# Patient Record
Sex: MALE | Race: WHITE | NOT HISPANIC OR LATINO | Employment: OTHER | URBAN - METROPOLITAN AREA
[De-identification: names, ages, dates, MRNs, and addresses within clinical notes are randomized per-mention and may not be internally consistent; named-entity substitution may affect disease eponyms.]

---

## 2017-01-12 ENCOUNTER — ALLSCRIPTS OFFICE VISIT (OUTPATIENT)
Dept: OTHER | Facility: OTHER | Age: 67
End: 2017-01-12

## 2017-02-10 ENCOUNTER — ALLSCRIPTS OFFICE VISIT (OUTPATIENT)
Dept: OTHER | Facility: OTHER | Age: 67
End: 2017-02-10

## 2017-02-10 ENCOUNTER — GENERIC CONVERSION - ENCOUNTER (OUTPATIENT)
Dept: OTHER | Facility: OTHER | Age: 67
End: 2017-02-10

## 2017-02-10 ENCOUNTER — APPOINTMENT (OUTPATIENT)
Dept: LAB | Facility: CLINIC | Age: 67
End: 2017-02-10
Payer: MEDICARE

## 2017-02-10 DIAGNOSIS — R35.1 NOCTURIA: ICD-10-CM

## 2017-02-10 DIAGNOSIS — R53.83 OTHER FATIGUE: ICD-10-CM

## 2017-02-10 LAB
ALBUMIN SERPL BCP-MCNC: 3.5 G/DL (ref 3.5–5)
ALP SERPL-CCNC: 57 U/L (ref 46–116)
ALT SERPL W P-5'-P-CCNC: 28 U/L (ref 12–78)
ANION GAP SERPL CALCULATED.3IONS-SCNC: 6 MMOL/L (ref 4–13)
AST SERPL W P-5'-P-CCNC: 18 U/L (ref 5–45)
BILIRUB SERPL-MCNC: 0.46 MG/DL (ref 0.2–1)
BUN SERPL-MCNC: 12 MG/DL (ref 5–25)
CALCIUM SERPL-MCNC: 9.8 MG/DL (ref 8.3–10.1)
CHLORIDE SERPL-SCNC: 108 MMOL/L (ref 100–108)
CO2 SERPL-SCNC: 28 MMOL/L (ref 21–32)
CREAT SERPL-MCNC: 1.12 MG/DL (ref 0.6–1.3)
GFR SERPL CREATININE-BSD FRML MDRD: >60 ML/MIN/1.73SQ M
GLUCOSE SERPL-MCNC: 92 MG/DL (ref 65–140)
POTASSIUM SERPL-SCNC: 4.4 MMOL/L (ref 3.5–5.3)
PROT SERPL-MCNC: 6.7 G/DL (ref 6.4–8.2)
PSA SERPL-MCNC: 0.7 NG/ML (ref 0–4)
SODIUM SERPL-SCNC: 142 MMOL/L (ref 136–145)
TSH SERPL DL<=0.05 MIU/L-ACNC: 2.57 UIU/ML (ref 0.36–3.74)

## 2017-02-10 PROCEDURE — 80053 COMPREHEN METABOLIC PANEL: CPT

## 2017-02-10 PROCEDURE — 84443 ASSAY THYROID STIM HORMONE: CPT

## 2017-02-10 PROCEDURE — G0103 PSA SCREENING: HCPCS

## 2017-02-10 PROCEDURE — 36415 COLL VENOUS BLD VENIPUNCTURE: CPT

## 2017-02-28 ENCOUNTER — ALLSCRIPTS OFFICE VISIT (OUTPATIENT)
Dept: OTHER | Facility: OTHER | Age: 67
End: 2017-02-28

## 2017-03-23 ENCOUNTER — ALLSCRIPTS OFFICE VISIT (OUTPATIENT)
Dept: OTHER | Facility: OTHER | Age: 67
End: 2017-03-23

## 2017-07-10 ENCOUNTER — ALLSCRIPTS OFFICE VISIT (OUTPATIENT)
Dept: OTHER | Facility: OTHER | Age: 67
End: 2017-07-10

## 2017-08-03 ENCOUNTER — ALLSCRIPTS OFFICE VISIT (OUTPATIENT)
Dept: OTHER | Facility: OTHER | Age: 67
End: 2017-08-03

## 2017-08-15 ENCOUNTER — GENERIC CONVERSION - ENCOUNTER (OUTPATIENT)
Dept: OTHER | Facility: OTHER | Age: 67
End: 2017-08-15

## 2017-08-15 ENCOUNTER — LAB REQUISITION (OUTPATIENT)
Dept: LAB | Facility: HOSPITAL | Age: 67
End: 2017-08-15
Payer: MEDICARE

## 2017-08-15 DIAGNOSIS — K62.1 RECTAL POLYP: ICD-10-CM

## 2017-08-15 DIAGNOSIS — Z86.010 HISTORY OF COLONIC POLYPS: ICD-10-CM

## 2017-08-15 DIAGNOSIS — K57.30 DIVERTICULOSIS OF LARGE INTESTINE WITHOUT PERFORATION OR ABSCESS WITHOUT BLEEDING: ICD-10-CM

## 2017-08-15 PROCEDURE — 88305 TISSUE EXAM BY PATHOLOGIST: CPT | Performed by: INTERNAL MEDICINE

## 2017-10-19 ENCOUNTER — GENERIC CONVERSION - ENCOUNTER (OUTPATIENT)
Dept: OTHER | Facility: OTHER | Age: 67
End: 2017-10-19

## 2017-10-28 NOTE — PROGRESS NOTES
Assessment    1  Polyneuropathy (356 9) (G62 9)   2  Trigeminal neuralgia (350 1) (G50 0)    Plan   Polyneuropathy, Trigeminal neuralgia    · Follow-up visit in 3 months Evaluation and Treatment  Follow-up  Status: Complete Done: [id number]   Ordered; Polyneuropathy, Trigeminal neuralgia; Ordered By: Sukhwinder Coello Performed:  Due: 71TTX4018; Last Updated By: Kyle Kiser; 7/10/2017 10:10:44 AM  OXcarbazepine 150 MG Oral Tablet; 1 tab qHS; Therapy: 68DQP6987 to (Last Rx:09Qxa4665)  Requested for: 05MYS9465; Status: ACTIVE Ordered Rx By: Sukhwinder Coello; Dispense: 0 Days ; #:30 Tablet; Refill: 2;  For: Trigeminal neuralgia; NOELLE = N; Verified Transmission to [address]; Last Updated By: [email or web address]@; 7/19/2017 3:57:27 PM   Discussion/Summary  Discussion Summary:   Overall since starting Lyrica he reports less severe pain so he will continue this dose but will not increase dose at this time  Last KFT in Feb is normal  For facial pain will add a small dose of Trileptal qHS, and consider Verapamil if not helpful  No autonomic features, no migrainous features, but headaches do somewhat mimic exertional or high pressure headaches as the pain in his right eye worsens when he sneezes  polyneuropathypossibly idiopathic at this point, no causes in blood work or specific cause in EMGpossibly low back pain contributing factorcan always repeat EMGwill try supplement(s) (as below) in the meantime and discussed to stay as active as possibleAlpha-lipoic acid 600 mg BID, B-complex, Continue B12  sleep and facial pain:try Trileptal/ oxcarbazepine first, if not helpful can try Trazodone or sleep medicine referralWean from Lamictal- 75 mg x 3 days, 50 mg x 3 days, then 25 mg x 3 days, then stop (ineffective per pt)continue Lyrica  patient will follow up in 3 months or earlier if needed  The patient was encouraged to call in the meantime with any questions or concerns   The patient was told to call 911 or report to the nearest ER with any new or worsening neurological symptoms  He expressed understanding of the plan and was appreciative  Medication SE Review and Pt Understands Tx: Possible side effects of new medications were reviewed with the patient/guardian today  The treatment plan was reviewed with the patient/guardian  The patient/guardian understands and agrees with the treatment plan   Patient Guardian understands agrees: The treatment plan was reviewed with the patient/guardian  The patient/guardian understands and agrees with the treatment plan      Chief Complaint  Chief Complaint Free Text Note Form: Patient present for follow up regarding Trigeminal neuralgia      History of Present Illness  HPI: Judy Trevino is a 79year old male presenting to the office today in neurological follow up of trigeminal neuralgia  is s/p Right V2 Rhizotomy by Dr Johanna Solitario on 8/8/2014  In addition he has a previous history of prior gamma knife radiosurgery in 2007 and 2010 by Dr Nelson Alfonso and Dr Erick Powell for his trigeminal neuralgia  recently seen by neurosurgery s/p trigeminal RF rhizotomy (--2014 and pain went away for about 1 5 years) and 2 gamma knife procedures (--2008 and --2011)  last seen facial pain is worse and constant  Constant ?background pain? and breakthrough or severe episodes are 1-2 times daily  pain level- 4-5/10daily; 1-2 times daily it is severe for several minutesconstant; 15 minutes when a severe episode occursaverage pain level 4-5/10 ? background pain,? up to 9/10 when severeright side V2>V1; just below the right eye; new at upper right forehead (where gamma knife frame was screwed into his head)sharp, dull, nagging, ice pick likeunsure, ?eating/ chewingmakes it worse: sneezing makes it 10/10  medications:  Lyrica, baclofen, aspirin, tramadol, amitriptyline (stopped due to fatigue), carbamazepine, cymbalta- no improvement, lamictal- no improvementtx: As noted above-- Hx of right sided rhizotomy with improvement of pain but wishes to hold on repeat rhizotomy   knee pain R>L:Chronic condition, unchanged since last seen, has caused falls due to pain, pain worsening over time  Follows with orthopedics and noticed injections do help, injections help with balance, did see ortho had inj no improvement  Started approx  September 2015, since last has improved, believes due to knees not brain  Will intermittently become lightheaded/sensation of self spinning occurs les often, occurs intermittently with walking/exertion/change directions but less frequent, will occur with quick movements/standing quickly, will last approx  1-2 seconds often occurs with severe pain  Does have hx of meningioma  brain- stable  changes- Since last seen unchanged  Started approx  February 2016, located in all toes, no radiation, constant, sensation of tingling and numbness  No progression over time  June 2016- subacute to chronic polyneuropathy  6-7 hours every nightSome nights 4 hours but that is unusualDenies clenching, denies snoringRestless leg sxs, tingling in feet as wellNever seen sleep specialist  of systems, Past medical history, Surgical history, Family history, Social history and Medication history were all reviewed today  Review of Systems  Neurological ROS:  Constitutional: no fever, no chills, no recent weight gain, no recent weight loss, no complaints of feeling tired, no changes in appetite  HEENT: hearing loss-- and-- tinnitus  Cardiovascular:  no chest pain or pressure, no palpitations present, the heart rate was not rapid or irregular, no swelling in the arms or legs, no poor circulation  Respiratory:  no unusual or persistant cough, no shortness of breath with or without exertion  Gastrointestinal: loss of bowel control  Genitourinary:  no incontinence, no feelings of urinary urgency, no increase in frequency, no urinary hesitancy, no dysuria, no hematuria    Musculoskeletal: arthralgias,-- immobility or loss of function,-- head/neck/back pain-- and-- pain while walking  Integumentary  no masses, no rash, no skin lesions, no livedo reticularis  Psychiatric:  no anxiety, no depression, no mood swings, no psychiatric hospitalizations, no sleep problems  Endocrine loss of sexual ability or drive   Hematologic/Lymphatic:  no unusual bleeding, no tendency for easy bruising, no clotting skin or lumps  Neurological General:  no headache, no nausea or vomiting, no lightheadedness, no convulsions, no blackouts, no syncope, no trauma, no photopsia, no increased sleepiness, no trouble falling asleep, no snoring, no awakening at night  Neurological Mental Status: memory problems  Neurological Cranial Nerves:  no blurry or double vision, no loss of vision, no face drooping, no facial numbness or weakness, no taste or smell loss/changes, no hearing loss or ringing, no vertigo or dizziness, no dysphagia, no slurred speech  Neurological Motor findings include:  no tremor, no twitching, no cramping(pre/post exercise), no atrophy  Neurological Coordination: balance difficulties-- and-- clumsiness  Neurological Sensory: tingling  Neurological Gait: difficulty walking  ROS Reviewed:   ROS reviewed  Active Problems     1  Abnormal tandem gait test (781 2) (R26 9)   2  Aftercare following surgery (V58 89) (Z48 89)   3  Anemia (285 9) (D64 9)   4  Backache (724 5) (M54 9)   5  Bilateral patellofemoral syndrome (719 46) (M22 2X1,M22 2X2)   6  Esophageal reflux (530 81) (K21 9)   7  Fatigue (780 79) (R53 83)   8  Gastroenteritis (558 9) (K52 9)   9  Intracranial meningioma (225 2) (D32 0)   10  Knee pain (719 46) (M25 569)   11  Need for pneumococcal vaccination (V03 82) (Z23)   12  Nocturia (788 43) (R35 1)   13  Paresthesia (782 0) (R20 2)   14  Past pointing on examination (334 3) (R27 8)   15  Polyneuropathy (356 9) (G62 9)   16  Primary osteoarthritis of knees, bilateral (715 16) (M17 0)   17   Prostate cancer screening (V76 44) (Z12 5)   18  Sensory disturbance (782 0) (R20 9)   19  Trigeminal neuralgia (350 1) (G50 0)   20  Urinary frequency (788 41) (R35 0)   21  Vertigo (780 4) (R42)   22  Vitamin D deficiency (268 9) (E55 9)  Primary osteoarthritis of right knee (715 16) (M17 11)       Surgical History  1  History of Appendectomy   2  History of Arthroscopy Knee Right   3  History of Brain Surgery   4  History of Hernia Repair   5  History of Stereotactic Ablation Of Gasserian Ganglion    Family History  Mother    1  Family history of emphysema (V17 6) (Z82 5)  Father    2  Family history of benign neoplasm of colon (V19 8) (Z84 89)  Maternal Grandmother    3  Family history of    4  Family history of skin cancer (V16 8) (Z80 8)  Paternal Grandmother    11  Family history of   Family History    6  Family history of Family Health Status Of Father -    9  Family history of Family Health Status Of Mother -     Social History     · Being A Social Drinker   · Caffeine use (V49 89) (F15 90)   · Daily caffeinated cola consumption   · Drinks coffee   · Denied: History of Drug use   · Marital History - Currently    · Never a smoker   · Never A Smoker   · Occupation:    Current Meds   1  Aspirin 325 MG Oral Tablet; TAKE 1 TABLET DAILY; Therapy: (Recorded:82Fll1705) to Recorded   2  Baclofen 10 MG Oral Tablet; Take 1 tablet every 8 hours as needed for pain; Therapy: 66QHL4433 to (Rigo Fisher)  Requested for: 59VOZ0510; Last Rx:2016 Ordered   3  Calcium CAPS; TAKE ONE CAPLET BY MOUTH DAILY; Therapy: (Recorded:62Czr5701) to Recorded   4  LamoTRIgine 100 MG Oral Tablet; Take 1 tablet qhs until done titration then 1 tab bid; Therapy: 32ZAC9586 to (Evaluate:90Bzw0767)  Requested for: 16LIB2411; Last Rx:2017 Ordered   5  LamoTRIgine 25 MG Oral Tablet; 1 tab qam x 5 days, then 2 tab qam x 5 days, then 3 tab qam x 5 day, then 100mg bid;  Therapy: 61SWU6336 to (Evaluate:2018)  Requested for: 91RRU8699; Last Rx:23Mar2017 Ordered   6  Lyrica 200 MG Oral Capsule; take 2 caps Q AM, 1 capsule in the afternoon and 1 cap Q PM; Last Rx:11Jzc5103 Ordered   7  PreserVision AREDS TABS; TAKE 1 TABLET DAILY; Therapy: (Recorded:24Jun2015) to Recorded   8  Protonix 40 MG Oral Tablet Delayed Release; take 1 tab daily; Therapy: (Recorded:24Jun2015) to Recorded   9  Tylenol Extra Strength 500 MG Oral Tablet; TAKE 1 TABLET EVERY 4 TO 6 HOURS AS NEEDED; Therapy: (Recorded:24Jun2015) to Recorded   10  Vitamin B-12 TABS; Take 1 tablet by mouth once daily; Therapy: (Recorded:10Feb2017) to Recorded   11  Vitamin B6 TABS; TAKE 1 TABLET DAILY AS DIRECTED; Therapy: (Recorded:10Feb2017) to Recorded   12  Vitamin D3 CAPS; Take 1000 IU by mouth daily; Therapy: (Recorded:10Feb2017) to Recorded    Allergies    1  No Known Drug Allergies    Vitals  Signs   Recorded: 64Aep4572 09:22AM   Heart Rate: 81  Respiration: 16  Systolic: 562  Diastolic: 66  Weight: 375 lb 3 oz  BMI Calculated: 36 9  BSA Calculated: 2 32  O2 Saturation: 97    Physical Exam   Constitutional  General appearance: Abnormal   obese  Musculoskeletal  Gait and station: Abnormal  -- (cannot walk in tandem) Gait evaluation demonstrated a wide-based and ataxic gait  Muscle strength: Normal strength throughout  Muscle tone: No atrophy, abnormal movements, flaccidity, cogwheeling or spasticity  Neurologic  Language: Names objects, able to repeat phrases and speaks spontaneously  Language:  The evaluation was normal   2nd cranial nerve: Normal    3rd, 4th, and 6th cranial nerves: Normal    5th cranial nerve: Normal    7th cranial nerve: Normal    8th cranial nerve: Normal    9th cranial nerve: Normal    10th cranial nerve: Normal    11th cranial nerve: Normal    12th cranial nerve: Normal    Sensation: Normal    Reflexes: Abnormal   Deep tendon reflexes: 1+ right patella,-- 1+ left patella,-- 1+ right ankle jerk-- and-- 1+ left ankle jerk2+ right biceps,-- 2+ left biceps,-- 2+ right triceps,-- 2+ left triceps,-- 2+ right brachioradialis,-- 2+ left brachioradialis,-- no ankle clonus on the right-- and-- no ankle clonus on the left  Coordination: Normal    Mood and affect: Normal  -- very pleasant  Future Appointments    Date/Time Provider Specialty Site   10/12/2017 02:00 PM Ha Sampson, [facility]       Signatures   Electronically signed by : Randi Drake [facility]; Jul 23 2017  8:23PM EST                       (Author)    Electronically signed by :  Kaelyn Talavera DO; Oct  9 2017  4:31PM EST                       (Author)

## 2018-01-13 VITALS
BODY MASS INDEX: 36.51 KG/M2 | HEART RATE: 83 BPM | WEIGHT: 255 LBS | DIASTOLIC BLOOD PRESSURE: 70 MMHG | HEIGHT: 70 IN | SYSTOLIC BLOOD PRESSURE: 112 MMHG

## 2018-01-13 VITALS
SYSTOLIC BLOOD PRESSURE: 124 MMHG | HEART RATE: 60 BPM | OXYGEN SATURATION: 97 % | BODY MASS INDEX: 37.79 KG/M2 | WEIGHT: 263.4 LBS | DIASTOLIC BLOOD PRESSURE: 74 MMHG

## 2018-01-14 VITALS
BODY MASS INDEX: 37.58 KG/M2 | HEART RATE: 72 BPM | RESPIRATION RATE: 18 BRPM | WEIGHT: 262.5 LBS | SYSTOLIC BLOOD PRESSURE: 128 MMHG | HEIGHT: 70 IN | DIASTOLIC BLOOD PRESSURE: 78 MMHG

## 2018-01-15 VITALS
SYSTOLIC BLOOD PRESSURE: 102 MMHG | WEIGHT: 257.19 LBS | DIASTOLIC BLOOD PRESSURE: 66 MMHG | RESPIRATION RATE: 16 BRPM | OXYGEN SATURATION: 97 % | BODY MASS INDEX: 36.9 KG/M2 | HEART RATE: 81 BPM

## 2018-01-15 VITALS
BODY MASS INDEX: 38.38 KG/M2 | RESPIRATION RATE: 18 BRPM | DIASTOLIC BLOOD PRESSURE: 75 MMHG | WEIGHT: 267.5 LBS | HEART RATE: 78 BPM | SYSTOLIC BLOOD PRESSURE: 117 MMHG

## 2018-01-15 VITALS
WEIGHT: 268 LBS | RESPIRATION RATE: 16 BRPM | HEART RATE: 64 BPM | TEMPERATURE: 97.8 F | DIASTOLIC BLOOD PRESSURE: 70 MMHG | HEIGHT: 70 IN | SYSTOLIC BLOOD PRESSURE: 140 MMHG | BODY MASS INDEX: 38.37 KG/M2

## 2018-01-15 NOTE — RESULT NOTES
Message   all bw was ok for pt     Verified Results  (1) COMPREHENSIVE METABOLIC PANEL [id number] 08:13OC Lindsey Quintero Order Number: [id number]     Test Name Result Flag Reference   GLUCOSE,RANDM 92 mg/dL     If the patient is fasting, the ADA then defines impaired fasting glucose as > 100 mg/dL and diabetes as > or equal to 123 mg/dL  SODIUM 142 mmol/L  136-145   POTASSIUM 4 4 mmol/L  3 5-5 3   CHLORIDE 108 mmol/L  100-108   CARBON DIOXIDE 28 mmol/L  21-32   ANION GAP (CALC) 6 mmol/L  4-13   BLOOD UREA NITROGEN 12 mg/dL  5-25   CREATININE 1 12 mg/dL  0 60-1 30   Standardized to IDMS reference method   CALCIUM 9 8 mg/dL  8 3-10 1   BILI, TOTAL 0 46 mg/dL  0 20-1 00   ALK PHOSPHATAS 57 U/L     ALT (SGPT) 28 U/L  12-78   AST(SGOT) 18 U/L  5-45   ALBUMIN 3 5 g/dL  3 5-5 0   TOTAL PROTEIN 6 7 g/dL  6 4-8 2   eGFR Non-African American      >60 0 ml/min/1 73sq m   - Patient Instructions: This bloodwork is non-fasting  Please drink two glasses of water morning of bloodwork  National Kidney Disease Education Program recommendations are as follows:  GFR calculation is accurate only with a steady state creatinine  Chronic Kidney disease less than 60 ml/min/1 73 sq  meters  Kidney failure less than 15 ml/min/1 73 sq  meters  (1) TSH 83OPE2474 50:77BP Lindsey Quintero Order Number: [id number]     Test Name Result Flag Reference   TSH 2 570 uIU/mL  0 358-3 740   - Patient Instructions: This bloodwork is non-fasting  Please drink two glasses of water morning of bloodwork  - Patient Instructions: This bloodwork is non-fasting  Please drink two glasses of water morning of bloodwork  Patients undergoing fluorescein dye angiography may retain small amounts of fluorescein in the body for 48-72 hours post procedure  Samples containing fluorescein can produce falsely depressed TSH values  If the patient had this procedure,a specimen should be resubmitted post fluorescein clearance  (1) PSA (SCREEN) (Dx V76 44 Screen for Prostate Cancer) 12HJG2139 70:38OH Anniece June Order Number: [id number]     Test Name Result Flag Reference   PROSTATE SPECIFIC ANTIGEN 0 7 ng/mL  0 0-4 0   American Urological Association Guidelines define biochemical recurrence of prostate cancer as a detectable or rising PSA value post-radical prostatectomy that is greater than or equal to 0 2 ng/mL with a second confirmatory level of greater than or equal to 0 2 ng/mL  - Patient Instructions: This test is non-fasting  Please drink two glasses of water morning of bloodwork  - Patient Instructions: This test is non-fasting  Please drink two glasses of water morning of bloodwork

## 2018-01-22 VITALS
BODY MASS INDEX: 36.7 KG/M2 | HEART RATE: 67 BPM | WEIGHT: 256.38 LBS | HEIGHT: 70 IN | SYSTOLIC BLOOD PRESSURE: 108 MMHG | DIASTOLIC BLOOD PRESSURE: 60 MMHG

## 2018-01-23 ENCOUNTER — ALLSCRIPTS OFFICE VISIT (OUTPATIENT)
Dept: OTHER | Facility: OTHER | Age: 68
End: 2018-01-23

## 2018-01-24 NOTE — PROGRESS NOTES
Assessment   1  Trigeminal neuralgia (350 1) (G50 0)   2  Intracranial meningioma (225 2) (D32 0)    Plan   Intracranial meningioma    · 1 - Ben Egan MD, Geraldine Troy  (Neurosurgery) Co-Management  *pt previous patient  Wants    to know if any options available surgically  Status: Active  Requested for: [id number]   Ordered; For: Intracranial meningioma; Ordered By: Jean Harmon Performed:  Due: 55DAW4932; Last Updated By: Peyton Newton; 1/23/2018 2:01:18 PM  Care Summary provided  : Yes  Intracranial meningioma, Paresthesia, Trigeminal neuralgia    · Follow-up visit in 2 months Evaluation and Treatment  Follow-up  Status: Complete     Done: [id number]   Ordered; For: Intracranial meningioma, Paresthesia, Trigeminal neuralgia; Ordered By: Jean Harmon Performed:  Due: 37PJA2625; Last Updated By: Peyton Newton; 1/23/2018 2:00:58 PM  Sensory disturbance, Trigeminal neuralgia    · LamoTRIgine 25 MG Oral Tablet   Rx By: Jean Harmon; Dispense: 90 Days ; #:360 Tablet; Refill: 3;For: Sensory disturbance, Trigeminal neuralgia; NOELLE = N; Sent To: [address]  Trigeminal neuralgia    · OXcarbazepine 150 MG Oral Tablet; 1 tablet daily for 5 days then increase to 1    tablet twice  a day   Rx By: Jean Harmon; Dispense: 0 Days ; #:60 Tablet; Refill: 3;For: Trigeminal neuralgia; NOELLE = N; Verified Transmission to [address]; Last Updated By: System, [employer]; 1/23/2018 1:58:45 PM    Discussion/Summary   Discussion Summary:    Preventative: Lamictal 100 mg twice a day Lyrica as previously prescribed oxcarbazepine 150 mg at bedtime for 5 days  Increase to 1 tablet twice a day after that  If any confusion or disorientation, we will discontinue that medication and start Dilantin  Finally, if this fails we may want to try to apply for Botox   follow up with Dr Cristobal Ugalde in 2 months  Patient and wife noted call with any problems or concerns prior to this appointment        Chief Complaint   Chief Complaint Free Text Note Form: Patient present for follow-up appointment regarding trigeminal neuralgia      History of Present Illness   HPI: Sandie Meade is a 79year old male presenting to the office today in neurological follow up of trigeminal neuralgia  is s/p Right V2 Rhizotomy by Dr Lida Mcrae on 8/8/2014  In addition he has a previous history of prior gamma knife radiosurgery in 2007 and 2010 by Dr Fadumo Pelaez and Dr Gonzalez Guerrero for his trigeminal neuralgia  recently seen by neurosurgery s/p trigeminal RF rhizotomy (--2014 and pain went away for about 1 5 years) and 2 gamma knife procedures (--2008 and --2011)  last seen facial pain is worse and constant  Constant âbackground painâ and breakthrough or severe episodes are 1-2 times daily  pain level- 7/10 daily; always presents, lowest 1-2/10, 1-2 times daily it is severe for several minutes 10/10 constant; 15 minutes when a severe episode occurs average pain level 4-5/10 âbackground pain,â up to 10/10 when severe right side V2>V1; just below the right eye; new at upper right forehead (where gamma knife frame was screwed into his head) sharp, dull, nagging, ice pick like unsure, ?eating/ chewing makes it worse: sneezing makes it 10/10   medications:   Lyrica, baclofen, aspirin, tramadol, amitriptyline (stopped due to fatigue), carbamazepine-makes goofy/confusion, cymbalta- no improvement, ,trileptal-dizzy, confusion, lamictal tx: As noted above-- Hx of right sided rhizotomy with improvement of pain but wishes to hold on repeat rhizotomy      knee pain R>L: Chronic condition, unchanged since last seen, has caused falls due to pain, pain worsening over time  Follows with orthopedics and noticed injections do help, injections help with balance, did see ortho had inj no improvement   Started approx  September 2015, since last has improved, believes due to knees not brain   Will intermittently become lightheaded/sensation of self spinning occurs less often, occurs intermittently with walking/exertion/change directions but less frequent, will occur with quick movements/standing quickly, will last approx  1-2 seconds often occurs with severe pain  Does have hx of meningioma  brain- stable   changes- Since last seen unchanged  Started approx  February 2016, located in all toes, no radiation, constant, sensation of tingling and numbness  No progression over time  June 2016- subacute to chronic polyneuropathy   goes to bed between 12-1 am, get up at 8-815am 6-7 hours every night Some nights 4 hours but that is unusual some daytime naps 10-90 minutes Denies clenching, denies snoring Restless leg sxs, tingling in feet as well Never seen sleep specialist   of systems, Past medical history, Surgical history, Family history, Social history and Medication history were all reviewed today  Review of Systems   Neurological ROS:      Constitutional: no fever, no chills, no recent weight gain, no recent weight loss, no complaints of feeling tired, no changes in appetite  HEENT: blurred vision-- and-- hearing loss  Cardiovascular:  no chest pain or pressure, no palpitations present, the heart rate was not rapid or irregular, no swelling in the arms or legs, no poor circulation  Respiratory:  no unusual or persistant cough, no shortness of breath with or without exertion  Gastrointestinal:  no nausea, no vomiting, no diarrhea, no abdominal pain, no changes in bowel habits, no melena, no loss of bowel control  Genitourinary: feelings of urinary urgency  Musculoskeletal: arthralgias-- and-- pain while walking  Integumentary  no masses, no rash, no skin lesions, no livedo reticularis  Psychiatric:  no anxiety, no depression, no mood swings, no psychiatric hospitalizations, no sleep problems  Endocrine   no unusual weight loss or gain, no excessive urination, no excessive thirst, no hair loss or gain, no hot or cold intolerance, no menstrual period change or irregularity, no loss of sexual ability or drive, no erection difficulty, no nipple discharge  Hematologic/Lymphatic:  no unusual bleeding, no tendency for easy bruising, no clotting skin or lumps  Neurological General: headache  Neurological Mental Status: memory problems  Neurological Cranial Nerves: facial numbness or weakness  Neurological Motor findings include:  no tremor, no twitching, no cramping(pre/post exercise), no atrophy  Neurological Coordination: clumsiness  Neurological Sensory:  no numbness, no pain, no tingling, does not fall when eyes closed or taking a shower  Neurological Gait: difficulty walking  ROS Reviewed:    ROS reviewed  Active Problems   1  Abnormal tandem gait test (781 2) (R26 9)   2  Aftercare following surgery (V58 89) (Z48 89)   3  Anemia (285 9) (D64 9)   4  Backache (724 5) (M54 9)   5  Bilateral patellofemoral syndrome (719 46) (M22 2X1,M22 2X2)   6  Diverticulosis of colon (562 10) (K57 30)   7  Esophageal reflux (530 81) (K21 9)   8  Fatigue (780 79) (R53 83)   9  Gastroenteritis (558 9) (K52 9)   10  History of colon polyps (V12 72) (Z86 010)   11  Intracranial meningioma (225 2) (D32 0)   12  Knee pain (719 46) (M25 569)   13  Need for pneumococcal vaccination (V03 82) (Z23)   14  Nocturia (788 43) (R35 1)   15  Paresthesia (782 0) (R20 2)   16  Past pointing on examination (334 3) (R27 8)   17  Polyneuropathy (356 9) (G62 9)   18  Primary osteoarthritis of knees, bilateral (715 16) (M17 0)   19  Primary osteoarthritis of right knee (715 16) (M17 11)   20  Prostate cancer screening (V76 44) (Z12 5)   21  Screening for colon cancer (V76 51) (Z12 11)   22  Sensory disturbance (782 0) (R20 9)   23  Trigeminal neuralgia (350 1) (G50 0)   24  Urinary frequency (788 41) (R35 0)   25  Vertigo (780 4) (R42)   26   Vitamin D deficiency (268 9) (E55 9)    Past Medical History   Active Problems And Past Medical History Reviewed: The active problems and past medical history were reviewed and updated today  Surgical History   1  History of Appendectomy   2  History of Arthroscopy Knee Right   3  History of Brain Surgery   4  History of Hernia Repair   5  History of Stereotactic Ablation Of Gasserian Ganglion  Surgical History Reviewed: The surgical history was reviewed and updated today  Family History   Mother    1  Family history of emphysema (V17 6) (Z82 5)  Father    2  Family history of benign neoplasm of colon (V19 8) (Z84 89)  Maternal Grandmother    3  Family history of    4  Family history of skin cancer (V16 8) (Z80 8)  Paternal Grandmother    11  Family history of   Family History    6  Family history of Family Health Status Of Father -    9  Family history of Family Health Status Of Mother -   Family History Reviewed: The family history was reviewed and updated today  Social History    · Being A Social Drinker   · Caffeine use (V49 89) (F15 90)   · Daily caffeinated cola consumption   · Drinks coffee   · Denied: History of Drug use   · Marital History - Currently    · Never a smoker   · Never A Smoker   · Occupation:  Social History Reviewed: The social history was reviewed and updated today  The social history was reviewed and is unchanged  Current Meds    1  Aspirin 325 MG Oral Tablet; TAKE 1 TABLET DAILY; Therapy: (Recorded:64Gxb8962) to Recorded   2  Baclofen 10 MG Oral Tablet; Take 1 tablet every 8 hours as needed for pain; Therapy: 47JKV2036 to (Evaluate:2018)  Requested for: 11NUI8192; Last     Rx:2017 Ordered   3  Calcium CAPS; TAKE ONE CAPLET BY MOUTH DAILY; Therapy: (Recorded:03Hrk8143) to Recorded   4  LamoTRIgine 100 MG Oral Tablet; Take 1 tablet qhs until done titration then 1 tab bid; Therapy: 79XXO9694 to (Evaluate:2018)  Requested for: 2017; Last     Rx:2017 Ordered   5   LamoTRIgine 25 MG Oral Tablet; 1 tab qam x 5 days, then 2 tab qam x 5 days, then 3     tab qam x 5 day, then 100mg bid; Therapy: 34DPS3574 to (Evaluate:74Kpl4669)  Requested for: 19Oct2017; Last     Rx:19Oct2017 Ordered   6  Lyrica 200 MG Oral Capsule; take 2 caps Q AM, 1 capsule in the afternoon and 1 cap Q     PM; Last Rx:19Oct2017 Ordered   7  PreserVision AREDS TABS; TAKE 1 TABLET DAILY; Therapy: (Recorded:24Jun2015) to Recorded   8  Tylenol Extra Strength 500 MG Oral Tablet; TAKE 1 TABLET EVERY 4 TO 6 HOURS AS     NEEDED; Therapy: (Recorded:24Jun2015) to Recorded   9  Vitamin B-12 TABS; Take 1 tablet by mouth once daily; Therapy: (Recorded:00Tct7463) to Recorded   10  Vitamin B6 TABS; TAKE 1 TABLET DAILY AS DIRECTED; Therapy: (Recorded:77Rqq8445) to Recorded   11  Vitamin D3 CAPS; Take 1000 IU by mouth daily; Therapy: (Recorded:36Wjv8632) to Recorded  Medication List Reviewed: The medication list was reviewed and updated today  Allergies   1  No Known Drug Allergies    Vitals   Signs   Recorded: 29YFC2009 01:27PM   Heart Rate: 68  Respiration: 16  Systolic: 410  Diastolic: 66  Height: 5 ft 10 in  Weight: 258 lb 7 oz  BMI Calculated: 37 08  BSA Calculated: 2 33    Physical Exam        Constitutional      General appearance: No acute distress, well appearing and well nourished  Eyes      Ophthalmoscopic examination: Vision is grossly normal  Gross visual field testing by confrontation shows no abnormalities  EOMI in both eyes  Conjunctivae clear  Eyelids normal palpebral fissures equal  Orbits exhibit normal position  No discharge from the eyes  PERRL  Musculoskeletal      Gait and station: Normal gait, stance and balance  Muscle strength: Normal strength throughout  Neurologic      Orientation to person, place, and time: Normal        Attention span and concentration: Normal thought process and attention span         2nd cranial nerve: Normal        3rd, 4th, and 6th cranial nerves: Normal        5th cranial nerve: Normal        7th cranial nerve: Normal        8th cranial nerve: Normal        9th cranial nerve: Normal        11th cranial nerve: Normal        12th cranial nerve: Normal        Sensation: Normal        Reflexes: Abnormal   Deep tendon reflexes: 1+ right biceps,-- 1+ left biceps,-- 1+ right brachioradialis,-- 1+ left brachioradialis,-- 1+ right patella-- and-- 1+ left patella  Coordination: Normal        Mood and affect: Abnormal  -- flat  Attending Note   Collaborating Physician Note: Collaborating Note: I agree with the Advanced Practitioner note        Signatures    Electronically signed by : Jimena Barton [facility]; Jan 23 2018  2:57PM EST                       (Author)     Electronically signed by : Rigo Giordano MD; Jan 23 2018  3:48PM EST                       (Co-participant)

## 2018-01-25 ENCOUNTER — TELEPHONE (OUTPATIENT)
Dept: NEUROLOGY | Facility: CLINIC | Age: 68
End: 2018-01-25

## 2018-01-25 DIAGNOSIS — G50.0 TRIGEMINAL NEURALGIA: Primary | ICD-10-CM

## 2018-01-25 NOTE — TELEPHONE ENCOUNTER
Pt called to report that he was started on Oxcarbazepine 150 mg 1 tab x 5 days then 1 tab BID on 1/23/18  He reports increased confusion, ringing ears and poor concentration  [facility] advice      [phone]

## 2018-01-26 ENCOUNTER — TELEPHONE (OUTPATIENT)
Dept: NEUROSURGERY | Facility: CLINIC | Age: 68
End: 2018-01-26

## 2018-01-26 RX ORDER — PHENYTOIN 50 MG/1
50 TABLET, CHEWABLE ORAL 3 TIMES DAILY
Qty: 90 TABLET | Refills: 0 | Status: SHIPPED | OUTPATIENT
Start: 2018-01-26 | End: 2018-02-21 | Stop reason: SDUPTHER

## 2018-01-26 RX ORDER — AMITRIPTYLINE HYDROCHLORIDE 10 MG/1
10 TABLET, FILM COATED ORAL
Qty: 60 TABLET | Refills: 0 | Status: SHIPPED | OUTPATIENT
Start: 2018-01-26 | End: 2018-02-21 | Stop reason: SDUPTHER

## 2018-01-26 NOTE — TELEPHONE ENCOUNTER
Called pt back and stating that he cannot tolerate taking Oxcarbazepine 150 mg 1 tab daily  He would like other alternative   Pls advice

## 2018-01-26 NOTE — TELEPHONE ENCOUNTER
Will start him on Dilantin as we did discuss at our last visit    Sent electronically to his pharmacy

## 2018-01-26 NOTE — TELEPHONE ENCOUNTER
Called pt back regarding below  He declined to take Oxcarbazepine 150 mg 1 tab as you suggested  Pt stated, "I took it yesterday and I'm afraid to take it bec it's making me too confused, ringing ears and I cannot function"  He is requesting for you to call him if possible       [phone]

## 2018-01-26 NOTE — TELEPHONE ENCOUNTER
We discussed at his visit that there aren't many options available for him but he increased to twice a day 3 days ago    Please have him try once a day until Monday and then call back if still having issues

## 2018-01-26 NOTE — TELEPHONE ENCOUNTER
Received call from patient reports is  s/p Rhizotomy approximately 3 years ago  Has efficacy with intermittent mild pain  now with exacerbation of trigeminal neuralgia symptoms  Starting approximately 2 months ago   He describes his symptoms as  Sharp stabbing right sided facial  Pain  Started just under the eye, has progressed  to include above right eye brow  He reports the pain is debilitating him, cannot wear dentures causes pain to worsen  Cannot wear dentures , diet changed to soft  , drink fluids without intolerance  He is followed by neurologist DR Danay Cox , prescribes Lyrica and Lamotrigine is requesting appoint with DR Abdi Jean to discuss treatment options  Plan; discuss with Dr Abdi Jean- for appointment    Patient in agreement with plan and that I will return call by Tuesday

## 2018-02-12 ENCOUNTER — OFFICE VISIT (OUTPATIENT)
Dept: NEUROSURGERY | Facility: CLINIC | Age: 68
End: 2018-02-12
Payer: MEDICARE

## 2018-02-12 VITALS
WEIGHT: 258 LBS | HEART RATE: 72 BPM | BODY MASS INDEX: 38.21 KG/M2 | RESPIRATION RATE: 16 BRPM | SYSTOLIC BLOOD PRESSURE: 130 MMHG | DIASTOLIC BLOOD PRESSURE: 70 MMHG | HEIGHT: 69 IN | TEMPERATURE: 97.5 F

## 2018-02-12 DIAGNOSIS — G50.0 TRIGEMINAL NEURALGIA OF RIGHT SIDE OF FACE: Primary | ICD-10-CM

## 2018-02-12 PROCEDURE — 99214 OFFICE O/P EST MOD 30 MIN: CPT | Performed by: NEUROLOGICAL SURGERY

## 2018-02-12 RX ORDER — LANOLIN ALCOHOL/MO/W.PET/CERES
1 CREAM (GRAM) TOPICAL DAILY
COMMUNITY

## 2018-02-12 RX ORDER — BACLOFEN 10 MG/1
TABLET ORAL
COMMUNITY
Start: 2018-01-03 | End: 2018-05-01

## 2018-02-12 RX ORDER — VIT A/VIT C/VIT E/ZINC/COPPER 2148-113
1 TABLET ORAL DAILY
COMMUNITY
End: 2021-04-06

## 2018-02-12 RX ORDER — BIOTIN 1 MG
1 TABLET ORAL DAILY
COMMUNITY
End: 2020-05-22

## 2018-02-12 RX ORDER — ASPIRIN 325 MG
1 TABLET ORAL DAILY
Status: ON HOLD | COMMUNITY
End: 2018-03-06 | Stop reason: ALTCHOICE

## 2018-02-12 RX ORDER — LAMOTRIGINE 100 MG/1
TABLET ORAL
Status: ON HOLD | COMMUNITY
Start: 2017-03-23 | End: 2018-03-06 | Stop reason: ALTCHOICE

## 2018-02-12 RX ORDER — MULTIVITAMIN WITH IRON
1 TABLET ORAL DAILY
COMMUNITY
End: 2021-08-30

## 2018-02-12 RX ORDER — PREGABALIN 200 MG/1
CAPSULE ORAL
COMMUNITY
End: 2018-02-21 | Stop reason: SDUPTHER

## 2018-02-12 NOTE — PROGRESS NOTES
Assessment/Plan:    No problem-specific Assessment & Plan notes found for this encounter  Diagnoses and all orders for this visit:    Trigeminal neuralgia of right side of face    Summary: This is a 27-year-old male with a history of right-sided facial pain  He has a known  meningioma in [facility]  Previous gamma knife radiation x2  Underwent right-sided V2 and V3 trigeminal rhizotomy 3 5 years ago with significant relief of his pain  He has had progressive return of his pain in the V2 distribution, but has also now developed pain in the V1 distribution  His pain is present despite multiple adjustments to his medications  The option of a repeat procedure was reviewed with the patient and his wife  They wish to proceed with this option  They understand the variable results with a repeat of the procedure  Additional discussion was also held in reference to performing the procedure in the V1 distribution with a loss of corneal sensation and response  He does wear glasses  They understand this risk involved and wished to proceed  We will therefore proceed with a right-sided trigeminal radiofrequency rhizotomy to the V1 and V2 distribution  The risks and benefits of the surgery were reviewed with the patient and family and they wish to proceed  These risks include, but are not limited to bleeding, infection, stroke, loss of corneal response and treatment failure  All questions were answered and appropriate contact information was given in case questions arise in the future  In the past the wife had difficulty coping with the chronic pain of her   They have instituted lifestyle adjustments and have been well in this regard  Subjective:      Patient ID: Luis Cole is a 79 y o  male  HPI   This is a very pleasant 27-year-old male with a history of right-sided facial pain since 2007  Upon workup he was found to have a meningioma of 4 mm in [facility]    He underwent gamma knife radiation in 2007 and 2010 at [facility]   Follow-up MRI imaging demonstrated stability with no change in size  He did not obtain pain relief from the gamma knife radiation  He underwent a right-sided trigeminal rhizotomy 3 5 years ago with me  He had resolution of his severe pain with the expected numbness  He always remained with mild residual pain  This mild residual pain was adequately treated with medication  He has had slow return and progression of his pain pattern  He is now describing the sharp and shooting pain in his right cheek which has become severe for the last 2 months  He also reports that the pain is now extended around his right eye and especially near his right eyebrow  He has undergone medication adjustments including the addition of Lyrica, amitriptyline, and Dilantin  He presents to discuss repeat procedure  The following portions of the patient's history were reviewed and updated as appropriate: allergies, current medications, past family history, past medical history, past social history and past surgical history  Review of Systems   Constitutional: Positive for fatigue  HENT: Negative  Eyes: Positive for pain  Right eye pain around eye socket  Respiratory: Negative  Cardiovascular: Negative  Gastrointestinal: Positive for constipation  Endocrine: Negative  Genitourinary: Negative  Musculoskeletal: Negative  Skin: Negative  Allergic/Immunologic: Negative  Neurological: Positive for facial asymmetry  Hematological: Negative     Psychiatric/Behavioral: Negative  Objective:    Vitals:    02/12/18 1141   BP: 130/70   Pulse: 72   Resp: 16   Temp: 97 5 °F (36 4 °C)        Physical Exam   Constitutional: He is oriented to person, place, and time  He appears well-developed  HENT:   Head: Normocephalic  Eyes: EOM are normal  Pupils are equal, round, and reactive to light     Neck: Normal range of motion  Pulmonary/Chest: Effort normal    Musculoskeletal: Normal range of motion  Neurological: He is alert and oriented to person, place, and time  He has normal strength  No cranial nerve deficit or sensory deficit  Psychiatric: He has a normal mood and affect

## 2018-02-15 ENCOUNTER — TRANSCRIBE ORDERS (OUTPATIENT)
Dept: LAB | Facility: CLINIC | Age: 68
End: 2018-02-15

## 2018-02-15 ENCOUNTER — APPOINTMENT (OUTPATIENT)
Dept: LAB | Facility: CLINIC | Age: 68
End: 2018-02-15
Payer: MEDICARE

## 2018-02-15 DIAGNOSIS — G50.0 TRIGEMINAL NEURALGIA OF RIGHT SIDE OF FACE: ICD-10-CM

## 2018-02-15 LAB
ALBUMIN SERPL BCP-MCNC: 3.6 G/DL (ref 3.5–5)
ALP SERPL-CCNC: 93 U/L (ref 46–116)
ALT SERPL W P-5'-P-CCNC: 23 U/L (ref 12–78)
ANION GAP SERPL CALCULATED.3IONS-SCNC: 4 MMOL/L (ref 4–13)
APTT PPP: 30 SECONDS (ref 23–35)
AST SERPL W P-5'-P-CCNC: 13 U/L (ref 5–45)
BASOPHILS # BLD AUTO: 0.04 THOUSANDS/ΜL (ref 0–0.1)
BASOPHILS NFR BLD AUTO: 1 % (ref 0–1)
BILIRUB SERPL-MCNC: 0.37 MG/DL (ref 0.2–1)
BILIRUB UR QL STRIP: NEGATIVE
BUN SERPL-MCNC: 16 MG/DL (ref 5–25)
CALCIUM SERPL-MCNC: 9.9 MG/DL (ref 8.3–10.1)
CHLORIDE SERPL-SCNC: 107 MMOL/L (ref 100–108)
CLARITY UR: CLEAR
CO2 SERPL-SCNC: 30 MMOL/L (ref 21–32)
COLOR UR: YELLOW
CREAT SERPL-MCNC: 1.19 MG/DL (ref 0.6–1.3)
EOSINOPHIL # BLD AUTO: 0.6 THOUSAND/ΜL (ref 0–0.61)
EOSINOPHIL NFR BLD AUTO: 8 % (ref 0–6)
ERYTHROCYTE [DISTWIDTH] IN BLOOD BY AUTOMATED COUNT: 15 % (ref 11.6–15.1)
EST. AVERAGE GLUCOSE BLD GHB EST-MCNC: 94 MG/DL
GFR SERPL CREATININE-BSD FRML MDRD: 63 ML/MIN/1.73SQ M
GLUCOSE P FAST SERPL-MCNC: 98 MG/DL (ref 65–99)
GLUCOSE UR STRIP-MCNC: NEGATIVE MG/DL
HBA1C MFR BLD: 4.9 % (ref 4.2–6.3)
HCT VFR BLD AUTO: 41.6 % (ref 36.5–49.3)
HGB BLD-MCNC: 13.3 G/DL (ref 12–17)
HGB UR QL STRIP.AUTO: NEGATIVE
INR PPP: 0.93 (ref 0.86–1.16)
KETONES UR STRIP-MCNC: NEGATIVE MG/DL
LEUKOCYTE ESTERASE UR QL STRIP: NEGATIVE
LYMPHOCYTES # BLD AUTO: 2.24 THOUSANDS/ΜL (ref 0.6–4.47)
LYMPHOCYTES NFR BLD AUTO: 31 % (ref 14–44)
MCH RBC QN AUTO: 27.1 PG (ref 26.8–34.3)
MCHC RBC AUTO-ENTMCNC: 32 G/DL (ref 31.4–37.4)
MCV RBC AUTO: 85 FL (ref 82–98)
MONOCYTES # BLD AUTO: 0.62 THOUSAND/ΜL (ref 0.17–1.22)
MONOCYTES NFR BLD AUTO: 9 % (ref 4–12)
NEUTROPHILS # BLD AUTO: 3.8 THOUSANDS/ΜL (ref 1.85–7.62)
NEUTS SEG NFR BLD AUTO: 51 % (ref 43–75)
NITRITE UR QL STRIP: NEGATIVE
NRBC BLD AUTO-RTO: 0 /100 WBCS
PH UR STRIP.AUTO: 6.5 [PH] (ref 4.5–8)
PLATELET # BLD AUTO: 290 THOUSANDS/UL (ref 149–390)
PMV BLD AUTO: 9.6 FL (ref 8.9–12.7)
POTASSIUM SERPL-SCNC: 4.2 MMOL/L (ref 3.5–5.3)
PROT SERPL-MCNC: 7 G/DL (ref 6.4–8.2)
PROT UR STRIP-MCNC: NEGATIVE MG/DL
PROTHROMBIN TIME: 12.5 SECONDS (ref 12.1–14.4)
RBC # BLD AUTO: 4.91 MILLION/UL (ref 3.88–5.62)
SODIUM SERPL-SCNC: 141 MMOL/L (ref 136–145)
SP GR UR STRIP.AUTO: 1.01 (ref 1–1.03)
UROBILINOGEN UR QL STRIP.AUTO: 0.2 E.U./DL
WBC # BLD AUTO: 7.32 THOUSAND/UL (ref 4.31–10.16)

## 2018-02-15 PROCEDURE — 85025 COMPLETE CBC W/AUTO DIFF WBC: CPT

## 2018-02-15 PROCEDURE — 83036 HEMOGLOBIN GLYCOSYLATED A1C: CPT

## 2018-02-15 PROCEDURE — 36415 COLL VENOUS BLD VENIPUNCTURE: CPT

## 2018-02-15 PROCEDURE — 81003 URINALYSIS AUTO W/O SCOPE: CPT | Performed by: NEUROLOGICAL SURGERY

## 2018-02-15 PROCEDURE — 85730 THROMBOPLASTIN TIME PARTIAL: CPT

## 2018-02-15 PROCEDURE — 85610 PROTHROMBIN TIME: CPT

## 2018-02-15 PROCEDURE — 80053 COMPREHEN METABOLIC PANEL: CPT

## 2018-02-21 ENCOUNTER — PROCEDURE VISIT (OUTPATIENT)
Dept: NEUROLOGY | Facility: CLINIC | Age: 68
End: 2018-02-21
Payer: MEDICARE

## 2018-02-21 VITALS — HEART RATE: 76 BPM | DIASTOLIC BLOOD PRESSURE: 72 MMHG | SYSTOLIC BLOOD PRESSURE: 133 MMHG | TEMPERATURE: 98.3 F

## 2018-02-21 DIAGNOSIS — G51.39 FACIAL SPASM: ICD-10-CM

## 2018-02-21 DIAGNOSIS — G50.0 TRIGEMINAL NEURALGIA OF RIGHT SIDE OF FACE: Primary | ICD-10-CM

## 2018-02-21 DIAGNOSIS — G50.0 TRIGEMINAL NEURALGIA: ICD-10-CM

## 2018-02-21 PROCEDURE — 64615 CHEMODENERV MUSC MIGRAINE: CPT | Performed by: PSYCHIATRY & NEUROLOGY

## 2018-02-21 RX ORDER — AMITRIPTYLINE HYDROCHLORIDE 10 MG/1
TABLET, FILM COATED ORAL
Qty: 180 TABLET | Refills: 1 | Status: SHIPPED | OUTPATIENT
Start: 2018-02-21 | End: 2018-07-17 | Stop reason: SDUPTHER

## 2018-02-21 RX ORDER — PREGABALIN 200 MG/1
CAPSULE ORAL
Qty: 90 CAPSULE | Refills: 1 | Status: SHIPPED | OUTPATIENT
Start: 2018-02-21 | End: 2018-05-09 | Stop reason: SDUPTHER

## 2018-02-21 RX ORDER — PHENYTOIN 50 MG/1
TABLET, CHEWABLE ORAL
Qty: 270 TABLET | Refills: 1 | Status: SHIPPED | OUTPATIENT
Start: 2018-02-21 | End: 2018-03-26 | Stop reason: SDUPTHER

## 2018-02-21 NOTE — PROGRESS NOTES
Chemodenervation  Date/Time: 2/21/2018 11:34 AM  Performed by: Cam Vazquez  Authorized by: Ean Rowley details:     Preparation: Patient was prepped and draped in usual sterile fashion    Anesthesia (see MAR for exact dosages):      Anesthesia method:  None  Procedure details:     Position:  Supine  Botox:     Botox Type:  Type A    mL's of Botulinum Toxin:  65    Final Concentration per CC:  100 units  Procedures:     Indications: hemifacial spasm    Total Units:     Total units used:  65    Total units discarded:  35  Post-procedure details:     Chemodenervation:  Head or face  Comments:      25 units in the right temporalis region  10 units right orbicularis oculi   5 units medial nasal region  5 units superior orbital region  5 units right   5 nits procerus  10 units right frontalis

## 2018-02-22 ENCOUNTER — OFFICE VISIT (OUTPATIENT)
Dept: FAMILY MEDICINE CLINIC | Facility: CLINIC | Age: 68
End: 2018-02-22
Payer: MEDICARE

## 2018-02-22 VITALS
DIASTOLIC BLOOD PRESSURE: 78 MMHG | SYSTOLIC BLOOD PRESSURE: 120 MMHG | WEIGHT: 255.2 LBS | BODY MASS INDEX: 37.8 KG/M2 | HEIGHT: 69 IN | HEART RATE: 76 BPM | TEMPERATURE: 97.6 F | RESPIRATION RATE: 14 BRPM

## 2018-02-22 DIAGNOSIS — Z23 NEED FOR PNEUMOCOCCAL VACCINATION: Primary | ICD-10-CM

## 2018-02-22 DIAGNOSIS — Z01.818 PREOPERATIVE EVALUATION OF A MEDICAL CONDITION TO RULE OUT SURGICAL CONTRAINDICATIONS (TAR REQUIRED): ICD-10-CM

## 2018-02-22 PROBLEM — E55.9 VITAMIN D DEFICIENCY: Status: ACTIVE | Noted: 2017-02-10

## 2018-02-22 PROBLEM — K57.30 DIVERTICULOSIS OF COLON: Status: ACTIVE | Noted: 2017-08-15

## 2018-02-22 PROCEDURE — G0009 ADMIN PNEUMOCOCCAL VACCINE: HCPCS | Performed by: FAMILY MEDICINE

## 2018-02-22 PROCEDURE — 90670 PCV13 VACCINE IM: CPT | Performed by: FAMILY MEDICINE

## 2018-02-22 PROCEDURE — G0439 PPPS, SUBSEQ VISIT: HCPCS | Performed by: FAMILY MEDICINE

## 2018-02-22 PROCEDURE — 99213 OFFICE O/P EST LOW 20 MIN: CPT | Performed by: FAMILY MEDICINE

## 2018-02-22 NOTE — PROGRESS NOTES
HPI:  Marcia Bowen is a 79 y o  male here for his Subsequent Wellness Visit  MMSE-30  Patient Active Problem List   Diagnosis    Trigeminal neuralgia    Facial spasm    Anemia    Bilateral patellofemoral syndrome    Diverticulosis of colon    Esophageal reflux    Intracranial meningioma (HCC)    Vertigo    Vitamin D deficiency    Need for pneumococcal vaccination    Preoperative evaluation of a medical condition to rule out surgical contraindications (TAR required)     No past medical history on file    Past Surgical History:   Procedure Laterality Date    APPENDECTOMY  1962    BRAIN SURGERY  2005    Gamma Knife for Trigeminal Neuralgia    HERNIA REPAIR  1952    KNEE ARTHROSCOPY Right     RHIZOTOMY W/ RADIOFREQUENCY ABLATION Right 08/08/2014    Stereotactic Ablation of Gasserian Ganglion Right sided trigeminal V2 radiofrequency rhizotomy     Family History   Problem Relation Age of Onset    Emphysema Mother     Colon cancer Father     Skin cancer Maternal Grandmother      History   Smoking Status    Never Smoker   Smokeless Tobacco    Never Used     History   Alcohol Use    Yes     Comment: Social      History   Drug Use No     /78 (BP Location: Left arm, Patient Position: Sitting, Cuff Size: Large)   Pulse 76   Temp 97 6 °F (36 4 °C) (Tympanic)   Resp 14   Ht 5' 9" (1 753 m)   Wt 116 kg (255 lb 3 2 oz)   BMI 37 69 kg/m²       Current Outpatient Prescriptions   Medication Sig Dispense Refill    amitriptyline (ELAVIL) 10 mg tablet Two tabs at bedtime 180 tablet 1    aspirin 325 mg tablet Take 1 tablet by mouth daily      baclofen 10 mg tablet       Calcium 250 MG CAPS Take 600 mg by mouth daily        Cholecalciferol (VITAMIN D3) 1000 units CAPS Take by mouth      cyanocobalamin (VITAMIN B-12) 100 mcg tablet Take 1 tablet by mouth daily      lamoTRIgine (LaMICtal) 100 mg tablet Take by mouth      Multiple Vitamins-Minerals (PRESERVISION AREDS) TABS Take 1 tablet by mouth daily      phenytoin (DILANTIN) 50 mg tablet 1 tablet  tablet 1    pregabalin (LYRICA) 200 MG capsule Take one tab daily 90 capsule 1    pyridoxine (VITAMIN B6) 100 mg tablet Take 1 tablet by mouth daily       No current facility-administered medications for this visit        Allergies   Allergen Reactions    Gabapentin Delerium    Oxcarbazepine Dizziness     Immunization History   Administered Date(s) Administered    Pneumococcal Conjugate 13-Valent 02/22/2018    Pneumococcal Polysaccharide PPV23 02/10/2017       Patient Care Team:  Madhu Pina MD as PCP - General  MD Svetlana Chowdary MD Mathew Sawyer, MD Doy Mans, MD      Medicare Screening Tests and Risk Assessments:  AWV Clinical     ISAR:   Previous hospitalizations?:  No       Once in a Lifetime Medicare Screening:   EKG performed?:  Yes Results:  pending MD review   AAA screening performed? (if performed, please add date to Health Maintenance):  No       Medicare Screening Tests and Risk Assessment:   AAA Risk Assessment    Age over 72 (males only):  Yes    Osteoporosis Risk Assessment    :  Yes    Age over 48:  Yes    HIV Risk Assessment        Drug and Alcohol Use:   Tobacco use    Cigarettes:  never smoker    Smokeless:  never used smokeless tobacco    Tobacco use duration    Tobacco Cessation Readiness    Alcohol use    Alcohol use:  occasional use    Amount of alcohol consumed:  3 drinks per week   Concern about alcohol use:  No    Alcohol Treatment Readiness   Illicit Drug Use    Drug use:  never        Diet & Exercise:   Diet   What is your diet?:  Regular, Limited junk food   How many servings a day of the following:   Fruits and Vegetables:  3-4 Meat:  1-2   Whole Grains:  1 Simple Carbs:  1   Dairy:  1 Soda:  0   Coffee:  4 Tea:  0   Exercise    Do you currently exercise?:  yes    Frequency:  occasional    Minutes per week:  60    Type of exercise:  walking       Cognitive Impairment Screening: Depression screening preformed:  Yes     PHQ-9 Depression scale score:  15   Depression screening results:  clinically significant symptoms   Cognitive Impairment Screening    Do you have difficulty learning or retaining new information?:  No Do you have difficulty handling new tasks?:  No   Do you have difficulty with reasoning?:  No Do you have difficulty with spatial ability and orientation?:  No   Do you have difficulty with language?:  No Do you have difficulty with behavior?:  No       Functional Ability/Level of Safety:   Hearing     Bilateral:  slightly decreased   Left:  slightly decreased Right:  slightly decreased   Hearing aid:  No    Hearing Impairment Assessment    Current Activities    Status:  limited ADL's, limited social activities, unlimited driving, unlimited IADL's   Help needed with the folllowing:    Using the phone:  No Transportation:  No   Shopping:  No Preparing Meals:  No   Doing Housework:  No Doing Laundry:  No   Managing Medications:  No Managing Money:  Yes   ADL    Fall Risk   Have you fallen in the last 12 months?:  Yes Are you unsteady on your feet?:  Yes   How many times?:  3 Are you taking any medications that may cause fatigue or dizziness?:  Yes    Do you rush to the bathroom potentially risking a fall?:  No   Injury History   Polypharmacy:  Yes    Previous Fall:  Yes     Urinary Incontinence:  No       Home Safety:   Are there hazards in your environment?:  No   If you fell, would you need help to get back up from the ground?:  Yes    Do you feel unsteady when walking?:  Yes    Do you have handrails and grab-bars in the home?:  No    Are you and/or family members aware of the dangers of smoking in bed?:  Yes    Home Safety Risk Factors   Unfamilar with surroundings:  No Uneven floors:  No   Stairs or handrail saftey risk:  No Loose rugs:  No   Household clutter:  No Poor household lighting:  No   No grab bars in bathroom:  No        Advanced Directives:   Advanced Directives Living Will:  No Durable POA for healthcare:  No   Advanced directive:  No    Patient's End of Life Decisions        Urinary Incontinence:   Do you have urinary incontinence?:  No        Glaucoma:            Provider Screening     Preventative Screening/Counseling:   Cardiovascular Screening/Counseling:   (Labs Q5 years, EKG optional one-time)         Diabetes Screening/Counseling:   (2 tests/year if Pre-Diabetes or 1 test/year if no Diabetes)         Colorectal Cancer Screening/Counseling:   (FOBT Q1 yr; Flex Sig Q4 yrs or Q10 yrs after Screening Colonoscopy; Screening Colonoscpy Q2 yrs High Risk or Q10 yrs Low Risk; Barium Enema Q2 yrs High Risk or Q4 yrs Low Risk)         Prostate Cancer Screening/Counseling:   (Annual)          Breast Cancer Screening/Counseling:   (Baseline Age 28 - 43; Annual Age 36+)         Cervical Cancer Screening/Counseling:   (Annual for High Risk or Childbearing Age with Abnormal Pap in Last 3 yrs; Every 2 all others)         Osteoporosis Screening/Counseling:   (Every 2 Yrs if at risk or more if medically necessary)         AAA Screening/Counseling:   (Once per Lifetime with risk factors)     Age over 72 (males only):  Yes          Glaucoma Screening/Counseling:   (Annual)         HIV Screening/Counseling:   (Voluntary; Once annually for high risk OR 3 times for Pregnancy at diagnosis of IUP; 3rd trimester; and at Labor         Hepatitis C Screening:             Immunizations: Other Preventative Couseling (Non-Medicare Wellness Visit Required):       Referrals (Non-Medicare Wellness Visit Required):       Medical Equipment/Suppliers:           No exam data present  Reviewed Updated [facility] Prior Wellness Visits:   Last Medicare wellness visit information was reviewed, patient interviewed , no change since last AWVyes  Last Medicare wellness visit information was reviewed, patient interviewed and updates made to the record today yes    Assessment and Plan:  1   Need for pneumococcal vaccination  PNEUMOCOCCAL CONJUGATE VACCINE 13-VALENT GREATER THAN 6 MONTHS    CANCELED: PNEUMOCOCCAL CONJUGATE VACCINE 13-VALENT GREATER THAN 6 MONTHS   2   Preoperative evaluation of a medical condition to rule out surgical contraindications (TAR required)         Health Maintenance Due   Topic Date Due    Hepatitis C Screening  1950    SLP PLAN OF CARE  1950    DTaP,Tdap,and Td Vaccines (1 - Tdap) 06/18/1957    Depression Screening PHQ-9  06/18/1962    Fall Risk  06/18/2015    GLAUCOMA SCREENING 67+ YR  06/18/2017    INFLUENZA VACCINE  09/01/2017

## 2018-02-22 NOTE — PROGRESS NOTES
FAMILY PRACTICE OFFICE VISIT       NAME: Michelle Prakash  AGE: 79 y o  SEX: male       : 1950        MRN: [id number]    DATE: 2018  TIME: 11:50 AM    Assessment and Plan     Problem List Items Addressed This Visit     Need for pneumococcal vaccination - Primary    Relevant Orders    PNEUMOCOCCAL CONJUGATE VACCINE 13-VALENT GREATER THAN 6 MONTHS (Completed)    Preoperative evaluation of a medical condition to rule out surgical contraindications (TAR required)          Patient appears to be in stable health and will be medically cleared for his rhizotomy procedure  I reviewed his EKG and blood work  There are no Patient Instructions on file for this visit  Chief Complaint     Chief Complaint   Patient presents with   [facility] Wellness Visit     Annual Welleness Exam and Questionnaire       History of Present Illness     Patient denies any recent illness  He is scheduled to have her rhizotomy therapy for his chronic trigeminal neuralgia  He did have a similar procedure approximately 3 years ago that did seem to help with his symptoms  I reviewed his current list of medications  Review of Systems   Review of Systems   Constitutional: Positive for fatigue  Negative for fever  HENT: Negative for congestion, ear pain and sore throat  Patient has chronic intermittent pain along his right facial area due to trigeminal neuralgia   Respiratory: Negative  Cardiovascular: Negative  Gastrointestinal: Negative  Genitourinary: Negative  Musculoskeletal: Negative  Neurological: Negative  Psychiatric/Behavioral: Negative          Active Problem List     Patient Active Problem List   Diagnosis    Trigeminal neuralgia    Facial spasm    Anemia    Bilateral patellofemoral syndrome    Diverticulosis of colon    Esophageal reflux    Intracranial meningioma (HCC)    Vertigo    Vitamin D deficiency    Need for pneumococcal vaccination    Preoperative evaluation of a medical condition to rule out surgical contraindications (TAR required)       Past Medical History:  No past medical history on file  Past Surgical History:  Past Surgical History:   Procedure Laterality Date    APPENDECTOMY  1962    BRAIN SURGERY  2005    Gamma Knife for Trigeminal Neuralgia    HERNIA REPAIR  1952    KNEE ARTHROSCOPY Right     RHIZOTOMY W/ RADIOFREQUENCY ABLATION Right 08/08/2014    Stereotactic Ablation of Gasserian Ganglion Right sided trigeminal V2 radiofrequency rhizotomy       Family History:  Family History   Problem Relation Age of Onset    Emphysema Mother     Colon cancer Father     Skin cancer Maternal Grandmother        Social History:  Social History     Social History    Marital status: /Civil Union     Spouse name: N/A    Number of children: N/A    Years of education: N/A     Occupational History    Semi-Retired      Social History Main Topics    Smoking status: Never Smoker    Smokeless tobacco: Never Used    Alcohol use Yes      Comment: Social    Drug use: No    Sexual activity: Not on file     Other Topics Concern    Not on file     Social History Narrative    Caffeine use: Daily caffeinated cola, drinks coffee       Objective     Vitals:    02/22/18 0926   BP: 120/78   Pulse: 76   Resp: 14   Temp: 97 6 °F (36 4 °C)     Wt Readings from Last 3 Encounters:   02/22/18 116 kg (255 lb 3 2 oz)   02/12/18 117 kg (258 lb)   10/19/17 116 kg (256 lb 6 oz)       Physical Exam   Constitutional: He is oriented to person, place, and time  Patient in no acute distress   HENT:   Mouth/Throat: No oropharyngeal exudate  Tympanic membranes within normal limits bilaterally   Cardiovascular:   Regular rate and rhythm with no murmurs   Pulmonary/Chest:   Lungs are clear to auscultation without wheezes,rales, or rhonchi   Abdominal:   Abdomen is soft, nontender with positive bowel sounds  There is no rebound or guarding   No masses palpated   Musculoskeletal: He exhibits no edema  Lymphadenopathy:     He has no cervical adenopathy  Neurological: He is alert and oriented to person, place, and time  No cranial nerve deficit  Skin: No rash noted       EKG showed normal sinus rhythm at 74 beats per minute, left axis deviation, negative acute ischemic changes  Pertinent Laboratory/Diagnostic Studies:  Lab Results   Component Value Date    GLUCOSE 92 02/10/2017    BUN 16 02/15/2018    CREATININE 1 19 02/15/2018    CALCIUM 9 9 02/15/2018     02/15/2018    K 4 2 02/15/2018    CO2 30 02/15/2018     02/15/2018     Lab Results   Component Value Date    ALT 23 02/15/2018    AST 13 02/15/2018    ALKPHOS 93 02/15/2018    BILITOT 0 37 02/15/2018       Lab Results   Component Value Date    WBC 7 32 02/15/2018    HGB 13 3 02/15/2018    HCT 41 6 02/15/2018    MCV 85 02/15/2018     02/15/2018       No results found for: TSH    Lab Results   Component Value Date    CHOL 185 06/24/2015     Lab Results   Component Value Date    TRIG 67 06/24/2015     Lab Results   Component Value Date    HDL 46 06/24/2015     Lab Results   Component Value Date    LDLCALC 126 (H) 06/24/2015     Lab Results   Component Value Date    HGBA1C 4 9 02/15/2018       Results for orders placed or performed in visit on 02/15/18   Comprehensive metabolic panel   Result Value Ref Range    Sodium 141 136 - 145 mmol/L    Potassium 4 2 3 5 - 5 3 mmol/L    Chloride 107 100 - 108 mmol/L    CO2 30 21 - 32 mmol/L    Anion Gap 4 4 - 13 mmol/L    BUN 16 5 - 25 mg/dL    Creatinine 1 19 0 60 - 1 30 mg/dL    Glucose, Fasting 98 65 - 99 mg/dL    Calcium 9 9 8 3 - 10 1 mg/dL    AST 13 5 - 45 U/L    ALT 23 12 - 78 U/L    Alkaline Phosphatase 93 46 - 116 U/L    Total Protein 7 0 6 4 - 8 2 g/dL    Albumin 3 6 3 5 - 5 0 g/dL    Total Bilirubin 0 37 0 20 - 1 00 mg/dL    eGFR 63 ml/min/1 73sq m   CBC and differential   Result Value Ref Range    WBC 7 32 4 31 - 10 16 Thousand/uL    RBC 4 91 3 88 - 5 62 Million/uL Hemoglobin 13 3 12 0 - 17 0 g/dL    Hematocrit 41 6 36 5 - 49 3 %    MCV 85 82 - 98 fL    MCH 27 1 26 8 - 34 3 pg    MCHC 32 0 31 4 - 37 4 g/dL    RDW 15 0 11 6 - 15 1 %    MPV 9 6 8 9 - 12 7 fL    Platelets 181 831 - 141 Thousands/uL    nRBC 0 /100 WBCs    Neutrophils Relative 51 43 - 75 %    Lymphocytes Relative 31 14 - 44 %    Monocytes Relative 9 4 - 12 %    Eosinophils Relative 8 (H) 0 - 6 %    Basophils Relative 1 0 - 1 %    Neutrophils Absolute 3 80 1 85 - 7 62 Thousands/µL    Lymphocytes Absolute 2 24 0 60 - 4 47 Thousands/µL    Monocytes Absolute 0 62 0 17 - 1 22 Thousand/µL    Eosinophils Absolute 0 60 0 00 - 0 61 Thousand/µL    Basophils Absolute 0 04 0 00 - 0 10 Thousands/µL   APTT   Result Value Ref Range    PTT 30 23 - 35 seconds   Protime-INR   Result Value Ref Range    Protime 12 5 12 1 - 14 4 seconds    INR 0 93 0 86 - 1 16   Hemoglobin A1c   Result Value Ref Range    Hemoglobin A1C 4 9 4 2 - 6 3 %    EAG 94 mg/dl       Orders Placed This Encounter   Procedures    PNEUMOCOCCAL CONJUGATE VACCINE 13-VALENT GREATER THAN 6 MONTHS       ALLERGIES:  Allergies   Allergen Reactions    Gabapentin Delerium    Oxcarbazepine Dizziness       Current Medications     Current Outpatient Prescriptions   Medication Sig Dispense Refill    amitriptyline (ELAVIL) 10 mg tablet Two tabs at bedtime 180 tablet 1    aspirin 325 mg tablet Take 1 tablet by mouth daily      baclofen 10 mg tablet       Calcium 250 MG CAPS Take 600 mg by mouth daily        Cholecalciferol (VITAMIN D3) 1000 units CAPS Take by mouth      cyanocobalamin (VITAMIN B-12) 100 mcg tablet Take 1 tablet by mouth daily      lamoTRIgine (LaMICtal) 100 mg tablet Take by mouth      Multiple Vitamins-Minerals (PRESERVISION AREDS) TABS Take 1 tablet by mouth daily      phenytoin (DILANTIN) 50 mg tablet 1 tablet  tablet 1    pregabalin (LYRICA) 200 MG capsule Take one tab daily 90 capsule 1    pyridoxine (VITAMIN B6) 100 mg tablet Take 1 tablet by mouth daily       No current facility-administered medications for this visit            Health Maintenance     Health Maintenance   Topic Date Due    Hepatitis C Screening  1950    SLP PLAN OF CARE  1950    DTaP,Tdap,and Td Vaccines (1 - Tdap) 06/18/1957    Depression Screening PHQ-9  06/18/1962    Fall Risk  06/18/2015    GLAUCOMA SCREENING 67+ YR  06/18/2017    INFLUENZA VACCINE  09/01/2017    PNEUMOCOCCAL POLYSACCHARIDE VACCINE AGE 72 AND OVER  Completed     Immunization History   Administered Date(s) Administered    Pneumococcal Conjugate 13-Valent 02/22/2018    Pneumococcal Polysaccharide PPV23 51/87/1042       Rufus Diane MD

## 2018-02-27 DIAGNOSIS — G50.0 TRIGEMINAL NEURALGIA OF RIGHT SIDE OF FACE: ICD-10-CM

## 2018-02-27 RX ORDER — PREGABALIN 200 MG/1
CAPSULE ORAL
Qty: 360 CAPSULE | Refills: 3 | OUTPATIENT
Start: 2018-02-27

## 2018-03-01 ENCOUNTER — TELEPHONE (OUTPATIENT)
Dept: NEUROLOGY | Facility: CLINIC | Age: 68
End: 2018-03-01

## 2018-03-01 NOTE — TELEPHONE ENCOUNTER
Pt called re: rx lyrica  Called [facility] and confirmed that it's being processed as of today  Estimated mail date-3/5/18  Pt made aware

## 2018-03-05 ENCOUNTER — TELEPHONE (OUTPATIENT)
Dept: NEUROSURGERY | Facility: CLINIC | Age: 68
End: 2018-03-05

## 2018-03-05 DIAGNOSIS — Z98.890 STATUS POST SURGERY: Primary | ICD-10-CM

## 2018-03-05 DIAGNOSIS — G89.18 ACUTE POST-OPERATIVE PAIN: ICD-10-CM

## 2018-03-05 RX ORDER — OXYCODONE HYDROCHLORIDE AND ACETAMINOPHEN 5; 325 MG/1; MG/1
TABLET ORAL
Qty: 32 TABLET | Refills: 0 | Status: SHIPPED | OUTPATIENT
Start: 2018-03-05 | End: 2018-03-05 | Stop reason: SDUPTHER

## 2018-03-05 RX ORDER — OXYCODONE HYDROCHLORIDE AND ACETAMINOPHEN 5; 325 MG/1; MG/1
TABLET ORAL
Qty: 32 TABLET | Refills: 0 | Status: SHIPPED | OUTPATIENT
Start: 2018-03-05 | End: 2018-03-19

## 2018-03-05 RX ORDER — CEPHALEXIN 500 MG/1
CAPSULE ORAL
Qty: 12 CAPSULE | Refills: 0 | Status: SHIPPED | OUTPATIENT
Start: 2018-03-06 | End: 2018-03-09

## 2018-03-05 RX ORDER — ACETAMINOPHEN 325 MG/1
650 TABLET ORAL EVERY 6 HOURS PRN
COMMUNITY
End: 2018-03-05

## 2018-03-05 NOTE — PRE-PROCEDURE INSTRUCTIONS
Pre-Surgery Instructions:   Medication Instructions    acetaminophen (TYLENOL) 325 mg tablet Instructed patient per Anesthesia Guidelines   amitriptyline (ELAVIL) 10 mg tablet Patient was instructed by Physician and understands   aspirin 325 mg tablet Patient was instructed by Physician and understands   baclofen 10 mg tablet Patient was instructed by Physician and understands   Calcium 250 MG CAPS Instructed patient per Anesthesia Guidelines   Cholecalciferol (VITAMIN D3) 1000 units CAPS Instructed patient per Anesthesia Guidelines   cyanocobalamin (VITAMIN B-12) 100 mcg tablet Instructed patient per Anesthesia Guidelines   lamoTRIgine (LaMICtal) 100 mg tablet Patient was instructed by Physician and understands   Multiple Vitamins-Minerals (PRESERVISION AREDS) TABS Instructed patient per Anesthesia Guidelines   phenytoin (DILANTIN) 50 mg tablet Patient was instructed by Physician and understands   pregabalin (LYRICA) 200 MG capsule Patient was instructed by Physician and understands   pyridoxine (VITAMIN B6) 100 mg tablet Instructed patient per Anesthesia Guidelines  Before your operation, you play an important role in decreasing your risk for infection by washing with special antiseptic soap  This is an effective way to reduce bacteria on the skin which may help to prevent infections at the surgical site  Please read the following directions in advance  1  In the week before your operation purchase a 4 ounce bottle of antiseptic soap containing chlorhexidine gluconate 4%  Some brand names include: Aplicare, Endure, and Hibiclens  The cost is usually less than $5 00  · For your convenience, the [address] [place] [address] carries the soap  · It may also be available at your doctor's office or pre-admission testing center, and at most retail pharmacies    · If you are allergic or sensitive to soaps containing chlorhexidine gluconate (CHG), please let your doctor know so another antiseptic soap can be suggested  · CHG antiseptic soap is for external use only  2      The day before your operation follow these directions carefully to get ready  · Place clean lines (sheets) on your bed; you should sleep on clean sheets after your evening shower  · Get clean towels and washcloths ready - you need enough for 2 showers  · Set aside clean underwear, pajamas, and clothing to wear after the shower  Reminders:  · DO NOT use any other soap or body rinse on your skin during or after the antiseptic showers  · DO NOT use lotion , powder, deodorant, or perfume/aftershave of any kind on your skin after your antiseptic shower  · DO NOT shave any body parts in the 24 hours/the day before your operation  · DO NOT get the antiseptic soap in your eyes, ears, nose, mouth, or vaginal area  3      You will need to shower the night before AND the morning of your Surgery  Shower 1:  · The evening before your operation, take the fist shower  · First, shampoo your hair with regular shampoo and rinse it completely before you use the anitseptic soap  After washing and rinsing your hair, rinse your body  · Next, use a clean wash cloth to apply the antiseptic soap and wash your body from the neck down to your toes using 1/2 bottle of the antiseptic soap  You will use the other 1/2 bottle for the second shower  · Clean the area where your incision will be; later this area well for about 2 minutes  · If you ar having head or neck surgery, wash areas with the antiseptic soap  · Rinse yourself completely with running water  · Use a clean towel to dry off  · Wear clean underwear and clothing/pajamas  Shower 2:  · The Morning of your operation, take the second shower following the same steps as Shower 1 using the second 1/2 of the bottle of antiseptic soap  · Use clean cloths and towels to was and dry yourself off  · Wear clean underwear and clothing

## 2018-03-05 NOTE — TELEPHONE ENCOUNTER
Pre operative call day prior surgery scheduled in the AM w/ DR Kelly Oneill the following information is confirmed / discussed: Allergies Reviewed ---yes   Hold medications reviewed: asa and OTC and supplements 7 days prior and 7 days post  NPO after MN, night prior surgery reviewed:-yes   Medication (s) instructed by healthcare provider to take the morning of surgery w/ sip of water 4 OZ discussed:per ASU   Post operative scripts electronic transmission: cephalexin   PDMP site reviewed accessed and reviewed scheduled drug list printed and scanned into record--reviewed lyrica  Pain management script:percocet   Pre- operative shower protocol reviewed; Clarify instructions as per protocol, third chlorhexidine shower tonight before surgery, then use ADOLPH wipes as per packaging instructions, Use a clean towel and wash cloth starting tonight and continue nightly until seen 2 weeks post operative visit for staple removal  Change bed linens tonight and continue at least 1-2 times weekly  --reinforced     Informed will receive a telephone call tonight from a hospital representative with time to report on surgery day: --reinforced   Informed will receive a f/u call within in 24 -48 hours post-op to assess   Follow-up appointments reviewed  2 week 4 week   Patient verbalized understanding information provided /discussed

## 2018-03-06 ENCOUNTER — ANESTHESIA (OUTPATIENT)
Dept: PERIOP | Facility: HOSPITAL | Age: 68
End: 2018-03-06
Payer: MEDICARE

## 2018-03-06 ENCOUNTER — HOSPITAL ENCOUNTER (OUTPATIENT)
Facility: HOSPITAL | Age: 68
Setting detail: OUTPATIENT SURGERY
Discharge: HOME | End: 2018-03-06
Attending: NEUROLOGICAL SURGERY | Admitting: NEUROLOGICAL SURGERY
Payer: MEDICARE

## 2018-03-06 ENCOUNTER — APPOINTMENT (OUTPATIENT)
Dept: RADIOLOGY | Facility: HOSPITAL | Age: 68
End: 2018-03-06
Payer: MEDICARE

## 2018-03-06 ENCOUNTER — ANESTHESIA EVENT (OUTPATIENT)
Dept: PERIOP | Facility: HOSPITAL | Age: 68
End: 2018-03-06
Payer: MEDICARE

## 2018-03-06 VITALS
OXYGEN SATURATION: 100 % | BODY MASS INDEX: 37.22 KG/M2 | WEIGHT: 260 LBS | SYSTOLIC BLOOD PRESSURE: 162 MMHG | RESPIRATION RATE: 16 BRPM | HEIGHT: 70 IN | DIASTOLIC BLOOD PRESSURE: 93 MMHG | HEART RATE: 54 BPM | TEMPERATURE: 98.4 F

## 2018-03-06 PROCEDURE — 70250 X-RAY EXAM OF SKULL: CPT

## 2018-03-06 PROCEDURE — 61790 TREAT TRIGEMINAL NERVE: CPT | Performed by: NEUROLOGICAL SURGERY

## 2018-03-06 RX ORDER — TRAMADOL HYDROCHLORIDE 50 MG/1
50 TABLET ORAL EVERY 6 HOURS SCHEDULED
Status: DISCONTINUED | OUTPATIENT
Start: 2018-03-06 | End: 2018-03-06 | Stop reason: HOSPADM

## 2018-03-06 RX ORDER — HYDRALAZINE HYDROCHLORIDE 20 MG/ML
5 INJECTION INTRAMUSCULAR; INTRAVENOUS AS NEEDED
Status: DISCONTINUED | OUTPATIENT
Start: 2018-03-06 | End: 2018-03-06 | Stop reason: HOSPADM

## 2018-03-06 RX ORDER — PROPOFOL 10 MG/ML
INJECTION, EMULSION INTRAVENOUS AS NEEDED
Status: DISCONTINUED | OUTPATIENT
Start: 2018-03-06 | End: 2018-03-06 | Stop reason: SURG

## 2018-03-06 RX ORDER — MEPERIDINE HYDROCHLORIDE 25 MG/ML
12.5 INJECTION INTRAMUSCULAR; INTRAVENOUS; SUBCUTANEOUS
Status: DISCONTINUED | OUTPATIENT
Start: 2018-03-06 | End: 2018-03-06 | Stop reason: HOSPADM

## 2018-03-06 RX ORDER — ONDANSETRON 2 MG/ML
4 INJECTION INTRAMUSCULAR; INTRAVENOUS ONCE AS NEEDED
Status: DISCONTINUED | OUTPATIENT
Start: 2018-03-06 | End: 2018-03-06 | Stop reason: HOSPADM

## 2018-03-06 RX ORDER — METOCLOPRAMIDE HYDROCHLORIDE 5 MG/ML
10 INJECTION INTRAMUSCULAR; INTRAVENOUS ONCE AS NEEDED
Status: DISCONTINUED | OUTPATIENT
Start: 2018-03-06 | End: 2018-03-06 | Stop reason: HOSPADM

## 2018-03-06 RX ORDER — ONDANSETRON 2 MG/ML
4 INJECTION INTRAMUSCULAR; INTRAVENOUS EVERY 6 HOURS PRN
Status: DISCONTINUED | OUTPATIENT
Start: 2018-03-06 | End: 2018-03-06 | Stop reason: HOSPADM

## 2018-03-06 RX ORDER — FENTANYL CITRATE 50 UG/ML
INJECTION, SOLUTION INTRAMUSCULAR; INTRAVENOUS AS NEEDED
Status: DISCONTINUED | OUTPATIENT
Start: 2018-03-06 | End: 2018-03-06 | Stop reason: SURG

## 2018-03-06 RX ORDER — CHLORHEXIDINE GLUCONATE 0.12 MG/ML
15 RINSE ORAL EVERY 12 HOURS SCHEDULED
Status: DISCONTINUED | OUTPATIENT
Start: 2018-03-06 | End: 2018-03-06 | Stop reason: HOSPADM

## 2018-03-06 RX ORDER — FENTANYL CITRATE/PF 50 MCG/ML
25 SYRINGE (ML) INJECTION
Status: DISCONTINUED | OUTPATIENT
Start: 2018-03-06 | End: 2018-03-06 | Stop reason: HOSPADM

## 2018-03-06 RX ORDER — SODIUM CHLORIDE, SODIUM LACTATE, POTASSIUM CHLORIDE, CALCIUM CHLORIDE 600; 310; 30; 20 MG/100ML; MG/100ML; MG/100ML; MG/100ML
50 INJECTION, SOLUTION INTRAVENOUS CONTINUOUS
Status: DISCONTINUED | OUTPATIENT
Start: 2018-03-06 | End: 2018-03-06 | Stop reason: HOSPADM

## 2018-03-06 RX ORDER — MIDAZOLAM HYDROCHLORIDE 1 MG/ML
INJECTION INTRAMUSCULAR; INTRAVENOUS AS NEEDED
Status: DISCONTINUED | OUTPATIENT
Start: 2018-03-06 | End: 2018-03-06 | Stop reason: SURG

## 2018-03-06 RX ORDER — PROPOFOL 10 MG/ML
INJECTION, EMULSION INTRAVENOUS CONTINUOUS PRN
Status: DISCONTINUED | OUTPATIENT
Start: 2018-03-06 | End: 2018-03-06 | Stop reason: SURG

## 2018-03-06 RX ORDER — PROMETHAZINE HYDROCHLORIDE 25 MG/ML
6.25 INJECTION, SOLUTION INTRAMUSCULAR; INTRAVENOUS ONCE AS NEEDED
Status: DISCONTINUED | OUTPATIENT
Start: 2018-03-06 | End: 2018-03-06 | Stop reason: HOSPADM

## 2018-03-06 RX ORDER — LABETALOL HYDROCHLORIDE 5 MG/ML
5 INJECTION, SOLUTION INTRAVENOUS AS NEEDED
Status: DISCONTINUED | OUTPATIENT
Start: 2018-03-06 | End: 2018-03-06 | Stop reason: HOSPADM

## 2018-03-06 RX ADMIN — TRAMADOL HYDROCHLORIDE 50 MG: 50 TABLET, FILM COATED ORAL at 15:52

## 2018-03-06 RX ADMIN — PROPOFOL 50 MG: 10 INJECTION, EMULSION INTRAVENOUS at 14:35

## 2018-03-06 RX ADMIN — PROPOFOL 50 MG: 10 INJECTION, EMULSION INTRAVENOUS at 14:41

## 2018-03-06 RX ADMIN — FENTANYL CITRATE 50 MCG: 50 INJECTION, SOLUTION INTRAMUSCULAR; INTRAVENOUS at 14:18

## 2018-03-06 RX ADMIN — CHLORHEXIDINE GLUCONATE 15 ML: 1.2 RINSE ORAL at 11:56

## 2018-03-06 RX ADMIN — SODIUM CHLORIDE, SODIUM LACTATE, POTASSIUM CHLORIDE, AND CALCIUM CHLORIDE: .6; .31; .03; .02 INJECTION, SOLUTION INTRAVENOUS at 14:14

## 2018-03-06 RX ADMIN — MIDAZOLAM HYDROCHLORIDE 2 MG: 1 INJECTION, SOLUTION INTRAMUSCULAR; INTRAVENOUS at 14:18

## 2018-03-06 RX ADMIN — PROPOFOL 50 MCG/KG/MIN: 10 INJECTION, EMULSION INTRAVENOUS at 14:30

## 2018-03-06 RX ADMIN — FENTANYL CITRATE 50 MCG: 50 INJECTION, SOLUTION INTRAMUSCULAR; INTRAVENOUS at 14:30

## 2018-03-06 RX ADMIN — SODIUM CHLORIDE, SODIUM LACTATE, POTASSIUM CHLORIDE, AND CALCIUM CHLORIDE 50 ML/HR: .6; .31; .03; .02 INJECTION, SOLUTION INTRAVENOUS at 11:55

## 2018-03-06 RX ADMIN — CEFAZOLIN SODIUM 2000 MG: 2 SOLUTION INTRAVENOUS at 14:26

## 2018-03-06 NOTE — INTERIM OP NOTE
RHIZOTOMY TRIGEMINAL NERVES (V2 & V3)  Postoperative Note  PATIENT NAME: Jud Dakins  : 1950  MRN: [id number]  [facility] OR ROOM 03    Surgery Date: 3/6/2018    Preop Diagnosis:  Trigeminal neuralgia of right side of face [G50 0]    Post-Op Diagnosis Codes:     * Trigeminal neuralgia of right side of face [G50 0]    Procedure(s) (LRB):  RHIZOTOMY TRIGEMINAL NERVES (V2 & V3) (Right)    Surgeon(s) and Role:     * Quinn Hatchet, MD - Primary    Specimens:  * No specimens in log *    Estimated Blood Loss:   Minimal    Anesthesia Type:   IV Sedation with Anesthesia     Findings:    Jaw Jerk    Complications:   None    SIGNATURE: Quinn Hatchet, MD   DATE: 2018   TIME: 2:56 PM

## 2018-03-06 NOTE — ANESTHESIA PREPROCEDURE EVALUATION
Review of Systems/Medical History  Patient summary reviewed  Chart reviewed      Cardiovascular  EKG reviewed,    Pulmonary       GI/Hepatic    GERD well controlled,             Endo/Other     GYN       Hematology  Anemia ,     Musculoskeletal    Arthritis     Neurology    Neuromuscular disease (trigeminal neuralgia) ,    Psychology           Physical Exam    Airway    Mallampati score: I  TM Distance: >3 FB  Neck ROM: full     Dental   upper dentures and lower dentures,     Cardiovascular  Rhythm: regular, Rate: normal,     Pulmonary  Pulmonary exam normal     Other Findings        Anesthesia Plan  ASA Score- 2     Anesthesia Type- IV sedation with anesthesia and general with ASA Monitors  Additional Monitors:   Airway Plan:         Plan Factors-    Induction- intravenous  Postoperative Plan-     Informed Consent- Anesthetic plan and risks discussed with patient

## 2018-03-06 NOTE — OP NOTE
OPERATIVE REPORT  PATIENT NAME: Gina Dodge    :  1950  MRN: [id number]  Pt Location:  OR ROOM 03    SURGERY DATE: 3/6/2018    Surgeon(s) and Role:     * Ester Hayes MD - Primary    Preop Diagnosis:  Trigeminal neuralgia of right side of face [G50 0]    Post-Op Diagnosis Codes:     * Trigeminal neuralgia of right side of face [G50 0]    Specimen(s):  * No specimens in log *    Estimated Blood Loss:   Minimal    Drains:       Anesthesia Type:   IV Sedation with Anesthesia    Operative Indications:  Trigeminal neuralgia of right side of face [E91 1]      Complications:   None    Procedure and Technique:  Right-sided trigeminal radiofrequency rhizotomy to the V1 and V2    distribution       INDICATION  This is a 59-year-old male with a long-standing history of right-sided    facial pain consistent with trigeminal neuralgia  The risks and benefits of the procedure were reviewed with    the patient and family, and they wished to proceed  Additional discussion held in reference to the V1 distribution and corneal sensation      FINDINGS  At time of surgery, the foramen ovale was visualized on the submental    fluoroscopic imaging  Upon entering the foramen ovale, there was a    visible jaw jerk reflex       OPERATIVE TECHNIQUE  The patient was identified and brought to the operating room  He    underwent the induction of MAC anesthesia and was placed supine on the    operating table with her head placed in slight extension  He was    prepped and draped in the usual sterile fashion  Then, 2 g of Ancef    were administered as antibiotic prophylaxis  Bilateral lower extremity    sequential compression devices were placed for deep vein thrombosis    prophylaxis  A time-out was then performed       Live fluoroscopic imaging was brought in with a submental view in    order to identify the foramen ovale   Once this was identified, an    entry point was chosen just lateral to the oral commissure, and a    Tuohy needle was advanced under fluoroscopic imaging into the foramen    ovale  Upon entering the foramen ovale, there was a visible jaw jerk    reflex       At this point then, the fluoroscopic imaging was changed into a true    lateral projection, and the Tuohy needle was advanced to just posterior to the    petroclival angle  The stylet was then removed, and the radiofrequency    probe was placed  Fluoroscopic imaging confirmed once again that it    was at the posterior portion petroclival angle for the V1 distribution  At this point    then, a rhizotomy was created at 65 degrees for 75 seconds with a    20-second ramp-up time  This was then repeated at 75 degrees for 75    seconds with a 20-second ramp-up time       Under fluoroscopic imaging, the radiofrequency probe and needle was   then slightly retracted to be just at the petroclival angle    for the V2 distribution  A rhizotomy was created at 65 degrees for 75    seconds with a 20-second ramp-up time and then duplicated at 75    degrees for 75 seconds with a 20-second ramp-up time  Under live    fluoroscopic imaging, the radiofrequency probe and Tuohy needle were    then removed, and routine sterile dressings were then applied       At the end of the case, all counts were reported to be correct  There    were no breaks in surgical technique or complications encountered    during the procedure   The patient awoke from anesthesia without    difficulty in stable condition being taken to the recovery room        I was present for the entire procedure    Patient Disposition:  PACU     SIGNATURE: Azeem Bass MD  DATE: March 6, 2018  TIME: 2:57 PM

## 2018-03-06 NOTE — DISCHARGE INSTRUCTIONS
Follow Up Dr Otilia Tomas 2 weeks  Remove Dressing 2 days  Antibiotics prescription at pharmacy  Prescription for pain    RHIZOTOMY DISCHARGE INSTRUCTIONS    What happens when I go home after the Rhizotomy? Surgical site: The bandage may be removed the day after surgery    Activity:   Resume normal activity level    Shower: You may shower the same day    Pain:   Take acetaminophen (i e  Tylenol) for pain according to  instructions    Continue all your previous facial pain medications without change      Please call the office at [phone] if you have any of the followin  Redness, swelling, heat, or unusual drainage (green, yellow, white, smelly)   from the surgical site  2  Heavy bleeding from the surgical site  3  Chills or fever above 101 degrees   4  Pain not relieved by acetaminophen  5  Questions or concerns about your surgery

## 2018-03-08 ENCOUNTER — TELEPHONE (OUTPATIENT)
Dept: NEUROSURGERY | Facility: CLINIC | Age: 68
End: 2018-03-08

## 2018-03-08 NOTE — TELEPHONE ENCOUNTER
Post operative call s/p surgery on   w/ DR Otilia Tomas s/p ;  Post operative pain:reports efficacy has right facial numbness  Up to forehead , right eyebrow, part of the right eye and right ear  No longer has trigeminal pain  Cannot tase food on the right  side , tast intact on left side  Has a constant pulsating sensation that is less intense as days progress  Taking one percocet per day about 1- 2 times   Antibiotic:complete today prescribed x 3 days   Gastrointestinal (BM, NVD,  Appetite /Fluid intake), ---denies problems                    Call if you develop any signs or symptoms of incision (s) redness, drainage, dehiscence, or  fever of 101 or higher , uncontrolled nausea and /or vomiting  ---reinforced   Wound/dressing; as per postoperative discharge instructions remove dressings  ---None   Post operative Hygiene: Gently wash surgical incision(s) with a clean wash cloth and pat dry using a clean wash towel  Reinforced use clean wash cloth and towel daily, use antibacterial soap, change bed linens 1-2 times per week and pajamas several times per week  --reinforced   Post operative Activity:  Ambulate as tolerate, Lift no greater than 10 LBS until 6 weeks, Avoid submersion in water x 3 weeks, and refrain for bending or twisting until about 4 weeks -light duty until then  --reinforced   No NSAID (aspirin, Motrin, ibuprofen, OTC, supplements etc  ) for 2 weeks, may resume after 2 week postoperative visit ---reinforced   Patent verbalized an understanding of information communicated

## 2018-03-19 ENCOUNTER — OFFICE VISIT (OUTPATIENT)
Dept: NEUROSURGERY | Facility: CLINIC | Age: 68
End: 2018-03-19

## 2018-03-19 VITALS
SYSTOLIC BLOOD PRESSURE: 139 MMHG | RESPIRATION RATE: 16 BRPM | WEIGHT: 260 LBS | BODY MASS INDEX: 38.51 KG/M2 | HEART RATE: 80 BPM | TEMPERATURE: 97.4 F | HEIGHT: 69 IN | DIASTOLIC BLOOD PRESSURE: 96 MMHG

## 2018-03-19 DIAGNOSIS — Z09 POSTOPERATIVE EXAMINATION: Primary | ICD-10-CM

## 2018-03-19 DIAGNOSIS — G50.0 TRIGEMINAL NEURALGIA: ICD-10-CM

## 2018-03-19 PROCEDURE — 99024 POSTOP FOLLOW-UP VISIT: CPT | Performed by: NURSE PRACTITIONER

## 2018-03-19 NOTE — PROGRESS NOTES
Assessment/Plan:      Problem List Items Addressed This Visit        Nervous and Auditory    Trigeminal neuralgia - Primary  Encounter for post operative examination            Subjective:      Patient ID: Ramya Eden is a 79 y o  male  presents for 2 week     HPI   Presents for 2 week post operative visit s/p surgery with Dr Dany Waddell on 3/6/2018 RHIZOTOMY TRIGEMINAL NERVES (V2 & V3)  As per operative report Fluoroscopic imaging confirmed once again that it  was at the posterior portion petroclival angle for the V1 distribution  At this point    then, a rhizotomy was created at 65 degrees for 75  Under fluoroscopic imaging, the radiofrequency probe and needle was then slightly retracted to be just at the petroclival angle    for the V2 distribution  A rhizotomy was created   He has a history of right sided facial pain since 2007 consistent with Trigeminal neuralgia  Has history of known meningioma in [address]  Previous gamma knife radiation x 2  He underwent right sided V2 and V3 trigeminal rhizotomy 3 5 years ago  W/ significant relief of his pain  Hes has had progressive return of his pain in the V2 distribution, but also now developed pain in  The V1 distribution  His medications have been adjusted several times but re[ports persistent pain  He reports efficacy and a numb sensation consistent w/ V1 2 Rhizotomy , he describes the corneal sensation right face forehead region, right eye blurry and dry  Recently saw opthalmology diagnosed with cataracts  Reports  a numb like sensation in the areas of V1 and V 2  opthalmic and maxillary distribution  Reports the trigeminal neuralgia pain is no longer present, has 65 % efficacy, slight discomfort under and over right eye, almost no residual neuralgia pain    Reports right ear tinnitus since surgery, has balance issues w/ turning but this was present before surgery       The following portions of the patient's history were reviewed and updated as appropriate:   He  has a past medical history of Arthritis; Chronic pain disorder; and Ulcer of bile duct  He   Patient Active Problem List    Diagnosis Date Noted    Need for pneumococcal vaccination 02/22/2018    Preoperative evaluation of a medical condition to rule out surgical contraindications (TAR required) 02/22/2018    Facial spasm 02/21/2018    Trigeminal neuralgia 02/12/2018    Diverticulosis of colon 08/15/2017    Vitamin D deficiency 02/10/2017    Intracranial meningioma ([facility] [address] ) 12/04/2015    Bilateral patellofemoral syndrome 07/09/2015    Esophageal reflux 06/24/2015    Vertigo 02/22/2013    Anemia 02/04/2013     He  has a past surgical history that includes Appendectomy (1962); Knee arthroscopy (Right); Hernia repair (3266); Rhizotomy w/ radiofrequency ablation (Right, 08/08/2014); Brain surgery (2005); Esophagogastroduodenoscopy; and pr stereo treat trigeminal nerve (Right, 3/6/2018)  His family history includes Colon cancer in his father; Emphysema in his mother; Skin cancer in his maternal grandmother  He  reports that he has never smoked  He has never used smokeless tobacco  He reports that he drinks alcohol  He reports that he does not use drugs  Current Outpatient Prescriptions   Medication Sig Dispense Refill    amitriptyline (ELAVIL) 10 mg tablet Two tabs at bedtime 180 tablet 1    baclofen 10 mg tablet       Calcium 250 MG CAPS Take 600 mg by mouth daily        Cholecalciferol (VITAMIN D3) 1000 units CAPS Take by mouth      cyanocobalamin (VITAMIN B-12) 100 mcg tablet Take 1 tablet by mouth daily      Multiple Vitamins-Minerals (PRESERVISION AREDS) TABS Take 1 tablet by mouth daily      phenytoin (DILANTIN) 50 mg tablet 1 tablet  tablet 1    pregabalin (LYRICA) 200 MG capsule Take one tab daily 90 capsule 1    pyridoxine (VITAMIN B6) 100 mg tablet Take 1 tablet by mouth daily       No current facility-administered medications for this visit        Current Outpatient Prescriptions on File Prior to Visit   Medication Sig    amitriptyline (ELAVIL) 10 mg tablet Two tabs at bedtime    baclofen 10 mg tablet     Calcium 250 MG CAPS Take 600 mg by mouth daily      Cholecalciferol (VITAMIN D3) 1000 units CAPS Take by mouth    cyanocobalamin (VITAMIN B-12) 100 mcg tablet Take 1 tablet by mouth daily    Multiple Vitamins-Minerals (PRESERVISION AREDS) TABS Take 1 tablet by mouth daily    phenytoin (DILANTIN) 50 mg tablet 1 tablet TID    pregabalin (LYRICA) 200 MG capsule Take one tab daily    pyridoxine (VITAMIN B6) 100 mg tablet Take 1 tablet by mouth daily     No current facility-administered medications on file prior to visit  He is allergic to gabapentin and oxcarbazepine       Review of Systems   Constitutional: Negative  HENT: Negative  Eyes:        Right eye dryness and blurry vision   Respiratory: Negative  Negative for apnea  Cardiovascular: Negative  Gastrointestinal: Negative  Endocrine: Negative  Genitourinary: Negative  Musculoskeletal: Negative  Allergic/Immunologic: Negative  Neurological: Positive for numbness (facial numbness, right side)  Hematological: Negative  Psychiatric/Behavioral: Negative  Objective:    /96 (BP Location: Left arm, Patient Position: Sitting, Cuff Size: Standard)   Pulse 80   Temp (!) 97 4 °F (36 3 °C) (Tympanic)   Resp 16   Ht 5' 9" (1 753 m)   Wt 118 kg (260 lb)   BMI 38 40 kg/m²          Physical Exam   Constitutional: He is oriented to person, place, and time  He appears well-nourished  HENT:   Head: Normocephalic and atraumatic  Eyes: Conjunctivae and EOM are normal  Right eye exhibits no discharge  Left eye exhibits no discharge  No scleral icterus  Neck: Neck supple  Cardiovascular: Normal rate, regular rhythm and normal heart sounds  Exam reveals no gallop and no friction rub  No murmur heard    Pulmonary/Chest: Effort normal and breath sounds normal  No respiratory distress  He has no wheezes  He has no rales  He exhibits no tenderness  Abdominal: Soft  Bowel sounds are normal  He exhibits no distension  There is no tenderness  Musculoskeletal: Normal range of motion  He exhibits no edema  Neurological: He is alert and oriented to person, place, and time  Skin: Skin is warm and dry  Psychiatric: He has a normal mood and affect  His behavior is normal    Nursing note and vitals reviewed

## 2018-03-26 ENCOUNTER — OFFICE VISIT (OUTPATIENT)
Dept: NEUROLOGY | Facility: CLINIC | Age: 68
End: 2018-03-26
Payer: MEDICARE

## 2018-03-26 VITALS
BODY MASS INDEX: 38.1 KG/M2 | HEART RATE: 74 BPM | SYSTOLIC BLOOD PRESSURE: 129 MMHG | WEIGHT: 258 LBS | DIASTOLIC BLOOD PRESSURE: 69 MMHG

## 2018-03-26 DIAGNOSIS — G50.0 TRIGEMINAL NEURALGIA: Primary | ICD-10-CM

## 2018-03-26 DIAGNOSIS — D32.0 INTRACRANIAL MENINGIOMA (HCC): ICD-10-CM

## 2018-03-26 PROCEDURE — 99214 OFFICE O/P EST MOD 30 MIN: CPT | Performed by: PSYCHIATRY & NEUROLOGY

## 2018-03-26 RX ORDER — DEXAMETHASONE 2 MG/1
2 TABLET ORAL
Qty: 5 TABLET | Refills: 0 | Status: SHIPPED | OUTPATIENT
Start: 2018-03-26 | End: 2018-04-06 | Stop reason: SINTOL

## 2018-03-26 RX ORDER — PHENYTOIN 50 MG/1
TABLET, CHEWABLE ORAL
Qty: 270 TABLET | Refills: 0 | Status: SHIPPED | OUTPATIENT
Start: 2018-03-26 | End: 2018-07-24 | Stop reason: SDUPTHER

## 2018-03-26 NOTE — PROGRESS NOTES
Patient ID: Alex Bone is a 79 y o  male  Assessment/Plan:   Problem List Items Addressed This Visit        Nervous and Auditory    Trigeminal neuralgia - Primary    Relevant Medications    phenytoin (DILANTIN) 50 mg tablet    dexamethasone (DECADRON) 2 mg tablet    Intracranial meningioma (HCC)          Preventive therapy  -  Continue taking amitriptyline 10 mg  -  May wean off of the Lamictal by taking 50 mg at bedtime for 5 days then 25 mg at bedtime for 5 days and then stop  -   Increase the Dilantin to 100 mg 3 times a day  -  Lyrica 200 mg 2 tabs in the morning 1 in the afternoon and 2 at bedtime-  This has also helped him sleep better at night  -  Baclofen 10 mg 3 times a day as needed for his facial pain  -  Continue Botox    Blurry vision in the right eye-  Told patient that does not improve he needs to go see his ophthalmologist   This did happen to him before in the past when he had his rhizotomy and it did improve on its own    Tinnitus right ear:  Suggest the patient increase his fluid intake to see if that improves his symptoms     will see him back in 3 weeks on April 12th at 12:30 p m  Subjective:  HPI   Nghia Claude Branham is a 79year old male presenting to the office today in neurological follow up of trigeminal neuralgia  He stopped working Jan of 2018  Trigeminal Neuralgia:     Surgery:   -  2 gamma knife procedures (~2008 and ~2011)    -  8/8/2014 - S/p Right V2 Rhizotomy by Dr Cristal Mcdermott  (pain went away for 6 months but the pain did not get worse for 3 years)   - 3/6/2018 S/p Rhizotomy TN V2 & V3     Medication used:  - Prevention: Lyrica (helped the most), amitriptyline (stopped due to fatigue), carbamazepine-makes goofy/confusion, cymbalta- no improvement, trileptal-dizzy, confusion, Neurontin (confusion), Lamictal (confusion)  - Abortive: baclofen, aspirin, tramadol,      Since the surgery has noted numbness on the right side of the face along with change in taste as well which I told the patient is typical following the surgery  Current pian level: 4-5 -   But the pain is on the V1 distribution at this time  Occurs- daily; always presents, lowest 1-2/10, 1-2 times daily it is severe for several minutes 10/10  Last- constant; 15 minutes when a severe episode occurs  Severity- average pain level 4-5/10 on the V1 and can get up to 10/10 few times a day and last for 15 minutes  Located- right side V2>V1; just below the right eye; new at upper right forehead (where gamma knife frame was screwed into his head)  Quality- sharp, dull, nagging, ice pick like  Triggers- unsure, ?eating/ chewing  What makes it worse: sneezing makes it 10/10    Tinnitus: right ear only    Meningoma:   Stable    MRI head:   IMPRESSION-    1  Stable nonenhancing rounded T2 hypointense structure in right Meckel's cave correlating to the previously present calcified structure noted on the prior 2013 head CT, stable from the previous MRI, possibly   a treated or calcified meningioma  Cisternal segments of both 5th nerves are otherwise normal in signal without abnormal enhancement  No brainstem abnormality  No interval change from the previous study  2   No acute infarction, intracranial hemorrhage or mass effect  MRI brain- stable       Objective:  Blood pressure 129/69, pulse 74, weight 117 kg (258 lb)  Physical Exam   Constitutional: He appears well-developed  HENT:   Head: Normocephalic  Eyes: EOM are normal  Pupils are equal, round, and reactive to light  Neck: Normal range of motion  Cardiovascular: Normal rate  Pulmonary/Chest: Effort normal    Musculoskeletal: Normal range of motion  Neurological: He has normal strength and normal reflexes  Gait and coordination normal    Skin: Skin is warm  Psychiatric: He has a normal mood and affect  His speech is normal    Nursing note and vitals reviewed        Neurological Exam    Mental Status  The patient is alert and oriented to person, place and time  His speech is normal  His language is fluent with no aphasia  He has normal attention span and concentration  Cranial Nerves    CN II: The patient's visual acuity and visual fields are normal   CN III, IV, VI: The patient's pupils are equally round and reactive to light and ocular movements are normal   CN VII:  The patient has symmetric facial movement  CN VIII:  The patient's hearing is normal   CN IX, X: The patient has symmetric palate movement and normal gag reflex  CN XI: The patient's shoulder shrug strength is normal   CN XII: The patient's tongue is midline without atrophy or fasciculations  Numbness on the way to V2-3 distribution on the right side     Motor  The patient has normal muscle bulk throughout  His overall muscle tone is normal throughout  His strength is 5/5 throughout all four extremities  Sensory  The patient's sensation is normal in all four extremities  Reflexes  Deep tendon reflexes are 2+ and symmetric in all four extremities with downgoing toes bilaterally  Gait and Coordination  The patient has normal gait and station and normal casual, toe, heel, and tandem gait  He has normal coordination bilaterally  ROS:  Review of Systems   Constitutional: Negative  HENT: Positive for hearing loss and tinnitus  Eyes: Positive for visual disturbance  Respiratory: Negative  Cardiovascular: Negative  Gastrointestinal: Negative  Endocrine: Negative  Genitourinary: Negative  Musculoskeletal: Positive for arthralgias and gait problem  Skin: Negative  Allergic/Immunologic: Negative  Neurological: Positive for numbness  Hematological: Negative  Psychiatric/Behavioral: Positive for confusion and sleep disturbance

## 2018-03-26 NOTE — PATIENT INSTRUCTIONS
preventive therapy  -  Continue taking amitriptyline 10 mg  -  May wean off of the Lamictal by taking 50 mg at bedtime for 5 days then 25 mg at bedtime for 5 days and then stop  -   Increase the Dilantin to 100 mg 3 times a day  -  Lyrica 200 mg 2 tabs in the morning 1 in the afternoon and 2 at bedtime-  This has also helped him sleep better at night  -  Baclofen 10 mg 3 times a day as needed for his facial pain  -  Continue Botox   will see him back in 3 weeks on April 12th at 12:30 p m

## 2018-04-06 ENCOUNTER — TELEPHONE (OUTPATIENT)
Dept: NEUROSURGERY | Facility: CLINIC | Age: 68
End: 2018-04-06

## 2018-04-06 DIAGNOSIS — G43.709 CHRONIC MIGRAINE WITHOUT AURA WITHOUT STATUS MIGRAINOSUS, NOT INTRACTABLE: Primary | ICD-10-CM

## 2018-04-06 RX ORDER — DEXAMETHASONE 2 MG/1
TABLET ORAL
Qty: 5 TABLET | Refills: 0 | Status: SHIPPED | OUTPATIENT
Start: 2018-04-06 | End: 2018-05-01

## 2018-04-06 NOTE — TELEPHONE ENCOUNTER
Decadron 2 mg in a m   For 5 days   increase amitriptyline to 30 mg at bedtime   if he can tolerate baclofen we can add that  As well

## 2018-04-06 NOTE — TELEPHONE ENCOUNTER
Rhizotomy on 3/6/18, pt states that he has not received any pain relief, and thinks that his pain may actually be worse  Pt states that he has an appt next week, Questioning recommendations in the meantime      Current meds:  lyrica 200m in am 1 in afternoon 1 at bedtime  amitriptyline 10m at bedtime  dilantin 50m TID    Pt #[phone]

## 2018-04-06 NOTE — TELEPHONE ENCOUNTER
Returned call to patient he reports continue Trigeminal nerve pain affecting area different form previous locations; He is s/p surgery w/ Dr Rosanne Payne on 3/6 2018 RHIZOTOMY TRIGEMINAL NERVES (V2 & V3)   New complaints appear to affect Trigeminal V1 root, located in the right eye brow and slightly above right eye brow  and underneath right eye  Describes pain is constant dull tooth ache type , when he touches under right eye pain is like a shooting pain up into forehead and under eyebrow  He reports continued numbness in V2-V3 areas, desired effect of recent surgery  Current medicine Treatment; Lyrica, Neurontin, dilantin, and  Elavil  Reports he has appointment with Dr Gil Limon neurologist next Thursday , he tu call me Thursday after appointment , I will review note then discuss w/ Dr Rosanne Payne , determine if need f/u appointment        During medication review patient reports he stopped taking decadron in January secondary adverse effects of dizziness, disorientation and loss of balance; d/c med from list

## 2018-04-06 NOTE — TELEPHONE ENCOUNTER
Advised patient of instructions below  Verbalizes understanding and agreeable to taking the Decadron  Script generated  Requesting script be sent to [facility]  Patient has been taking Baclofen 1 tab twice a day, "couple days a week " States it has been helping

## 2018-04-12 ENCOUNTER — OFFICE VISIT (OUTPATIENT)
Dept: NEUROLOGY | Facility: CLINIC | Age: 68
End: 2018-04-12
Payer: MEDICARE

## 2018-04-12 VITALS
HEART RATE: 72 BPM | WEIGHT: 254 LBS | DIASTOLIC BLOOD PRESSURE: 72 MMHG | BODY MASS INDEX: 37.51 KG/M2 | SYSTOLIC BLOOD PRESSURE: 132 MMHG

## 2018-04-12 DIAGNOSIS — G50.0 TRIGEMINAL NEURALGIA: Primary | ICD-10-CM

## 2018-04-12 PROCEDURE — 99214 OFFICE O/P EST MOD 30 MIN: CPT | Performed by: PSYCHIATRY & NEUROLOGY

## 2018-04-12 RX ORDER — CYPROHEPTADINE HYDROCHLORIDE 4 MG/1
TABLET ORAL
Qty: 60 TABLET | Refills: 0 | Status: SHIPPED | OUTPATIENT
Start: 2018-04-12 | End: 2018-05-17 | Stop reason: ALTCHOICE

## 2018-04-12 NOTE — PROGRESS NOTES
Patient ID: Daniel Lr is a 79 y o  male  Assessment/Plan:     Problem List Items Addressed This Visit        Nervous and Auditory    Trigeminal neuralgia - Primary    Relevant Medications    cyproheptadine (PERIACTIN) 4 mg tablet    Other Relevant Orders    Ambulatory referral to Pain Management          Preventive therapy  -  amitriptyline 10 mg may stop the amitriptyline at this time as well  -  Dilantin to 100 mg 3 times a day  -  Lyrica 200 mg 2 tabs in the morning 1 in the afternoon and 2 at bedtime-    -  Cyproheptadine 4 mg at bedtime to help him sleep  -   Compound gel  Will be sent out to see if that helps with his facial pain  -   Stop the baclofen as pt does not think it is helping  -  Continue Botox for one more time will give him on the tirgger point    -  Will refer him to Pain Management for possible trigger points or opioids  Subjective:  LEYDA Kee Harry Jeff is a 79year old male presenting to the office today in neurological follow up of trigeminal neuralgia  He stopped working Jan of 2018  Trigeminal Neuralgia:   Surgery:   -  2 gamma knife procedures (~2008 and ~2011)  -  8/8/2014 - S/p Right V2 Rhizotomy by Dr [facility]  (pain went away for 6 months but the pain did not get worse for 3 years)   - 3/6/2018 S/p Rhizotomy TN V2 & V3     Medication used:  - Prevention: Lyrica (helped the most), amitriptyline (stopped due to fatigue), carbamazepine-makes goofy/confusion, cymbalta- no improvement, trileptal-dizzy, confusion, Neurontin (confusion), Lamictal (confusion)  - Abortive: baclofen, aspirin, tramadol,      Since the surgery has noted numbness on the right side of the face along with change in taste as well which I told the patient is typical following the surgery  He has not noticed any improvement in his symptoms since last seen  He brought a picture today showing me where his typical trigger points in his face are      Current pian level: 4-5 -   But the pain is on the V1 distribution at this time  Occurs- daily; always presents, lowest 1-2/10, 1-2 times daily it is severe for several minutes 10/10  Last- constant; 15 minutes when a severe episode occurs  Severity- average pain level 4-5/10 on the V1 and can get up to 10/10 few times a day and last for 15 minutes  Located- right side V2>V1; just below the right eye; new at upper right forehead (where gamma knife frame was screwed into his head)  Quality- sharp, dull, nagging, ice pick like  Triggers- unsure, ?eating/ chewing  What makes it worse: sneezing makes it 10/10    Tinnitus: right ear only    Meningoma:   Stable    MRI head:   IMPRESSION-    1  Stable nonenhancing rounded T2 hypointense structure in right Meckel's cave correlating to the previously present calcified structure noted on the prior 2013 head CT, stable from the previous MRI, possibly   a treated or calcified meningioma  Cisternal segments of both 5th nerves are otherwise normal in signal without abnormal enhancement  No brainstem abnormality  No interval change from the previous study  2   No acute infarction, intracranial hemorrhage or mass effect  MRI brain- stable       The following portions of the patient's history were reviewed and updated as appropriate: allergies, current medications, past family history, past medical history, past social history, past surgical history and problem list      Objective:    Blood pressure 132/72, pulse 72, weight 115 kg (254 lb)  Physical Exam   Constitutional: He appears well-developed  HENT:   Head: Normocephalic  Eyes: EOM are normal  Pupils are equal, round, and reactive to light  Neck: Normal range of motion  Cardiovascular: Normal rate  Pulmonary/Chest: Effort normal    Musculoskeletal: Normal range of motion  Neurological: He has normal strength and normal reflexes  Gait and coordination normal    Skin: Skin is warm  Psychiatric: He has a normal mood and affect   His speech is normal    Nursing note and vitals reviewed  Neurological Exam    Mental Status  The patient is alert  His speech is normal  His language is fluent with no aphasia  He has normal attention span and concentration  Cranial Nerves    CN II: The patient's visual acuity and visual fields are normal   CN III, IV, VI: The patient's pupils are equally round and reactive to light and ocular movements are normal   CN VII:  The patient has symmetric facial movement  CN VIII:  The patient's hearing is normal   CN IX, X: The patient has symmetric palate movement and normal gag reflex  CN XI: The patient's shoulder shrug strength is normal   CN XII: The patient's tongue is midline without atrophy or fasciculations  Right V2  And V3 numbness     Motor  The patient has normal muscle bulk throughout  His overall muscle tone is normal throughout  His strength is 5/5 throughout all four extremities  Sensory  The patient's sensation is normal in all four extremities  Reflexes  Deep tendon reflexes are 2+ and symmetric in all four extremities with downgoing toes bilaterally  Gait and Coordination  The patient has normal gait and station and normal casual, toe, heel, and tandem gait  He has normal coordination bilaterally  ROS:    Review of Systems   Constitutional: Negative  HENT: Negative  Eyes: Negative  Respiratory: Negative  Cardiovascular: Negative  Gastrointestinal: Negative  Endocrine: Negative  Genitourinary: Negative  Musculoskeletal: Negative  Skin: Negative  Allergic/Immunologic: Negative  Neurological: Positive for headaches  Face pain   Hematological: Negative  Psychiatric/Behavioral: Negative

## 2018-04-20 ENCOUNTER — TELEPHONE (OUTPATIENT)
Dept: NEUROSURGERY | Facility: CLINIC | Age: 68
End: 2018-04-20

## 2018-04-20 NOTE — TELEPHONE ENCOUNTER
Call to patient as a f/u to note on 4/6 2018 ;  New onset trigeminal neuralgia pian pain is on the V1 distributionlocated in the right eye brow and slightly above right eye brow  and underneath right eye discussed with Dr Shaye Varghese    Informed Dr Shaye Varghese will discuss with patient during 4/23 visit

## 2018-04-23 ENCOUNTER — OFFICE VISIT (OUTPATIENT)
Dept: NEUROSURGERY | Facility: CLINIC | Age: 68
End: 2018-04-23

## 2018-04-23 VITALS
WEIGHT: 254 LBS | RESPIRATION RATE: 16 BRPM | BODY MASS INDEX: 37.62 KG/M2 | SYSTOLIC BLOOD PRESSURE: 136 MMHG | DIASTOLIC BLOOD PRESSURE: 98 MMHG | HEIGHT: 69 IN | HEART RATE: 75 BPM | TEMPERATURE: 96.6 F

## 2018-04-23 DIAGNOSIS — G50.0 TRIGEMINAL NEURALGIA: Primary | ICD-10-CM

## 2018-04-23 PROCEDURE — 99024 POSTOP FOLLOW-UP VISIT: CPT | Performed by: NEUROLOGICAL SURGERY

## 2018-04-23 NOTE — PROGRESS NOTES
Assessment/Plan:    No problem-specific Assessment & Plan notes found for this encounter  Summary: This is a 51-year-old male with a history of right-sided facial pain  He has a known  meningioma in [facility]  Previous gamma knife radiation in 2007 and 2010  Underwent right-sided V2 and V3 trigeminal rhizotomy 3 5 years ago with significant relief of his pain  He has had progressive return of his pain in the V2 distribution, but has also now developed pain in the V1 distribution  His pain is present despite multiple adjustments to his medications  He is now 6 weeks from V1 and V2 radiofrequency trigeminal rhizotomy  He has had a mild a response for pain relief in the V2 distribution, but did not have relief in the V1 distribution despite having the expected numbness in both these areas  Discussion once again held that repeat procedures have had variable responses  Both he is wife expressed understanding  Further options were discussed  At this point, there is the option of a repeat gamma knife radiation procedure  For this, I am referring him to my partner Dr Jl Zheng for evaluation  He is also scheduled to see pain management in 1 month  There is the possibility of an SPG block  Further, I did discuss the options of facial peripheral nerve stimulation, as well as deep brain stimulation  These are considered off label  I will have him follow up in 3 months to  his progress at to have further discussions if needed      In the past the wife had difficulty coping with the chronic pain of her   They have instituted lifestyle adjustments and have been well in this regard             Diagnoses and all orders for this visit:    Trigeminal neuralgia  -     Ambulatory referral to Neurosurgery; Future          Subjective:      Patient ID: Alber Gonzalez is a 79 y o  male  HPI:     He is now 6 weeks from a repeat V1 and V2 radiofrequency trigeminal rhizotomy    He is noting the expected numbness in the V2 distribution, as well as mild numbness in the V1 distribution following the procedure  He does note however that he remains with pain in the V1 distribution  He feels significant relief in the V2 distribution  from previous office note: This is a very pleasant 31-year-old male with a history of right-sided facial pain since 2007  Upon workup he was found to have a meningioma of 4 mm in [facility]  He underwent gamma knife radiation in 2007 and 2010 at [facility]   Follow-up MRI imaging demonstrated stability with no change in size  He did not obtain pain relief from the gamma knife radiation  He underwent a right-sided trigeminal rhizotomy 3 5 years ago with me  He had resolution of his severe pain with the expected numbness  He always remained with mild residual pain  This mild residual pain was adequately treated with medication  He has had slow return and progression of his pain pattern  He is now describing the sharp and shooting pain in his right cheek which has become severe for the last 2 months  He also reports that the pain is now extended around his right eye and especially near his right eyebrow  He has undergone medication adjustments including the addition of Lyrica, amitriptyline, and Dilantin  He presents to discuss repeat procedure        The following portions of the patient's history were reviewed and updated as appropriate: allergies, current medications, past family history, past medical history, past social history and past surgical history  Review of Systems   Constitutional: Negative  HENT: Negative  Eyes: Negative  Respiratory: Negative  Cardiovascular: Negative  Gastrointestinal: Negative  Endocrine: Negative  Genitourinary: Negative  Musculoskeletal: Negative  Allergic/Immunologic: Negative  Neurological: Negative  Hematological: Negative  Psychiatric/Behavioral: Negative  Objective:      /98 (BP Location: Left arm, Patient Position: Sitting, Cuff Size: Standard)   Pulse 75   Temp (!) 96 6 °F (35 9 °C) (Tympanic)   Resp 16   Ht 5' 9" (1 753 m)   Wt 115 kg (254 lb)   BMI 37 51 kg/m²          Physical Exam   Constitutional: He appears well-developed  HENT:   Head: Normocephalic  Eyes: Pupils are equal, round, and reactive to light  Neck: Normal range of motion  Pulmonary/Chest: Effort normal    Musculoskeletal: Normal range of motion  Neurological: He has normal strength  No sensory deficit

## 2018-04-23 NOTE — LETTER
April 23, 4831     Yuridia Mauricio MD  [address] [facility] [address]    Patient: Jimbo Ram   YOB: 1950   Date of Visit: 4/23/2018       Dear Dr Caitlyn Campo: Thank you for referring Cindi Magdaleno to me for evaluation  Below are my notes for this consultation  If you have questions, please do not hesitate to call me  I look forward to following your patient along with you           Sincerely,        Pérez Greco MD        CC: MD Gabriela Nelson MD

## 2018-05-01 ENCOUNTER — OFFICE VISIT (OUTPATIENT)
Dept: NEUROSURGERY | Facility: CLINIC | Age: 68
End: 2018-05-01

## 2018-05-01 VITALS
HEART RATE: 78 BPM | HEIGHT: 69 IN | SYSTOLIC BLOOD PRESSURE: 104 MMHG | WEIGHT: 255 LBS | DIASTOLIC BLOOD PRESSURE: 75 MMHG | RESPIRATION RATE: 14 BRPM | BODY MASS INDEX: 37.77 KG/M2 | TEMPERATURE: 97.1 F

## 2018-05-01 DIAGNOSIS — G50.0 TRIGEMINAL NEURALGIA: Primary | ICD-10-CM

## 2018-05-01 DIAGNOSIS — D32.0 INTRACRANIAL MENINGIOMA (HCC): ICD-10-CM

## 2018-05-01 PROCEDURE — 99024 POSTOP FOLLOW-UP VISIT: CPT | Performed by: NEUROLOGICAL SURGERY

## 2018-05-01 NOTE — PROGRESS NOTES
Neurosurgery Office Note  Quiana Masterson 79 y o  male MRN: [id number]       Assessment/Plan      Diagnoses and all orders for this visit:    Trigeminal neuralgia  -     Ambulatory referral to Neurosurgery    Intracranial meningioma [facility])    Other orders  -     Ketoprofen 10 % CREA; 3 (three) times a day        Discussion:    75-year-old male status post Gamma Knife 2007, 2010 presumably 2 right Meckel's cave mass  More recently had radiofrequency ablation is with Dr Johanna Solitario, most recently 3/6/18  Since that time he feels his face pain is improving  He is presently taking Lyrica and amitriptyline  He also follows with Dr Carlo Spears  At present, he experiences some pain in the V1 distribution, typically mild, sometimes moderate and infrequently severe  Character sharp, occasionally throbbing  The symptom is constant but severity varies  He has numbness and tingling in the V2, V3 distributions at present  I reviewed with him options for management including radiosurgery to the trigeminal nerve itself to treat trigeminal neuralgia  This would be a much higher dose and at the root entry zone  Whether this can be technically accomplished in the history of prior gamma knife radiosurgery is unclear  We will request records from the [facility] i e  specifically the ports of his prior gamma knife surgery  As he is improving, at present we will not plan for radiosurgery  He is also planning to see Dr Amaris Gann next week for medication adjustment management etc     He will contact us should he wish to further discuss radiosurgery, should he relapse or not continue to improve in wish to pursue radiosurgery  That appointment would be with Radiation Oncology at the [facility] as appropriate  Counseling / Coordination of Care  I spent 25 minutes with the patient, more than 50% was spent on counseling and/or coordination of care        CHIEF COMPLAINT    Chief Complaint   Patient presents with    Follow-up Discuss [facility] for Trigeminal Pain       HISTORY    History of Present Illness     79y o  year old male     HPI    See discussion    REVIEW OF SYSTEMS    Review of Systems   Constitutional: Negative  HENT: Positive for tinnitus (right ear, new since March rhizotomy, comes and goes, constant buzz when present)  Eyes: Positive for pain (hurts a lttle to touch and pain around the eye) and visual disturbance (some blurriness in right eye)  Respiratory: Negative  Cardiovascular: Negative  Gastrointestinal: Positive for constipation (occasional, may be from meds)  Endocrine: Positive for polydipsia  Genitourinary: Negative  Musculoskeletal: Positive for arthralgias (right knee)  Right knee some walking difficulty   Skin: Negative  Allergic/Immunologic: Negative  Neurological: Positive for light-headedness (occasional) and numbness (in location of rhizotomy)  Trigeminal neuralgia pain, right sided   Hematological: Negative  Psychiatric/Behavioral: Positive for decreased concentration (when pain is present, some forgetfulness), dysphoric mood (due to pain, discouraged by being in pain so long) and sleep disturbance  Negative for agitation  Meds/Allergies     Current Outpatient Prescriptions   Medication Sig Dispense Refill    amitriptyline (ELAVIL) 10 mg tablet Two tabs at bedtime (Patient taking differently: 30 mg daily at bedtime Two tabs at bedtime ) 180 tablet 1    Calcium 250 MG CAPS Take 600 mg by mouth daily        cyanocobalamin (VITAMIN B-12) 1,000 mcg tablet Take 1 5 tablets by mouth daily        cyproheptadine (PERIACTIN) 4 mg tablet 1 tab HS X 1-2 weeks, if not better you can increase to 2 tabs HS   60 tablet 0    Ketoprofen 10 % CREA 3 (three) times a day  2    Multiple Vitamins-Minerals (PRESERVISION AREDS) TABS Take 1 tablet by mouth daily      phenytoin (DILANTIN) 50 mg tablet 2 tabs  tablet 0    pregabalin (LYRICA) 200 MG capsule Take one tab daily (Patient taking differently: 200 mg 4 (four) times a day Take one tab daily ) 90 capsule 1    pyridoxine (VITAMIN B6) 100 mg tablet Take 2 tablets by mouth daily        Cholecalciferol (VITAMIN D3) 1000 units CAPS Take by mouth       No current facility-administered medications for this visit  Allergies   Allergen Reactions    Gabapentin Delerium    Oxcarbazepine Dizziness       PAST HISTORY    Past Medical History:   Diagnosis Date    Arthritis     Right knee    Chronic pain disorder     Ulcer of bile duct        Past Surgical History:   Procedure Laterality Date    APPENDECTOMY  1962    BRAIN SURGERY  2005    Gamma Knife for Trigeminal Neuralgia    ESOPHAGOGASTRODUODENOSCOPY      HERNIA REPAIR  1952    KNEE ARTHROSCOPY Right     NV STEREO TREAT TRIGEMINAL NERVE Right 3/6/2018    Procedure: RHIZOTOMY TRIGEMINAL NERVES (V2 & V3); Surgeon: Jean-Paul Shukla MD;  Location: [facility];  Service: Neurosurgery    RHIZOTOMY W/ RADIOFREQUENCY ABLATION Right 08/08/2014    Stereotactic Ablation of Gasserian Ganglion Right sided trigeminal V2 radiofrequency rhizotomy       Social History   Substance Use Topics    Smoking status: Never Smoker    Smokeless tobacco: Never Used    Alcohol use Yes      Comment: Social       Family History   Problem Relation Age of Onset    Emphysema Mother     Colon cancer Father     Skin cancer Maternal Grandmother          Above history personally reviewed  EXAM    Vitals:Blood pressure 104/75, pulse 78, temperature (!) 97 1 °F (36 2 °C), temperature source Tympanic, resp  rate 14, height 5' 9" (1 753 m), weight 116 kg (255 lb)  ,Body mass index is 37 66 kg/m²  Physical Exam    Neurologic Exam     Cranial Nerves     CN VII   Facial expression full, symmetric  MEDICAL DECISION MAKING    Imaging Studies:     MRI brain study from 12/26/15 personally reviewed  I have personally reviewed pertinent reports     and I have personally reviewed pertinent films in PACS

## 2018-05-07 ENCOUNTER — CONSULT (OUTPATIENT)
Dept: PAIN MEDICINE | Facility: CLINIC | Age: 68
End: 2018-05-07
Payer: MEDICARE

## 2018-05-07 VITALS
HEART RATE: 82 BPM | DIASTOLIC BLOOD PRESSURE: 60 MMHG | BODY MASS INDEX: 38.06 KG/M2 | HEIGHT: 69 IN | TEMPERATURE: 98.2 F | SYSTOLIC BLOOD PRESSURE: 132 MMHG | WEIGHT: 257 LBS

## 2018-05-07 DIAGNOSIS — G50.0 TRIGEMINAL NEURALGIA: Primary | ICD-10-CM

## 2018-05-07 PROCEDURE — 99204 OFFICE O/P NEW MOD 45 MIN: CPT | Performed by: ANESTHESIOLOGY

## 2018-05-07 NOTE — PROGRESS NOTES
Assessment:  1  Trigeminal neuralgia        Plan:  Idania Victoria is a 79 y o  male who presents for initial consultation for trigeminal neuralgia that has been present for greater than 11 years the as a result of a meningioma in [address]  The patient continues today with ongoing right facial pain  The patient underwent right V1 and V2 radiofrequency trigeminal rhizotomy on 3/6/2018 with Dr Martínez Burch, and reports increased relief of symptoms that were radiating into his right jaw and cheek  However, He continues to have right V1 allodynia on palpation and continued pain above and below his right eye  I discussed with the patient about moving forward with a right sphenopalatine ganglion injection, to decrease his pain and symptoms  Patient was educated on the procedures most common risks  The patient verbalized understanding and would like to proceed with the procedure  Therefore, the patient be scheduled for an upcoming Tuesday or Thursday  The patient will follow up his right sphenopalatine ganglion nerve block  ,or sooner with the worsening of symptoms  Complete risks and benefits including bleeding, infection, tissue reaction, nerve injury and allergic reaction were discussed  The approach was demonstrated using models and literature was provided  Verbal and written consent was obtained  My impressions and treatment recommendations were discussed in detail with the patient who verbalized understanding and had no further questions  Discharge instructions were provided  I personally saw and examined the patient and I agree with the above discussed plan of care  Orders Placed This Encounter   Procedures    [place] spine and pain procedure     Standing Status:   Future     Standing Expiration Date:   5/7/2022     Order Specific Question:   Reason for Exam:     Answer:   Right Sphenopalatine ganglion nerve block     Order Specific Question:   Anticoagulant hold needed?      Answer:   No     No orders of the defined types were placed in this encounter  History of Present Illness:    Beverly Mistry is a 79 y o  male who presents for initial consultation for trigeminal neuralgia that has been present for greater than 11 years the as a result of a meningioma in [address]  Patient was referred to our office by Dr Tere Frankel  Of V1 The patient and V2 radiofrequency trigeminal rhizotomy on 3/6/2018 with Tere Frankel  He also under went gamma knife radiation with the past    Patient reports that his pain is moderate, constant in nature occurring in no typical pattern  Patient currently rates his pain as 3-10 out numeric pain scale  Patient describes his pain as sharp, dull aching, throbbing, drilling pain  Patient reports that he has mild right calf weakness  Patient reports that the pain is present above and below his right eye and also noted in his right forehead  Patient reports that his pain is decreased with walking, exercising  Patient reports no change in pain with prayer, lying down, standing, bending, bowel movements  Patient reports that his pain is increased with sitting, exercising, and sneezing  Patient reports moderate relief of symptoms with the use of surgery, and nerve block/injection  Patient reports that he is currently taking amitriptyline 30 mg at bedtime, Dilantin 100 mg 3 times a day, and Lyrica 200 mg 4 times a day  Patient reports that these medications provide him minimal pain relief  I have personally reviewed and/or updated the patient's past medical history, past surgical history, family history, social history, current medications, allergies, and vital signs today  Review of Systems:    Review of Systems   Constitutional: Negative for fever and unexpected weight change  HENT: Negative for trouble swallowing  Eyes: Positive for visual disturbance  Respiratory: Negative for shortness of breath and wheezing      Cardiovascular: Negative for chest pain and palpitations  Gastrointestinal: Positive for constipation  Negative for diarrhea, nausea and vomiting  Endocrine: Negative for cold intolerance, heat intolerance and polydipsia  Genitourinary: Negative for difficulty urinating and frequency  Musculoskeletal: Positive for gait problem  Negative for arthralgias, joint swelling and myalgias  Skin: Negative for rash  Neurological: Positive for weakness  Negative for dizziness, seizures, syncope and headaches  Hematological: Does not bruise/bleed easily  Psychiatric/Behavioral: Negative for dysphoric mood  All other systems reviewed and are negative  Patient Active Problem List   Diagnosis    Trigeminal neuralgia    Facial spasm    Anemia    Bilateral patellofemoral syndrome    Diverticulosis of colon    Esophageal reflux    [facility]ac[facility] ([facility] [address] [facility]    Vertigo    Vitamin D deficiency    Need for pneumococcal vaccination    Preoperative evaluation of a medical condition to rule out surgical contraindications (TAR required)       Past Medical History:   Diagnosis Date    Arthritis     Right knee    Chronic pain disorder     Trigeminal neuralgia pain     Ulcer of bile duct        Past Surgical History:   Procedure Laterality Date    APPENDECTOMY  1962    BRAIN SURGERY  2005    Gamma Knife for Trigeminal Neuralgia    ESOPHAGOGASTRODUODENOSCOPY      HERNIA REPAIR  1952    KNEE ARTHROSCOPY Right     Patient denies arthroscopy, had steroid injection    OTHER SURGICAL HISTORY      gamma knife RT in 2007 and 2010    TX STEREO TREAT TRIGEMINAL NERVE Right 3/6/2018    Procedure: RHIZOTOMY TRIGEMINAL NERVES (V2 & V3);   Surgeon: Rikki Runner, MD;  Location:  MAIN OR;  Service: Neurosurgery    RHIZOTOMY W/ RADIOFREQUENCY ABLATION Right 08/08/2014    Stereotactic Ablation of Gasserian Ganglion Right sided trigeminal V2 radiofrequency rhizotomy x2       Family History   Problem Relation Age of Onset    Emphysema Mother    Marcia Silence Colon cancer Father     Skin cancer Maternal Grandmother        Social History     Occupational History    Retired      21 years [employer] &      Social History Main Topics    Smoking status: Never Smoker    Smokeless tobacco: Never Used    Alcohol use Yes      Comment: Social    Drug use: No    Sexual activity: Yes       Current Outpatient Prescriptions on File Prior to Visit   Medication Sig    amitriptyline (ELAVIL) 10 mg tablet Two tabs at bedtime (Patient taking differently: 30 mg daily at bedtime Two tabs at bedtime )    Calcium 250 MG CAPS Take 600 mg by mouth daily      Cholecalciferol (VITAMIN D3) 1000 units CAPS Take by mouth    cyanocobalamin (VITAMIN B-12) 1,000 mcg tablet Take 1 5 tablets by mouth daily      cyproheptadine (PERIACTIN) 4 mg tablet 1 tab HS X 1-2 weeks, if not better you can increase to 2 tabs HS   Ketoprofen 10 % CREA 3 (three) times a day    Multiple Vitamins-Minerals (PRESERVISION AREDS) TABS Take 1 tablet by mouth daily    phenytoin (DILANTIN) 50 mg tablet 2 tabs TID    pregabalin (LYRICA) 200 MG capsule Take one tab daily (Patient taking differently: 200 mg 4 (four) times a day Take one tab daily )    pyridoxine (VITAMIN B6) 100 mg tablet Take 2 tablets by mouth daily       No current facility-administered medications on file prior to visit  Allergies   Allergen Reactions    Gabapentin Delerium    Oxcarbazepine Dizziness       Physical Exam:    /60   Pulse 82   Temp 98 2 °F (36 8 °C) (Oral)   Ht 5' 9" (1 753 m)   Wt 117 kg (257 lb)   BMI 37 95 kg/m²     Constitutional: normal, well developed, well nourished, alert, in no distress and non-toxic and no overt pain behavior   and obese  Eyes: anicteric  HEENT: grossly intact  Neck: supple, symmetric, trachea midline and no masses   Pulmonary:even and unlabored  Cardiovascular:No edema or pitting edema present  Skin:Normal without rashes or lesions and well hydrated  Psychiatric:Mood and affect appropriate  Neurologic:Cranial Nerves II-XII grossly intact  Musculoskeletal:normal    The patient was noted to have allodynia over V1 with palpation  Patient also was noted to have numbness and paresthesias of the right cheek with palpation  Imaging    EXAM ROOM = MRI 1   EXAM START = [id number]   EXAM STOP = [id number]   FILMS USED = N/A      MRI BRAIN WITH AND WITHOUT CONTRAST      INDICATION-  Trigeminal neuralgia  781 2 r26 9, 225 2 d32 0 334 3   r27 8, 780 4 r42  History-pt states face pain- trigeminal neuralgia pt   states he had gamma ray 2 times-once in 2008 and 2013      COMPARISON-  6/8/2014^ head CT 2/15/2013      TECHNIQUE-   Brain- Sagittal T1, axial T2, axial gradient, axial T1 postcontrast        Brainstem- Axial Fiesta, coronal T1 precontrast, coronal T1   postcontrast with fat suppression, axial T1 postcontrast        12 mL of Gadavist was injected intravenously without immediate   consequence  IMAGE QUALITY-   Motion artifact limits portions of the study  Some   pulse sequences were repeated  Diagnostic information is obtained  FINDINGS-      BRAIN PARENCHYMA-  There is no discrete mass, mass effect or midline   shift  No abnormal white matter signal identified  Brainstem and   cerebellum demonstrate normal signal  There is no intracranial   hemorrhage  There is no evidence of acute infarction and diffusion   imaging is unremarkable  Postcontrast imaging of the brain demonstrates no abnormal enhancement  BRAINSTEM-  The appearance of the low signal abnormality in the right   Meckel's cave, best seen perhaps on image 9 series 7 is stable,   correlating to the calcified lesion noted on the prior head CT  This   rounded T2 hypointense structure measuring approximately 7 mm in   maximal transverse diameter  The cisternal segments of both 5th nerves   are normal in size and signal characteristics  There is no fatty   atrophy of the muscles of mastication    The brainstem is free of signal   abnormality  No definite evidence of vascular compression on either   5th nerve      VENTRICLES-  Normal       SELLA AND PITUITARY GLAND-  Normal       ORBITS-  Normal       PARANASAL SINUSES- Normal       VASCULATURE-  Evaluation of the major intracranial vasculature   demonstrates appropriate flow voids  CALVARIUM AND SKULL BASE-  Normal       EXTRACRANIAL SOFT TISSUES-  Normal       IMPRESSION-      1  Stable nonenhancing rounded T2 hypointense structure in right   Meckel's cave correlating to the previously present calcified structure   noted on the prior 2013 head CT, stable from the previous MRI, possibly   a treated or calcified meningioma  Cisternal segments of both 5th   nerves are otherwise normal in signal without abnormal enhancement  No   brainstem abnormality  No interval change from the previous study  2   No acute infarction, intracranial hemorrhage or mass effect        C-ARM - SKULL BASE     INDICATION:  Procedure guidance  Trigeminal rhizotomy      COMPARISON:  None     TECHNIQUE:     FLUOROSCOPY TIME:   34 seconds     2 FLUOROSCOPIC IMAGES     FINDINGS:     Image(s) depict fluoroscopic guidance for trigeminal rhizotomy     IMPRESSION:     Image(s) depict fluoroscopic guidance for trigeminal rhizotomy       Please refer to the separate procedure notes for additional details

## 2018-05-09 DIAGNOSIS — G50.0 TRIGEMINAL NEURALGIA OF RIGHT SIDE OF FACE: ICD-10-CM

## 2018-05-09 RX ORDER — PREGABALIN 200 MG/1
CAPSULE ORAL
Qty: 90 CAPSULE | Refills: 1 | Status: SHIPPED | OUTPATIENT
Start: 2018-05-09 | End: 2018-05-16 | Stop reason: SDUPTHER

## 2018-05-15 ENCOUNTER — TELEPHONE (OUTPATIENT)
Dept: NEUROLOGY | Facility: CLINIC | Age: 68
End: 2018-05-15

## 2018-05-15 NOTE — TELEPHONE ENCOUNTER
Patient's wife called in and stated patient has not received mail order of lyrica  Called [facility]-spoke to Beatrice, transferred to [facility], spoke to XIOMARA, she states the med was being held because they needed clarification and multiple attempts were made to contact our office  Transferred to Stefan Dobson (pharmacy) She states the dosage on Rx exceeds max daily dosage  Need a new Rx; the one sent on 5/9 was cancelled  Rx is 1000 mg/day but max recomm is 600/day  They stated that the new Rx should have in the note to the pharm that "DR is aware of max recomm dose is 600/day" or something similar    (So that delay doesn't happen again)    Also, since patient will not receive this med in mail on time, patient's wife is requesting a 30 day supply to local pharmacy on file--[facility]

## 2018-05-16 DIAGNOSIS — G50.0 TRIGEMINAL NEURALGIA OF RIGHT SIDE OF FACE: ICD-10-CM

## 2018-05-16 RX ORDER — PREGABALIN 200 MG/1
CAPSULE ORAL
Qty: 150 CAPSULE | Refills: 2 | Status: SHIPPED | OUTPATIENT
Start: 2018-05-16 | End: 2018-10-03 | Stop reason: SDUPTHER

## 2018-05-16 RX ORDER — PREGABALIN 200 MG/1
CAPSULE ORAL
Qty: 75 CAPSULE | Refills: 0 | Status: SHIPPED | OUTPATIENT
Start: 2018-05-16 | End: 2018-05-17 | Stop reason: SDUPTHER

## 2018-05-17 ENCOUNTER — HOSPITAL ENCOUNTER (OUTPATIENT)
Dept: RADIOLOGY | Facility: CLINIC | Age: 68
Discharge: HOME | End: 2018-05-17
Attending: ANESTHESIOLOGY | Admitting: ANESTHESIOLOGY
Payer: MEDICARE

## 2018-05-17 VITALS
SYSTOLIC BLOOD PRESSURE: 112 MMHG | HEART RATE: 74 BPM | RESPIRATION RATE: 20 BRPM | OXYGEN SATURATION: 98 % | TEMPERATURE: 98.1 F | DIASTOLIC BLOOD PRESSURE: 73 MMHG

## 2018-05-17 DIAGNOSIS — G50.0 TRIGEMINAL NEURALGIA: ICD-10-CM

## 2018-05-17 PROCEDURE — 64505 N BLOCK SPENOPALATINE GANGL: CPT | Performed by: ANESTHESIOLOGY

## 2018-05-17 PROCEDURE — 77003 FLUOROGUIDE FOR SPINE INJECT: CPT | Performed by: ANESTHESIOLOGY

## 2018-05-17 RX ORDER — BUPIVACAINE HCL/PF 2.5 MG/ML
10 VIAL (ML) INJECTION ONCE
Status: COMPLETED | OUTPATIENT
Start: 2018-05-17 | End: 2018-05-17

## 2018-05-17 RX ORDER — METHYLPREDNISOLONE ACETATE 80 MG/ML
80 INJECTION, SUSPENSION INTRA-ARTICULAR; INTRALESIONAL; INTRAMUSCULAR; PARENTERAL; SOFT TISSUE ONCE
Status: COMPLETED | OUTPATIENT
Start: 2018-05-17 | End: 2018-05-17

## 2018-05-17 RX ORDER — LIDOCAINE HYDROCHLORIDE 10 MG/ML
5 INJECTION, SOLUTION EPIDURAL; INFILTRATION; INTRACAUDAL; PERINEURAL ONCE
Status: COMPLETED | OUTPATIENT
Start: 2018-05-17 | End: 2018-05-17

## 2018-05-17 RX ADMIN — IOHEXOL 1 ML: 300 INJECTION, SOLUTION INTRAVENOUS at 15:03

## 2018-05-17 RX ADMIN — Medication 1.5 ML: at 15:03

## 2018-05-17 RX ADMIN — METHYLPREDNISOLONE ACETATE 40 MG: 80 INJECTION, SUSPENSION INTRA-ARTICULAR; INTRALESIONAL; INTRAMUSCULAR; PARENTERAL; SOFT TISSUE at 15:03

## 2018-05-17 RX ADMIN — LIDOCAINE HYDROCHLORIDE 2 ML: 10 INJECTION, SOLUTION EPIDURAL; INFILTRATION; INTRACAUDAL; PERINEURAL at 14:54

## 2018-05-17 NOTE — H&P
History of Present Illness: The patient is a 79 y o  male who presents with complaints of right-sided facial pain and is here today for right-sided sphenopalatine ganglion block  Patient Active Problem List   Diagnosis    Trigeminal neuralgia    Facial spasm    Anemia    Bilateral patellofemoral syndrome    Diverticulosis of colon    Esophageal reflux    Intracranial meningioma ([address] )    Vertigo    Vitamin D deficiency    Need for pneumococcal vaccination    Preoperative evaluation of a medical condition to rule out surgical contraindications (TAR required)       Past Medical History:   Diagnosis Date    Arthritis     Right knee    Chronic pain disorder     Trigeminal neuralgia pain     Ulcer of bile duct        Past Surgical History:   Procedure Laterality Date    APPENDECTOMY  1962    BRAIN SURGERY  2005    Gamma Knife for Trigeminal Neuralgia    ESOPHAGOGASTRODUODENOSCOPY      HERNIA REPAIR  1952    KNEE ARTHROSCOPY Right     Patient denies arthroscopy, had steroid injection    OTHER SURGICAL HISTORY      gamma knife RT in 2007 and 2010    VT STEREO TREAT TRIGEMINAL NERVE Right 3/6/2018    Procedure: RHIZOTOMY TRIGEMINAL NERVES (V2 & V3);   Surgeon: Siri Howard MD;  Location: [facility];  Service: Neurosurgery    RHIZOTOMY W/ RADIOFREQUENCY ABLATION Right 08/08/2014    Stereotactic Ablation of Gasserian Ganglion Right sided trigeminal V2 radiofrequency rhizotomy x2         Current Outpatient Prescriptions:     amitriptyline (ELAVIL) 10 mg tablet, Two tabs at bedtime (Patient taking differently: 30 mg daily at bedtime Two tabs at bedtime ), Disp: 180 tablet, Rfl: 1    Calcium 250 MG CAPS, Take 600 mg by mouth daily  , Disp: , Rfl:     Cholecalciferol (VITAMIN D3) 1000 units CAPS, Take by mouth, Disp: , Rfl:     cyanocobalamin (VITAMIN B-12) 1,000 mcg tablet, Take 1 5 tablets by mouth daily  , Disp: , Rfl:     Ketoprofen 10 % CREA, 3 (three) times a day, Disp: , Rfl: 2    Multiple Vitamins-Minerals (PRESERVISION AREDS) TABS, Take 1 tablet by mouth daily, Disp: , Rfl:     phenytoin (DILANTIN) 50 mg tablet, 2 tabs TID, Disp: 270 tablet, Rfl: 0    pregabalin (LYRICA) 200 MG capsule, Take 2 tabs in the am, 1 tab in the afternoon, 2 tabs at bedtime, Disp: 150 capsule, Rfl: 2    pyridoxine (VITAMIN B6) 100 mg tablet, Take 2 tablets by mouth daily  , Disp: , Rfl:     Current Facility-Administered Medications:     bupivacaine (PF) (MARCAINE) 0 25 % injection 10 mL, 10 mL, Perineural, Once, Rosalina Stanton MD    iohexol (OMNIPAQUE) 300 mg/mL injection 50 mL, 50 mL, Perineural, Once, Rosalina Stanton MD    lidocaine (PF) (XYLOCAINE-MPF) 1 % injection 5 mL, 5 mL, Infiltration, Once, Rosalina Stanton MD    methylPREDNISolone acetate (DEPO-MEDROL) injection 80 mg, 80 mg, Perineural, Once, Rosalina Stanton MD    Allergies   Allergen Reactions    Gabapentin Delerium    Oxcarbazepine Dizziness       Physical Exam:   Vitals:    05/17/18 1438   BP: 122/80   Pulse: 72   Resp: 20   Temp: 98 1 °F (36 7 °C)   SpO2: 96%     General: Awake, Alert, Oriented x 3, Mood and affect appropriate  Respiratory: Respirations even and unlabored  Cardiovascular: Peripheral pulses intact; no edema  Musculoskeletal Exam:  Right-sided facial pain    ASA Score: 3    Assessment:   1   Trigeminal neuralgia        Plan: Right Sphenopalatine ganglion nerve block

## 2018-05-17 NOTE — DISCHARGE INSTRUCTIONS
1  Do not apply heat to any area that is numb  If you have discomfort or soreness at the injection site, you may apply ice today, 20 minutes on and 20 minutes off  Tomorrow you may use ice or warm, moist heat  Do not apply ice or heat directly to the skin  2  If you experience severe shortness of breath, go to the Emergency Room  3  You may have numbness for several hours from the local anesthetic  Please use caution and common sense, especially with weight-bearing activities  4  You may have an increase or change in the discomfort for 36-48 hours after your treatment  Apply ice and continue with any pain medicine you have been prescribed  5  Do not do anything strenuous today  You may shower, but no tub baths or hot tubs today  You may resume your normal activities tomorrow, but do not overdo it  Resume normal activities slowly when you are feeling better  6  If you experience redness, drainage or swelling at the injection site, or if you develop a fever above 100 degrees, please call [facility] at [phone] or go to the Emergency Room  7  Continue to take all routine medicines prescribed by your primary care physician unless otherwise instructed by our staff  Most blood thinners should be started again according to your regularly scheduled dosing  If you have any questions, please give our office a call  If you have a problem specifically related to your procedure, please call our office at [phone]  Problems not related to your procedure should be directed to your primary care physician

## 2018-05-23 ENCOUNTER — TELEPHONE (OUTPATIENT)
Dept: RADIOLOGY | Facility: CLINIC | Age: 68
End: 2018-05-23

## 2018-05-24 NOTE — TELEPHONE ENCOUNTER
Pt left message in voicemail this morning, stating that he was returning a call to our office regarding his recent procedure  Pt left a call back number of [phone] or [phone]

## 2018-05-24 NOTE — TELEPHONE ENCOUNTER
Spoke with pt  75% relief  Pain level -2/10  Pt stated that he still has some discomfort under his right eye

## 2018-07-16 ENCOUNTER — OFFICE VISIT (OUTPATIENT)
Dept: NEUROSURGERY | Facility: CLINIC | Age: 68
End: 2018-07-16
Payer: MEDICARE

## 2018-07-16 VITALS
BODY MASS INDEX: 39.4 KG/M2 | DIASTOLIC BLOOD PRESSURE: 77 MMHG | HEART RATE: 72 BPM | HEIGHT: 69 IN | SYSTOLIC BLOOD PRESSURE: 103 MMHG | WEIGHT: 266 LBS | TEMPERATURE: 96.7 F | RESPIRATION RATE: 16 BRPM

## 2018-07-16 DIAGNOSIS — G50.0 TRIGEMINAL NEURALGIA OF RIGHT SIDE OF FACE: Primary | ICD-10-CM

## 2018-07-16 DIAGNOSIS — D32.0 INTRACRANIAL MENINGIOMA (HCC): ICD-10-CM

## 2018-07-16 PROCEDURE — 99213 OFFICE O/P EST LOW 20 MIN: CPT | Performed by: NEUROLOGICAL SURGERY

## 2018-07-16 NOTE — PROGRESS NOTES
Assessment/Plan:    No problem-specific Assessment & Plan notes found for this encounter  Diagnoses and all orders for this visit:    Trigeminal neuralgia of right side of face    Intracranial meningioma ([address] )      Summary: This is a 60-year-old male with a history of right-sided facial pain  He has a known meningioma and Meckel's cave  Previous gamma knife radiation  He is 4 months from his repeat radiofrequency trigeminal rhizotomy  He is now also 2 months from and SPG block with Dr Liza Laird  He is doing well with control of his pain  He has expected numbness in the V2 and V3 distribution  At this point he will continue his medications and continue his follow-up with Dr Jerald Cooney  If his pain returns, he has the option of a repeat rhizotomy, a repeat SPG block with Dr Liza Laird, or potential gamma knife radiation with Dr Joss Garner  For now he will follow-up as needed  In the past the wife had difficulty coping with the chronic pain of her , but they instituted lifestyle adjustments and have continued to do well in this regard and several subsequent follow-up visits  Subjective:      Patient ID: Krista Munoz is a 76 y o  male  HPI     He is now 4 months from his repeat V1 and V2 radiofrequency trigeminal rhizotomy  He remains with the expected numbness in the V2 and V3 distribution on the right side  Two months ago he underwent an SPG block with Dr Liza Laird which gave him additional relief  He is now noting only mild intermittent pain below his right eye on the medial border  He had also follow-up with Dr Joss Garner to discuss potential gamma knife radiation for which he is not interested in pursuing at this time  Review of his history is that he is a very pleasant 60-year-old male with a right-sided meningioma in [address]  He had undergone subsequent gamma knife radiation multiple times and has right-sided trigeminal neuralgia    He has had 2 radiofrequency rhizotomy is with myself  He is taking medication and is followed by Dr Kat Tirado  The following portions of the patient's history were reviewed and updated as appropriate: allergies, current medications, past family history, past medical history, past social history and past surgical history  Review of Systems   Constitutional: Negative  HENT:        Per patient loss of taste  Pain from TN is now constant by right eye and temple  Eyes: Positive for pain (at times TN pain through right eye )  Respiratory: Negative  Cardiovascular: Negative  Gastrointestinal: Negative  Endocrine: Negative  Genitourinary: Negative  Musculoskeletal: Negative  Skin: Negative  Allergic/Immunologic: Negative  Neurological: Positive for light-headedness  Hematological: Negative  Psychiatric/Behavioral: Negative  Objective:      /77 (BP Location: Left arm, Patient Position: Sitting, Cuff Size: Standard)   Pulse 72   Temp (!) 96 7 °F (35 9 °C) (Tympanic)   Resp 16   Ht 5' 9" (1 753 m)   Wt 121 kg (266 lb)   BMI 39 28 kg/m²          Physical Exam   Constitutional: He is oriented to person, place, and time  He appears well-developed  HENT:   Head: Normocephalic  Eyes: Pupils are equal, round, and reactive to light  Neck: Normal range of motion  Pulmonary/Chest: Effort normal    Musculoskeletal: Normal range of motion  Neurological: He is alert and oriented to person, place, and time  He has normal strength  No sensory deficit     Right V2 and V3 numbness

## 2018-07-17 DIAGNOSIS — G50.0 TRIGEMINAL NEURALGIA: ICD-10-CM

## 2018-07-17 RX ORDER — AMITRIPTYLINE HYDROCHLORIDE 10 MG/1
TABLET, FILM COATED ORAL
Qty: 270 TABLET | Refills: 1 | Status: SHIPPED | OUTPATIENT
Start: 2018-07-17 | End: 2018-07-20 | Stop reason: SDUPTHER

## 2018-07-20 DIAGNOSIS — G50.0 TRIGEMINAL NEURALGIA: ICD-10-CM

## 2018-07-20 RX ORDER — AMITRIPTYLINE HYDROCHLORIDE 10 MG/1
TABLET, FILM COATED ORAL
Qty: 30 TABLET | Refills: 0 | Status: SHIPPED | OUTPATIENT
Start: 2018-07-20 | End: 2018-09-26

## 2018-07-20 NOTE — TELEPHONE ENCOUNTER
Pt has been taking 3tabs Qhs  Pt ran out of meds 4days ago, has taken nothing x3days  Ok to not refill? Pt can just be done? Or did u want him to taper off?

## 2018-07-20 NOTE — TELEPHONE ENCOUNTER
My note states that he has to stop it  thus please have him stop it but if he is taking 2 tabs have go down to 1 tab for 10 days and then stop it   Thank you

## 2018-07-20 NOTE — PROGRESS NOTES
Patient ID: Phani Londono is a 76 y o  male  Assessment/Plan:    Insomnia  Referral to Dr Josy Webber    Trigeminal neuralgia   preventive therapy  -  Wean off Amitriptyline  Take 2 pills at bedtime for 10 days then 1 pill at bedtime for 10 days then stop  -  Continue the Dilantin to 100 mg 3 times a day  -  Lyrica 200 mg 2 tabs in the morning 1 in the afternoon and 2 at bedtime-  This has also helped him sleep better at night  - Start Melatonin 5 mg at 30 min before bedtime  May increase up to 10 mg  We will also put in a referral sleep medicine - Dr Nilton Gonzales         Problem List Items Addressed This Visit        Nervous and Auditory    Trigeminal neuralgia - Primary      preventive therapy  -  Wean off Amitriptyline  Take 2 pills at bedtime for 10 days then 1 pill at bedtime for 10 days then stop  -  Continue the Dilantin to 100 mg 3 times a day  -  Lyrica 200 mg 2 tabs in the morning 1 in the afternoon and 2 at bedtime-  This has also helped him sleep better at night  - Start Melatonin 5 mg at 30 min before bedtime  May increase up to 10 mg  We will also put in a referral sleep medicine - Dr Nilton Gonzales            Other    Vertigo    Insomnia     Referral to Dr Miquel Martínez    Ambulatory referral to Sleep Medicine         Subjective:    HPI  Danita Stevens is a 76year old male presenting to the office today in neurological follow up of trigeminal neuralgia  He stopped working Jan of 2018        Trigeminal Neuralgia:   Surgery:   -  2 gamma knife procedures (~2008 and ~2011)    -  8/8/2014 - S/p Right V2 Rhizotomy by Dr Jed Marti  (pain went away for 6 months but the pain did not get worse for 3 years)   - 3/6/2018 S/p Rhizotomy TN V2 & V3      Medication used:  - Prevention: Lyrica (helped the most), amitriptyline (stopped due to fatigue), carbamazepine-makes goofy/confusion, cymbalta- no improvement, trileptal-dizzy, confusion, Neurontin (confusion), Lamictal (confusion)  - Abortive: baclofen, aspirin, tramadol,      Since the surgery has noted numbness on the right side of the face along with change in taste as well which I told the patient is typical following the surgery  He has not noticed any improvement in his symptoms since last seen  He brought a picture today showing me where his typical trigger points in his face are      Current pian level: 4/10-   But the pain is in corner of right eye and above eye brow  Occurs- daily; always presents, lowest 1-2/10, 1-2 times daily it is severe for several minutes 10/10 when sneezes, occasionally random 8/10 (at least once a day-15-20 minutes)  Last- constant; 15 minutes when a severe episode occurs  Severity- average pain level 4-5/10 on the V1 and can get up to 10/10 few times a day and last for 15 minutes  Located- right side, corner of right eye and right eye brow at upper right forehead (where gamma knife frame was screwed into his head)  Quality- sharp, dull, nagging, ice pick like  Triggers- unsure, ?eating/ chewing  What makes it worse: sneezing makes it 10/10     Tinnitus: right ear only     Meningoma:   Stable     MRI head:   IMPRESSION-    1  Stable nonenhancing rounded T2 hypointense structure in right Meckel's cave correlating to the previously present calcified structure noted on the prior 2013 head CT, stable from the previous MRI, possibly   a treated or calcified meningioma   Cisternal segments of both 5th nerves are otherwise normal in signal without abnormal enhancement   No brainstem abnormality   No interval change from the previous study     2   No acute infarction, intracranial hemorrhage or mass effect       MRI brain- stable         The following portions of the patient's history were reviewed and updated as appropriate: allergies, current medications, past family history, past medical history, past social history, past surgical history and problem list          Objective:    Blood pressure 122/78, pulse 70, height 5' 9" (1 753 m), weight 121 kg (265 lb 12 8 oz)  Physical Exam   Constitutional: He is oriented to person, place, and time  He appears well-developed and well-nourished  HENT:   Head: Normocephalic and atraumatic  Eyes: EOM are normal  Pupils are equal, round, and reactive to light  Neck: Neck supple  Cardiovascular: Normal rate  Pulmonary/Chest: Effort normal    Abdominal: Soft  Musculoskeletal: Normal range of motion  Neurological: He is alert and oriented to person, place, and time  He has normal strength and normal reflexes  Gait and coordination normal    Skin: Skin is warm and dry  Psychiatric: He has a normal mood and affect  His speech is normal    Nursing note and vitals reviewed  Neurological Exam    Mental Status  The patient is alert and oriented to person, place, time, and situation  His recent and remote memory are normal  He has no visuospatial neglect  His speech is normal  His language is fluent with no aphasia  He has normal attention span and concentration  He has a normal fund of knowledge  Cranial Nerves    CN II: The patient's visual acuity and visual fields are normal   CN III, IV, VI: The patient's pupils are equally round and reactive to light and ocular movements are normal   CN V: The patient has normal facial sensation  CN VII:  The patient has symmetric facial movement  CN VIII:  The patient's hearing is normal   CN IX, X: The patient has symmetric palate movement and normal gag reflex  CN XI: The patient's shoulder shrug strength is normal   CN XII: The patient's tongue is midline without atrophy or fasciculations  Motor  The patient has normal muscle bulk throughout  His overall muscle tone is normal throughout  His strength is 5/5 throughout all four extremities  Sensory  The patient's sensation is normal in all four extremities   He has normal cortical sensation    Reflexes  Deep tendon reflexes are 2+ and symmetric in all four extremities with downgoing toes bilaterally  Gait and Coordination  The patient has normal gait and station and normal casual, toe, heel, and tandem gait  He has normal coordination bilaterally  ROS:    Review of Systems   Constitutional: Positive for fatigue  HENT: Positive for facial swelling and tinnitus  Eyes: Negative  Respiratory: Negative  Cardiovascular: Negative  Gastrointestinal: Negative  Endocrine: Negative  Genitourinary: Negative  Musculoskeletal: Positive for arthralgias  Skin: Negative  Allergic/Immunologic: Negative  Neurological: Positive for dizziness, numbness and headaches  Hematological: Negative  Psychiatric/Behavioral: Positive for sleep disturbance

## 2018-07-20 NOTE — TELEPHONE ENCOUNTER
Yeah I will rather he still taper off, as once the serotonin level drops he is not going to feel well    give him total of 30 pills   he will to 2 pills at bedtime for 10 days then 1 pill at bedtime for 10 days then stop    Thank you

## 2018-07-20 NOTE — TELEPHONE ENCOUNTER
received a fax from pharm to danielle instructions for amitriptyline  instructions were two 3 at bedtime  i spoke to pt and he takes 3 tabs hs   pt states that he has not taken this for the last 4 days because he ran out  he states that he does not notice a difference since stopping  he still has pain every day but in varing degrees  per office note in april it states he may stop amtriptyline as well   he states that he has been taking med since last appt     lyrica 200mg 2 tabs in am, 1 tab in afternoon and 2 tabs at hs  dilantin 50mg 2 tabs tid  please advise    if you want him to continue amitriptyline please send new script with updated instructions  [phone]

## 2018-07-23 ENCOUNTER — OFFICE VISIT (OUTPATIENT)
Dept: NEUROLOGY | Facility: CLINIC | Age: 68
End: 2018-07-23
Payer: MEDICARE

## 2018-07-23 VITALS
HEIGHT: 69 IN | DIASTOLIC BLOOD PRESSURE: 78 MMHG | HEART RATE: 70 BPM | WEIGHT: 265.8 LBS | BODY MASS INDEX: 39.37 KG/M2 | SYSTOLIC BLOOD PRESSURE: 122 MMHG

## 2018-07-23 DIAGNOSIS — G50.0 TRIGEMINAL NEURALGIA: Primary | ICD-10-CM

## 2018-07-23 DIAGNOSIS — R42 VERTIGO: ICD-10-CM

## 2018-07-23 DIAGNOSIS — G47.00 INSOMNIA, UNSPECIFIED TYPE: ICD-10-CM

## 2018-07-23 PROCEDURE — 99214 OFFICE O/P EST MOD 30 MIN: CPT | Performed by: PHYSICIAN ASSISTANT

## 2018-07-23 NOTE — PATIENT INSTRUCTIONS
preventive therapy  -  Wean off Amitriptyline  Take 2 pills at bedtime for 10 days then 1 pill at bedtime for 10 days then stop  -  Continue the Dilantin to 100 mg 3 times a day  -  Lyrica 200 mg 2 tabs in the morning 1 in the afternoon and 2 at bedtime-  This has also helped him sleep better at night  - Start Melatonin 5 mg at 30 min before bedtime  May increase up to 10 mg    We will also put in a referral sleep medicine - Dr Stanislav Luna to Follow up Dr Liza Laird and if needed Dr Jason Cardenas

## 2018-07-23 NOTE — PROGRESS NOTES
Patient ID: Eloisa Araujo is a 76 y o  male  Assessment/Plan:    No problem-specific Assessment & Plan notes found for this encounter  {Assess/PlanSmartLinks:73839}       Subjective:    HPI    {[facility] HPI texts:06250}    {Common ambulatory SmartLinks:02635}         Objective:    Blood pressure 122/78, pulse 70, height 5' 9" (1 753 m), weight 121 kg (265 lb 12 8 oz)  Physical Exam    Neurological Exam      ROS:    Review of Systems   Constitutional: Positive for fatigue  Negative for appetite change and fever  HENT: Positive for facial swelling and tinnitus  Negative for hearing loss, trouble swallowing and voice change  Eyes: Negative  Negative for photophobia and pain  Respiratory: Negative  Negative for shortness of breath  Cardiovascular: Negative  Negative for palpitations  Gastrointestinal: Negative  Negative for nausea and vomiting  Endocrine: Negative  Negative for cold intolerance and heat intolerance  Genitourinary: Negative  Negative for dysuria, frequency and urgency  Musculoskeletal: Negative  Negative for myalgias and neck pain  Knee Pain   Skin: Negative  Negative for rash  Neurological: Positive for dizziness, numbness (Face) and headaches  Negative for tremors, seizures, syncope, facial asymmetry, speech difficulty, weakness and light-headedness  Hematological: Negative  Does not bruise/bleed easily  Psychiatric/Behavioral: Positive for sleep disturbance (2/3 hrs a night)  Negative for confusion and hallucinations

## 2018-07-24 DIAGNOSIS — G50.0 TRIGEMINAL NEURALGIA: ICD-10-CM

## 2018-07-24 RX ORDER — PHENYTOIN 50 MG/1
TABLET, CHEWABLE ORAL
Qty: 270 TABLET | Refills: 1 | Status: SHIPPED | OUTPATIENT
Start: 2018-07-24 | End: 2018-07-25 | Stop reason: SDUPTHER

## 2018-07-25 DIAGNOSIS — G50.0 TRIGEMINAL NEURALGIA: ICD-10-CM

## 2018-07-25 RX ORDER — PHENYTOIN 50 MG/1
TABLET, CHEWABLE ORAL
Qty: 540 TABLET | Refills: 0 | Status: SHIPPED | OUTPATIENT
Start: 2018-07-25 | End: 2018-11-05 | Stop reason: SDUPTHER

## 2018-08-28 ENCOUNTER — OFFICE VISIT (OUTPATIENT)
Dept: PAIN MEDICINE | Facility: CLINIC | Age: 68
End: 2018-08-28
Payer: MEDICARE

## 2018-08-28 VITALS
WEIGHT: 272 LBS | RESPIRATION RATE: 16 BRPM | DIASTOLIC BLOOD PRESSURE: 70 MMHG | HEIGHT: 69 IN | HEART RATE: 69 BPM | BODY MASS INDEX: 40.29 KG/M2 | SYSTOLIC BLOOD PRESSURE: 126 MMHG | TEMPERATURE: 97.9 F

## 2018-08-28 DIAGNOSIS — G50.0 TRIGEMINAL NEURALGIA: Primary | ICD-10-CM

## 2018-08-28 PROCEDURE — 99214 OFFICE O/P EST MOD 30 MIN: CPT | Performed by: NURSE PRACTITIONER

## 2018-08-28 NOTE — PROGRESS NOTES
Pt c/o facial pain     Assessment:  1  Trigeminal neuralgia        Plan:  Meg Gonzalez is a 76 y o  male with a history of trigeminal neuralgia  The patient presents today with right forehead pain, which is consistent with trigeminal neuralgia  The patient underwent sphenopalatine ganglion injection on 5/17/2018, where he continues with ongoing relief of symptoms under his eye and into his cheek and jaw  However, he continues with right forehead pain, which has worsened  Therefore at this time I discussed with the patient about moving forward with a V1 super orbital nerve block injection to help with his right forehead pain  The patient was educated on the procedures most common risks  He verbalized understanding, would like to proceed with the procedure  Therefore the patient will be scheduled for upcoming Friday afternoon under ultrasound guidance  Complete risks and benefits including bleeding, infection, tissue reaction, nerve injury and allergic reaction were discussed  The approach was demonstrated using models and literature was provided  The patient will follow up after his V1 supraorbital nerve block, or sooner the with worsening of symptoms  My impressions and treatment recommendations were discussed in detail with the patient who verbalized understanding and had no further questions  Discharge instructions were provided  I personally saw and examined the patient and I agree with the above discussed plan of care  Orders Placed This Encounter   Procedures    US guidance     Standing Status:   Future     Standing Expiration Date:   8/28/2022     Scheduling Instructions:      No prep required  Please bring your insurance cards, a form of photo ID and a list of your medications with you  Arrive 15 minutes prior to your appointment time in order to register  To schedule this appointment, please contact Central Scheduling at [phone]       Order Specific Question: Reason for Exam:     Answer:   right V1 supraorbital nerve block injection (procedure code 56756)     Order Specific Question:   What is the patient's sedation requirement? Answer:   No Sedation     No orders of the defined types were placed in this encounter  History of Present Illness:  Jimbo Ram is a 76 y o  male with a history of trigeminal neuralgia  The patient was last seen office on 5/17/2018 where he underwent a right sphenopalatine ganglion block  The patient reports greater than 50% ongoing relief of symptoms after this injection  He presents for a follow up office visit in regards to right forehead pain  The patients current symptoms include right-sided pain above his eye and in his right forehead  He describes his pain as sharp, pressure-like, pins and needles pain that is constant in nature  He reports that his pain is worse since last visit  He currently rates his pain 8 out 10 numeric pain scale  Current pain medications includes:  Lyrica 200 mg 1 tablet 2 times daily     The patient reports that this regimen is providing minimal to moderate % pain relief  The patient is reporting Confusion and balance issues from this pain medication regimen  I have personally reviewed and/or updated the patient's past medical history, past surgical history, family history, social history, current medications, allergies, and vital signs today  Review of Systems   Respiratory: Negative for shortness of breath  Cardiovascular: Negative for chest pain  Gastrointestinal: Negative for constipation, diarrhea, nausea and vomiting  Musculoskeletal: Positive for gait problem  Negative for arthralgias, joint swelling and myalgias  Joint stiffness   Skin: Negative for rash  Neurological: Positive for dizziness  Negative for seizures and weakness  Memory loss   All other systems reviewed and are negative        Patient Active Problem List   Diagnosis    Trigeminal neuralgia    Facial spasm    Anemia    Bilateral patellofemoral syndrome    Diverticulosis of colon    Esophageal reflux    Intracranial meningioma (HCC)    Vertigo    Vitamin D deficiency    Need for pneumococcal vaccination    Preoperative evaluation of a medical condition to rule out surgical contraindications (TAR required)    Insomnia       Past Medical History:   Diagnosis Date    Arthritis     Right knee    Chronic pain disorder     Trigeminal neuralgia pain     Ulcer of bile duct        Past Surgical History:   Procedure Laterality Date    APPENDECTOMY  1962    BRAIN SURGERY  2005    Gamma Knife for Trigeminal Neuralgia    ESOPHAGOGASTRODUODENOSCOPY      HERNIA REPAIR  1952    KNEE ARTHROSCOPY Right     Patient denies arthroscopy, had steroid injection    OTHER SURGICAL HISTORY      gamma knife RT in 2007 and 2010    IN STEREO TREAT TRIGEMINAL NERVE Right 3/6/2018    Procedure: RHIZOTOMY TRIGEMINAL NERVES (V2 & V3);   Surgeon: Jean-Paul Shukla MD;  Location: [facility];  Service: Neurosurgery    RHIZOTOMY W/ [address] Right 08/08/2014    Stereotactic Ablation of Gasserian Ganglion Right sided trigeminal V2 radiofrequency rhizotomy x2       Family History   Problem Relation Age of Onset    Emphysema Mother     Colon cancer Father     Skin cancer Maternal Grandmother        Social History     Occupational History    Retired      21 years [employer] &      Social History Main Topics    Smoking status: Never Smoker    Smokeless tobacco: Never Used    Alcohol use Yes      Comment: Social    Drug use: No    Sexual activity: Yes       Current Outpatient Prescriptions on File Prior to Visit   Medication Sig    Calcium 250 MG CAPS Take 600 mg by mouth daily      Cholecalciferol (VITAMIN D3) 1000 units CAPS Take by mouth    cyanocobalamin (VITAMIN B-12) 1,000 mcg tablet Take 1 5 tablets by mouth daily      Ketoprofen 10 % CREA 3 (three) times a day    Multiple Vitamins-Minerals (PRESERVISION AREDS) TABS Take 1 tablet by mouth daily    phenytoin (DILANTIN) 50 mg tablet 2 tabs TID    pregabalin (LYRICA) 200 MG capsule Take 2 tabs in the am, 1 tab in the afternoon, 2 tabs at bedtime    pyridoxine (VITAMIN B6) 100 mg tablet Take 2 tablets by mouth daily      amitriptyline (ELAVIL) 10 mg tablet Take 2 pills at bedtime 10 days, then 1 pill at bedtime x 10days, then stop (Patient not taking: Reported on 8/28/2018 )     No current facility-administered medications on file prior to visit  Allergies   Allergen Reactions    Gabapentin Delerium    Oxcarbazepine Dizziness       Physical Exam:    /70   Pulse 69   Temp 97 9 °F (36 6 °C)   Resp 16   Ht 5' 9" (1 753 m)   Wt 123 kg (272 lb)   BMI 40 17 kg/m²     Constitutional:normal, well developed, well nourished, alert, in no distress and non-toxic and no overt pain behavior  and obese  Eyes:anicteric  HEENT:grossly intact  Neck:supple, symmetric, trachea midline and no masses   Pulmonary:even and unlabored  Cardiovascular:No edema or pitting edema present  Skin:Normal without rashes or lesions and well hydrated  Psychiatric:Mood and affect appropriate  Neurologic:Cranial Nerves II-XII grossly intact  Musculoskeletal:normal    Tenderness noted over patient's right forehead with palpation       Imaging

## 2018-09-13 ENCOUNTER — HOSPITAL ENCOUNTER (OUTPATIENT)
Dept: RADIOLOGY | Facility: HOSPITAL | Age: 68
Discharge: HOME | End: 2018-09-13
Attending: ORTHOPAEDIC SURGERY
Payer: MEDICARE

## 2018-09-13 ENCOUNTER — OFFICE VISIT (OUTPATIENT)
Dept: OBGYN CLINIC | Facility: HOSPITAL | Age: 68
End: 2018-09-13
Payer: MEDICARE

## 2018-09-13 VITALS
HEIGHT: 70 IN | HEART RATE: 56 BPM | WEIGHT: 273 LBS | BODY MASS INDEX: 39.08 KG/M2 | DIASTOLIC BLOOD PRESSURE: 75 MMHG | SYSTOLIC BLOOD PRESSURE: 112 MMHG

## 2018-09-13 DIAGNOSIS — M17.11 PRIMARY OSTEOARTHRITIS OF RIGHT KNEE: Primary | ICD-10-CM

## 2018-09-13 DIAGNOSIS — M25.561 RIGHT KNEE PAIN, UNSPECIFIED CHRONICITY: ICD-10-CM

## 2018-09-13 PROCEDURE — 73562 X-RAY EXAM OF KNEE 3: CPT

## 2018-09-13 PROCEDURE — 99214 OFFICE O/P EST MOD 30 MIN: CPT | Performed by: ORTHOPAEDIC SURGERY

## 2018-09-13 RX ORDER — FOLIC ACID 1 MG/1
1 TABLET ORAL DAILY
Qty: 30 TABLET | Refills: 0 | Status: SHIPPED | OUTPATIENT
Start: 2018-09-13 | End: 2019-01-08

## 2018-09-13 RX ORDER — FERROUS SULFATE TAB EC 324 MG (65 MG FE EQUIVALENT) 324 (65 FE) MG
324 TABLET DELAYED RESPONSE ORAL
Qty: 60 TABLET | Refills: 0 | Status: SHIPPED | OUTPATIENT
Start: 2018-09-13 | End: 2019-01-08

## 2018-09-13 RX ORDER — ASCORBIC ACID 500 MG
500 TABLET ORAL DAILY
Qty: 60 TABLET | Refills: 0 | Status: SHIPPED | OUTPATIENT
Start: 2018-09-13

## 2018-09-13 RX ORDER — SODIUM CHLORIDE 9 MG/ML
125 INJECTION, SOLUTION INTRAVENOUS CONTINUOUS
Status: CANCELLED | OUTPATIENT
Start: 2018-09-13

## 2018-09-13 RX ORDER — ACETAMINOPHEN 325 MG/1
975 TABLET ORAL ONCE
Status: CANCELLED | OUTPATIENT
Start: 2018-09-13 | End: 2018-09-13

## 2018-09-14 ENCOUNTER — TELEPHONE (OUTPATIENT)
Dept: PREADMISSION TESTING | Facility: HOSPITAL | Age: 68
End: 2018-09-14

## 2018-09-14 PROBLEM — M17.11 PRIMARY OSTEOARTHRITIS OF RIGHT KNEE: Status: ACTIVE | Noted: 2018-09-14

## 2018-09-14 PROCEDURE — 1124F ACP DISCUSS-NO DSCNMKR DOCD: CPT | Performed by: ORTHOPAEDIC SURGERY

## 2018-09-14 NOTE — PROGRESS NOTES
Orthopedics          Nidia Dc 76 y o  male MRN: [id number]      Chief Complaint:   right knee pain    HPI:   76 y o male complaining of right knee pain  Patient with history of bilateral knee DJD but with right greater than left  Patient with right knee valgus deformity  Patient states that the pain has become unbearable  Most the pain is on the lateral aspect of the knee  Denies any numbness tingling fevers chills  States that conservative treatment has not helped the symptoms as well as he used to  Review Of Systems:   · Skin: Normal  · Neuro: See HPI  · Musculoskeletal: See HPI  · All review of systems were noted to be negative other than those stated in HPI    Past Medical History:   Past Medical History:   Diagnosis Date    Arthritis     Right knee    Chronic pain disorder     Trigeminal neuralgia pain     Ulcer of bile duct        Past Surgical History:   Past Surgical History:   Procedure Laterality Date    APPENDECTOMY  1962    BRAIN SURGERY  2005    Gamma Knife for Trigeminal Neuralgia    ESOPHAGOGASTRODUODENOSCOPY      HERNIA REPAIR  1952    KNEE ARTHROSCOPY Right     Patient denies arthroscopy, had steroid injection    OTHER SURGICAL HISTORY      gamma knife RT in 2007 and 2010    [place] STEREO TREAT TRIGEMINAL NERVE Right 3/6/2018    Procedure: RHIZOTOMY TRIGEMINAL NERVES (V2 & V3); Surgeon: Noel Ruiz MD;  Location: [facility];  Service: Neurosurgery    RHIZOTOMY W/ RADIOFREQUENCY ABLATION Right 08/08/2014    Stereotactic Ablation of Gasserian Ganglion Right sided trigeminal V2 radiofrequency rhizotomy x2       Family History:  Family history reviewed and non-contributory  Family History   Problem Relation Age of Onset    Emphysema Mother     Colon cancer Father     Skin cancer Maternal Grandmother          Social History:  Social History     Social History    Marital status: /Civil Union     Spouse name:  Grady Mauro Number of children: 2    Years of education: BS Degree plus addl classes     Occupational History    Retired      20 years [employer] &      Social History Main Topics    Smoking status: Never Smoker    Smokeless tobacco: Never Used    Alcohol use Yes      Comment: Social    Drug use: No    Sexual activity: Yes     Other Topics Concern    None     Social History Narrative    Caffeine use: Daily caffeinated cola, drinks coffee    Lives at home with wife Erma June       Allergies:    Allergies   Allergen Reactions    Gabapentin Delerium    Oxcarbazepine Dizziness       Labs:    0  Lab Value Date/Time   HCT 41 6 02/15/2018 1029   HCT 43 6 06/24/2015 1107   HCT 44 8 07/29/2014 1048   HGB 13 3 02/15/2018 1029   HGB 14 7 06/24/2015 1107   HGB 15 0 07/29/2014 1048   INR 0 93 02/15/2018 1029   INR 0 94 07/29/2014 1048   WBC 7 32 02/15/2018 1029   WBC 5 76 06/24/2015 1107   WBC 4 81 07/29/2014 1048   ESR 2 06/24/2015 1107       Meds:    Current Outpatient Prescriptions:     Calcium 250 MG CAPS, Take 600 mg by mouth daily  , Disp: , Rfl:     Cholecalciferol (VITAMIN D3) 1000 units CAPS, Take by mouth, Disp: , Rfl:     cyanocobalamin (VITAMIN B-12) 1,000 mcg tablet, Take 1 5 tablets by mouth daily  , Disp: , Rfl:     Ketoprofen 10 % CREA, 3 (three) times a day, Disp: , Rfl: 2    Multiple Vitamins-Minerals (PRESERVISION AREDS) TABS, Take 1 tablet by mouth daily, Disp: , Rfl:     phenytoin (DILANTIN) 50 mg tablet, 2 tabs TID, Disp: 540 tablet, Rfl: 0    pregabalin (LYRICA) 200 MG capsule, Take 2 tabs in the am, 1 tab in the afternoon, 2 tabs at bedtime, Disp: 150 capsule, Rfl: 2    pyridoxine (VITAMIN B6) 100 mg tablet, Take 2 tablets by mouth daily  , Disp: , Rfl:     amitriptyline (ELAVIL) 10 mg tablet, Take 2 pills at bedtime 10 days, then 1 pill at bedtime x 10days, then stop (Patient not taking: Reported on 8/28/2018 ), Disp: 30 tablet, Rfl: 0    ascorbic acid (VITAMIN C) 500 MG tablet, Take 1 tablet (500 mg total) by mouth daily, Disp: 60 tablet, Rfl: 0    enoxaparin (LOVENOX) 40 mg/0 4 mL, 1 subcutaneous injection daily x 28 days AFTER surgery, Disp: 28 Syringe, Rfl: 0    ferrous sulfate 324 (65 Fe) mg, Take 1 tablet (324 mg total) by mouth 2 (two) times a day before meals, Disp: 60 tablet, Rfl: 0    folic acid (FOLVITE) 1 mg tablet, Take 1 tablet (1 mg total) by mouth daily, Disp: 30 tablet, Rfl: 0      Physical Exam:     General Appearance:    Alert, cooperative, no distress, appears stated age   Head:    Normocephalic, without obvious abnormality, atraumatic   Eyes:    conjunctiva/corneas clear, both eyes        Nose:   Nares normal, septum midline, no drainage    Throat:   Lips normal; teeth and gums normal   Neck:    symmetrical, trachea midline, ;     thyroid:  no enlargement/   Back:     Symmetric, no curvature, ROM normal   Lungs:   No audible wheezing or labored breathing   Chest Wall:    No tenderness or deformity    Heart:    Regular rate and rhythm               Pulses:   2+ and symmetric all extremities   Skin:   Skin color, texture, turgor normal, no rashes or lesions   Neurologic:   normal strength, sensation and reflexes     throughout       Musculoskeletal: right lower extremity-knee-  · No abrasions, no open wounds  · Valgus deformity of the knee  · Significant lateral joint line tenderness  · Minimal medial joint line tenderness  · Stable to varus and valgus stress  · Stable in sagittal plane stress is  · Able to obtain full extension  · Neurologically and vascularly intact distally    Radiology:   I personally reviewed the films    X-rays right knee shows worsening valgus deformity of the right knee with bone-on-bone deformity noted over the lateral tibial femoral compartment    _*_*_*_*_*_*_*_*_*_*_*_*_*_*_*_*_*_*_*_*_*_*_*_*_*_*_*_*_*_*_*_*_*_*_*_*_*_*_*_*_*    Assessment:  68 y o male with right knee DJD with valgus deformity    Plan:   · Weight bearing as tolerated  right lower extremity  · Discussed pros and cons of operative and non operative intervention and patient like to elect for right total knee arthroplasty    Patient told of pros and cons of operative and non-operative intervention  Complications include infection, bleeding, scaring, nerve injury, vascular injury, continued pain, decreased range of motion, fracture, need for further surgery, DVT, PE, death, loss of limb, not obtaining results patient wished  he understood the pros and cons and consented for operative intervention  Will plan for primary right total knee arthroplasty and all associated procedures    Medical clearance    Will follow up with patient post-operatively    Discussed plan with patient and he is in agreement with treatment plan      · Will follow up with patient 2 weeks postoperatively    Jimbo Lynn MD

## 2018-09-18 ENCOUNTER — TELEPHONE (OUTPATIENT)
Dept: OBGYN CLINIC | Facility: HOSPITAL | Age: 68
End: 2018-09-18

## 2018-09-18 NOTE — TELEPHONE ENCOUNTER
I was notified by staff that patient had questions he wanted addressed, call placed to address questions and conduct pre-op elective admission assessment  Spoke with pt  Pt reporting that [place] with his wife, Leticia Galvan is planned post-op caregiver and transportation  Pt has 8 steps into the home and 13 steps to the 2nd level  Pt plans to stay on the 1st level for first few days after DC in their guest bedroom  Pt has a step-in tub  Pt is currently ambulating independently of assistive devices and is independent with his ADLs  Pt has a cane but does not have a walker or bedside commode  Pt uses [facility] in [place]  He self manages his meds  Pt reports his pharmacy notified him that there were medications ready to be picked up- pt questioning what medications are there and why  Pt has RXs for blood management vitamins and post-op lovenox at pharmacy- pt was educated that the vitamins are to help build up pt's hgb and lovenox is for after surgery blood thinning  Pt verbalizes understanding and reports he plans to  soon  Pt was instructed Lovenox is for after surgery use only and was instructed vitamins are to be used before surgery, pt again verbalizes understanding  Pt plans to be DCd to home and plans to attend outpt PT at [facility]  Pt enrolled into [facility]  Pt educated on post-op pain, early mobilization (POD0), indication for/use of incentive spirometer (10x/hour while awake) and indication for/use of foot/leg pumps (18 hours/day)  Pt reports all his questions were addressed during the call, he denies having additional questions at this time  Pt encouraged to call me with any questions, concerns or issues

## 2018-09-21 ENCOUNTER — PROCEDURE VISIT (OUTPATIENT)
Dept: PAIN MEDICINE | Facility: CLINIC | Age: 68
End: 2018-09-21
Payer: MEDICARE

## 2018-09-21 VITALS — BODY MASS INDEX: 38.94 KG/M2 | WEIGHT: 272 LBS | HEIGHT: 70 IN

## 2018-09-21 DIAGNOSIS — G50.0 TRIGEMINAL NEURALGIA: Primary | ICD-10-CM

## 2018-09-21 PROCEDURE — 76942 ECHO GUIDE FOR BIOPSY: CPT | Performed by: ANESTHESIOLOGY

## 2018-09-21 PROCEDURE — 64400 NJX AA&/STRD TRIGEMINAL NRV: CPT | Performed by: ANESTHESIOLOGY

## 2018-09-21 RX ORDER — METHYLPREDNISOLONE ACETATE 40 MG/ML
20 INJECTION, SUSPENSION INTRA-ARTICULAR; INTRALESIONAL; INTRAMUSCULAR; SOFT TISSUE ONCE
Status: COMPLETED | OUTPATIENT
Start: 2018-09-21 | End: 2018-09-21

## 2018-09-21 RX ORDER — BUPIVACAINE HYDROCHLORIDE 2.5 MG/ML
0.5 INJECTION, SOLUTION INFILTRATION; PERINEURAL ONCE
Status: COMPLETED | OUTPATIENT
Start: 2018-09-21 | End: 2018-09-21

## 2018-09-21 RX ADMIN — BUPIVACAINE HYDROCHLORIDE 0.5 ML: 2.5 INJECTION, SOLUTION INFILTRATION; PERINEURAL at 15:30

## 2018-09-21 RX ADMIN — METHYLPREDNISOLONE ACETATE 20 MG: 40 INJECTION, SUSPENSION INTRA-ARTICULAR; INTRALESIONAL; INTRAMUSCULAR; SOFT TISSUE at 15:31

## 2018-09-21 RX ADMIN — BUPIVACAINE HYDROCHLORIDE 0.5 ML: 2.5 INJECTION, SOLUTION INFILTRATION; PERINEURAL at 15:32

## 2018-09-21 NOTE — PROGRESS NOTES
Pre-procedure Diagnosis:   1  Trigeminal neuralgia      Post-procedure Diagnosis:   1  Trigeminal neuralgia      Operation Title(s):  Right V1 trigeminal nerve block under ultrasound guidance  Attending Surgeon:   Antonia Marcus MD  Anesthesia:   Local    Indications: The patient is a 76y o  year-old male with a diagnosis of   1  Trigeminal neuralgia      The patient's history and physical exam were reviewed  The risks, benefits and alternatives to the procedure were discussed, and all questions were answered to the patient's satisfaction  The patient agreed to proceed, and written informed consent was obtained  Procedure in Detail: The patient was brought into the exam room and placed in the supine position on the exam room table  The right forehead was prepped with chloraprep and draped in a sterile manner  A sterile ultrasound probe cover and gel was placed over a 3 75 MHz curvilinear transducer probe  The probe was placed over the right forehead and eye to visualize the supraorbital notch  Optimal needle path was determined  Then, under ultrasound guidance, a 2 inch ultrasound needle was advanced until the needle entered the target area  After visualization of the tip in the target area and negative aspiration for blood, a solution consisting of 0 5 mL 0 25% bupivacaine and 0 5 mL Depo-Medrol (40mg/ml) was easily injected  The patient's forehead was cleaned and a bandage was placed over the sites of needle insertion  Disposition: The patient tolerated the procedure well and there were no apparent complications  The patient was given written discharge instructions for the procedure

## 2018-09-26 ENCOUNTER — APPOINTMENT (OUTPATIENT)
Dept: LAB | Facility: HOSPITAL | Age: 68
End: 2018-09-26
Attending: NEUROLOGICAL SURGERY
Payer: MEDICARE

## 2018-09-26 ENCOUNTER — LAB (OUTPATIENT)
Dept: LAB | Facility: HOSPITAL | Age: 68
End: 2018-09-26
Attending: PHYSICIAN ASSISTANT
Payer: MEDICARE

## 2018-09-26 ENCOUNTER — TRANSCRIBE ORDERS (OUTPATIENT)
Dept: LAB | Facility: HOSPITAL | Age: 68
End: 2018-09-26

## 2018-09-26 DIAGNOSIS — M25.561 CHRONIC PAIN OF RIGHT KNEE: ICD-10-CM

## 2018-09-26 DIAGNOSIS — M17.11 PRIMARY OSTEOARTHRITIS OF RIGHT KNEE: ICD-10-CM

## 2018-09-26 DIAGNOSIS — G89.29 CHRONIC PAIN OF RIGHT KNEE: ICD-10-CM

## 2018-09-26 DIAGNOSIS — G50.0 TRIGEMINAL NEURALGIA OF RIGHT SIDE OF FACE: ICD-10-CM

## 2018-09-26 LAB
ABO GROUP BLD: NORMAL
ALBUMIN SERPL BCP-MCNC: 3.4 G/DL (ref 3.5–5)
ALP SERPL-CCNC: 95 U/L (ref 46–116)
ALT SERPL W P-5'-P-CCNC: 26 U/L (ref 12–78)
ANION GAP SERPL CALCULATED.3IONS-SCNC: 5 MMOL/L (ref 4–13)
APTT PPP: 28 SECONDS (ref 24–36)
AST SERPL W P-5'-P-CCNC: 14 U/L (ref 5–45)
BASOPHILS # BLD AUTO: 0.05 THOUSANDS/ΜL (ref 0–0.1)
BASOPHILS NFR BLD AUTO: 1 % (ref 0–1)
BILIRUB SERPL-MCNC: 0.24 MG/DL (ref 0.2–1)
BLD GP AB SCN SERPL QL: NEGATIVE
BUN SERPL-MCNC: 13 MG/DL (ref 5–25)
CALCIUM SERPL-MCNC: 9.4 MG/DL (ref 8.3–10.1)
CHLORIDE SERPL-SCNC: 106 MMOL/L (ref 100–108)
CO2 SERPL-SCNC: 28 MMOL/L (ref 21–32)
CREAT SERPL-MCNC: 0.9 MG/DL (ref 0.6–1.3)
EOSINOPHIL # BLD AUTO: 0 THOUSAND/ΜL (ref 0–0.61)
EOSINOPHIL NFR BLD AUTO: 0 % (ref 0–6)
ERYTHROCYTE [DISTWIDTH] IN BLOOD BY AUTOMATED COUNT: 14.4 % (ref 11.6–15.1)
EST. AVERAGE GLUCOSE BLD GHB EST-MCNC: 100 MG/DL
GFR SERPL CREATININE-BSD FRML MDRD: 87 ML/MIN/1.73SQ M
GLUCOSE SERPL-MCNC: 102 MG/DL (ref 65–140)
HBA1C MFR BLD: 5.1 % (ref 4.2–6.3)
HCT VFR BLD AUTO: 46.3 % (ref 36.5–49.3)
HGB BLD-MCNC: 15.4 G/DL (ref 12–17)
IMM GRANULOCYTES # BLD AUTO: 0.01 THOUSAND/UL (ref 0–0.2)
IMM GRANULOCYTES NFR BLD AUTO: 0 % (ref 0–2)
INR PPP: 0.99 (ref 0.86–1.17)
LYMPHOCYTES # BLD AUTO: 1.99 THOUSANDS/ΜL (ref 0.6–4.47)
LYMPHOCYTES NFR BLD AUTO: 31 % (ref 14–44)
MCH RBC QN AUTO: 30.8 PG (ref 26.8–34.3)
MCHC RBC AUTO-ENTMCNC: 33.3 G/DL (ref 31.4–37.4)
MCV RBC AUTO: 93 FL (ref 82–98)
MONOCYTES # BLD AUTO: 0.61 THOUSAND/ΜL (ref 0.17–1.22)
MONOCYTES NFR BLD AUTO: 10 % (ref 4–12)
NEUTROPHILS # BLD AUTO: 3.67 THOUSANDS/ΜL (ref 1.85–7.62)
NEUTS SEG NFR BLD AUTO: 58 % (ref 43–75)
NRBC BLD AUTO-RTO: 0 /100 WBCS
PLATELET # BLD AUTO: 209 THOUSANDS/UL (ref 149–390)
PMV BLD AUTO: 10 FL (ref 8.9–12.7)
POTASSIUM SERPL-SCNC: 4.4 MMOL/L (ref 3.5–5.3)
PROT SERPL-MCNC: 6.3 G/DL (ref 6.4–8.2)
PROTHROMBIN TIME: 13.2 SECONDS (ref 11.8–14.2)
RBC # BLD AUTO: 5 MILLION/UL (ref 3.88–5.62)
RH BLD: NEGATIVE
SODIUM SERPL-SCNC: 139 MMOL/L (ref 136–145)
SPECIMEN EXPIRATION DATE: NORMAL
WBC # BLD AUTO: 6.33 THOUSAND/UL (ref 4.31–10.16)

## 2018-09-26 PROCEDURE — 85610 PROTHROMBIN TIME: CPT

## 2018-09-26 PROCEDURE — 36415 COLL VENOUS BLD VENIPUNCTURE: CPT

## 2018-09-26 PROCEDURE — 86901 BLOOD TYPING SEROLOGIC RH(D): CPT

## 2018-09-26 PROCEDURE — 86900 BLOOD TYPING SEROLOGIC ABO: CPT

## 2018-09-26 PROCEDURE — 80053 COMPREHEN METABOLIC PANEL: CPT

## 2018-09-26 PROCEDURE — 93005 ELECTROCARDIOGRAM TRACING: CPT

## 2018-09-26 PROCEDURE — 85025 COMPLETE CBC W/AUTO DIFF WBC: CPT

## 2018-09-26 PROCEDURE — 85730 THROMBOPLASTIN TIME PARTIAL: CPT

## 2018-09-26 PROCEDURE — 83036 HEMOGLOBIN GLYCOSYLATED A1C: CPT

## 2018-09-26 PROCEDURE — 86850 RBC ANTIBODY SCREEN: CPT

## 2018-09-26 NOTE — PRE-PROCEDURE INSTRUCTIONS
Pre-Surgery Instructions:   Medication Instructions    Acetaminophen (TYLENOL PO) Instructed patient per Anesthesia Guidelines   ascorbic acid (VITAMIN C) 500 MG tablet Instructed patient per Anesthesia Guidelines   Calcium 250 MG CAPS Instructed patient per Anesthesia Guidelines   Cholecalciferol (VITAMIN D3) 1000 units CAPS Instructed patient per Anesthesia Guidelines   cyanocobalamin (VITAMIN B-12) 1,000 mcg tablet Instructed patient per Anesthesia Guidelines   ferrous sulfate 324 (65 Fe) mg Instructed patient per Anesthesia Guidelines   folic acid (FOLVITE) 1 mg tablet Instructed patient per Anesthesia Guidelines   MELATONIN PO Instructed patient per Anesthesia Guidelines   Multiple Vitamins-Minerals (PRESERVISION AREDS) TABS Instructed patient per Anesthesia Guidelines   phenytoin (DILANTIN) 50 mg tablet Instructed patient per Anesthesia Guidelines   pregabalin (LYRICA) 200 MG capsule Instructed patient per Anesthesia Guidelines   pyridoxine (VITAMIN B6) 100 mg tablet Instructed patient per Anesthesia Guidelines  Patient had PAT appt, wife present  Medication list reviewed & instructed  As of 10 15 18 pt to stop b6, preservision, b12, d3, calcium  Instructed on tylenol only  Pt started taking folic acid, vit C, iron and will continue until 10 21 18  Am DOS pt to take lyrica & dilantin with 1-2 sips of water  Pt has lovenox script filled at home, instructed injection is for post-op use only  Pt aware  Showering instructions & cloths given by office, reviewed at time of appt  Soap and IS given to pt  All instructions verbally understood by patient  All questions answered  Callback number given  Acetaminophen Med Class     Continue to take this medication on your normal schedule  If this is an oral medication and you take it in the morning, then you may take this medicine with a sip of water    Antiepileptic Med Class     Continue to take this medication on your normal schedule  If this is an oral medication and you take it in the morning, then you may take this medicine with a sip of water  Low Molecular Weight Heparin Med Class     Stop taking this medication at least 12-24 hours prior to surgery/procedure with prescribing Physician and Surgeon consultation  Herbal Med Class     Stop taking this herbal medications at least one week prior to surgery/procedure

## 2018-09-27 LAB
ATRIAL RATE: 66 BPM
P AXIS: 37 DEGREES
PR INTERVAL: 216 MS
QRS AXIS: -21 DEGREES
QRSD INTERVAL: 106 MS
QT INTERVAL: 406 MS
QTC INTERVAL: 425 MS
T WAVE AXIS: 21 DEGREES
VENTRICULAR RATE: 66 BPM

## 2018-09-28 ENCOUNTER — TELEPHONE (OUTPATIENT)
Dept: PAIN MEDICINE | Facility: CLINIC | Age: 68
End: 2018-09-28

## 2018-10-02 NOTE — TELEPHONE ENCOUNTER
Patient states that he got some relief  Patient states that he thinks the pain has expanded  Patient states that the pain is under his right eye and expands to his right ear  Patient states that the pain comes and goes  However, he feels the pain is always there  Pain is not as intense as before      Pain scale - 3/10 with 35% relief

## 2018-10-03 ENCOUNTER — OFFICE VISIT (OUTPATIENT)
Dept: FAMILY MEDICINE CLINIC | Facility: CLINIC | Age: 68
End: 2018-10-03
Payer: MEDICARE

## 2018-10-03 VITALS
HEART RATE: 76 BPM | BODY MASS INDEX: 38.77 KG/M2 | RESPIRATION RATE: 20 BRPM | TEMPERATURE: 96.7 F | HEIGHT: 70 IN | SYSTOLIC BLOOD PRESSURE: 110 MMHG | DIASTOLIC BLOOD PRESSURE: 62 MMHG | WEIGHT: 270.8 LBS

## 2018-10-03 DIAGNOSIS — M17.11 PRIMARY OSTEOARTHRITIS OF RIGHT KNEE: ICD-10-CM

## 2018-10-03 DIAGNOSIS — G50.0 TRIGEMINAL NEURALGIA OF RIGHT SIDE OF FACE: ICD-10-CM

## 2018-10-03 DIAGNOSIS — Z01.818 PRE-OP EXAMINATION: Primary | ICD-10-CM

## 2018-10-03 LAB
SL AMB  POCT GLUCOSE, UA: NORMAL
SL AMB LEUKOCYTE ESTERASE,UA: NORMAL
SL AMB POCT BILIRUBIN,UA: NORMAL
SL AMB POCT BLOOD,UA: NORMAL
SL AMB POCT CLARITY,UA: NORMAL
SL AMB POCT COLOR,UA: YELLOW
SL AMB POCT KETONES,UA: NORMAL
SL AMB POCT NITRITE,UA: NORMAL
SL AMB POCT PH,UA: 8
SL AMB POCT SPECIFIC GRAVITY,UA: 1
SL AMB POCT URINE PROTEIN: NORMAL
SL AMB POCT UROBILINOGEN: 0.2

## 2018-10-03 PROCEDURE — 81002 URINALYSIS NONAUTO W/O SCOPE: CPT | Performed by: FAMILY MEDICINE

## 2018-10-03 PROCEDURE — 99214 OFFICE O/P EST MOD 30 MIN: CPT | Performed by: FAMILY MEDICINE

## 2018-10-03 RX ORDER — PREGABALIN 200 MG/1
CAPSULE ORAL
Qty: 150 CAPSULE | Refills: 2 | Status: SHIPPED | OUTPATIENT
Start: 2018-10-03 | End: 2019-01-14 | Stop reason: SDUPTHER

## 2018-10-03 NOTE — TELEPHONE ENCOUNTER
Received fax from [facility] requesting lyrica refill  Refill request entered, pls sign off      Thanks

## 2018-10-03 NOTE — PROGRESS NOTES
FAMILY PRACTICE OFFICE VISIT       NAME: Tni Blanca  AGE: 76 y o  SEX: male       : 1950        MRN: [id number]    DATE: 10/3/2018  TIME: 8:32 AM    Assessment and Plan     Problem List Items Addressed This Visit     Primary osteoarthritis of right knee    Pre-op examination - Primary    Relevant Orders    POCT urine dip auto non-scope (Completed)        Overall the patient appears to be in stable health  I reviewed his pre-admission blood tests, EKG, and urinalysis  All values appears stable and patient is medically cleared to proceed with upcoming surgery for total right knee replacement as described    There are no Patient Instructions on file for this visit  Chief Complaint     Chief Complaint   Patient presents with    Pre-op Exam     Pt is here for preop exam for right knee surgery on 10/22/2018       History of Present Illness     Patient scheduled to have total right knee replacement surgery by Dr Pratt on 2018  He denies any recent illness  He has already obtain his pre-admission blood work as well as EKG  Review of Systems   Review of Systems   Constitutional: Negative  HENT: Negative  Respiratory: Negative  Cardiovascular: Negative  Gastrointestinal: Negative  Genitourinary: Negative  Musculoskeletal:        Chronic right knee pain from degenerative joint disease   Neurological:        Chronic intermittent trigeminal neuralgia of right-sided face  Psychiatric/Behavioral: Negative          Active Problem List     Patient Active Problem List   Diagnosis    Trigeminal neuralgia    Facial spasm    Anemia    Bilateral patellofemoral syndrome    Diverticulosis of colon    Esophageal reflux    Intracranial meningioma (HCC)    Vertigo    Vitamin D deficiency    Need for pneumococcal vaccination    Preoperative evaluation of a medical condition to rule out surgical contraindications (TAR required)    Insomnia    Primary osteoarthritis of right knee    Pre-op examination       Past Medical History:  Past Medical History:   Diagnosis Date    Arthritis     Right knee    Chronic pain disorder     Trigeminal neuralgia pain     Ulcer of bile duct        Past Surgical History:  Past Surgical History:   Procedure Laterality Date    APPENDECTOMY  1962    BRAIN SURGERY  2005    Gamma Knife for Trigeminal Neuralgia    ESOPHAGOGASTRODUODENOSCOPY      HERNIA REPAIR  1952    KNEE ARTHROSCOPY Right     Patient denies arthroscopy, had steroid injection    OTHER SURGICAL HISTORY      gamma knife RT in 2007 and 2010    PA STEREO TREAT TRIGEMINAL NERVE Right 3/6/2018    Procedure: RHIZOTOMY TRIGEMINAL NERVES (V2 & V3); Surgeon: Daisha Briseno MD;  Location: [facility];  Service: Neurosurgery    RHIZOTOMY W/ RADIOFREQUENCY ABLATION Right 08/08/2014    Stereotactic Ablation of Gasserian Ganglion Right sided trigeminal V2 radiofrequency rhizotomy x2       Family History:  Family History   Problem Relation Age of Onset    Emphysema Mother     Colon cancer Father     Skin cancer Maternal Grandmother        Social History:  Social History     Social History    Marital status: /Civil Union     Spouse name:  Dhiraj Lindsay Number of children: 2    Years of education: BS Degree plus addl classes     Occupational History    Retired      21 years [employer] &      Social History Main Topics    Smoking status: Never Smoker    Smokeless tobacco: Never Used    Alcohol use No    Drug use: No    Sexual activity: Yes     Other Topics Concern    Not on file     Social History Narrative    Caffeine use: Daily caffeinated cola, drinks coffee    Lives at home with wife Viki Harrison       Objective     Vitals:    10/03/18 0804   BP: 110/62   Pulse: 76   Resp: 20   Temp: (!) 96 7 °F (35 9 °C)     Wt Readings from Last 3 Encounters:   10/03/18 123 kg (270 lb 12 8 oz)   09/21/18 123 kg (272 lb)   09/13/18 124 kg (273 lb)       Physical Exam Constitutional: He is oriented to person, place, and time  No distress  HENT:   Mouth/Throat: Oropharynx is clear and moist  No oropharyngeal exudate  Tympanic membranes within normal limits bilaterally   Neck:   No carotid bruits   Cardiovascular:   Regular rate and rhythm with no murmurs   Pulmonary/Chest:   Lungs are clear to auscultation without wheezes,rales, or rhonchi   Abdominal:   Abdomen is soft, nontender with positive bowel sounds  There is no rebound or guarding  No masses palpated   Musculoskeletal: He exhibits no edema  Lymphadenopathy:     He has no cervical adenopathy  Neurological: He is alert and oriented to person, place, and time  No cranial nerve deficit  Skin: No rash noted  Psychiatric: He has a normal mood and affect   His behavior is normal  Judgment and thought content normal        Pertinent Laboratory/Diagnostic Studies:  Lab Results   Component Value Date    GLUCOSE 78 12/19/2015    BUN 13 09/26/2018    CREATININE 0 90 09/26/2018    CALCIUM 9 4 09/26/2018     09/26/2018    K 4 4 09/26/2018    CO2 28 09/26/2018     09/26/2018     Lab Results   Component Value Date    ALT 26 09/26/2018    AST 14 09/26/2018    ALKPHOS 95 09/26/2018    BILITOT 0 45 06/24/2015       Lab Results   Component Value Date    WBC 6 33 09/26/2018    HGB 15 4 09/26/2018    HCT 46 3 09/26/2018    MCV 93 09/26/2018     09/26/2018       No results found for: TSH    Lab Results   Component Value Date    CHOL 185 06/24/2015     Lab Results   Component Value Date    TRIG 67 06/24/2015     Lab Results   Component Value Date    HDL 46 06/24/2015     Lab Results   Component Value Date    LDLCALC 126 (H) 06/24/2015     Lab Results   Component Value Date    HGBA1C 5 1 09/26/2018       Results for orders placed or performed in visit on 10/03/18   POCT urine dip auto non-scope   Result Value Ref Range     COLOR,UA yellow      CLARITY,UA transparent     SPECIFIC GRAVITY,UA 1 005      PH,UA 8 0 LEUKOCYTE ESTERASE,UA -     NITRITE,UA -     GLUCOSE, UA -     KETONES,UA -      BILIRUBIN,UA -      BLOOD,UA -     SL AMB POCT URINE PROTEIN -     SL AMB POCT UROBILINOGEN 0 2        Orders Placed This Encounter   Procedures    POCT urine dip auto non-scope       ALLERGIES:  Allergies   Allergen Reactions    Gabapentin Delerium    Oxcarbazepine Dizziness       Current Medications     Current Outpatient Prescriptions   Medication Sig Dispense Refill    Acetaminophen (TYLENOL PO) Take by mouth as needed      ascorbic acid (VITAMIN C) 500 MG tablet Take 1 tablet (500 mg total) by mouth daily 60 tablet 0    Calcium 250 MG CAPS Take 600 mg by mouth daily        Cholecalciferol (VITAMIN D3) 1000 units CAPS Take by mouth      cyanocobalamin (VITAMIN B-12) 1,000 mcg tablet Take 1 5 tablets by mouth daily        enoxaparin (LOVENOX) 40 mg/0 4 mL 1 subcutaneous injection daily x 28 days AFTER surgery 28 Syringe 0    ferrous sulfate 324 (65 Fe) mg Take 1 tablet (324 mg total) by mouth 2 (two) times a day before meals 60 tablet 0    folic acid (FOLVITE) 1 mg tablet Take 1 tablet (1 mg total) by mouth daily 30 tablet 0    Ketoprofen 10 % CREA as needed    2    MELATONIN PO Take by mouth      Multiple Vitamins-Minerals (PRESERVISION AREDS) TABS Take 1 tablet by mouth daily      phenytoin (DILANTIN) 50 mg tablet 2 tabs TID (Patient taking differently: 100 mg 3 (three) times a day 2 tabs TID ) 540 tablet 0    pregabalin (LYRICA) 200 MG capsule Take 2 tabs in the am, 1 tab in the afternoon, 2 tabs at bedtime (Patient taking differently: 3 (three) times a day Take 2 tabs in the am, 1 tab in the afternoon, 2 tabs at bedtime ) 150 capsule 2    pyridoxine (VITAMIN B6) 100 mg tablet Take 2 tablets by mouth daily         No current facility-administered medications for this visit            Health Maintenance     Health Maintenance   Topic Date Due    SLP PLAN OF CARE  1950    DTaP,Tdap,and Td Vaccines (1 - Tdap) 06/18/1971    INFLUENZA VACCINE  09/01/2018    Depression Screening PHQ  11/03/2018 (Originally 1950)    Fall Risk  02/22/2019    CRC Screening: Colonoscopy  08/15/2027    Pneumococcal PPSV23/PCV13 65+ Years / Low and Medium Risk  Completed     Immunization History   Administered Date(s) Administered    Pneumococcal Conjugate 13-Valent 02/22/2018    Pneumococcal Polysaccharide PPV23 41/32/1736       Ester Garber MD

## 2018-10-08 ENCOUNTER — EVALUATION (OUTPATIENT)
Dept: PHYSICAL THERAPY | Facility: REHABILITATION | Age: 68
End: 2018-10-08
Payer: MEDICARE

## 2018-10-08 DIAGNOSIS — M17.11 PRIMARY OSTEOARTHRITIS OF RIGHT KNEE: Primary | ICD-10-CM

## 2018-10-08 PROCEDURE — G8978 MOBILITY CURRENT STATUS: HCPCS | Performed by: PHYSICAL THERAPIST

## 2018-10-08 PROCEDURE — 97110 THERAPEUTIC EXERCISES: CPT | Performed by: PHYSICAL THERAPIST

## 2018-10-08 PROCEDURE — 97161 PT EVAL LOW COMPLEX 20 MIN: CPT | Performed by: PHYSICAL THERAPIST

## 2018-10-08 PROCEDURE — G8979 MOBILITY GOAL STATUS: HCPCS | Performed by: PHYSICAL THERAPIST

## 2018-10-08 NOTE — PROGRESS NOTES
PT Evaluation     Today's date: 10/8/2018  Patient name: Alba Aiken  : 1950  MRN: [id number]  Referring provider: Hendrick Goltz, MD  Dx:   Encounter Diagnosis     ICD-10-CM    1  Primary osteoarthritis of right knee M17 11        Start Time: 1040  Stop Time: 1150  Total time in clinic (min): 70 minutes    Assessment  Impairments: abnormal coordination, abnormal gait, abnormal muscle firing, abnormal or restricted ROM, abnormal movement, activity intolerance, impaired balance, impaired physical strength, lacks appropriate home exercise program, pain with function, safety issue and weight-bearing intolerance    Assessment details: Alba Aiken is a 76 y o  male who presents with pain, decreased strength, decreased joint mobility and ambulatory dysfunction  Due to these impairments, Patient has difficulty performing a/iadls, recreational activities and engaging in social activities  Patient's clinical presentation is consistent with their referring diagnosis of right knee osteoarthritis  Patient will be undergoing a total knee arthroplasty on 10/22/18  Patient has been educated in post-operative rehabilitation protocol, precautions/contraindications, how to recognize sign/symptoms of infection, gait training with appropriate and appropriate home set up  Patient would benefit from skilled physical therapy to address their aforementioned impairments, improve their level of function and to improve their overall quality of life  Understanding of Dx/Px/POC: excellent  Goals  Short Term Goals: to be achieved in 2 weeks  1) Patient to be independent with basic HEP  2) Patient to demonstrate safe technique ambulating with SPC and RW     *Short and Long-term Goals to be updated at reevaluation following surgery      Plan  Patient would benefit from: skilled PT  Planned modality interventions: biofeedback, cryotherapy, hydrotherapy, TENS and unattended electrical stimulation  Planned therapy interventions: activity modification, ADL retraining, balance, balance/weight bearing training, behavior modification, body mechanics training, functional ROM exercises, gait training, home exercise program, IADL retraining, joint mobilization, manual therapy, massage, neuromuscular re-education, patient education, strengthening, stretching, therapeutic activities, therapeutic exercise and transfer training  Frequency: 2-3x week  Duration in weeks: 12  Treatment plan discussed with: patient and family (Patient's wife present for duration of IE )        Subjective Evaluation    History of Present Illness  Mechanism of injury: Patient presents with c/o chronic right knee pain secondary to years of wear and tear  Patient has intermittent clicking and buckling of his knee where he feels like it might collapse, but he is able to prevent it  Patient denies experiencing tingling/numbness  Patient has received steroid and viscosupplementation in the past with partial relief  Patient has participated in physical therapy as well  Despite conservative treatment, Patient's knee remains painful and significantly limits his weight-bearing tolerance  Patient has chosen to undergo a right total knee arthroplasty, DOS: 10/22/18  Patient lives in a 2 story [facility] with his wife  Patient has 8 steps to enter his home with a handrail bilaterally  Patient's master bedroom is on the 2nd floor, but he does have a one floor set up available  Patient has 12 steps with a right-sided HR to access 2nd floor  Patient has no grab bars or shower bench/chair  Patient has a low toilet seat  Pain  Current pain rating: 3  At best pain ratin  At worst pain ratin  Location: Anterior right knee pain ; diffuse  Quality: nagging, sudden  Exacerbated by: prolonged standing/walking (1 block), negotiating steps (descending > ascending), squatting, kneeling, prolonged static positions    Progression: worsening    Social Support    Employment status: not working    Diagnostic Tests  X-ray: abnormal (right knee: severe DJD)  Patient Goals  Patient goals for therapy: decreased pain, increased motion, return to sport/leisure activities, increased strength and independence with ADLs/IADLs  Patient goal: playing [employer], return to hiking        Objective     Active Range of Motion   Left Knee   Flexion: 126 degrees   Extension: 0 degrees     Right Knee   Flexion: 118 degrees   Extension: 0 degrees     Passive Range of Motion   Left Knee   Flexion: 129 degrees   Extension: 0 degrees     Right Knee   Flexion: 123 degrees   Extension: 0 degrees     Mobility   Patellar Mobility:   Left Knee   Hypomobile: left medial, left lateral, left superior and left inferior    Right Knee   Hypomobile: medial, lateral, superior and inferior     Strength/Myotome Testing     Left Hip   Planes of Motion   Flexion: 5  Extension: 5  Abduction: 4-    Right Hip   Planes of Motion   Flexion: 4  Extension: 5  Abduction: 4    Left Knee   Flexion: 5  Extension: 5    Right Knee   Flexion: 4-  Extension: 4+    Left Ankle/Foot   Dorsiflexion: 4+    Right Ankle/Foot   Dorsiflexion: 4+    Ambulation   Weight-Bearing Status   Weight-Bearing Status (Left): full weight bearing   Weight-Bearing Status (Right): full weight-bearing    Assistive device used: single point cane    Ambulation: Level Surfaces   Ambulation with assistive device: independent  Ambulation without assistive device: independent    Observational Gait   Gait: antalgic     Functional Assessment     Single Leg Stance - Eyes Open   Left  Trial 1: 2 seconds    Right  Trial 1: 1 seconds    Comments  Bilateral squat: poor technique, use of UEs for balance  Timed Up and Go (TUG): 12 12 sec, Independent ambulation, Mod I sit to/from stand with arm rests  Five Times Sit to Stand Test: 20 17 sec, Independent ; 17 23 sec, Mod I with arm rests    General Comments     Knee Comments  Right Knee Circumference Measurements (cm):   Mid patella: 49 6  10 cm proximal to mid patella: 58 5  10 cm distal to mid patella: 44 2      Flowsheet Rows      Most Recent Value   PT/OT G-Codes   Current Score  33   Projected Score  52   FOTO information reviewed  Yes   Assessment Type  Evaluation   G code set  Mobility: Walking & Moving Around   Mobility: Walking and Moving Around Current Status ()  CK   Mobility: Walking and Moving Around Goal Status ()  CI          Precautions: right TKA (DOS: 10/22/18), trigeminal neuralgia    Daily Treatment Diary     Manual  10/8            Right knee PROM             Right gastroc, hamstring, hip flexor str  Gr  II-IV pat  mobs                                           Exercise Diary  10/8            Recumbent bike             VG             Long-sitting gastroc str               HR             Heel slides             Quad sets with heel prop             Supine SLR             Standing hip abd, ext with TB             SAQ                                                                                                                                                                Modalities  10/8

## 2018-10-15 NOTE — PROGRESS NOTES
Patient ID: Umesh Amaro is a 76 y o  male  Assessment/Plan:    Trigeminal neuralgia  Preventive therapy  -  Continue the Dilantin to 100 mg 3 times a day  -  Lyrica 200 mg 2 tabs in the morning 1 in the afternoon and 2 at bedtime-  This has also helped him sleep better at night    Consider botox   Consider baclofen as needed        Insomnia  Continue melatonin  Consider sleep referral if no improvement         Problem List Items Addressed This Visit        Nervous and Auditory    Trigeminal neuralgia - Primary     Preventive therapy  -  Continue the Dilantin to 100 mg 3 times a day  -  Lyrica 200 mg 2 tabs in the morning 1 in the afternoon and 2 at bedtime-  This has also helped him sleep better at night    Consider botox   Consider baclofen as needed                Other    Insomnia     Continue melatonin  Consider sleep referral if no improvement                  Subjective:    HPI  Marck PITT  Nerissa Carter is a 76year old male presenting to the office today in neurological follow up of trigeminal neuralgia  He stopped working Jan of 2018        Trigeminal Neuralgia:   Surgery:   -  2 gamma knife procedures (~2008 and ~2011)    -  8/8/2014 - S/p Right V2 Rhizotomy by Dr Luis Pedroza went away for 6 months but the pain did not get worse for 3 years)   - 3/6/2018 S/p Rhizotomy TN V2 & V3      Medication used:  - Prevention: Lyrica (helped the most), amitriptyline (stopped due to fatigue), carbamazepine-makes goofy/confusion, cymbalta- no improvement, trileptal-dizzy, confusion, Neurontin (confusion), Lamictal (confusion)  - Abortive: baclofen, aspirin, tramadol,      Since the surgery has noted numbness on the right side of the face along with change in taste as well which I told the patient is typical following the surgery  Aliya Porter has not noticed any improvement in his symptoms since last seen  Aliya Porter brought a picture today showing me where his typical trigger points in his face are      Current pian level: 4/10-   But the pain is in corner of right eye and above eye brow  Occurs- daily; always presents, lowest 1-2/10, 1-2 times daily it is severe for several minutes 10/10 when sneezes, occasionally random 8/10 (at least once a day-15-20 minutes)  Last- constant; 15 minutes when a severe episode occurs  Severity- average pain level 4-5/10 on the V1 and can get up to 10/10 few times a day and last for 15 minutes  Located- right side, corner of right eye and right eye brow at upper right forehead (where gamma knife frame was screwed into his head)  Quality- sharp, dull, nagging, ice pick like  Triggers- unsure, ?eating/ chewing  What makes it worse: sneezing makes it 10/10     Tinnitus: right ear only     Meningoma:   Stable     MRI head:   IMPRESSION-    1  Stable nonenhancing rounded T2 hypointense structure in right Meckel's cave correlating to the previously present calcified structure noted on the prior 2013 head CT, stable from the previous MRI, possibly   a treated or calcified meningioma   Cisternal segments of both 5th nerves are otherwise normal in signal without abnormal enhancement   No brainstem abnormality   No interval change from the previous study  2   No acute infarction, intracranial hemorrhage or mass effect       MRI brain- stable         The following portions of the patient's history were reviewed and updated as appropriate: allergies, current medications, past family history, past medical history, past social history, past surgical history and problem list          Objective:    Blood pressure 112/60, pulse 60, height 5' 10" (1 778 m), weight 124 kg (273 lb 9 6 oz)  Physical Exam   Constitutional: He is oriented to person, place, and time  He appears well-developed and well-nourished  HENT:   Head: Normocephalic and atraumatic  Eyes: Pupils are equal, round, and reactive to light  EOM and lids are normal    Neck: Neck supple  Cardiovascular: Normal rate      Pulmonary/Chest: Effort normal  Abdominal: Soft  Musculoskeletal: Normal range of motion  Neurological: He is alert and oriented to person, place, and time  He has normal strength and normal reflexes  Coordination normal    Skin: Skin is warm and dry  Psychiatric: He has a normal mood and affect  His speech is normal    Nursing note and vitals reviewed  Neurological Exam  Mental Status  Alert  Oriented to person, place, time and situation  Recent and remote memory are intact  Speech is normal  Language is fluent with no aphasia  Attention and concentration are normal     Cranial Nerves  CN II: Visual acuity is normal  Visual fields full to confrontation  CN III, IV, VI: Extraocular movements intact bilaterally  Extraocular movements intact bilaterally  Normal lids and orbits bilaterally  Pupils equal round and reactive to light bilaterally  CN V: Facial sensation is normal   CN VII: Full and symmetric facial movement  CN VIII: Hearing is normal   CN IX, X: Palate elevates symmetrically  Normal gag reflex  CN XI: Shoulder shrug strength is normal   CN XII: Tongue midline without atrophy or fasciculations  Motor  Normal muscle bulk throughout  No fasciculations present  Normal muscle tone  Strength is 5/5 throughout all four extremities  Sensory  Sensation is intact to light touch, pinprick, vibration and proprioception in all four extremities  Reflexes  Deep tendon reflexes are 2+ and symmetric in all four extremities with downgoing toes bilaterally  Coordination  Finger-to-nose, rapid alternating movements and heel-to-shin normal bilaterally without dysmetria  Gait  Normal casual, toe, heel and tandem gait  Limps  ROS:    Review of Systems   Constitutional: Negative  HENT: Negative  Eyes: Negative  Respiratory: Negative  Cardiovascular: Negative  Gastrointestinal: Negative  Endocrine: Negative  Genitourinary: Negative  Musculoskeletal: Negative           Right knee pain    Skin: Negative  Allergic/Immunologic: Negative  Neurological: Positive for numbness (tinglign right side of face )  Face pain right side    Hematological: Negative  Psychiatric/Behavioral: Negative

## 2018-10-16 ENCOUNTER — OFFICE VISIT (OUTPATIENT)
Dept: NEUROLOGY | Facility: CLINIC | Age: 68
End: 2018-10-16
Payer: MEDICARE

## 2018-10-16 VITALS
HEART RATE: 60 BPM | WEIGHT: 273.6 LBS | DIASTOLIC BLOOD PRESSURE: 60 MMHG | BODY MASS INDEX: 39.17 KG/M2 | HEIGHT: 70 IN | SYSTOLIC BLOOD PRESSURE: 112 MMHG

## 2018-10-16 DIAGNOSIS — G50.0 TRIGEMINAL NEURALGIA: Primary | ICD-10-CM

## 2018-10-16 DIAGNOSIS — G47.00 INSOMNIA, UNSPECIFIED TYPE: ICD-10-CM

## 2018-10-16 PROCEDURE — 99214 OFFICE O/P EST MOD 30 MIN: CPT | Performed by: PHYSICIAN ASSISTANT

## 2018-10-16 NOTE — ASSESSMENT & PLAN NOTE
Preventive therapy  -  Continue the Dilantin to 100 mg 3 times a day  -  Lyrica 200 mg 2 tabs in the morning 1 in the afternoon and 2 at bedtime-  This has also helped him sleep better at night    Consider botox   Consider baclofen as needed

## 2018-10-16 NOTE — PATIENT INSTRUCTIONS
Insomnia  Continue Melatonin     Trigeminal neuralgia  Preventive therapy  -  Continue the Dilantin to 100 mg 3 times a day  -  Lyrica 200 mg 2 tabs in the morning 1 in the afternoon and 2 at bedtime-  This has also helped him sleep better at night    Consider botox   Consider baclofen as needed

## 2018-10-22 ENCOUNTER — ANESTHESIA EVENT (OUTPATIENT)
Dept: PERIOP | Facility: HOSPITAL | Age: 68
DRG: 469 | End: 2018-10-22
Payer: MEDICARE

## 2018-10-22 ENCOUNTER — HOSPITAL ENCOUNTER (INPATIENT)
Facility: HOSPITAL | Age: 68
LOS: 2 days | Discharge: HOME | DRG: 469 | End: 2018-10-25
Attending: ORTHOPAEDIC SURGERY | Admitting: ORTHOPAEDIC SURGERY
Payer: MEDICARE

## 2018-10-22 ENCOUNTER — ANESTHESIA (OUTPATIENT)
Dept: PERIOP | Facility: HOSPITAL | Age: 68
DRG: 469 | End: 2018-10-22
Payer: MEDICARE

## 2018-10-22 DIAGNOSIS — Z96.651 STATUS POST TOTAL KNEE REPLACEMENT, RIGHT: Primary | ICD-10-CM

## 2018-10-22 LAB
ABO GROUP BLD: NORMAL
BLD GP AB SCN SERPL QL: NEGATIVE
RH BLD: NEGATIVE
SPECIMEN EXPIRATION DATE: NORMAL

## 2018-10-22 PROCEDURE — 86901 BLOOD TYPING SEROLOGIC RH(D): CPT | Performed by: ORTHOPAEDIC SURGERY

## 2018-10-22 PROCEDURE — C1776 JOINT DEVICE (IMPLANTABLE): HCPCS | Performed by: ORTHOPAEDIC SURGERY

## 2018-10-22 PROCEDURE — G8979 MOBILITY GOAL STATUS: HCPCS

## 2018-10-22 PROCEDURE — 27447 TOTAL KNEE ARTHROPLASTY: CPT | Performed by: ORTHOPAEDIC SURGERY

## 2018-10-22 PROCEDURE — 86900 BLOOD TYPING SEROLOGIC ABO: CPT | Performed by: ORTHOPAEDIC SURGERY

## 2018-10-22 PROCEDURE — 0SRC0J9 REPLACEMENT OF RIGHT KNEE JOINT WITH SYNTHETIC SUBSTITUTE, CEMENTED, OPEN APPROACH: ICD-10-PCS | Performed by: ORTHOPAEDIC SURGERY

## 2018-10-22 PROCEDURE — C1713 ANCHOR/SCREW BN/BN,TIS/BN: HCPCS | Performed by: ORTHOPAEDIC SURGERY

## 2018-10-22 PROCEDURE — 86850 RBC ANTIBODY SCREEN: CPT | Performed by: ORTHOPAEDIC SURGERY

## 2018-10-22 PROCEDURE — G8978 MOBILITY CURRENT STATUS: HCPCS

## 2018-10-22 PROCEDURE — 97163 PT EVAL HIGH COMPLEX 45 MIN: CPT

## 2018-10-22 DEVICE — ATTUNE PATELLA MEDIALIZED DOME 41MM CEMENTED AOX
Type: IMPLANTABLE DEVICE | Site: KNEE | Status: FUNCTIONAL
Brand: ATTUNE

## 2018-10-22 DEVICE — SMARTSET HV HIGH VISCOSITY BONE CEMENT 40G
Type: IMPLANTABLE DEVICE | Site: KNEE | Status: FUNCTIONAL
Brand: SMARTSET

## 2018-10-22 DEVICE — ATTUNE KNEE SYSTEM FEMORAL POSTERIOR STABILIZED SIZE 8 RIGHT CEMENTED
Type: IMPLANTABLE DEVICE | Site: KNEE | Status: FUNCTIONAL
Brand: ATTUNE

## 2018-10-22 DEVICE — ATTUNE KNEE SYSTEM TIBIAL BASE ROTATING PLATFORM SIZE 8 CEMENTED
Type: IMPLANTABLE DEVICE | Site: KNEE | Status: FUNCTIONAL
Brand: ATTUNE

## 2018-10-22 DEVICE — ATTUNE KNEE SYSTEM TIBIAL INSERT ROTATING PLATFORM POSTERIOR STABILIZED 8 7MM AOX
Type: IMPLANTABLE DEVICE | Site: KNEE | Status: FUNCTIONAL
Brand: ATTUNE

## 2018-10-22 RX ORDER — EPHEDRINE SULFATE 50 MG/ML
INJECTION, SOLUTION INTRAVENOUS AS NEEDED
Status: DISCONTINUED | OUTPATIENT
Start: 2018-10-22 | End: 2018-10-22 | Stop reason: SURG

## 2018-10-22 RX ORDER — DOCUSATE SODIUM 100 MG/1
100 CAPSULE, LIQUID FILLED ORAL 2 TIMES DAILY
Status: DISCONTINUED | OUTPATIENT
Start: 2018-10-22 | End: 2018-10-25 | Stop reason: HOSPADM

## 2018-10-22 RX ORDER — ACETAMINOPHEN 325 MG/1
650 TABLET ORAL EVERY 6 HOURS PRN
Status: DISCONTINUED | OUTPATIENT
Start: 2018-10-22 | End: 2018-10-22 | Stop reason: SDUPTHER

## 2018-10-22 RX ORDER — TRANEXAMIC ACID 100 MG/ML
INJECTION, SOLUTION INTRAVENOUS AS NEEDED
Status: DISCONTINUED | OUTPATIENT
Start: 2018-10-22 | End: 2018-10-22 | Stop reason: SURG

## 2018-10-22 RX ORDER — PANTOPRAZOLE SODIUM 40 MG/1
40 TABLET, DELAYED RELEASE ORAL
Status: DISCONTINUED | OUTPATIENT
Start: 2018-10-23 | End: 2018-10-25 | Stop reason: HOSPADM

## 2018-10-22 RX ORDER — TRANEXAMIC ACID 100 MG/ML
INJECTION, SOLUTION INTRAVENOUS AS NEEDED
Status: DISCONTINUED | OUTPATIENT
Start: 2018-10-22 | End: 2018-10-22 | Stop reason: HOSPADM

## 2018-10-22 RX ORDER — ACETAMINOPHEN 325 MG/1
975 TABLET ORAL ONCE
Status: COMPLETED | OUTPATIENT
Start: 2018-10-22 | End: 2018-10-22

## 2018-10-22 RX ORDER — FENTANYL CITRATE 50 UG/ML
INJECTION, SOLUTION INTRAMUSCULAR; INTRAVENOUS AS NEEDED
Status: DISCONTINUED | OUTPATIENT
Start: 2018-10-22 | End: 2018-10-22 | Stop reason: SURG

## 2018-10-22 RX ORDER — METHOCARBAMOL 500 MG/1
500 TABLET, FILM COATED ORAL EVERY 6 HOURS PRN
Status: DISCONTINUED | OUTPATIENT
Start: 2018-10-22 | End: 2018-10-25 | Stop reason: HOSPADM

## 2018-10-22 RX ORDER — ACETAMINOPHEN 325 MG/1
650 TABLET ORAL EVERY 6 HOURS PRN
Status: DISCONTINUED | OUTPATIENT
Start: 2018-10-22 | End: 2018-10-25 | Stop reason: HOSPADM

## 2018-10-22 RX ORDER — OXYCODONE HYDROCHLORIDE 5 MG/1
5 TABLET ORAL EVERY 4 HOURS PRN
Status: DISCONTINUED | OUTPATIENT
Start: 2018-10-22 | End: 2018-10-25 | Stop reason: HOSPADM

## 2018-10-22 RX ORDER — BUPIVACAINE HYDROCHLORIDE 7.5 MG/ML
INJECTION, SOLUTION INTRASPINAL AS NEEDED
Status: DISCONTINUED | OUTPATIENT
Start: 2018-10-22 | End: 2018-10-22 | Stop reason: SURG

## 2018-10-22 RX ORDER — SODIUM CHLORIDE 9 MG/ML
125 INJECTION, SOLUTION INTRAVENOUS CONTINUOUS
Status: DISCONTINUED | OUTPATIENT
Start: 2018-10-22 | End: 2018-10-25 | Stop reason: HOSPADM

## 2018-10-22 RX ORDER — OXYCODONE HYDROCHLORIDE 10 MG/1
10 TABLET ORAL EVERY 4 HOURS PRN
Status: DISCONTINUED | OUTPATIENT
Start: 2018-10-22 | End: 2018-10-25 | Stop reason: HOSPADM

## 2018-10-22 RX ORDER — PROPOFOL 10 MG/ML
INJECTION, EMULSION INTRAVENOUS AS NEEDED
Status: DISCONTINUED | OUTPATIENT
Start: 2018-10-22 | End: 2018-10-22 | Stop reason: SURG

## 2018-10-22 RX ORDER — DOCUSATE SODIUM 100 MG/1
100 CAPSULE, LIQUID FILLED ORAL 2 TIMES DAILY
Qty: 10 CAPSULE | Refills: 0 | Status: SHIPPED | OUTPATIENT
Start: 2018-10-22 | End: 2019-01-08

## 2018-10-22 RX ORDER — SENNOSIDES 8.6 MG
650 CAPSULE ORAL EVERY 8 HOURS PRN
Qty: 30 TABLET | Refills: 0 | Status: SHIPPED | OUTPATIENT
Start: 2018-10-22 | End: 2018-11-21

## 2018-10-22 RX ORDER — FENTANYL CITRATE/PF 50 MCG/ML
25 SYRINGE (ML) INJECTION
Status: DISCONTINUED | OUTPATIENT
Start: 2018-10-22 | End: 2018-10-22 | Stop reason: HOSPADM

## 2018-10-22 RX ORDER — PHENYTOIN 50 MG/1
50 TABLET, CHEWABLE ORAL EVERY 8 HOURS SCHEDULED
Status: DISCONTINUED | OUTPATIENT
Start: 2018-10-22 | End: 2018-10-25 | Stop reason: HOSPADM

## 2018-10-22 RX ORDER — TRAMADOL HYDROCHLORIDE 50 MG/1
50 TABLET ORAL EVERY 6 HOURS SCHEDULED
Status: DISCONTINUED | OUTPATIENT
Start: 2018-10-22 | End: 2018-10-24

## 2018-10-22 RX ORDER — HYDROMORPHONE HCL/PF 1 MG/ML
0.5 SYRINGE (ML) INJECTION
Status: DISCONTINUED | OUTPATIENT
Start: 2018-10-22 | End: 2018-10-25 | Stop reason: HOSPADM

## 2018-10-22 RX ORDER — CALCIUM CARBONATE 200(500)MG
1000 TABLET,CHEWABLE ORAL DAILY PRN
Status: DISCONTINUED | OUTPATIENT
Start: 2018-10-22 | End: 2018-10-25 | Stop reason: HOSPADM

## 2018-10-22 RX ORDER — SODIUM CHLORIDE, SODIUM LACTATE, POTASSIUM CHLORIDE, CALCIUM CHLORIDE 600; 310; 30; 20 MG/100ML; MG/100ML; MG/100ML; MG/100ML
20 INJECTION, SOLUTION INTRAVENOUS CONTINUOUS
Status: DISCONTINUED | OUTPATIENT
Start: 2018-10-22 | End: 2018-10-22

## 2018-10-22 RX ORDER — PROPOFOL 10 MG/ML
INJECTION, EMULSION INTRAVENOUS CONTINUOUS PRN
Status: DISCONTINUED | OUTPATIENT
Start: 2018-10-22 | End: 2018-10-22 | Stop reason: SURG

## 2018-10-22 RX ORDER — SODIUM CHLORIDE 9 MG/ML
125 INJECTION, SOLUTION INTRAVENOUS CONTINUOUS
Status: DISCONTINUED | OUTPATIENT
Start: 2018-10-22 | End: 2018-10-22

## 2018-10-22 RX ORDER — SODIUM CHLORIDE, SODIUM LACTATE, POTASSIUM CHLORIDE, CALCIUM CHLORIDE 600; 310; 30; 20 MG/100ML; MG/100ML; MG/100ML; MG/100ML
75 INJECTION, SOLUTION INTRAVENOUS CONTINUOUS
Status: CANCELLED | OUTPATIENT
Start: 2018-10-22

## 2018-10-22 RX ORDER — MIDAZOLAM HYDROCHLORIDE 1 MG/ML
INJECTION INTRAMUSCULAR; INTRAVENOUS AS NEEDED
Status: DISCONTINUED | OUTPATIENT
Start: 2018-10-22 | End: 2018-10-22 | Stop reason: SURG

## 2018-10-22 RX ORDER — LANOLIN ALCOHOL/MO/W.PET/CERES
3 CREAM (GRAM) TOPICAL
Status: DISCONTINUED | OUTPATIENT
Start: 2018-10-22 | End: 2018-10-25 | Stop reason: HOSPADM

## 2018-10-22 RX ORDER — OXYCODONE HYDROCHLORIDE 5 MG/1
TABLET ORAL
Qty: 30 TABLET | Refills: 0 | Status: SHIPPED | OUTPATIENT
Start: 2018-10-22 | End: 2018-11-16 | Stop reason: SDUPTHER

## 2018-10-22 RX ORDER — TRAMADOL HYDROCHLORIDE 50 MG/1
50 TABLET ORAL EVERY 6 HOURS PRN
Qty: 30 TABLET | Refills: 0 | Status: SHIPPED | OUTPATIENT
Start: 2018-10-22 | End: 2018-10-25 | Stop reason: HOSPADM

## 2018-10-22 RX ORDER — ONDANSETRON 2 MG/ML
4 INJECTION INTRAMUSCULAR; INTRAVENOUS ONCE AS NEEDED
Status: DISCONTINUED | OUTPATIENT
Start: 2018-10-22 | End: 2018-10-22 | Stop reason: HOSPADM

## 2018-10-22 RX ORDER — MAGNESIUM HYDROXIDE 1200 MG/15ML
LIQUID ORAL AS NEEDED
Status: DISCONTINUED | OUTPATIENT
Start: 2018-10-22 | End: 2018-10-22 | Stop reason: HOSPADM

## 2018-10-22 RX ADMIN — OXYCODONE HYDROCHLORIDE 10 MG: 10 TABLET ORAL at 16:39

## 2018-10-22 RX ADMIN — FENTANYL CITRATE 50 MCG: 50 INJECTION, SOLUTION INTRAMUSCULAR; INTRAVENOUS at 10:06

## 2018-10-22 RX ADMIN — PROPOFOL 20 MG: 10 INJECTION, EMULSION INTRAVENOUS at 10:10

## 2018-10-22 RX ADMIN — PROPOFOL 40 MCG/KG/MIN: 10 INJECTION, EMULSION INTRAVENOUS at 10:06

## 2018-10-22 RX ADMIN — EPHEDRINE SULFATE 7.5 MG: 50 INJECTION, SOLUTION INTRAMUSCULAR; INTRAVENOUS; SUBCUTANEOUS at 11:37

## 2018-10-22 RX ADMIN — DOCUSATE SODIUM 100 MG: 100 CAPSULE, LIQUID FILLED ORAL at 17:46

## 2018-10-22 RX ADMIN — ACETAMINOPHEN 975 MG: 325 TABLET, FILM COATED ORAL at 09:06

## 2018-10-22 RX ADMIN — SODIUM CHLORIDE, SODIUM LACTATE, POTASSIUM CHLORIDE, AND CALCIUM CHLORIDE 20 ML/HR: .6; .31; .03; .02 INJECTION, SOLUTION INTRAVENOUS at 09:28

## 2018-10-22 RX ADMIN — PHENYTOIN 50 MG: 50 TABLET, CHEWABLE ORAL at 22:43

## 2018-10-22 RX ADMIN — BUPIVACAINE HYDROCHLORIDE IN DEXTROSE 1.8 ML: 7.5 INJECTION, SOLUTION SUBARACHNOID at 10:04

## 2018-10-22 RX ADMIN — CEFAZOLIN SODIUM 1000 MG: 1 SOLUTION INTRAVENOUS at 17:46

## 2018-10-22 RX ADMIN — TRANEXAMIC ACID 1 G: 100 INJECTION, SOLUTION INTRAVENOUS at 10:09

## 2018-10-22 RX ADMIN — OXYCODONE HYDROCHLORIDE 10 MG: 10 TABLET ORAL at 20:42

## 2018-10-22 RX ADMIN — SODIUM CHLORIDE 125 ML/HR: 0.9 INJECTION, SOLUTION INTRAVENOUS at 13:05

## 2018-10-22 RX ADMIN — PROPOFOL 20 MG: 10 INJECTION, EMULSION INTRAVENOUS at 10:08

## 2018-10-22 RX ADMIN — FENTANYL CITRATE 50 MCG: 50 INJECTION, SOLUTION INTRAMUSCULAR; INTRAVENOUS at 09:25

## 2018-10-22 RX ADMIN — CEFAZOLIN SODIUM 2000 MG: 2 SOLUTION INTRAVENOUS at 10:09

## 2018-10-22 RX ADMIN — MIDAZOLAM 2 MG: 1 INJECTION INTRAMUSCULAR; INTRAVENOUS at 09:25

## 2018-10-22 RX ADMIN — PHENYTOIN 50 MG: 50 TABLET, CHEWABLE ORAL at 16:47

## 2018-10-22 RX ADMIN — SODIUM CHLORIDE, SODIUM LACTATE, POTASSIUM CHLORIDE, AND CALCIUM CHLORIDE: .6; .31; .03; .02 INJECTION, SOLUTION INTRAVENOUS at 11:10

## 2018-10-22 RX ADMIN — HYDROMORPHONE HYDROCHLORIDE 0.5 MG: 1 INJECTION, SOLUTION INTRAMUSCULAR; INTRAVENOUS; SUBCUTANEOUS at 22:53

## 2018-10-22 RX ADMIN — TRAMADOL HYDROCHLORIDE 50 MG: 50 TABLET, COATED ORAL at 17:45

## 2018-10-22 RX ADMIN — MELATONIN 3 MG: at 22:43

## 2018-10-22 NOTE — ANESTHESIA POSTPROCEDURE EVALUATION
Post-Op Assessment Note      CV Status:  Stable    Mental Status:  Alert and awake    Hydration Status:  Euvolemic    PONV Controlled:  Controlled    Airway Patency:  Patent    Post Op Vitals Reviewed: Yes          Staff: CRNA           BP   112/58   Temp   97 1 F   Pulse  57   Resp   16   SpO2   96%

## 2018-10-22 NOTE — ANESTHESIA PROCEDURE NOTES
Spinal Block    Patient location during procedure: OR  Start time: 10/22/2018 10:04 AM  Reason for block: primary anesthetic  Staffing  Anesthesiologist: Brandi Moreno  Resident/CRNA: Forrest Crawley  Performed: resident/CRNA   Preanesthetic Checklist  Completed: patient identified, surgical consent, pre-op evaluation, timeout performed, IV checked, risks and benefits discussed and monitors and equipment checked  Spinal Block  Patient position: sitting  Prep: Betadine  Patient monitoring: continuous pulse ox and frequent blood pressure checks  Approach: midline  Location: L3-4  Injection technique: single-shot  Needle  Needle type: pencil-tip   Needle gauge: 25 G  Needle length: 10 cm  Assessment  Sensory level: T4  Injection Assessment:  negative aspiration for heme, no paresthesia on injection and positive aspiration for clear CSF    Post-procedure:  site cleaned

## 2018-10-22 NOTE — OP NOTE
Brief Op Note  Tamra Weber  MRN: [id number]      Unit/Bed#: Operating Room    PreOp Dx: right knee DJD      Postop Dx: right knee DJD      Procedure: right total knee arthroplasty      Surgeon: MD Rain Bowers MD ,Barry Holt PA-C ,        Fluids:       EBL:       Drains: hemovac x 1      Specimens: No       Complications: No       Condition: stable transferred to postanesthesia care unit      Implants: Depuy Attune RP  Femoral, size: 8  Tibial tray: 8  Polyethylene: 7  Patellar button: 54      83 y o male, presents at this time, secondary to treatment of right knee DJD with valgus deformity which has failed conservative treatment  The patient was told of all the pros and cons of operative and nonoperative intervention  Some of the complications of operative intervention include infection, bleeding, scarring, nerve injury, vascular injury, fracture, continued pain, decreased range of motion, DVT, PE death, loss of limb, need for further surgery, not obtain an results  The patient wished  Patient did consent for operative intervention for this pathology  Patient was brought to the operating room  Patient was anesthetized as anesthesia team  Patient was prepped and draped in normal sterile fashion  After this was done, we did conduct a time out to make sure correct  Patient was in the room, prepped marked and draped  Implants were in the room, Rep  For the implants were present, DVT prophylaxis and antibiotics were addressed  Midline incision was made over the anterior knee after going through skin, fatty tissue, fascia was identified  Flaps were created both medially and laterally  Medial parapatellar incision was made  Excision of Hoffa fat pad was conducted  We're able to excise the fatty tissue from the anterior aspect of the distal femur  We were able to at this time, flex the knee with the patella everted  Intramedullary reamer was used   Intramedullary guide for the femur was then placed to determine how much of the distal femur to cut and also, valgus cut angle  Pins were then placed  Intramedullary guide was removed  Distal femoral cut was made  Attention was now to the proximal tibia  Extramedullary guide was used  After making sure that we took the appropriate amount from the medial and lateral side with the aid of a measuring device, the jig was held in place with pins  Protection of the medial and lateral ligaments, as well as the popliteal region, was done  Proximal tibial cut was made  Extension gaps were evaluated  Size of the femur was then determined with the aid of a guide  After this was done, we placed the appropriate sized block  These were held down with pins  Anterior and posterior cuts were made  Anterior, posterior, chamfer cuts were made  We did check our flexion gap and was noted to be appropriate  This was a PCL sacrificing device being used Therefore a box cut was made  Tibial tray size was determined  This was held down with 2 small screws  The reamer and the punch was then used with the appropriate guide to make sure that these were all done, the appropriate manner  Guide was removed  Trial femoral component was placed  Trial tibial polyethylene was placed  Patient was noted to be stable  On coronal and sagittal planes  Patellar button size was determined  This was placed on the medial aspect of the patella  Holes were drilled for our true patella button to be cemented on  We tracked the knee, and we noted that the patella was maltracking over the trochlear of the trial implants  After this was done we did drill holes in our trial femoral component  We then removed  All trial components  Copious irrigation was used at the operative site  We did place some bone within the intramedullary canal of the femur  I discussed the cement was used and all components were placed, including the femur, tibia, and patella button   A trial tibial Polyethylene was placed  After cement had dried, removed the trial polyethylene and removed any excess cement that was in the popliteal region  Copious irrigation was used  The tibial polyethylene was then placed  Patient was placed in the appropriate ranges of motion and patient's Knee was noted to be stable  Tourniquet was discontinued and patient had some maltracking  At this time, some release of the lateral retinaculum was conducted and patient was noted to track appropriately  Hemostasis was obtained  Hemovac was placed, exiting the lateral distal thigh region  #1 Vicryl sutures were used to reapproximate the parapatellar incision  2-0 Vicryl sutures for the subcutaneous closure  This was reinforced with staples  Wounds were cleaned and dried  Hemovac was placed to suction  Acticoat, 4 x 4, ABDs and web roll was placed prior to Ace bandage be placed from the foot  All the way to the mid thigh region    Patient was awakened as anesthesia team noted to be a stable condition and transferred to postanesthesia care unit

## 2018-10-22 NOTE — DISCHARGE INSTRUCTIONS
Discharge Instructions - Leeanna Ramos 76 y o  male MRN: [id number]  Unit/Bed#: PACU 02    Weight Bearing Status:                                           Weight Bearing as tolerated to the right lower extremity  DVT prophylaxis:  Complete course of Lovenox as directed    Pain:  Continue analgesics as directed    Showering Instructions:   Do not shower until followup     Dressing Instructions:   Keep dressing clean, dry and intact until follow up appointment  Driving Instructions:  No driving until cleared by Orthopaedic Surgery  PT/OT:  Continue PT/OT on outpatient basis as directed    Appt Instructions: If you do not have your appointment, please call the clinic at [phone]  Otherwise followup as scheduled below:  Dr Ofelia Cooper in 2 weeks    Contact the office sooner if you experience any increased numbness/tingling in the extremities        Miscellaneous:  None

## 2018-10-23 LAB
ANION GAP SERPL CALCULATED.3IONS-SCNC: 6 MMOL/L (ref 4–13)
BUN SERPL-MCNC: 15 MG/DL (ref 5–25)
CALCIUM SERPL-MCNC: 9.1 MG/DL (ref 8.3–10.1)
CHLORIDE SERPL-SCNC: 107 MMOL/L (ref 100–108)
CO2 SERPL-SCNC: 25 MMOL/L (ref 21–32)
CREAT SERPL-MCNC: 0.86 MG/DL (ref 0.6–1.3)
ERYTHROCYTE [DISTWIDTH] IN BLOOD BY AUTOMATED COUNT: 14.1 % (ref 11.6–15.1)
GFR SERPL CREATININE-BSD FRML MDRD: 89 ML/MIN/1.73SQ M
GLUCOSE SERPL-MCNC: 110 MG/DL (ref 65–140)
HCT VFR BLD AUTO: 39.9 % (ref 36.5–49.3)
HGB BLD-MCNC: 13.3 G/DL (ref 12–17)
MCH RBC QN AUTO: 31.2 PG (ref 26.8–34.3)
MCHC RBC AUTO-ENTMCNC: 33.3 G/DL (ref 31.4–37.4)
MCV RBC AUTO: 94 FL (ref 82–98)
PLATELET # BLD AUTO: 185 THOUSANDS/UL (ref 149–390)
PMV BLD AUTO: 10.3 FL (ref 8.9–12.7)
POTASSIUM SERPL-SCNC: 4.4 MMOL/L (ref 3.5–5.3)
RBC # BLD AUTO: 4.26 MILLION/UL (ref 3.88–5.62)
SODIUM SERPL-SCNC: 138 MMOL/L (ref 136–145)
WBC # BLD AUTO: 9.19 THOUSAND/UL (ref 4.31–10.16)

## 2018-10-23 PROCEDURE — 97530 THERAPEUTIC ACTIVITIES: CPT

## 2018-10-23 PROCEDURE — 97110 THERAPEUTIC EXERCISES: CPT

## 2018-10-23 PROCEDURE — 80048 BASIC METABOLIC PNL TOTAL CA: CPT | Performed by: ORTHOPAEDIC SURGERY

## 2018-10-23 PROCEDURE — 97167 OT EVAL HIGH COMPLEX 60 MIN: CPT

## 2018-10-23 PROCEDURE — 97535 SELF CARE MNGMENT TRAINING: CPT

## 2018-10-23 PROCEDURE — 85027 COMPLETE CBC AUTOMATED: CPT | Performed by: ORTHOPAEDIC SURGERY

## 2018-10-23 PROCEDURE — 97116 GAIT TRAINING THERAPY: CPT

## 2018-10-23 PROCEDURE — G8987 SELF CARE CURRENT STATUS: HCPCS

## 2018-10-23 PROCEDURE — G8988 SELF CARE GOAL STATUS: HCPCS

## 2018-10-23 RX ADMIN — CEFAZOLIN SODIUM 1000 MG: 1 SOLUTION INTRAVENOUS at 02:01

## 2018-10-23 RX ADMIN — MELATONIN 3 MG: at 21:28

## 2018-10-23 RX ADMIN — PHENYTOIN 50 MG: 50 TABLET, CHEWABLE ORAL at 14:50

## 2018-10-23 RX ADMIN — HYDROMORPHONE HYDROCHLORIDE 0.5 MG: 1 INJECTION, SOLUTION INTRAMUSCULAR; INTRAVENOUS; SUBCUTANEOUS at 04:46

## 2018-10-23 RX ADMIN — OXYCODONE HYDROCHLORIDE 10 MG: 10 TABLET ORAL at 07:28

## 2018-10-23 RX ADMIN — TRAMADOL HYDROCHLORIDE 50 MG: 50 TABLET, COATED ORAL at 12:42

## 2018-10-23 RX ADMIN — DOCUSATE SODIUM 100 MG: 100 CAPSULE, LIQUID FILLED ORAL at 17:40

## 2018-10-23 RX ADMIN — HYDROMORPHONE HYDROCHLORIDE 0.5 MG: 1 INJECTION, SOLUTION INTRAMUSCULAR; INTRAVENOUS; SUBCUTANEOUS at 08:51

## 2018-10-23 RX ADMIN — TRAMADOL HYDROCHLORIDE 50 MG: 50 TABLET, COATED ORAL at 00:24

## 2018-10-23 RX ADMIN — PHENYTOIN 50 MG: 50 TABLET, CHEWABLE ORAL at 05:03

## 2018-10-23 RX ADMIN — SODIUM CHLORIDE 125 ML/HR: 0.9 INJECTION, SOLUTION INTRAVENOUS at 07:29

## 2018-10-23 RX ADMIN — TRAMADOL HYDROCHLORIDE 50 MG: 50 TABLET, COATED ORAL at 05:03

## 2018-10-23 RX ADMIN — TRAMADOL HYDROCHLORIDE 50 MG: 50 TABLET, COATED ORAL at 17:40

## 2018-10-23 RX ADMIN — OXYCODONE HYDROCHLORIDE 10 MG: 10 TABLET ORAL at 02:01

## 2018-10-23 RX ADMIN — DOCUSATE SODIUM 100 MG: 100 CAPSULE, LIQUID FILLED ORAL at 08:51

## 2018-10-23 RX ADMIN — PANTOPRAZOLE SODIUM 40 MG: 40 TABLET, DELAYED RELEASE ORAL at 05:03

## 2018-10-23 RX ADMIN — OXYCODONE HYDROCHLORIDE 10 MG: 10 TABLET ORAL at 14:52

## 2018-10-23 RX ADMIN — TRAMADOL HYDROCHLORIDE 50 MG: 50 TABLET, COATED ORAL at 23:27

## 2018-10-23 RX ADMIN — ENOXAPARIN SODIUM 40 MG: 40 INJECTION SUBCUTANEOUS at 08:51

## 2018-10-23 RX ADMIN — PHENYTOIN 50 MG: 50 TABLET, CHEWABLE ORAL at 21:28

## 2018-10-23 NOTE — PROGRESS NOTES
Upon entering the patient's room at 2230 on 10/22/2018, this RN's intention was to empty the Hemovac Accordion Suction Drain and to assess patient's pain level  This RN found the Hemovac Drain on the ground next to the patient's bed, while noting the drain was completely disconnected from the patient  Upon assessment there was no additional drainage on the surrounding dressings, and the patient remarked that he was not aware the drain came out/how it came out  This RN continued in emptying the drain of which was 40ml, which brings the total to 280ml since time of placement which looks to be about 1200  This RN went forth and paged Orthopedics to inform them of this event multiple times, and at this point is still waiting for a call to be returned  Will continue to monitor the site and reinforce dressing as needed

## 2018-10-23 NOTE — PLAN OF CARE
Problem: OCCUPATIONAL THERAPY ADULT  Goal: Performs self-care activities at highest level of function for planned discharge setting  See evaluation for individualized goals  Treatment Interventions: ADL retraining, Functional transfer training, Endurance training, Patient/family training, Compensatory technique education, Energy conservation  Equipment Recommended: Raised toilet seat, Tub seat with back       See flowsheet documentation for full assessment, interventions and recommendations  Limitation: Decreased ADL status, Decreased endurance, Decreased self-care trans, Decreased high-level ADLs, Decreased cognition  Prognosis: Good  Assessment: Pt is a 76 y o  male who was [facility] to [facility] on 10/22/2018 with Primary osteoarthritis of right knee  And now S/P R TKA and RLE WBAT  Pt's problem list also includes PMH of trigeminal neurlagia, facial spasm, anemia, B patellofemoral syndrome, esophageal reflux, vertigo, Vitamin D deficiency, osteoarthitis, intercranial meningioma  At baseline pt was completing ADL's/IADLs with I/shares with wife  Pt lives with wife in a [facility] with 8STE, has a first floor setup, ambulates with a SPC  Currently pt requires S UB and mod a LB self care tasks for and mod a sit to stand transfers with RW  Pt currently presents with impairments in the following categories -steps to enter environment, difficulty performing ADLS and difficulty performing IADLS  activity tolerance, endurance, standing balance/tolerance, memory, safety  and attention   These impairments, as well as pt's fatigue, pain and WBS   limit pt's ability to safely engage in all baseline areas of occupation, includingbathing, dressing, toileting, functional mobility/transfers, community mobility, house maintenance and work/volunteer work  From [employer] standpoint, recommend home with family support upon D/C  OT will continue to follow to address the below stated goals        OT Discharge Recommendation: Home with family support  OT - OK to Discharge: No

## 2018-10-23 NOTE — SOCIAL WORK
CM met with patient to complete a general SW assessment and discuss discharge needs  CM introduced herself, provided extension, and explained her role in the discharge planning  Patient's wife Maxx Decker [phone])SXU present in room  Patient resides in a two level home with his wife  Patient identifies his wife and family as their primary support system  Patient is independent with ADLs and functional mobility  Patient reports needing a RW at discharge; declined needing additional DMEs at discharge  Orders for RW received and sent to [facility]; patient agreeable  Patient drives; reports his wife is available to transport at discharge  Patient uses [facility] in [facility]; Patient prefers to use [facility] at discharge  Patient's Lovenox is filled  PCP identified as Dr Jack Sewell  Patient does not have a POA and declined further information  No history of Mental health/ D&A reported during this assessment  Patient/caregiver received discharge checklist   Content reviewed  Patient/caregiver encouraged to participate in discharge plan of care prior to discharge home  CM reviewed d/c planning process including the following: identifying help at home, patient preference for d/c planning needs, Discharge Lounge, [facility] Meds to Bed program, availability of treatment team to discuss questions or concerns patient and/or family may have regarding understanding medications and recognizing signs and symptoms once discharged  CM also encouraged patient to follow up with all recommended appointments after discharge  Patient advised of importance for patient and family to participate in managing patients medical well being

## 2018-10-23 NOTE — UTILIZATION REVIEW
Initial Clinical Review    Age/Sex: 76 y o  male admitted on 10/22 for elective surgery - OR    Surgery Date: 10/22    Procedure: S/P Right Total Knee Arthroplasty    Anesthesia: Spinal, Regional    Admission Orders: Date/Time/Statement: Inpatient 10/23/18 @ 0833 Med Surg    Orders Placed This Encounter   Procedures    Inpatient Admission     Standing Status:   Standing     Number of Occurrences:   1     Order Specific Question:   Admitting Physician     Answer:   Tracey Albright [[id number]]     Order Specific Question:   Level of Care     Answer:   Med Surg [16]     Order Specific Question:   Estimated length of stay     Answer:   More than 2 Midnights     Order Specific Question:   Certification     Answer:   I certify that inpatient services are medically necessary for this patient for a duration of greater than two midnights  See H&P and MD Progress Notes for additional information about the patient's course of treatment  This patient underwent a procedure not on the inpatient only list and therefore is subject to the 2 midnight benchmark  Accordingly, in my judgement, the patient will require at least 2 midnights in the hospital receiving acute medical care  The patient is noted to have comorbid conditions which will require management in the barak-operative period prior to safe discharge  As such the patient will require acute care beyond the usual and routine recovery period for the procedure alone and is therefore appropriate for inpatient admission  Vital Signs: /76 (BP Location: Right arm)   Pulse 90   Temp 99 1 °F (37 3 °C) (Oral)   Resp 18   Ht 5' 10" (1 778 m)   Wt 123 kg (272 lb)   SpO2 93%   BMI 39 03 kg/m²     Diet:        Diet Orders            Start     Ordered    10/22/18 1329  Diet Regular; Regular House  Diet effective now     Question Answer Comment   Diet Type Regular    Regular Regular House    RD to adjust diet per protocol?  Yes        10/22/18 1328          Mobility: Up with assistance  PT/OT eval and treat    DVT Prophylaxis: Foot Pump    Scheduled Meds:  Cefazolin  Intravenous  x3   docusate sodium 100 mg Oral BID    enoxaparin 40 mg Subcutaneous Daily    melatonin 3 mg Oral HS    pantoprazole 40 mg Oral Early Morning    phenytoin 50 mg Oral Q8H [address]    traMADol 50 mg Oral Q6H [address]      Continuous Infusions:  sodium chloride 125 mL/hr Last Rate: 125 mL/hr (10/23/18 0729)     PRN Meds:    acetaminophen    calcium carbonate    HYDROmorphone Iv x3    methocarbamol    oxyCODONE po x4

## 2018-10-23 NOTE — PHYSICAL THERAPY NOTE
Physical Therapy Progress Note     10/23/18 1505   Pain Assessment   Pain Assessment 0-10   Pain Score 8   Pain Type Acute pain   Pain Location Knee   Pain Orientation Right   Effect of Pain on Daily Activities impaired mobiilty   Patient's Stated Pain Goal No pain   Hospital Pain Intervention(s) Cold applied;Repositioned; Ambulation/increased activity; Elevated; Rest   Response to Interventions tolerated   Restrictions/Precautions   RLE Weight Bearing Per Order WBAT   Other Precautions WBS;Pain; Fall Risk   General   Family/Caregiver Present Yes   Subjective   Subjective Pt presented to therapy asking to use bathroom  Pt had difficulty describing pain when asked  "It feels different, but not necessarily worse when walking "   Transfers   Sit to Stand 4  Minimal assistance   Additional items Assist x 1; Armrests; Increased time required   Stand to Sit 4  Minimal assistance   Additional items Assist x 1; Armrests; Increased time required;Verbal cues   Toilet transfer 3  Moderate assistance   Additional items Assist x 1; Increased time required;Standard toilet   Ambulation/Elevation   Gait pattern Improper Weight shift; Antalgic;Decreased R stance;Shuffling; Inconsistent janie; Short stride; Step to;Excessively slow   Gait Assistance 4  Minimal assist   Additional items Assist x 1   Assistive Device Rolling walker   Distance 20', 15'   Balance   Static Sitting Good   Static Standing Fair   Ambulatory Poor +   Endurance Deficit   Endurance Deficit Yes   Endurance Deficit Description pain, UE fatigue   Activity Tolerance   Activity Tolerance Patient limited by fatigue;Patient limited by pain   Nurse [place] RN   Exercises   TKR Sitting;10 reps;Right;AAROM   Assessment   Prognosis Good   Problem List Decreased strength;Decreased range of motion;Decreased endurance; Impaired balance;Decreased mobility; Decreased coordination; Impaired judgement;Decreased safety awareness;Pain;Orthopedic restrictions;Decreased skin integrity   Assessment Pt demonstrated slightly improved mobility this session, ambulating repeated short distances, but is still limited overall by pain & RLE weakness  Pt requires extensive time to perform all transfers & ambulate in room & hallway  Also demonstrated difficulty remembering sequencing with RW during ambulation, which impeded his gait as well  Instructed in proper sequencing, then given visual demonstration at end of session  Pt performed LE exercises to improve RLE strength & ROM to facilitate improved functional mobility  Pt given paper with instructions for all exercises performed  Education provided about continued mobility and exercises to maximize functional independence  Pt verbalized understanding at this time  Pt will continue to benefit from therapy services to maximize functional mobility and activity tolerance for safe return home once discharged from hospital    Barriers to Discharge Inaccessible home environment   Barriers to Discharge Comments improved mobility and JEYSON   Goals   Patient Goals to get stronger so he can go home   STG Expiration Date 10/29/18   Short Term Goal #1 per PT assessment: Pt will be mod(I) with rolling R and L for ease with OOB transfer  Pt will be mod(I) with supine<->sit transfers  Pt will be mod(I) with sit<->stand to promote (I) with toileting  Pt will be mod(I) with ambualtion of household distances (appx 50') to reduce caregiver burden  Pt will be S with community distance ambulaiton using least restrictive AD to increase tolerance to activity  Pt will be S ascending and descending >/= 8 steps to gain access into home  Pt will particiapte in HEP consisitng of balance, strength, ROM and coordinaiton tasks to increase activitiy, improve (I) and decrease pain  Treatment Day 2   Plan   Treatment/Interventions Functional transfer training;LE strengthening/ROM; Elevations; Therapeutic exercise; Endurance training;Patient/family training;Equipment eval/education; Bed mobility;Gait training   Progress Progressing toward goals   PT Frequency Twice a day   Recommendation   Recommendation Home with family support; Outpatient PT   Equipment Recommended Justin Sheldon   PT - OK to Discharge Connie Escobar, IZA

## 2018-10-23 NOTE — PHYSICIAN ADVISOR
Current patient class: Inpatient  The patient is currently on Hospital Day: 2 at [address] [place] [address]      This patient was originally admitted to the hospital under outpatient status  After admission the patient was reevaluated and determined to require further hospitalization  The patient is now documented to require at least a 2nd midnight in the hospital  As such the patient is now expected to satisfy the 2 midnight benchmark and is therefore eligible for inpatient admission  After review of the relevant documentation, labs, vital signs and test results, the patient is appropriate for INPATIENT ADMISSION  Admission to the hospital as an inpatient is a complex decision making process which requires the practitioner to consider the patients presenting complaint, history and physical examination and all relevant testing  With this in mind, in this case, the patient was deemed appropriate for INPATIENT ADMISSION  After review of the documentation and testing available at the time of the admission I concur with this clinical determination of medical necessity  Rationale is as follows: The patient is a 51-year-old male who presented to the hospital on October 22, 2018 for elective right total knee arthroplasty  His initial status was outpatient  Documentation today includes this statement and the patient was changed to inpatient level care:    "This patient underwent a procedure not on the inpatient only list and therefore is subject to the 2 midnight benchmark  Accordingly, in my judgement, the patient will require at least 2 midnights in the hospital receiving acute medical care  The patient is noted to have comorbid conditions which will require management in the barak-operative period prior to safe discharge   As such the patient will require acute care beyond the usual and routine recovery period for the procedure alone and is therefore appropriate for inpatient admission  The patient remains on intravenous fluids and is receiving intravenous Dilaudid for breakthrough pain, two doses today  The patient was seen in consultation today by the acute pain service  The patient was reporting at least 7/10 pain as the adductor canal block and spinal have worn off postoperatively  As the patient requires ongoing hospital management, I agree that he is appropriate for inpatient level care  The patients vitals on arrival were ED Triage Vitals   Temperature Pulse Respirations Blood Pressure SpO2   10/22/18 0839 10/22/18 0839 10/22/18 0839 10/22/18 0839 10/22/18 0839   97 9 °F (36 6 °C) 75 20 119/70 96 %      Temp Source Heart Rate Source Patient Position - Orthostatic VS BP Location FiO2 (%)   10/22/18 0839 10/22/18 0839 10/22/18 1330 10/22/18 1330 --   Tymp Core Monitor Lying Left arm       Pain Score       10/22/18 0906       No Pain           Past Medical History:   Diagnosis Date    Arthritis     Right knee    Chronic pain disorder     Trigeminal neuralgia pain     Ulcer of bile duct      Past Surgical History:   Procedure Laterality Date    APPENDECTOMY  1962    BRAIN SURGERY  2005    Gamma Knife for Trigeminal Neuralgia    ESOPHAGOGASTRODUODENOSCOPY      HERNIA REPAIR  1952    OTHER SURGICAL HISTORY      gamma knife RT in 2007 and 2010    AL STEREO TREAT TRIGEMINAL NERVE Right 3/6/2018    Procedure: RHIZOTOMY TRIGEMINAL NERVES (V2 & V3);   Surgeon: Toni Herrmann MD;  [facility];  Service: Neurosurgery    AL TOTAL KNEE ARTHROPLASTY Right 10/22/2018    Procedure: ARTHROPLASTY KNEE TOTAL;  Surgeon: Suzanne Cedeno MD;  [facility];  Service: Orthopedics    RHIZOTOMY W/ [facility] Right 08/08/2014    Stereotactic Ablation of Gasserian Ganglion Right sided trigeminal V2 radiofrequency rhizotomy x2       The patient was admitted to the hospital at  on 10/22/18 for the following diagnosis:  Primary osteoarthritis of right knee [M17 11]    Consults have been placed to:   IP CONSULT TO CASE MANAGEMENT  IP CONSULT TO ACUTE PAIN SERVICE    Vitals:    10/22/18 2358 10/23/18 0317 10/23/18 0748 10/23/18 1141   BP: 120/53 107/59 118/56 133/76   BP Location: Left arm Left arm Right arm Right arm   Pulse: 76 75 76 90   Resp: 18 18 18 18   Temp: 98 4 °F (36 9 °C) 98 7 °F (37 1 °C) 98 6 °F (37 °C) 99 1 °F (37 3 °C)   TempSrc: Oral Oral Oral Oral   SpO2: 93% 94% 93% 93%   Weight:       Height:           Most recent labs:    Recent Labs      10/23/18   0456   WBC  9 19   HGB  13 3   HCT  39 9   PLT  185   K  4 4   NA  138   CALCIUM  9 1   BUN  15   CREATININE  0 86       Scheduled Meds:  Current Facility-Administered Medications:  acetaminophen 650 mg Oral Q6H PRN Courtney Renae MD    calcium carbonate 1,000 mg Oral Daily PRN Courtney Renae MD    docusate sodium 100 mg Oral BID Courtney Renae MD    enoxaparin 40 mg Subcutaneous Daily Courtney Renae MD    HYDROmorphone 0 5 mg Intravenous Q3H PRN Courtney Renae MD    melatonin 3 mg Oral HS Courtney Renae MD    methocarbamol 500 mg Oral Q6H PRN Courtney Renae MD    oxyCODONE 10 mg Oral Q4H PRN Courtney Renae MD    oxyCODONE 5 mg Oral Q4H PRN Courtney Renae MD    pantoprazole 40 mg Oral Early Morning Courtney Renae MD    phenytoin 50 mg Oral Q8H Anish Miner MD    sodium chloride 125 mL/hr Intravenous Continuous Courtney Renae MD Last Rate: 125 mL/hr (10/23/18 0729)   traMADol 50 mg Oral Q6H [address] Courtney Renae MD      Continuous Infusions:  sodium chloride 125 mL/hr Last Rate: 125 mL/hr (10/23/18 0729)     PRN Meds:   acetaminophen    calcium carbonate    HYDROmorphone    methocarbamol    oxyCODONE    oxyCODONE    Surgical procedures (if appropriate):  Procedure(s):  ARTHROPLASTY KNEE TOTAL

## 2018-10-23 NOTE — PLAN OF CARE
Problem: PHYSICAL THERAPY ADULT  Goal: Performs mobility at highest level of function for planned discharge setting  See evaluation for individualized goals  Treatment/Interventions: Functional transfer training, LE strengthening/ROM, Elevations, Therapeutic exercise, Endurance training, Patient/family training, Equipment eval/education, Bed mobility, Gait training, Compensatory technique education, Spoke to nursing  Equipment Recommended: Carlos Lambert (bariatric vs wide RW and BSC)       See flowsheet documentation for full assessment, interventions and recommendations  Prognosis: Good  Problem List: Decreased strength, Decreased range of motion, Decreased endurance, Impaired balance, Decreased mobility, Decreased coordination, Impaired sensation, Decreased skin integrity, Orthopedic restrictions, Pain  Assessment: Pt is a 71year old male presenting s/pARTHROPLASTY KNEE TOTAL performed 10/22/18  PT consulted to assess functional mobility and assist with safe d/c planning  PT eval performed POD #0 with WBAT RLE and OOB orders  Pt with an additional problem list which includes OA, trigeminal neuralgia, anemia, vertigo, intracranial meningioma and chronic pain disorder  PTA, pt living at home with his spouse  in a [facility] with 8 JEYSON and a first floor set up  Pt owns a SPC and admits to 1 fall  Pt spouse able to provide assistance at d/c  On eval, pt presenting with the following deficits: impaired balance, decreased strength and ROM, pain, poor tolerance to activity, and decreased overall functional mobility  Pt required overall mod A for bed mobility, transfers and gait  Pt with antalgic gait pattern but able to perform without LOB or adverse reactions  PT to continue to follow pt during stay to progress functional mobility (I) and safety  Anticipate safe return home with OPPT, bariatric RW and bariatric BSC          Recommendation: Outpatient PT, Home with family support     PT - OK to Discharge: No    See flowsheet documentation for full assessment

## 2018-10-23 NOTE — OCCUPATIONAL THERAPY NOTE
[address]  Evaluation     Patient Name: Jud Dakins  UZGLI'A Date: 10/23/2018  Problem List  Patient Active Problem List   Diagnosis    Trigeminal neuralgia    Facial spasm    Anemia    Bilateral patellofemoral syndrome    Diverticulosis of colon    Esophageal reflux    Intracranial meningioma ([address] )    Vertigo    Vitamin D deficiency    Need for pneumococcal vaccination    Preoperative evaluation of a medical condition to rule out surgical contraindications (TAR required)    Insomnia    Primary osteoarthritis of right knee    Pre-op examination     Past Medical History  Past Medical History:   Diagnosis Date    Arthritis     Right knee    Chronic pain disorder     Trigeminal neuralgia pain     Ulcer of bile duct      Past Surgical History  Past Surgical History:   Procedure Laterality Date    APPENDECTOMY  1962    BRAIN SURGERY  2005    Gamma Knife for Trigeminal Neuralgia    ESOPHAGOGASTRODUODENOSCOPY      HERNIA REPAIR  1952    OTHER SURGICAL HISTORY      gamma knife RT in 2007 and 2010    LA STEREO TREAT TRIGEMINAL NERVE Right 3/6/2018    Procedure: RHIZOTOMY TRIGEMINAL NERVES (V2 & V3);   Surgeon: Quinn Hatchet, MD;  [facility];  Service: Neurosurgery    LA TOTAL KNEE ARTHROPLASTY Right 10/22/2018    Procedure: ARTHROPLASTY KNEE TOTAL;  Surgeon: Yuridia Armstrong MD;  [facility];  Service: Orthopedics    [address]ZOTOMY W/ [address] Right 08/08/2014    Stereotactic Ablation of Gasserian Ganglion Right sided trigeminal V2 radiofrequency rhizotomy x2         10/23/18 0957   Note Type   Note type Eval only   Restrictions/Precautions   Weight Bearing Precautions Per Order Yes   RLE Weight Bearing Per Order WBAT   Other Precautions WBS;Pain   Pain Assessment   Pain Assessment 0-10   Pain Score 8   Pain Type Acute pain   Pain Location Knee   Pain Orientation Right   Hospital Pain Intervention(s) Ambulation/increased activity;Repositioned;Elevated   Home Living   Type of [address] [facility] Two level; Able to live on main level with bedroom/bathroom  (8 [address], 13 Steps to 2nd floor )   Bathroom Shower/Tub Tub/shower unit   Bathroom Toilet Standard   Bathroom Accessibility Accessible   Prior Function   Level of Dane Independent with ADLs and functional mobility   Lives With Spouse   Receives Help From Family   ADL Assistance Independent   Falls in the last 6 months 1 to 4  (fell in grocery store)   Vocational Retired   Lifestyle   Autonomy I with ADL's, shares I ADL's with wife, ambulates with a [address]   Reciprocal Relationships family   Service to Others retired   Intrinsic Gratification guitar   Psychosocial   Psychosocial (WDL) WDL   Subjective   Subjective pleasant and cooperartive and stating "I feel not life myself today"   ADL   Where Assessed Edge of bed   Eating Assistance 7  Independent   Grooming Assistance 7  Independent   UB [address] 5  Supervision/Setup   LB Bathing Assistance 3  Moderate Assistance   LB Bathing Deficit Right lower leg including foot; Left lower leg including foot; Perineal area   UB Dressing Assistance 5  Supervision/Setup   LB Dressing Assistance 3  Moderate Assistance   LB Dressing Deficit Setup;Steadying;Verbal cueing; Increased time to complete; Thread RLE into underwear; Thread LLE into underwear  (pt able to pull up underwear over knees and hike over hips)   Functional Assistance Unable to assess   Functional Deficit (pt +pain, impaired endurance for func mobility)   Transfers   Sit to Stand 3  Moderate assistance   Stand to Sit 4  Minimal assistance   Toilet transfer Unable to assess  (unable to tolerate mobility this am-pt premedicated for eval)   Additional Comments pt became pale in standing - RN notified and BP after 3 min 124/78   Functional Mobility   Functional Mobility (unable)   Balance   Static Sitting Good   Dynamic Sitting Fair +   Static Standing Fair   Dynamic Standing Poor +   Activity Tolerance   Activity Tolerance Patient limited by fatigue;Patient limited by pain  (symptomatic with standing)   Medical Staff Made Aware yes, RN Mingo   Nurse Made Aware yes   RUE Assessment   RUE Assessment WFL   LUE Assessment   LUE Assessment WFL   Hand Function   Gross Motor Coordination Functional   Fine Motor Coordination Functional   Vision-Basic Assessment   Current Vision Wears glasses all the time   Vision - Complex Assessment   Acuity Able to read clock/calendar on wall without difficulty   Cognition   Overall Cognitive Status Impaired   Arousal/Participation Alert; Cooperative   Attention Attends with cues to redirect   Orientation Level Oriented X4   Memory (slow processing with biographical information)   Following Commands Follows multistep commands with increased time or repetition   Comments pt reports he feels different since surgery, "my thinking isnt the same"   Assessment   Limitation Decreased ADL status; Decreased endurance;Decreased self-care trans;Decreased high-level ADLs; Decreased cognition   Prognosis Good   Assessment Pt is a 76 y o  male who was [facility] on 10/22/2018 with Primary osteoarthritis of right knee  And now S/P R TKA and RLE WBAT  Pt's problem list also includes PMH of trigeminal neurlagia, facial spasm, anemia, B patellofemoral syndrome, esophageal reflux, vertigo, Vitamin D deficiency, osteoarthitis, intercranial meningioma  At baseline pt was completing ADL's/IADLs with I/shares with wife  Pt lives with wife in a [facility] with 8E, has a first floor setup, ambulates with a SPC  Currently pt requires S UB and mod a LB self care tasks for and mod a sit to stand transfers with RW  Continue to assess functional mobility   Pt currently presents with impairments in the following categories -steps to enter environment, difficulty performing ADLS and difficulty performing IADLS  activity tolerance, endurance, standing balance/tolerance, memory, safety  and attention   These impairments, as well as pt's fatigue, pain and WBS   limit pt's ability to safely engage in all baseline areas of occupation, includingbathing, dressing, toileting, functional mobility/transfers, community mobility, house maintenance and work/volunteer work  From [employer] standpoint, recommend home with family support upon D/C  OT will continue to follow to address the below stated goals  Goals   Patient Goals feel better   STG Time Frame 3-5   Short Term Goal #1 see established goals below   Plan   Treatment Interventions ADL retraining;Functional transfer training; Endurance training;Patient/family training; Compensatory technique education; Energy conservation   Goal Expiration Date 10/26/18   OT Frequency 3-5x/wk   Recommendation   OT Discharge Recommendation Home with family support   Equipment Recommended Raised toilet seat;Tub seat with back   OT - OK to Discharge No   Barthel Index   Feeding 10   Bathing 0   Grooming Score 5   Dressing Score 0   Bladder Score 10   Bowels Score 10   Toilet Use Score 0   Transfers (Bed/Chair) Score 5   Mobility (Level Surface) Score 0   Stairs Score 0   Barthel Index Score 40   Modified Sheldon Scale   Modified [place] Scale 4     * Pt will perform UB ADL's at  Mod I level  And LB ADL's with min a with F+balance and DME/AD as needed  *Pt will perform functional transfers, including toilet transfer at S  level with the use of DME as needed  *Pt will perform functional mobility at a S  level with the use of AD as needed and F+ balance  *Pt with demonstrate good carry over of RW safety and energy conservation techniques  *Pt will demonstrate good carry over of pt/family education and training with 100% attention to tasks to assist with all ADL's     *Pt will improve activity tolerance to G for 30-45 minute treatment session      Emilia Alves, OT

## 2018-10-23 NOTE — PHYSICAL THERAPY NOTE
Physical Therapy Evaluation     Patient's Name: Jacy Torrez    Admitting Diagnosis  Primary osteoarthritis of right knee [M17 11]    Problem List  Patient Active Problem List   Diagnosis    Trigeminal neuralgia    Facial spasm    Anemia    Bilateral patellofemoral syndrome    Diverticulosis of colon    Esophageal reflux    Intracranial meningioma ([address] )    Vertigo    Vitamin D deficiency    Need for pneumococcal vaccination    Preoperative evaluation of a medical condition to rule out surgical contraindications (TAR required)    Insomnia    Primary osteoarthritis of right knee    Pre-op examination       Past Medical History  Past Medical History:   Diagnosis Date    Arthritis     Right knee    Chronic pain disorder     Trigeminal neuralgia pain     Ulcer of bile duct        Past Surgical History  Past Surgical History:   Procedure Laterality Date    APPENDECTOMY  1962    BRAIN SURGERY  2005    Gamma Knife for Trigeminal Neuralgia    ESOPHAGOGASTRODUODENOSCOPY      HERNIA REPAIR  1952    OTHER SURGICAL HISTORY      gamma knife RT in 2007 and 2010    MA STEREO TREAT TRIGEMINAL NERVE Right 3/6/2018    Procedure: RHIZOTOMY TRIGEMINAL NERVES (V2 & V3);   Surgeon: Ced Duarn MD;  Location: [facility];  Service: Neurosurgery    RHIZOTOMY W/ RADIOFREQUENCY ABLATION Right 08/08/2014    Stereotactic Ablation of Gasserian Ganglion Right sided trigeminal V2 radiofrequency rhizotomy x2        10/22/18 4063   Note Type   Note type Eval/Treat   Pain Assessment   Pain Assessment 0-10   Pain Score 6   Pain Type Acute pain;Surgical pain   Pain Location Knee   Pain Orientation Right   Effect of Pain on Daily Activities impaired tolerance to mobility    Home Living   Type of [address] to live on main level with bedroom/bathroom;Stairs to enter with rails  (8 JEYSON, 13 steps to second floor )   Bathroom Shower/Tub Tub/shower unit   Bathroom Toilet Standard   Bathroom Accessibility Accessible   Home Equipment Cane   Additional Comments Patient reports using SPC PTA   Prior Function   Level of [place] Independent with ADLs and functional mobility   Lives With Spouse   Receives Help From Family   ADL Assistance Independent   IADLs Independent   Falls in the last 6 months 1 to 4  (1)   Vocational Retired   Comments Patient admits to 1 fall in [facility] which he reports was accidental    Restrictions/Precautions   [facility] Bearing Precautions Per Order Yes   RLE Weight Bearing Per Order WBAT   LLE Weight Bearing Per Order WBAT   Other Precautions WBS; Fall Risk;Pain   General   Additional Pertinent History 10/22/18 ARTHROPLASTY KNEE TOTAL    Family/Caregiver Present Yes   Cognition   Overall Cognitive Status WFL   Arousal/Participation Alert   Orientation Level Oriented X4   Memory Within functional limits   Following Commands Follows all commands and directions without difficulty   Comments Pt is overall pleasant and cooperative  Able to converse ebyond basic needs    RUE Assessment   RUE Assessment WFL   LUE Assessment   LUE Assessment WFL   RLE Assessment   RLE Assessment (3-/5)   LLE Assessment   LLE Assessment (4+/5)   Coordination   Movements are Fluid and Coordinated 0   Coordination and Movement Description antalgic; LE instbaility    Sensation X  (post op numbness)   Light Touch   RLE Light Touch Grossly intact   LLE Light Touch Grossly intact   Proprioception   RLE Proprioception Grossly intact   LLE Proprioception Grossly Intact   Bed Mobility   Supine to Sit 3  Moderate assistance   Additional items Assist x 1; Increased time required;LE management;Verbal cues   Additional Comments Pt initally reporting dizziness that subsided sitting EOB   Transfers   Sit to Stand 3  Moderate assistance   Additional items Assist x 1   Stand to Sit 3  Moderate assistance   Additional items Assist x 1   Stand pivot 3  Moderate assistance   Additional items Assist x 1   Ambulation/Elevation   Gait pattern Wide JAELYN; Decreased foot clearance; Antalgic;L Foot drag;Short stride; Step to;Excessively slow   Gait Assistance 3  Moderate assist   Additional items Assist x 1   Assistive Device Rolling walker   Distance 3'   Balance   Static Sitting Fair +   Dynamic Sitting Fair   Static Standing Fair -   Dynamic Standing Poor +   Ambulatory Poor +   Endurance Deficit   Endurance Deficit Yes   Endurance Deficit Description pain    Activity Tolerance   Activity Tolerance Patient limited by fatigue;Patient limited by pain   Medical Staff Made Aware Will update team in AM rounding    Nurse Made Aware cleared prior to assessment    Assessment   Prognosis Good   Problem List Decreased strength;Decreased range of motion;Decreased endurance; Impaired balance;Decreased mobility; Decreased coordination; Impaired sensation;Decreased skin integrity;Orthopedic restrictions;Pain   Assessment Pt is a 71year old male presenting s/pARTHROPLASTY KNEE TOTAL performed 10/22/18  PT consulted to assess functional mobility and assist with safe d/c planning  PT eval performed POD #0 with WBAT RLE and OOB orders  Pt with an additional problem list which includes OA, trigeminal neuralgia, anemia, vertigo, intracranial meningioma and chronic pain disorder  PTA, pt living at home with his spouse  in a [facility] with 8 JEYSON and a first floor set up  Pt owns a SPC and admits to 1 fall  Pt spouse able to provide assistance at d/c  On eval, pt presenting with the following deficits: impaired balance, decreased strength and ROM, pain, poor tolerance to activity, and decreased overall functional mobility  Pt required overall mod A for bed mobility, transfers and gait  Pt with antalgic gait pattern but able to perform without LOB or adverse reactions  PT to continue to follow pt during stay to progress functional mobility (I) and safety  Anticipate safe return home with OPPT, bariatric RW and bariatric BSC     Goals   Patient Goals Patient would like to take part in activites with his spouse   STG Expiration Date 10/29/18   Short Term Goal #1 Pt will be mod(I) with rolling R and L for ease with OOB transfer  Pt will be mod(I) with supine<->sit transfers  Pt will be mod(I) with sit<->stand to promote (I) with toileting  Pt will be mod(I) with ambualtion of household distances (appx 50') to reduce caregiver burden  Pt will be S with community distance ambulaiton using least restrictive AD to increase tolerance to activity  Pt will be S ascending and descending >/= 8 steps  to gain access into home  Pt will particiapte in HEP consisitng of balance, strength, ROM and coordinaiton tasks to increase activitiy, improve (I) and decrease pain  Treatment Day 0   Plan   Treatment/Interventions Functional transfer training;LE strengthening/ROM; Elevations; Therapeutic exercise; Endurance training;Patient/family training;Equipment eval/education; Bed mobility;Gait training; Compensatory technique education;Spoke to nursing   PT Frequency 7x/wk; Twice a day   Recommendation   Recommendation Outpatient PT; Home with family support   Equipment Recommended Walker  (bariatric vs wide RW and BSC)   PT - OK to Discharge No   Additional Comments OOB in chair with all needs within reach, foot pumps activated and education provided on call bell    Barthel Index   Feeding 10   Bathing 0   Grooming Score 5   Dressing Score 5   Bladder Score 0   Bowels Score 10   Toilet Use Score 5   Transfers (Bed/Chair) Score 10   Mobility (Level Surface) Score 0   Stairs Score 0   Barthel Index Score 45         Krys Nieto, PT

## 2018-10-23 NOTE — PLAN OF CARE
Problem: PHYSICAL THERAPY ADULT  Goal: Performs mobility at highest level of function for planned discharge setting  See evaluation for individualized goals  Treatment/Interventions: Functional transfer training, LE strengthening/ROM, Elevations, Therapeutic exercise, Endurance training, Patient/family training, Equipment eval/education, Bed mobility, Gait training, Compensatory technique education, Spoke to nursing  Equipment Recommended: oCoper Vera (bariatric vs wide RW and BSC)       See flowsheet documentation for full assessment, interventions and recommendations  Outcome: Progressing  Prognosis: Good  Problem List: Decreased strength, Decreased range of motion, Decreased endurance, Impaired balance, Decreased mobility, Decreased coordination, Impaired judgement, Decreased safety awareness, Pain, Orthopedic restrictions, Decreased skin integrity  Assessment: Pt demonstrated slightly improved mobility this session, ambulating repeated short distances, but is still limited overall by pain & RLE weakness  Pt requires extensive time to perform all transfers & ambulate in room & hallway  Also demonstrated difficulty remembering sequencing with RW during ambulation, which impeded his gait as well  Instructed in proper sequencing, then given visual demonstration at end of session  Pt performed LE exercises to improve RLE strength & ROM to facilitate improved functional mobility  Pt given paper with instructions for all exercises performed  Education provided about continued mobility and exercises to maximize functional independence  Pt verbalized understanding at this time    Pt will continue to benefit from therapy services to maximize functional mobility and activity tolerance for safe return home once discharged from hospital   Barriers to Discharge: Inaccessible home environment  Barriers to Discharge Comments: improved mobility and JEYSON  Recommendation: Home with family support, Outpatient PT     PT - OK to Discharge: No    See flowsheet documentation for full assessment

## 2018-10-23 NOTE — PLAN OF CARE
Problem: DISCHARGE PLANNING - CARE MANAGEMENT  Goal: Discharge to post-acute care or home with appropriate resources  INTERVENTIONS:  - Conduct assessment to determine patient/family and health care team treatment goals, and need for post-acute services based on payer coverage, community resources, and patient preferences, and barriers to discharge  - Address psychosocial, clinical, and financial barriers to discharge as identified in assessment in conjunction with the patient/family and health care team  - Arrange appropriate level of post-acute services according to patients   needs and preference and payer coverage in collaboration with the physician and health care team  - Communicate with and update the patient/family, physician, and health care team regarding progress on the discharge plan  - Arrange appropriate transportation to post-acute venues  Outcome: Completed Date Met: 10/23/18  Patient to be discharged with appropriate services when medically cleared by MD  Patient's OPPT appointment scheduled for Thursday October 25th at 11am  CM to follow as needed

## 2018-10-23 NOTE — CONSULTS
Consultation - Acute Pain Service   Luis Cole 76 y o  male MRN: [id number]  Unit/Bed#: CW3 345-01 Encounter: [id number]               Assessment/Plan     Assessment:   Patient in a lot of pain  7/10  Adductor canal block and spinal have worn off appropriately  No obvious complications  Plan:   Patient encouraged to take hi PRN pain medications  He understands and will do so  APS sign off  Thank you for the Consult  Please call DEANA / Casandra Martinez ( btwn [phone]) with any further questions    History of Present Illness    Admit Date:  10/22/2018  Hospital Day:  1 day  Primary Service:  Orthopedic Surgery  Attending Provider:  Elisabeht Azar MD   HPI: Luis Cole is a 76y o  year old male who is POD #1 S/P TKA  Inpatient consult to Acute Pain Service  Consult performed by: Chi Reynolds ordered by: Rose Marie Pritchett          Review of Systems    Historical Information   Past Medical History:   Diagnosis Date    Arthritis     Right knee    Chronic pain disorder     Trigeminal neuralgia pain     Ulcer of bile duct      Past Surgical History:   Procedure Laterality Date    APPENDECTOMY  1962    BRAIN SURGERY  2005    Gamma Knife for Trigeminal Neuralgia    ESOPHAGOGASTRODUODENOSCOPY      HERNIA REPAIR  1952    OTHER SURGICAL HISTORY      gamma knife RT in 2007 and 2010    AK STEREO TREAT TRIGEMINAL NERVE Right 3/6/2018    Procedure: RHIZOTOMY TRIGEMINAL NERVES (V2 & V3);   Surgeon: Chioma Arevalo MD;  Location:  MAIN OR;  Service: Neurosurgery    AK TOTAL KNEE ARTHROPLASTY Right 10/22/2018    Procedure: ARTHROPLASTY KNEE TOTAL;  Surgeon: Elisabeth Azar MD;  Location:  MAIN OR;  Service: Orthopedics    RHIZOTOMY W/ [address] Right 08/08/2014    Stereotactic Ablation of Gasserian Ganglion Right sided trigeminal V2 radiofrequency rhizotomy x2     Social History   History   Alcohol Use No     History   Drug Use No     History   Smoking Status    Never Smoker   Smokeless Tobacco    Never Used       Meds/Allergies   current meds:   Current Facility-Administered Medications   Medication Dose Route Frequency    acetaminophen (TYLENOL) tablet 650 mg  650 mg Oral Q6H PRN    calcium carbonate (TUMS) chewable tablet 1,000 mg  1,000 mg Oral Daily PRN    docusate sodium (COLACE) capsule 100 mg  100 mg Oral BID    enoxaparin (LOVENOX) subcutaneous injection 40 mg  40 mg Subcutaneous Daily    HYDROmorphone (DILAUDID) injection 0 5 mg  0 5 mg Intravenous Q3H PRN    melatonin tablet 3 mg  3 mg Oral HS    methocarbamol (ROBAXIN) tablet 500 mg  500 mg Oral Q6H PRN    oxyCODONE (ROXICODONE) immediate release tablet 10 mg  10 mg Oral Q4H PRN    oxyCODONE (ROXICODONE) IR tablet 5 mg  5 mg Oral Q4H PRN    pantoprazole (PROTONIX) EC tablet 40 mg  40 mg Oral Early Morning    phenytoin (DILANTIN) chewable tablet 50 mg  50 mg Oral Q8H [address]    sodium chloride 0 9 % infusion  125 mL/hr Intravenous Continuous    traMADol (ULTRAM) tablet 50 mg  50 mg Oral Q6H [address]       Allergies   Allergen Reactions    Gabapentin Delerium    Oxcarbazepine Dizziness       Objective   Temp:  [94 9 °F (34 9 °C)-98 7 °F (37 1 °C)] 98 6 °F (37 °C)  HR:  [52-76] 76  Resp:  [14-18] 18  BP: (104-129)/(53-73) 118/56    Intake/Output Summary (Last 24 hours) at 10/23/18 1101  Last data filed at 10/23/18 0930   Gross per 24 hour   Intake             2660 ml   Output             1590 ml   Net             1070 ml       Physical Exam    Counseling / Coordination of Care  Total floor / unit time spent today Level 1 = 20 minutes  Greater than 50% of total time was spent with the patient and / or family counseling and / or coordination of care

## 2018-10-23 NOTE — PHYSICAL THERAPY NOTE
PT Progress Note     10/23/18 1030   Pain Assessment   Pain Assessment 0-10   Pain Score 9   Pain Type Acute pain;Surgical pain   Pain Location Knee   Pain Orientation Right   Hospital Pain Intervention(s) Ambulation/increased activity   Response to Interventions pain decreased w mob   Restrictions/Precautions   RLE Weight Bearing Per Order WBAT   Other Precautions WBS; Fall Risk;Pain   General   Chart Reviewed Yes   Response to Previous Treatment Patient with no complaints from previous session  Family/Caregiver Present Yes   Cognition   Arousal/Participation Alert   Attention Within functional limits   Orientation Level Oriented to person;Oriented to place;Oriented to situation   Following Commands Follows multistep commands with increased time or repetition   Subjective   Subjective pain in chair agreeable for PT   Bed Mobility   Additional Comments bed mob NT- pt in chair on PT arrival   Transfers   Sit to Stand 4  Minimal assistance   Additional items Increased time required;Verbal cues   Stand to Sit 4  Minimal assistance   Additional items Increased time required;Verbal cues   Stand pivot 4  Minimal assistance   Additional items Increased time required;Verbal cues  (RW)   Ambulation/Elevation   Gait pattern Improper Weight shift; Antalgic;Decreased R stance; Short stride; Step to;Excessively slow   Gait Assistance 4  Minimal assist   Additional items Verbal cues; Tactile cues   Assistive Device Rolling walker   Distance 22 ft   Balance   Static Standing Fair  (RW)   Dynamic Standing Poor +  (RW)   Endurance Deficit   Endurance Deficit Yes   Endurance Deficit Description antalgic LE instabilit   Activity Tolerance   Activity Tolerance Patient limited by fatigue;Patient limited by pain   Nurse Made Aware yes   Assessment   Prognosis Good   Problem List Decreased strength;Decreased range of motion;Decreased endurance; Impaired balance;Decreased mobility; Decreased coordination; Impaired judgement;Decreased safety awareness;Pain;Orthopedic restrictions;Decreased skin integrity   Assessment pt seen after OT transf to chair-  per report, pt was pale; orthostatic BP monitored, in long sit 124/67 IA 80; in sit w LE dep position 120/72 IA 85 in standing 111/62 IA 87 post 2 min in standing 122/66 IA 89; pt experienced increased pain  w standing; educatd on step to gait pattern using RW; amb w min assist 22 ft using RW- gait pattern antalgic walker reliant + falls risk; pt fatigued w exertion; will benefit from cont PT rec home  w fam support f/u OPPT when med cleared;; will need RW   Goals   Patient Goals go home   STG Expiration Date 10/29/18   Short Term Goal #1 per PT assessment: Pt will be mod(I) with rolling R and L for ease with OOB transfer  Pt will be mod(I) with supine<->sit transfers  Pt will be mod(I) with sit<->stand to promote (I) with toileting  Pt will be mod(I) with ambualtion of household distances (appx 50') to reduce caregiver burden  Pt will be S with community distance ambulaiton using least restrictive AD to increase tolerance to activity  Pt will be S ascending and descending >/= 8 steps to gain access into home  Pt will particiapte in HEP consisitng of balance, strength, ROM and coordinaiton tasks to increase activitiy, improve (I) and decrease pain  Plan   Treatment/Interventions Functional transfer training;LE strengthening/ROM; Elevations; Therapeutic exercise; Endurance training;Cognitive reorientation;Equipment eval/education;Patient/family training; Compensatory technique education;Continued evaluation; Bed mobility;Gait training;Spoke to nursing   PT Frequency Twice a day   Recommendation   Recommendation Outpatient PT; Home with family support   Equipment Recommended Jared Lindsay

## 2018-10-23 NOTE — PROGRESS NOTES
Marcia Other 76 y o  male MRN: [id number]  Unit/Bed#: CW3 345-01    This patient underwent a procedure not on the inpatient only list and therefore is subject to the 2 midnight benchmark  Accordingly, in my judgement, the patient will require at least 2 midnights in the hospital receiving acute medical care  The patient is noted to have comorbid conditions which will require management in the barak-operative period prior to safe discharge  As such the patient will require acute care beyond the usual and routine recovery period for the procedure alone and is therefore appropriate for inpatient admission      Jenae Carver MD

## 2018-10-23 NOTE — PROGRESS NOTES
Steve Ramos 76 y o  male MRN: [id number]  Unit/Bed#: CW3 345-01      Subjective:  76 y o male post operative day 1 right total knee arthroplasty  Pt doing well  Pain controlled      Labs:    0  Lab Value Date/Time   HCT 39 9 10/23/2018 0456   HCT 46 3 09/26/2018 1408   HCT 41 6 02/15/2018 1029   HCT 43 6 06/24/2015 1107   HCT 44 8 07/29/2014 1048   HGB 13 3 10/23/2018 0456   HGB 15 4 09/26/2018 1408   HGB 13 3 02/15/2018 1029   HGB 14 7 06/24/2015 1107   HGB 15 0 07/29/2014 1048   INR 0 99 09/26/2018 1408   INR 0 94 07/29/2014 1048   WBC 9 19 10/23/2018 0456   WBC 6 33 09/26/2018 1408   WBC 7 32 02/15/2018 1029   WBC 5 76 06/24/2015 1107   WBC 4 81 07/29/2014 1048   ESR 2 06/24/2015 1107       Meds:    Current Facility-Administered Medications:     acetaminophen (TYLENOL) tablet 650 mg, 650 mg, Oral, Q6H PRN, Migel Valladares MD    calcium carbonate (TUMS) chewable tablet 1,000 mg, 1,000 mg, Oral, Daily PRN, Migel Valladares MD    docusate sodium (COLACE) capsule 100 mg, 100 mg, Oral, BID, Migel Valladares MD, 100 mg at 10/22/18 1746    enoxaparin (LOVENOX) subcutaneous injection 40 mg, 40 mg, Subcutaneous, Daily, Migel Valladares MD    HYDROmorphone (DILAUDID) injection 0 5 mg, 0 5 mg, Intravenous, Q3H PRN, Migel Valladares MD, 0 5 mg at 10/23/18 0446    melatonin tablet 3 mg, 3 mg, Oral, HS, Migel Valladares MD, 3 mg at 10/22/18 2243    methocarbamol (ROBAXIN) tablet 500 mg, 500 mg, Oral, Q6H PRN, Migel Valladares MD    oxyCODONE (ROXICODONE) immediate release tablet 10 mg, 10 mg, Oral, Q4H PRN, Migel Valladares MD, 10 mg at 10/23/18 0201    oxyCODONE (ROXICODONE) IR tablet 5 mg, 5 mg, Oral, Q4H PRN, Migel Valladares MD    pantoprazole (PROTONIX) EC tablet 40 mg, 40 mg, Oral, Early Morning, Migel Valladares MD, 40 mg at 10/23/18 0503    phenytoin (DILANTIN) chewable tablet 50 mg, 50 mg, Oral, Q8H [facility], Migel Valladares MD, 50 mg at 10/23/18 0503    sodium chloride 0 9 % infusion, 125 mL/hr, Intravenous, Continuous, Zhao Perez MD, Last Rate: 125 mL/hr at 10/22/18 1305, 125 mL/hr at 10/22/18 1305    traMADol (ULTRAM) tablet 50 mg, 50 mg, Oral, Q6H [facility], Zhao Perez MD, 50 mg at 10/23/18 0503    Blood Culture:   No results found for: BLOODCX    Wound Culture:   No results found for: WOUNDCULT    Ins and Outs:  I/O last 24 hours: In: 8091 [P O :920; I V :1450; IV Piggyback:50]  Out: 3700 [Urine:1160; Drains:280; Blood:150]          Physical:  Vitals:    10/23/18 0317   BP: 107/59   Pulse: 75   Resp: 18   Temp: 98 7 °F (37 1 °C)   SpO2: 94%     right lower extremity:  · Dressings C/D/I  · Toes warm and well perfused  · VAC came out yesterday evening, dressing c/d/i at site    _*_*_*_*_*_*_*_*_*_*_*_*_*_*_*_*_*_*_*_*_*_*_*_*_*_*_*_*_*_*_*_*_*_*_*_*_*_*_*_*_*    Assessment: 68 y o male post operative day 1 right total knee arthroplasty   Doing well    Plan:  · Weight Bearing as tolerated  · Up and out of bed  · DVT prophylaxis  · Analgesics  · PT/OT  · Patient noted to have acute blood loss anemia due to a drop in Hbg of > 2 0g from preop levels, will monitor vital signs and resuscitate with IV fluids as needed    Laci Wellington MD

## 2018-10-23 NOTE — PLAN OF CARE
Problem: PHYSICAL THERAPY ADULT  Goal: Performs mobility at highest level of function for planned discharge setting  See evaluation for individualized goals  Treatment/Interventions: Functional transfer training, LE strengthening/ROM, Elevations, Therapeutic exercise, Endurance training, Patient/family training, Equipment eval/education, Bed mobility, Gait training, Compensatory technique education, Spoke to nursing  Equipment Recommended: Collin Choudhary (bariatric vs wide RW and BSC)       See flowsheet documentation for full assessment, interventions and recommendations  Prognosis: Good  Problem List: Decreased strength, Decreased range of motion, Decreased endurance, Impaired balance, Decreased mobility, Decreased coordination, Impaired judgement, Decreased safety awareness, Pain, Orthopedic restrictions, Decreased skin integrity  Assessment: pt seen after OT transf to chair-  per report, pt was pale; orthostatic BP monitored, in long sit 124/67 CO 80; in sit w LE dep position 120/72 CO 85 in standing 111/62 CO 87 post 2 min in standing 122/66 CO 89; pt experienced increased pain  w standing; educatd on step to gait pattern using RW; amb w min assist 22 ft using RW- gait pattern antalgic walker reliant + falls risk; pt fatigued w exertion; will benefit from cont PT rec home  w fam support f/u OPPT when med cleared;; will need RW        Recommendation: Outpatient PT, Home with family support     PT - OK to Discharge: No    See flowsheet documentation for full assessment

## 2018-10-24 ENCOUNTER — TELEPHONE (OUTPATIENT)
Dept: NEUROLOGY | Facility: CLINIC | Age: 68
End: 2018-10-24

## 2018-10-24 LAB
ANION GAP SERPL CALCULATED.3IONS-SCNC: 7 MMOL/L (ref 4–13)
BUN SERPL-MCNC: 12 MG/DL (ref 5–25)
CALCIUM SERPL-MCNC: 9.5 MG/DL (ref 8.3–10.1)
CHLORIDE SERPL-SCNC: 106 MMOL/L (ref 100–108)
CO2 SERPL-SCNC: 23 MMOL/L (ref 21–32)
CREAT SERPL-MCNC: 0.84 MG/DL (ref 0.6–1.3)
ERYTHROCYTE [DISTWIDTH] IN BLOOD BY AUTOMATED COUNT: 13.9 % (ref 11.6–15.1)
GFR SERPL CREATININE-BSD FRML MDRD: 90 ML/MIN/1.73SQ M
GLUCOSE SERPL-MCNC: 110 MG/DL (ref 65–140)
HCT VFR BLD AUTO: 37.2 % (ref 36.5–49.3)
HGB BLD-MCNC: 12.6 G/DL (ref 12–17)
MCH RBC QN AUTO: 31.2 PG (ref 26.8–34.3)
MCHC RBC AUTO-ENTMCNC: 33.9 G/DL (ref 31.4–37.4)
MCV RBC AUTO: 92 FL (ref 82–98)
PLATELET # BLD AUTO: 146 THOUSANDS/UL (ref 149–390)
PMV BLD AUTO: 10.4 FL (ref 8.9–12.7)
POTASSIUM SERPL-SCNC: 3.9 MMOL/L (ref 3.5–5.3)
RBC # BLD AUTO: 4.04 MILLION/UL (ref 3.88–5.62)
SODIUM SERPL-SCNC: 136 MMOL/L (ref 136–145)
WBC # BLD AUTO: 10.21 THOUSAND/UL (ref 4.31–10.16)

## 2018-10-24 PROCEDURE — 97112 NEUROMUSCULAR REEDUCATION: CPT

## 2018-10-24 PROCEDURE — 97116 GAIT TRAINING THERAPY: CPT

## 2018-10-24 PROCEDURE — 97535 SELF CARE MNGMENT TRAINING: CPT

## 2018-10-24 PROCEDURE — 97530 THERAPEUTIC ACTIVITIES: CPT

## 2018-10-24 PROCEDURE — 80048 BASIC METABOLIC PNL TOTAL CA: CPT | Performed by: ORTHOPAEDIC SURGERY

## 2018-10-24 PROCEDURE — 97110 THERAPEUTIC EXERCISES: CPT

## 2018-10-24 PROCEDURE — 85027 COMPLETE CBC AUTOMATED: CPT | Performed by: ORTHOPAEDIC SURGERY

## 2018-10-24 RX ORDER — PREGABALIN 100 MG/1
200 CAPSULE ORAL
Status: DISCONTINUED | OUTPATIENT
Start: 2018-10-24 | End: 2018-10-25 | Stop reason: HOSPADM

## 2018-10-24 RX ORDER — PREGABALIN 100 MG/1
100 CAPSULE ORAL
Status: DISCONTINUED | OUTPATIENT
Start: 2018-10-24 | End: 2018-10-25 | Stop reason: HOSPADM

## 2018-10-24 RX ADMIN — PREGABALIN 200 MG: 100 CAPSULE ORAL at 21:20

## 2018-10-24 RX ADMIN — DOCUSATE SODIUM 100 MG: 100 CAPSULE, LIQUID FILLED ORAL at 09:05

## 2018-10-24 RX ADMIN — PHENYTOIN 50 MG: 50 TABLET, CHEWABLE ORAL at 05:22

## 2018-10-24 RX ADMIN — PHENYTOIN 50 MG: 50 TABLET, CHEWABLE ORAL at 21:20

## 2018-10-24 RX ADMIN — TRAMADOL HYDROCHLORIDE 50 MG: 50 TABLET, COATED ORAL at 11:58

## 2018-10-24 RX ADMIN — ENOXAPARIN SODIUM 40 MG: 40 INJECTION SUBCUTANEOUS at 09:05

## 2018-10-24 RX ADMIN — PREGABALIN 100 MG: 100 CAPSULE ORAL at 14:49

## 2018-10-24 RX ADMIN — PANTOPRAZOLE SODIUM 40 MG: 40 TABLET, DELAYED RELEASE ORAL at 05:23

## 2018-10-24 RX ADMIN — PHENYTOIN 50 MG: 50 TABLET, CHEWABLE ORAL at 14:49

## 2018-10-24 RX ADMIN — TRAMADOL HYDROCHLORIDE 50 MG: 50 TABLET, COATED ORAL at 05:22

## 2018-10-24 RX ADMIN — MELATONIN 3 MG: at 21:20

## 2018-10-24 RX ADMIN — PREGABALIN 200 MG: 100 CAPSULE ORAL at 09:19

## 2018-10-24 NOTE — PROGRESS NOTES
Maverick Ramos 76 y o  male MRN: [id number]  Unit/Bed#: CW3 345-01      Subjective:  76 y o male post operative day 2 right total knee arthroplasty  Pt doing well  Pain controlled      Labs:    0  Lab Value Date/Time   HCT 37 2 10/24/2018 0507   HCT 39 9 10/23/2018 0456   HCT 46 3 09/26/2018 1408   HCT 43 6 06/24/2015 1107   HCT 44 8 07/29/2014 1048   HGB 12 6 10/24/2018 0507   HGB 13 3 10/23/2018 0456   HGB 15 4 09/26/2018 1408   HGB 14 7 06/24/2015 1107   HGB 15 0 07/29/2014 1048   INR 0 99 09/26/2018 1408   INR 0 94 07/29/2014 1048   WBC 10 21 (H) 10/24/2018 0507   WBC 9 19 10/23/2018 0456   WBC 6 33 09/26/2018 1408   WBC 5 76 06/24/2015 1107   WBC 4 81 07/29/2014 1048   ESR 2 06/24/2015 1107       Meds:    Current Facility-Administered Medications:     acetaminophen (TYLENOL) tablet 650 mg, 650 mg, Oral, Q6H PRN, Babak Banerjee MD    calcium carbonate (TUMS) chewable tablet 1,000 mg, 1,000 mg, Oral, Daily PRN, Babak Banerjee MD    docusate sodium (COLACE) capsule 100 mg, 100 mg, Oral, BID, Babak Banerjee MD, 100 mg at 10/23/18 1740    enoxaparin (LOVENOX) subcutaneous injection 40 mg, 40 mg, Subcutaneous, Daily, Babak Banerjee MD, 40 mg at 10/23/18 0851    HYDROmorphone (DILAUDID) injection 0 5 mg, 0 5 mg, Intravenous, Q3H PRN, Babak Banerjee MD, 0 5 mg at 10/23/18 0851    melatonin tablet 3 mg, 3 mg, Oral, HS, Babak Banerjee MD, 3 mg at 10/23/18 2128    methocarbamol (ROBAXIN) tablet 500 mg, 500 mg, Oral, Q6H PRN, Babak Banerjee MD    oxyCODONE (ROXICODONE) immediate release tablet 10 mg, 10 mg, Oral, Q4H PRN, Babak Banerjee MD, 10 mg at 10/23/18 1452    oxyCODONE (ROXICODONE) IR tablet 5 mg, 5 mg, Oral, Q4H PRN, Babak Banerjee MD    pantoprazole (PROTONIX) EC tablet 40 mg, 40 mg, Oral, Early Morning, Babak Banerjee MD, 40 mg at 10/24/18 0523    phenytoin (DILANTIN) chewable tablet 50 mg, 50 mg, Oral, Q8H [address], Babak Banerjee MD, 50 mg at 10/24/18 0522    sodium chloride 0 9 % infusion, 125 mL/hr, Intravenous, Continuous, Migel Valladares MD, Last Rate: 125 mL/hr at 10/23/18 0729, 125 mL/hr at 10/23/18 0729    traMADol (ULTRAM) tablet 50 mg, 50 mg, Oral, Q6H [address], Migel Valladares MD, 50 mg at 10/24/18 0522    Blood Culture:   No results found for: BLOODCX    Wound Culture:   No results found for: WOUNDCULT    Ins and Outs:  I/O last 24 hours: In: 1400 [P O :1400]  Out: 1630 [Urine:1500; Drains:130]          Physical:  Vitals:    10/23/18 2315   BP: 108/67   Pulse: 99   Resp: 18   Temp: 98 6 °F (37 °C)   SpO2: 98%     right lower extremity:  · Dressings C/D/I, changed this am  · Toes warm and well perfused    _*_*_*_*_*_*_*_*_*_*_*_*_*_*_*_*_*_*_*_*_*_*_*_*_*_*_*_*_*_*_*_*_*_*_*_*_*_*_*_*_*    Assessment: 68 y o male post operative day 2 right total knee arthroplasty   Doing well    Plan:  · Weight Bearing as tolerated  · Up and out of bed  · DVT prophylaxis  · Analgesics  · PT/OT  · Will continue to assess for acute blood loss anemia    Steve Gerardo MD

## 2018-10-24 NOTE — OCCUPATIONAL THERAPY NOTE
Occupational Therapy Treatment Note:     10/24/18 1145   Restrictions/Precautions   Weight Bearing Precautions Per Order Yes   RLE Weight Bearing Per Order WBAT   Other Precautions WBS; Cognitive;Pain; Fall Risk   Pain Assessment   Pain Score 4   Pain Location Knee   Pain Orientation Right   Hospital Pain Intervention(s) Repositioned   Response to Interventions unchanged   ADL   Where Assessed Chair   UB Dressing Assistance 5  Supervision/Setup   UB Dressing Deficit Increased time to complete;Verbal cueing   UB Dressing Comments Doff/don gown   LB Dressing Assistance 3  Moderate Assistance   LB Dressing Deficit Don/doff R sock; Don/doff L sock   LB Dressing Comments Pt doff socks SBA with incresed time, mod A to don socks and increased time  Pt with difficuity folowing directions this date, when pt was instructed to take off sock, pt attempts STS and reports, " im not sure what im doing"    Functional Standing Tolerance   Time ~ 2 mins   Activity static standing    Comments RW level, mod A   Transfers   Sit to Stand 4  Minimal assistance   Additional items Assist x 1; Increased time required;Armrests   Stand to Sit 4  Minimal assistance   Additional items Assist x 1; Increased time required   Additional Comments Attempted toilet transfer however pt with hard L lean into TANG and very unsteady attemping to weight shift, pt compelted staqtic standing then return to seated for rest before second attempt   Functional Mobility   Functional Mobility 4  Minimal assistance   Additional Comments Excessivly slow, house hold distance, unable to attempt toilet trasnfer however as planned due to pt limitations at this time      Additional items Rolling walker   Cognition   Overall Cognitive Status Impaired   Arousal/Participation Alert   Attention Attends with cues to redirect   Orientation Level Oriented X4   Memory Decreased short term memory   Following Commands Follows one step commands with increased time or repetition   Comments Difficulity following directs, unable to recall short term I e "I dont remeber what I had for breakfast" Pt initially reports difficlity stating pain level as he feels "off" today  Frequently looks to wife to ansfer questions for him   Activity Tolerance   Activity Tolerance Patient limited by pain   Medical Staff Made Aware NSG aware   Assessment   Assessment Pt was seen this date for OT tx session focsuing on LB dressing tasks, standing, funcitonal mobility and overall activity toelrance  Pt requires overall mod A for LB dressing tasks, SBA for UB dressign tasks, Kianna for STS and mod A in static stance when attempting to weight shift  Seated rest required  Min A for STS at rw, increased time in static stance before attemping functional mobility, funcitnoal mobility in room house hold distances excessivly slow, plan toaattempt toilet transfers, unable at this time  pt is limited by pain  Would benefit from contineud OT tx to improve overall functinal abilities      Plan   Treatment Interventions ADL retraining   Goal Expiration Date 10/26/18   Treatment Day 1   OT Frequency 3-5x/wk   Recommendation   OT Discharge Recommendation Home with family support   Equipment Recommended Tub seat with back;Raised toilet seat  (RW)       David DoctorKITTY

## 2018-10-24 NOTE — PLAN OF CARE
Problem: OCCUPATIONAL THERAPY ADULT  Goal: Performs self-care activities at highest level of function for planned discharge setting  See evaluation for individualized goals  Treatment Interventions: ADL retraining, Functional transfer training, Endurance training, Patient/family training, Compensatory technique education, Energy conservation  Equipment Recommended: Raised toilet seat, Tub seat with back       See flowsheet documentation for full assessment, interventions and recommendations  Outcome: Progressing  Limitation: Decreased ADL status, Decreased endurance, Decreased self-care trans, Decreased high-level ADLs, Decreased cognition  Prognosis: Good  Assessment: Pt was seen this date for OT tx session focsuing on LB dressing tasks, standing, funcitonal mobility and overall activity toelrance  Pt requires overall mod A for LB dressing tasks, SBA for UB dressign tasks, Kianna for STS and mod A in static stance when attempting to weight shift  Seated rest required  Min A for STS at rw, increased time in static stance before attemping functional mobility, funcitnoal mobility in room house hold distances excessivly slow, plan toaattempt toilet transfers, unable at this time  pt is limited by pain  Would benefit from contineud OT tx to improve overall functinal abilities        OT Discharge Recommendation: Home with family support  OT - OK to Discharge: KITTY Reza

## 2018-10-24 NOTE — CONSULTS
Consultation - Harinder Agarwal 76 y o  male MRN: [id number]    Unit/Bed#: CW3 345-01 Encounter: [id number]        History of Present Illness     HPI: Harinder Agarwal is a 76y o  year old male who presents with severe osteoarthritis of the right knee that failed outpatient conservative measures  He underwent elective replacement on 10/22 with orthopedics  He underwent the procedure itself without any difficulty  Overnight however, according to both nursing and the wife who is at bedside and is able to provide a good history the patient developed difficulty with word finding and mild confusion  He has a long standing h/o fairly intense trigeminal neuralgia in which he is followed by neurology  He has been through multiple procedures and finally was placed on dilantin 3x daily as well as high dose lyrica 200mg in the a m  100mg in the afternoon and another 200mg in the evening  The pt has not been on this medication since Sunday, until this a m  The wife also states that he is fairly sensitive to medications, interestingly he is able to take oxycodone, but is unable to take neurontin and she thinks tramadol as well, but she can't be completely sure  His neurologic exam has no focal deficits, he has some mild word finding but the wife feels like this has improved even since this a m     ROS:  Constitutional: generalized fatigue  HENT: Negative  Respiratory: Negative  Cardiovascular: Negative  Gastrointestinal: Negative  Musculoskeletal: mild R knee pain  Neurological: + word finding issues  Psychiatric/Behavioral: Negative          Historical Information   Past Medical History:   Diagnosis Date    Arthritis     Right knee    Chronic pain disorder     Trigeminal neuralgia pain     Ulcer of bile duct      Past Surgical History:   Procedure Laterality Date    APPENDECTOMY  1962    BRAIN SURGERY  2005    Gamma Knife for Trigeminal Neuralgia    ESOPHAGOGASTRODUODENOSCOPY      HERNIA REPAIR  1952    OTHER SURGICAL HISTORY      gamma knife RT in 2007 and 2010    AR STEREO TREAT TRIGEMINAL NERVE Right 3/6/2018    Procedure: RHIZOTOMY TRIGEMINAL NERVES (V2 & V3);   Surgeon: Irina Read MD;  Location: [facility];  Service: Neurosurgery    AR TOTAL KNEE ARTHROPLASTY Right 10/22/2018    Procedure: ARTHROPLASTY KNEE TOTAL;  Surgeon: Ryan Carter MD;  Location: [facility];  Service: Orthopedics    RHIZOTOMY W/ RADIOFREQUENCY ABLATION Right 08/08/2014    Stereotactic Ablation of Gasserian Ganglion Right sided trigeminal V2 radiofrequency rhizotomy x2     Social History   History   Alcohol Use No     History   Drug Use No     History   Smoking Status    Never Smoker   Smokeless Tobacco    Never Used     Family History   Problem Relation Age of Onset    Emphysema Mother     Colon cancer Father     Skin cancer Maternal Grandmother        Meds/Allergies   current meds:  Current Facility-Administered Medications   Medication Dose Route Frequency    acetaminophen (TYLENOL) tablet 650 mg  650 mg Oral Q6H PRN    calcium carbonate (TUMS) chewable tablet 1,000 mg  1,000 mg Oral Daily PRN    docusate sodium (COLACE) capsule 100 mg  100 mg Oral BID    enoxaparin (LOVENOX) subcutaneous injection 40 mg  40 mg Subcutaneous Daily    HYDROmorphone (DILAUDID) injection 0 5 mg  0 5 mg Intravenous Q3H PRN    melatonin tablet 3 mg  3 mg Oral HS    methocarbamol (ROBAXIN) tablet 500 mg  500 mg Oral Q6H PRN    oxyCODONE (ROXICODONE) immediate release tablet 10 mg  10 mg Oral Q4H PRN    oxyCODONE (ROXICODONE) IR tablet 5 mg  5 mg Oral Q4H PRN    pantoprazole (PROTONIX) EC tablet 40 mg  40 mg Oral Early Morning    phenytoin (DILANTIN) chewable tablet 50 mg  50 mg Oral Q8H [address]    pregabalin (LYRICA) capsule 100 mg  100 mg Oral After Lunch    pregabalin (LYRICA) capsule 200 mg  200 mg Oral After Breakfast    pregabalin (LYRICA) capsule 200 mg  200 mg Oral HS    sodium chloride 0 9 % infusion  125 mL/hr Intravenous Continuous       PTA meds:   Prescriptions Prior to Admission   Medication    Acetaminophen (TYLENOL PO)    ascorbic acid (VITAMIN C) 500 MG tablet    Calcium 250 MG CAPS    Cholecalciferol (VITAMIN D3) 1000 units CAPS    cyanocobalamin (VITAMIN B-12) 1,000 mcg tablet    ferrous sulfate 324 (65 Fe) mg    folic acid (FOLVITE) 1 mg tablet    Ketoprofen 10 % CREA    MELATONIN PO    phenytoin (DILANTIN) 50 mg tablet    pregabalin (LYRICA) 200 MG capsule    pyridoxine (VITAMIN B6) 100 mg tablet    TURMERIC CURCUMIN PO    enoxaparin (LOVENOX) 40 mg/0 4 mL    Multiple Vitamins-Minerals (PRESERVISION AREDS) TABS     Allergies   Allergen Reactions    Gabapentin Delerium    Oxcarbazepine Dizziness       Objective   Vitals: Blood pressure 149/77, pulse 91, temperature 99 1 °F (37 3 °C), temperature source Oral, resp  rate 18, height 5' 10" (1 778 m), weight 123 kg (272 lb), SpO2 97 %  Physical Exam   Constitutional: Pt is pleasant and interactive  HENT:  Normocephalic  EOM are normal  PERRLAt  Neck supple  Cardiovascular: S1 S2 regular; no MRG; 1/4 pp 1/4 RLE edema, trace LLE edema  Pulmonary: Breath sounds normal  No respiratory distress  Pt has no wheezes nor rales  Abdominal: Soft  Bowel sounds are normal  Pt exhibits no distension  There is no tenderness  There is no rebound and no guarding  Neurological: Pt is alert and oriented to person, place, and time, +mild word finding difficulty, no focal neurological deficits noted  Psychiatric: Pt has a normal mood and affect         Lab Results:   Results from last 7 days  Lab Units 10/24/18  0507 10/23/18  0456   WBC Thousand/uL 10 21* 9 19   HEMOGLOBIN g/dL 12 6 13 3   HEMATOCRIT % 37 2 39 9   PLATELETS Thousands/uL 146* 185       Results from last 7 days  Lab Units 10/24/18  0507 10/23/18  0456   SODIUM mmol/L 136 138   POTASSIUM mmol/L 3 9 4 4   CHLORIDE mmol/L 106 107   CO2 mmol/L 23 25   BUN mg/dL 12 15   CREATININE mg/dL 0  84 0 86   CALCIUM mg/dL 9 5 9 1               Glucose (mg/dL)   Date Value   12/19/2015 78   06/24/2015 93   07/29/2014 104       Labs reviewed    Imaging: reviewed  EKG, Pathology, and Other Studies: I have personally reviewed pertinent reports  VTE Prophylaxis: Enoxaparin (Lovenox)    Code Status: Level 1 - Full Code   Advance Directive and Living Will:      Power of :    POLST:      Assessment/Plan     # R TKR:  Post op management per orthopedics; d/c tramadol; monitor cbc/bmp and treat accordingly; cont lovenox for dvt prophylaxis    # Mild aphasia:  Likely 2/2 to medications; will d/c tramadol; lyrica was restarted at home dose by orthopedics; no need for imaging at this point; I would recommend limiting sedating medications if at all possible  Monitor neurological status closely, if any other deficits occur then imaging would be recommended  Would recommend the patient stay in patient for another day to monitor    # Trigeminal Neuralgia:  Cont home dose of Lyrica 200mg in a m  100mg in afternoon and 200mg in pm along with dilantin 3x daily; f/u neurology on d/c    # Anemia:  Post op; monitor cbc    Plan of care was relayed to Dr Selma Reyes from orthopedics    Counseling / Coordination of Care  Total floor / unit time spent today 45 minutes  Greater than 50% of total time was spent with the patient and / or family counseling and / or coordination of care        Jarome Mention

## 2018-10-24 NOTE — PLAN OF CARE
Problem: PHYSICAL THERAPY ADULT  Goal: Performs mobility at highest level of function for planned discharge setting  See evaluation for individualized goals  Treatment/Interventions: Functional transfer training, LE strengthening/ROM, Elevations, Therapeutic exercise, Endurance training, Patient/family training, Equipment eval/education, Bed mobility, Gait training, Compensatory technique education, Spoke to nursing  Equipment Recommended: Justin Sheldon (bariatric vs wide RW and BSC)       See flowsheet documentation for full assessment, interventions and recommendations  Outcome: Progressing  Prognosis: Good  Problem List: Decreased strength, Decreased range of motion, Decreased endurance, Impaired balance, Decreased mobility, Decreased coordination, Impaired judgement, Decreased safety awareness, Pain, Orthopedic restrictions, Decreased skin integrity  Assessment: The pt  was able to progress his ambulatory distance today, but he continues to require extensive time  He was able to progress to supervision with his transfers and gait, but he required forty seconds in order to complete the transfer  He would periodically take sequential steps of right-left-right, but mostly he would hesitate with each step and take 10-15 seconds in-between each step  Anticipate that he will continue to progress and be able to return home at discharge  Barriers to Discharge: Inaccessible home environment  Barriers to Discharge Comments: improved mobility and JEYSON  Recommendation: Home with family support, Outpatient PT (Pending progress  )     PT - OK to Discharge: (S) No    See flowsheet documentation for full assessment

## 2018-10-24 NOTE — TELEPHONE ENCOUNTER
Pt's wife called in very upset stating that he he currently is in the hospital for a knee surgery he had on Monday, states that he had a reaction to the medications and is "out of it"  She is concerned because they are no giving him his Lyrica that he has been on for several years and wants to know if this is why he is "out of it"  States that she has tried to mention this to the nurse taking care of him but "she doesn't care" because they are only treating him for his pain  I made her aware that she needs to contact the attending physician and bring this concern up to that doctor  She then became very upset/tearful/short tempered on the phone stating "you want me to speak with a doctor I never see? ?" and "you aren't listening to me!"  I again made her aware that because he is currently inpatient she needs to address her concerns with the physician taking care of her, but that I would relay her concern  She was very upset with me and hung up the phone

## 2018-10-24 NOTE — PHYSICAL THERAPY NOTE
Physical Therapy Progress Note     10/24/18 1200   Pain Assessment   Pain Assessment 0-10   Pain Score 4   Pain Type Surgical pain   Pain Location Knee   Pain Orientation Right   Hospital Pain Intervention(s) Cold applied;Repositioned; Ambulation/increased activity; Emotional support   Response to Interventions Tolerated  Restrictions/Precautions   RLE Weight Bearing Per Order WBAT   LLE Weight Bearing Per Order WBAT   Other Precautions Cognitive; Chair Alarm;WBS;Fall Risk;Pain  (Alarm active post session )   Subjective   Subjective The pt  states that he is not having a good morning  Transfers   Sit to Stand 4  Minimal assistance   Additional items Assist x 1; Increased time required   Stand to Sit 4  Minimal assistance   Additional items Assist x 1; Increased time required;Verbal cues   Ambulation/Elevation   Gait pattern Excessively slow; Step to;Short stride; Inconsistent janie; Shuffling;Decreased R stance;Decreased foot clearance; Forward Flexion; Wide JAELYN; Antalgic   Gait Assistance 4  Minimal assist   Additional items Assist x 1;Verbal cues; Tactile cues   Assistive Device Rolling walker   Distance 30 feet  Stair Management Assistance Not tested   Balance   Static Sitting Good   Dynamic Sitting Fair   Static Standing Fair -   Ambulatory Poor +   Activity Tolerance   Activity Tolerance Patient limited by pain; Patient limited by fatigue;Patient tolerated treatment well   Nurse [address], RN  Assessment   Prognosis Good   Problem List Decreased strength;Decreased range of motion;Decreased endurance; Impaired balance;Decreased mobility; Decreased coordination; Impaired judgement;Decreased safety awareness;Pain;Orthopedic restrictions;Decreased skin integrity   Assessment The pt  required extensive time to ambulate today as he required 7 minutes to take his first two steps  He also required extended time in order to ambulate due to a very slow janie   He had a disjointed and interrupted gait with extended pause in-between each step as well  He fatigued easily with gait, and after thirty minutes he was unable to ambulate any further  Will return later to progress his functional mobility  Barriers to Discharge Inaccessible home environment   Goals   Patient Goals To feel better  STG Expiration Date 10/29/18   Short Term Goal #1 Pt will be mod(I) with rolling R and L for ease with OOB transfer  Pt will be mod(I) with supine<->sit transfers  Pt will be mod(I) with sit<->stand to promote (I) with toileting  Pt will be mod(I) with ambualtion of household distances (appx 50') to reduce caregiver burden  Pt will be S with community distance ambulaiton using least restrictive AD to increase tolerance to activity  Pt will be S ascending and descending >/= 8 steps to gain access into home  Pt will particiapte in HEP consisitng of balance, strength, ROM and coordinaiton tasks to increase activitiy, improve (I) and decrease pain  by Marilou Hammans, PT at 10/22/18 1730   Treatment Day 3   Plan   Treatment/Interventions Functional transfer training;LE strengthening/ROM; Therapeutic exercise; Endurance training;Elevations;Cognitive reorientation;Patient/family training;Bed mobility;Gait training   Progress Progressing toward goals   PT Frequency Twice a day;7x/wk   Recommendation   Recommendation Home with family support; Outpatient PT   Equipment Recommended Asya Hanson   PT - OK to Discharge No     Keith Guzman, PTA

## 2018-10-24 NOTE — PROGRESS NOTES
This AM patient appears more confused than normal   Is able to state name and place but difficulty with word finding  Neuro exam intact, smile symmetrical, tongue midline, equal strength bilaterally  Jimena MD Abiodun with ortho made aware  Will come to assess patient

## 2018-10-24 NOTE — PHYSICAL THERAPY NOTE
Physical Therapy Progress Note     10/24/18 8155   Pain Assessment   Pain Assessment 0-10   Pain Score 8   Pain Type Surgical pain   Pain Location Knee   Pain Orientation Right   Hospital Pain Intervention(s) Cold applied;Repositioned; Ambulation/increased activity; Emotional support   Response to Interventions Tolerated  Restrictions/Precautions   RLE Weight Bearing Per Order WBAT   LLE Weight Bearing Per Order WBAT   Other Precautions Pain; Fall Risk; Chair Alarm  (Alarm active post session )   Subjective   Subjective The pt  states that he continues to have pain  He is agreeable to work with therapy  Transfers   Sit to Stand 5  Supervision   Additional items Increased time required  (40 seconds to complete )   Stand to Sit 5  Supervision   Additional items Verbal cues; Increased time required   Ambulation/Elevation   Gait pattern Excessively slow; Step to;Short stride; Inconsistent janie;Decreased foot clearance; Antalgic;Decreased R stance   Gait Assistance 5  Supervision   Additional items Verbal cues; Tactile cues   Assistive Device Rolling walker   Distance 45 feet  Balance   Static Sitting Good   Dynamic Sitting Fair +   Static Standing Fair  (14 minutes static standing )   Ambulatory Fair -   Activity Tolerance   Activity Tolerance Patient limited by fatigue;Patient limited by pain; Patient tolerated treatment well   Nurse Made Via Eric Pitt 19, RN  Exercises   TKR Sitting;Right;AAROM;10 reps   Assessment   Prognosis Good   Problem List Decreased strength;Decreased range of motion;Decreased endurance; Impaired balance;Decreased mobility; Decreased coordination; Impaired judgement;Decreased safety awareness;Pain;Orthopedic restrictions;Decreased skin integrity   Assessment The pt  was able to progress his ambulatory distance today, but he continues to require extensive time  He was able to progress to supervision with his transfers and gait, but he required forty seconds in order to complete the transfer   He would periodically take sequential steps of right-left-right, but mostly he would hesitate with each step and take 10-15 seconds in-between each step  Anticipate that he will continue to progress and be able to return home at discharge  Barriers to Discharge Inaccessible home environment   Goals   Patient Goals To have less pain  STG Expiration Date 10/29/18   Short Term Goal #1 Pt will be mod(I) with rolling R and L for ease with OOB transfer  Pt will be mod(I) with supine<->sit transfers  Pt will be mod(I) with sit<->stand to promote (I) with toileting  Pt will be mod(I) with ambualtion of household distances (appx 50') to reduce caregiver burden  Pt will be S with community distance ambulaiton using least restrictive AD to increase tolerance to activity  Pt will be S ascending and descending >/= 8 steps to gain access into home  Pt will particiapte in HEP consisitng of balance, strength, ROM and coordinaiton tasks to increase activitiy, improve (I) and decrease pain  by Cesia Mcmillan PT at 10/22/18 1730   Treatment Day 4   Plan   Treatment/Interventions Functional transfer training;LE strengthening/ROM; Therapeutic exercise; Endurance training;Patient/family training;Bed mobility;Gait training;Elevations   Progress Progressing toward goals   PT Frequency 7x/wk; Twice a day   Recommendation   Recommendation Home with family support; Outpatient PT  (Pending progress )   Equipment Recommended Walker   PT - OK to Discharge No     Wendy Ybarra, PTA

## 2018-10-24 NOTE — PLAN OF CARE
Problem: PHYSICAL THERAPY ADULT  Goal: Performs mobility at highest level of function for planned discharge setting  See evaluation for individualized goals  Treatment/Interventions: Functional transfer training, LE strengthening/ROM, Elevations, Therapeutic exercise, Endurance training, Patient/family training, Equipment eval/education, Bed mobility, Gait training, Compensatory technique education, Spoke to nursing  Equipment Recommended: Alysa Burnett (bariatric vs wide RW and BSC)       See flowsheet documentation for full assessment, interventions and recommendations  Outcome: Progressing  Prognosis: Good  Problem List: Decreased strength, Decreased range of motion, Decreased endurance, Impaired balance, Decreased mobility, Decreased coordination, Impaired judgement, Decreased safety awareness, Pain, Orthopedic restrictions, Decreased skin integrity  Assessment: The pt  required extensive time to ambulate today as he required 7 minutes to take his first two steps  He also required extended time in order to ambulate due to a very slow janie  He had a disjointed and interrupted gait with extended pause in-between each step as well  He fatigued easily with gait, and after thirty minutes he was unable to ambulate any further  Will return later to progress his functional mobility  Barriers to Discharge: Inaccessible home environment  Barriers to Discharge Comments: improved mobility and JEYSON  Recommendation: Home with family support, Outpatient PT     PT - OK to Discharge: (S) No    See flowsheet documentation for full assessment

## 2018-10-24 NOTE — TELEPHONE ENCOUNTER
Spoke with wife regarding these issues  Did relay that I cannot do anything because he is an inpatient  She needs to speak with his nurse, the charge nurse and the attending physician  I did discuss that Lyrica cannot just be stopped  Wife will try to get this resolved in the hospital   She was appreciative of the phone call

## 2018-10-25 ENCOUNTER — APPOINTMENT (OUTPATIENT)
Dept: PHYSICAL THERAPY | Facility: REHABILITATION | Age: 68
End: 2018-10-25
Payer: MEDICARE

## 2018-10-25 VITALS
OXYGEN SATURATION: 98 % | HEIGHT: 70 IN | BODY MASS INDEX: 38.94 KG/M2 | HEART RATE: 76 BPM | RESPIRATION RATE: 16 BRPM | TEMPERATURE: 98.1 F | DIASTOLIC BLOOD PRESSURE: 59 MMHG | SYSTOLIC BLOOD PRESSURE: 120 MMHG | WEIGHT: 272 LBS

## 2018-10-25 LAB
ANION GAP SERPL CALCULATED.3IONS-SCNC: 6 MMOL/L (ref 4–13)
BUN SERPL-MCNC: 13 MG/DL (ref 5–25)
CALCIUM SERPL-MCNC: 9.7 MG/DL (ref 8.3–10.1)
CHLORIDE SERPL-SCNC: 103 MMOL/L (ref 100–108)
CO2 SERPL-SCNC: 26 MMOL/L (ref 21–32)
CREAT SERPL-MCNC: 1.1 MG/DL (ref 0.6–1.3)
ERYTHROCYTE [DISTWIDTH] IN BLOOD BY AUTOMATED COUNT: 14 % (ref 11.6–15.1)
GFR SERPL CREATININE-BSD FRML MDRD: 69 ML/MIN/1.73SQ M
GLUCOSE SERPL-MCNC: 107 MG/DL (ref 65–140)
HCT VFR BLD AUTO: 38.4 % (ref 36.5–49.3)
HGB BLD-MCNC: 12.9 G/DL (ref 12–17)
MCH RBC QN AUTO: 31.6 PG (ref 26.8–34.3)
MCHC RBC AUTO-ENTMCNC: 33.6 G/DL (ref 31.4–37.4)
MCV RBC AUTO: 94 FL (ref 82–98)
PLATELET # BLD AUTO: 146 THOUSANDS/UL (ref 149–390)
PMV BLD AUTO: 10.2 FL (ref 8.9–12.7)
POTASSIUM SERPL-SCNC: 4.3 MMOL/L (ref 3.5–5.3)
RBC # BLD AUTO: 4.08 MILLION/UL (ref 3.88–5.62)
SODIUM SERPL-SCNC: 135 MMOL/L (ref 136–145)
WBC # BLD AUTO: 9.02 THOUSAND/UL (ref 4.31–10.16)

## 2018-10-25 PROCEDURE — 97535 SELF CARE MNGMENT TRAINING: CPT

## 2018-10-25 PROCEDURE — 97116 GAIT TRAINING THERAPY: CPT

## 2018-10-25 PROCEDURE — 97530 THERAPEUTIC ACTIVITIES: CPT

## 2018-10-25 PROCEDURE — 80048 BASIC METABOLIC PNL TOTAL CA: CPT | Performed by: ORTHOPAEDIC SURGERY

## 2018-10-25 PROCEDURE — 85027 COMPLETE CBC AUTOMATED: CPT | Performed by: ORTHOPAEDIC SURGERY

## 2018-10-25 PROCEDURE — 99024 POSTOP FOLLOW-UP VISIT: CPT | Performed by: ORTHOPAEDIC SURGERY

## 2018-10-25 RX ADMIN — ENOXAPARIN SODIUM 40 MG: 40 INJECTION SUBCUTANEOUS at 10:30

## 2018-10-25 RX ADMIN — PANTOPRAZOLE SODIUM 40 MG: 40 TABLET, DELAYED RELEASE ORAL at 05:23

## 2018-10-25 RX ADMIN — DOCUSATE SODIUM 100 MG: 100 CAPSULE, LIQUID FILLED ORAL at 10:30

## 2018-10-25 RX ADMIN — PREGABALIN 200 MG: 100 CAPSULE ORAL at 10:27

## 2018-10-25 RX ADMIN — PHENYTOIN 50 MG: 50 TABLET, CHEWABLE ORAL at 05:23

## 2018-10-25 RX ADMIN — ACETAMINOPHEN 650 MG: 325 TABLET, FILM COATED ORAL at 05:23

## 2018-10-25 NOTE — PLAN OF CARE
Problem: OCCUPATIONAL THERAPY ADULT  Goal: Performs self-care activities at highest level of function for planned discharge setting  See evaluation for individualized goals  Treatment Interventions: ADL retraining, Functional transfer training, Endurance training, Patient/family training, Compensatory technique education, Energy conservation  Equipment Recommended: Raised toilet seat, Tub seat with back       See flowsheet documentation for full assessment, interventions and recommendations  Outcome: Progressing  Limitation: Decreased ADL status, Decreased endurance, Decreased self-care trans, Decreased high-level ADLs, Decreased cognition  Prognosis: Good  Assessment: Pt was seen this date for OT tx session focusing on bed mobility, transfers, toileting, self care tasks and overall activity tolerance  Pt requires min A for supine to sit EOB for LE managment  SBA for STS and throguhout ambulating transfer to toilet  Mod A for STS from toilet 2* low surface  Compelte hygiene mod I in stance  Toelrates approx 7 min in stance at sink RW level  Pt seated in chair to finish ADL  Set up A for washing in seated  SBA for UB dressing and LB dressing  Pt requires excessive time to complete all self care tasks  Ice applied to R knee at end of session chair alarm on and call bell in reach        OT Discharge Recommendation: Home with family support  OT - OK to Discharge: No  KITTY Conti

## 2018-10-25 NOTE — OCCUPATIONAL THERAPY NOTE
Occupational Therapy Treatment Note:   10/25/18 0940   Restrictions/Precautions   Weight Bearing Precautions Per Order Yes   RLE Weight Bearing Per Order WBAT   Other Precautions Pain; Fall Risk;WBS;Chair Alarm;Cognitive   Pain Assessment   Pain Score 2   Pain Type Surgical pain   Pain Location Knee   Pain Orientation Right   Hospital Pain Intervention(s) Repositioned;Cold applied   Response to Interventions tolerated   ADL   Where Assessed Standing at sink  (chair)   Grooming Assistance 5  Supervision/Setup   Grooming Deficit Standing with assistive device; Wash/dry hands; Wash/dry face; Teeth care   UB Bathing Assistance 5  Supervision/Setup   UB Bathing Deficit Increased time to complete   UB Bathing Comments seated in chair   LB Bathing Assistance 5  Supervision/Setup   LB Bathing Deficit Increased time to complete;Steadying; Buttocks; Perineal area   LB Bathing Comments close SBA   UB Dressing Assistance 5  Supervision/Setup   UB Dressing Deficit Thread RUE; Thread LUE   UB Dressing Comments doff/don gown   LB Dressing Assistance 5  Supervision/Setup   LB Dressing Deficit Thread RLE into underwear; Thread LLE into underwear;Pull up over hips   LB Dressing Comments close SBA in stance at [employer] acute hospital   Toileting Assistance  5  Supervision/Setup   Toileting Deficit Grab bar use; Increased time to complete;Supervison/safety; Perineal hygiene   Toileting Comments standard toilet, would benefit from raised commode   Functional Standing Tolerance   Time ~ 7 mins   Activity self care tasks   Comments instance at sink RW level close SBA   Bed Mobility   Supine to Sit 4  Minimal assistance   Additional items Assist x 1;LE management   Additional Comments pt in chiar at end of session   Transfers   Sit to Stand 5  Supervision   Additional items Increased time required   Stand to Sit 5  Supervision   Additional items Increased time required   Functional Mobility   Functional Mobility 5  Supervision   Additional Comments house hold distances, excessily slow   Additional items Rolling walker   Toilet Transfers   Toilet Transfer From Rolling walker   Toilet Transfer Type To and from   Toilet Transfer to [employer] Ambulating   Toilet Transfers Moderate assistance   Toilet Transfers Comments SBA throguhout however requires mod A for STS from toilet with grab bar use  would benefit from raised commode   Cognition   Overall Cognitive Status Impaired   Arousal/Participation Alert; Cooperative   Attention Attends with cues to redirect   Orientation Level Oriented X4   Memory Decreased short term memory   Following Commands Follows multistep commands without difficulty   Comments Noted improvement in attention and direction following this date   Activity Tolerance   Activity Tolerance Patient tolerated treatment well   Medical Staff Made Aware NSG aware   Assessment   Assessment Pt was seen this date for OT tx session focusing on bed mobility, transfers, toileting, self care tasks and overall activity tolerance  Pt requires min A for supine to sit EOB for LE managment  SBA for STS and throguhout ambulating transfer to toilet  Mod A for STS from toilet 2* low surface  Compelte hygiene mod I in stance  Toelrates approx 7 min in stance at sink RW level  Pt seated in chair to finish ADL  Set up A for washing in seated  SBA for UB dressing and LB dressing  Pt requires excessive time to complete all self care tasks  Ice applied to R knee at end of session chair alarm on and call bell in reach      Plan   Treatment Interventions ADL retraining   Goal Expiration Date 10/26/18   Treatment Day 2   OT Frequency 3-5x/wk   Recommendation   OT Discharge Recommendation Home with family support   Equipment Recommended Bedside commode;Tub seat with back  (RW)       [employer], TANG

## 2018-10-25 NOTE — DISCHARGE SUMMARY
ORTHOPEDICS DISCHARGE SUMMARY  Kat Maier 76 y o  male MRN: [id number]  Unit/Bed#: CW3 345-01    Attending Physician: Forrest Moore    Admitting diagnosis: Primary osteoarthritis of right knee [M17 11]    Discharge diagnosis: Primary osteoarthritis of right knee [M17 11]    Date of admission: 10/22/2018    Date of discharge: 10/25/18         Procedure: R Total Knee Arthroplasty    HPI:  This is a 76y o  year old male that presented to the office with signs and symptoms of right knee osteoarthritis  They tried and failed conservative treatment measures and wished to proceed with surgical intervention  The risks, benefits, and complications of the procedure were discussed with the patient and informed consent was obtained  Hospital Course: The patient was admitted to the hospital on 10/22/2018 and underwent an uncomplicated right total knee arthroplasty  They were transferred to the floor after a brief stay in the post-anesthesia care unit  Their pain was well managed with IV and oral pain medications  They began therapy on post operative day #1  Lovenox was also started for DVT prophylaxis post operative day #1  Hemovac drain was removed on POD1  On discharge date pt was cleared by PT and the medicine team and determined to be safe for discharge  Daily discussion was had with the patient, nursing staff, orthopaedic team, and family members if present  All questions were answered to the patients satisifaction  0  Lab Value Date/Time   HGB 12 9 10/25/2018 0450   HGB 12 6 10/24/2018 0507   HGB 13 3 10/23/2018 0456   HGB 15 4 09/26/2018 1408   HGB 13 3 02/15/2018 1029   HGB 14 7 06/24/2015 1107   HGB 15 0 07/29/2014 1048       Greater than 2 gram drop which qualifies for diagnosis of acute blood loss anemia  Vital signs remained stable and pt was resuscitated with IVF as needed     Discharge Instructions: The patient was discharged weight bearing as tolerated to the right lower extremity   Lovenox will be continued for 28 days  Continue PT/OT  Take pain medications as instructed  Discharge Medications: For the complete list of discharge medications, please refer to the patient's medication reconciliation

## 2018-10-25 NOTE — SOCIAL WORK
CM met with patient to confirm the RW was delivered  Patient also reports to have changed his mind regarding a BSC and would like to be discharged with one  Referral updated with [facility] order  CM to follow as needed

## 2018-10-25 NOTE — PLAN OF CARE
Problem: PHYSICAL THERAPY ADULT  Goal: Performs mobility at highest level of function for planned discharge setting  See evaluation for individualized goals  Treatment/Interventions: Functional transfer training, LE strengthening/ROM, Elevations, Therapeutic exercise, Endurance training, Patient/family training, Equipment eval/education, Bed mobility, Gait training, Compensatory technique education, Spoke to nursing  Equipment Recommended: Tommy Thomas (bariatric vs wide RW and BSC)       See flowsheet documentation for full assessment, interventions and recommendations  Outcome: Adequate for Discharge  Prognosis: Good  Problem List: Decreased strength, Decreased range of motion, Decreased endurance, Impaired balance, Decreased mobility, Decreased coordination, Impaired judgement, Decreased safety awareness, Pain, Orthopedic restrictions, Decreased skin integrity  Assessment: The pt  has much improved mobility today as his janie is significantly improved as well as his balance  He was able to ambulate community distances without a rest as well as complete stairs  He was more alert and responsive throughout the treatment as well  The pt  has demonstrated the necessary mobility in order to safely return home at discharge  Reviewed general home safety with the pt  Barriers to Discharge: None  Barriers to Discharge Comments: improved mobility and JEYSON  Recommendation: Home with family support, Outpatient PT     PT - OK to Discharge: Yes    See flowsheet documentation for full assessment

## 2018-10-25 NOTE — PROGRESS NOTES
Internal Medicine Progress Note  Patient: Shavonne Maloney  Age/sex: 76 y o  male  Medical Record #: [id number]      ASSESSMENT/PLAN:  Shavonne Maloney is seen and examined and mangement for following issues:    # R TKR:  Post op management per orthopedics; off tramadol; monitor cbc/bmp and treat accordingly; cont lovenox for dvt prophylaxis     # Mild encephalopathy:   completely resolved off of pain medications  Patient states he feels monitor percent improved in terms of cognitive follow get AS  At this point patient is at his baseline would be medically stable for discharge from our standpoint  # Trigeminal Neuralgia:  Cont home dose of Lyrica 200mg in a m  100mg in afternoon and 200mg in pm along with dilantin 3x daily; f/u neurology as scheduled     # Mild hyponatremia:  Likely 2/2 to poor appetite, improved this a m  Medically clear for discharge      Subjective: Patient seen and examined   No issues overnight, slept well, pain manageable    ROS:   GI: denies abdominal pain, change bowel habits or reflux symptoms  Neuro: No new neurologic changes; word finding improved  Respiratory: No Cough, SOB  Cardiovascular: No CP, palpitations   Musculoskeletal:  Mild R knee pain    Scheduled Meds:    Current Facility-Administered Medications:  acetaminophen 650 mg Oral Q6H PRN Estrellita De Jesus MD    calcium carbonate 1,000 mg Oral Daily PRN Estrellita De Jesus MD    docusate sodium 100 mg Oral BID Estrellita De Jesus MD    enoxaparin 40 mg Subcutaneous Daily Estrellita De Jesus MD    HYDROmorphone 0 5 mg Intravenous Q3H PRN Estrellita De Jesus MD    melatonin 3 mg Oral HS Estrellita De Jesus MD    methocarbamol 500 mg Oral Q6H PRN Estrellita De Jesus MD    oxyCODONE 10 mg Oral Q4H PRN Estrellita De Jesus MD    oxyCODONE 5 mg Oral Q4H PRN Estrellita De Jesus MD    pantoprazole 40 mg Oral Early Morning Estrellita De Jesus MD    phenytoin 50 mg Oral Q8H Cassandra Herrera MD    pregabalin 100 mg Oral After Tracie Dejesus MD    pregabalin 200 mg Oral After Edward Crowley MD    pregabalin 200 mg Oral HS Nannette Shirley MD    sodium chloride 125 mL/hr Intravenous Continuous Courtney Renae MD Last Rate: 125 mL/hr (10/23/18 0729)       Labs:       Results from last 7 days  Lab Units 10/25/18  0450 10/24/18  0507   WBC Thousand/uL 9 02 10 21*   HEMOGLOBIN g/dL 12 9 12 6   HEMATOCRIT % 38 4 37 2   PLATELETS Thousands/uL 146* 146*       Results from last 7 days  Lab Units 10/25/18  0450 10/24/18  0507   SODIUM mmol/L 135* 136   POTASSIUM mmol/L 4 3 3 9   CHLORIDE mmol/L 103 106   CO2 mmol/L 26 23   BUN mg/dL 13 12   CREATININE mg/dL 1 10 0 84   CALCIUM mg/dL 9 7 9 5                Glucose (mg/dL)   Date Value   12/19/2015 78   06/24/2015 93   07/29/2014 104       Labs reviewed    Physical Examination:  Vitals:   Vitals:    10/24/18 1535 10/24/18 2015 10/24/18 2311 10/25/18 0826   BP: 148/65  109/58 120/59   BP Location: Right arm  Left arm Left arm   Pulse: 92  86 76   Resp: 18  18 16   Temp: 98 1 °F (36 7 °C)  99 1 °F (37 3 °C) 98 1 °F (36 7 °C)   TempSrc: Oral  Oral Oral   SpO2: 97% 95% 96% 97%   Weight:       Height:           GEN: NAD  RESP: CTAB, no R/R/W  CV: +S1 S2, regular rate, no rubs  ABD: soft, NT, ND, normal BS   : voiding  EXT: DP pulses intact b/l; dressing in place  Neuro: AAOx3    [x ] Total time spent: 30 Mins and greater than 50% of this time was spent counseling/coordinating care

## 2018-10-25 NOTE — NURSING NOTE
Discharge instructions and scripts reviewed with patient and wife by ortho md    Patient discharged via wheelchair by ortho md

## 2018-10-25 NOTE — PROGRESS NOTES
Steve Ramos 76 y o  male MRN: [id number]  Unit/Bed#: CW3 345-01      Subjective:  76 y o male post operative day #3 right total knee arthroplasty  Pt doing well  Pain controlled  Denies chest pain shortness of breath numbness or tingling calf pain      Labs:    0  Lab Value Date/Time   HCT 38 4 10/25/2018 0450   HCT 37 2 10/24/2018 0507   HCT 39 9 10/23/2018 0456   HCT 43 6 06/24/2015 1107   HCT 44 8 07/29/2014 1048   HGB 12 9 10/25/2018 0450   HGB 12 6 10/24/2018 0507   HGB 13 3 10/23/2018 0456   HGB 14 7 06/24/2015 1107   HGB 15 0 07/29/2014 1048   INR 0 99 09/26/2018 1408   INR 0 94 07/29/2014 1048   WBC 9 02 10/25/2018 0450   WBC 10 21 (H) 10/24/2018 0507   WBC 9 19 10/23/2018 0456   WBC 5 76 06/24/2015 1107   WBC 4 81 07/29/2014 1048   ESR 2 06/24/2015 1107       Meds:    Current Facility-Administered Medications:     acetaminophen (TYLENOL) tablet 650 mg, 650 mg, Oral, Q6H PRN, Migel Valladares MD, 650 mg at 10/25/18 0630    calcium carbonate (TUMS) chewable tablet 1,000 mg, 1,000 mg, Oral, Daily PRN, Migel Valladares MD    docusate sodium (COLACE) capsule 100 mg, 100 mg, Oral, BID, Migel Valladares MD, 100 mg at 10/24/18 0905    enoxaparin (LOVENOX) subcutaneous injection 40 mg, 40 mg, Subcutaneous, Daily, Migel Valladares MD, 40 mg at 10/24/18 0905    HYDROmorphone (DILAUDID) injection 0 5 mg, 0 5 mg, Intravenous, Q3H PRN, Migel Valladares MD, 0 5 mg at 10/23/18 0851    melatonin tablet 3 mg, 3 mg, Oral, HS, Migel Valladares MD, 3 mg at 10/24/18 2120    methocarbamol (ROBAXIN) tablet 500 mg, 500 mg, Oral, Q6H PRN, Migel Valladares MD    oxyCODONE (ROXICODONE) immediate release tablet 10 mg, 10 mg, Oral, Q4H PRN, Migel Valladares MD, 10 mg at 10/23/18 1452    oxyCODONE (ROXICODONE) IR tablet 5 mg, 5 mg, Oral, Q4H PRN, Migel Valladares MD    pantoprazole (PROTONIX) EC tablet 40 mg, 40 mg, Oral, Early Morning, Migel Valladares MD, 40 mg at 10/25/18 0523    phenytoin (DILANTIN) chewable tablet 50 mg, 50 mg, Oral, Q8H [facility], Seng Lyn MD, 50 mg at 10/25/18 0523    pregabalin (LYRICA) capsule 100 mg, 100 mg, Oral, After Lunch, Jag Louise MD, 100 mg at 10/24/18 1449    pregabalin (LYRICA) capsule 200 mg, 200 mg, Oral, After Breakfast, Jag Louise MD, 200 mg at 10/24/18 0919    pregabalin (LYRICA) capsule 200 mg, 200 mg, Oral, HS, Jag Louise MD, 200 mg at 10/24/18 2120    sodium chloride 0 9 % infusion, 125 mL/hr, Intravenous, Continuous, Seng Lyn MD, Last Rate: 125 mL/hr at 10/23/18 0729, 125 mL/hr at 10/23/18 0729    Blood Culture:   No results found for: BLOODCX    Wound Culture:   No results found for: WOUNDCULT    Ins and Outs:  I/O last 24 hours: In: 1320 [P O :1320]  Out: 900 [Urine:900]          Physical:  Vitals:    10/24/18 2311   BP: 109/58   Pulse: 86   Resp: 18   Temp: 99 1 °F (37 3 °C)   SpO2: 96%     right lower extremity:  · Incision C/D/I, no erythema  · Toes warm and well perfused  · Ankle dorsi plantar flexion calf nontender palpation bilaterally    _*_*_*_*_*_*_*_*_*_*_*_*_*_*_*_*_*_*_*_*_*_*_*_*_*_*_*_*_*_*_*_*_*_*_*_*_*_*_*_*_*    Assessment: 68 y o male post operative day #3 right total knee arthroplasty   Doing well    Plan:  · Weight Bearing as tolerated  · Up and out of bed  · DVT prophylaxis  · Analgesics  · PT/OT  · Will continue to assess for acute blood loss anemia    Jody Maya PA-C

## 2018-10-25 NOTE — PHYSICAL THERAPY NOTE
Physical Therapy Progress Note     10/25/18 1225   Pain Assessment   Pain Assessment 0-10   Pain Score 4   Pain Type Surgical pain   Pain Location Knee   Pain Orientation Right   Hospital Pain Intervention(s) Repositioned; Ambulation/increased activity; Emotional support   Response to Interventions Good  Restrictions/Precautions   RLE Weight Bearing Per Order WBAT   LLE Weight Bearing Per Order WBAT   Other Precautions Pain   Subjective   Subjective The pt  states that he is doing much better today, and his wife confirms this  He notes that he is ready to demonstrate what he can do  Transfers   Sit to Stand 5  Supervision   Additional items Increased time required   Stand to Sit 5  Supervision   Additional items Increased time required   Ambulation/Elevation   Gait pattern Excessively slow; Inconsistent janie;Decreased foot clearance   Gait Assistance 5  Supervision   Assistive Device Rolling walker   Distance 160 feet  Stair Management Assistance 5  Supervision   Additional items Increased time required;Verbal cues   Stair Management Technique Two rails; Step to pattern; Foreward;Nonreciprocal   Number of Stairs 5   Balance   Static Sitting Normal   Dynamic Sitting Good   Static Standing Fair   Ambulatory Fair   Activity Tolerance   Activity Tolerance Patient tolerated treatment well   Nurse Dina Mena RN  Assessment   Prognosis Good   Problem List Decreased strength;Decreased range of motion;Decreased endurance; Impaired balance;Decreased mobility; Decreased coordination; Impaired judgement;Decreased safety awareness;Pain;Orthopedic restrictions;Decreased skin integrity   Assessment The pt  has much improved mobility today as his janie is significantly improved as well as his balance  He was able to ambulate community distances without a rest as well as complete stairs  He was more alert and responsive throughout the treatment as well   The pt  has demonstrated the necessary mobility in order to safely return home at discharge  Reviewed general home safety with the pt  Barriers to Discharge None   Goals   Patient Goals To go home, and to regain his independence  STG Expiration Date 10/29/18   Short Term Goal #1 Pt will be mod(I) with rolling R and L for ease with OOB transfer  Pt will be mod(I) with supine<->sit transfers  Pt will be mod(I) with sit<->stand to promote (I) with toileting  Pt will be mod(I) with ambualtion of household distances (appx 50') to reduce caregiver burden  Pt will be S with community distance ambulaiton using least restrictive AD to increase tolerance to activity  Pt will be S ascending and descending >/= 8 steps to gain access into home  Pt will particiapte in HEP consisitng of balance, strength, ROM and coordinaiton tasks to increase activitiy, improve (I) and decrease pain  by Jyotsna Michel, PT at 10/22/18 1730   Treatment Day 5   Plan   Treatment/Interventions Functional transfer training;LE strengthening/ROM; Elevations; Therapeutic exercise; Endurance training;Patient/family training;Bed mobility;Gait training   Progress Progressing toward goals   PT Frequency 7x/wk; Twice a day   Recommendation   Recommendation Home with family support; Outpatient PT   Equipment Recommended oJse Elias Haque   PT - OK to Discharge Yes     Melanie Goodell, PTA

## 2018-10-26 ENCOUNTER — TRANSITIONAL CARE MANAGEMENT (OUTPATIENT)
Dept: FAMILY MEDICINE CLINIC | Facility: CLINIC | Age: 68
End: 2018-10-26

## 2018-10-29 ENCOUNTER — OFFICE VISIT (OUTPATIENT)
Dept: PHYSICAL THERAPY | Facility: REHABILITATION | Age: 68
End: 2018-10-29
Payer: MEDICARE

## 2018-10-29 ENCOUNTER — TELEPHONE (OUTPATIENT)
Dept: OBGYN CLINIC | Facility: HOSPITAL | Age: 68
End: 2018-10-29

## 2018-10-29 DIAGNOSIS — M17.11 PRIMARY OSTEOARTHRITIS OF RIGHT KNEE: Primary | ICD-10-CM

## 2018-10-29 PROCEDURE — 97164 PT RE-EVAL EST PLAN CARE: CPT | Performed by: PHYSICAL THERAPIST

## 2018-10-29 PROCEDURE — G8978 MOBILITY CURRENT STATUS: HCPCS | Performed by: PHYSICAL THERAPIST

## 2018-10-29 PROCEDURE — 97110 THERAPEUTIC EXERCISES: CPT | Performed by: PHYSICAL THERAPIST

## 2018-10-29 PROCEDURE — G8979 MOBILITY GOAL STATUS: HCPCS | Performed by: PHYSICAL THERAPIST

## 2018-10-29 NOTE — PROGRESS NOTES
PT Re-Evaluation     Today's date: 10/29/2018  Patient name: Shavonne Maloney  : 1950  MRN: [id number]  Referring provider: Lilli Fiore MD  Dx:   Encounter Diagnosis     ICD-10-CM    1  Primary osteoarthritis of right knee M17 11        Start Time: 1100  Stop Time: 1225  Total time in clinic (min): 85 minutes    Assessment  Impairments: abnormal coordination, abnormal gait, abnormal muscle firing, abnormal or restricted ROM, abnormal movement, activity intolerance, impaired balance, impaired physical strength, lacks appropriate home exercise program, pain with function, safety issue and weight-bearing intolerance    Assessment details: Isaac Boothe is currently s/p right total knee arthroplasty, DOS: 10/22/18  Patient presents with typical post-operative presentation, including pain, decreased strength, decreased ROM, decreased joint mobility, joint effusion, impaired sensation and ambulatory dysfunction  Patient has been reeducated in post-operative rehabilitation protocol, precautions/contraindications, how to recognize sign/symptoms of infection, gait training with appropriate AD and appropriate home set up  Patient would benefit from skilled physical therapy to address their aforementioned impairments, improve their level of function and to improve their overall quality of life  Understanding of Dx/Px/POC: excellent  Goals  Short Term Goals: to be achieved in 2 weeks ALL GOALS MET  1) Patient to be independent with basic HEP  2) Patient to demonstrate safe technique ambulating with SPC and RW  UPDATED POST-OPERATIVE GOALS:    Short Term Goals: to be achieved by 4 weeks  1) Patient to be independent with basic HEP  2) Decrease pain by 5/10 at its worst   3) Increase knee flexion ROM > 100 deg  4) Increase knee extension ROM by > 5 deg  5) Increase LE strength by 1/2 MMT grade in all deficient planes  6) Patient to negotiate steps with a step-to pattern with use of HR     7) Patient to report decreased sleep interruption secondary to pain  8) Increase ambulatory tolerance by 20 min with LRAD  Long Term Goals: to be achieved by discharge  1) FOTO equal to or greater than 58   2) Patient to be independent with comprehensive HEP  3) Abolish pain for improved quality of life  4) Increase LE strength to 5/5 MMT grade in all planes to improve a/iadls  5) Achieve full knee extension ROM to improve a/iadls  6) Increase knee flexion ROM to within 10 deg of contralateral LE to improve a/iadls  7) Patient to negotiate steps with a reciprocal pattern with use of Hr  8) Increase ambulatory tolerance to 45 min to improve participation in social activities  9) Patient to report no sleep interruption secondary to pain  Plan  Patient would benefit from: skilled PT  Planned modality interventions: biofeedback, cryotherapy, hydrotherapy, TENS and unattended electrical stimulation  Planned therapy interventions: activity modification, ADL retraining, balance, balance/weight bearing training, behavior modification, body mechanics training, functional ROM exercises, gait training, home exercise program, IADL retraining, joint mobilization, manual therapy, massage, neuromuscular re-education, patient education, strengthening, stretching, therapeutic activities, therapeutic exercise and transfer training  Frequency: 2-3x week  Duration in weeks: 12  Treatment plan discussed with: patient and family        Subjective Evaluation    History of Present Illness  Date of surgery: 10/22/18  Mechanism of injury: surgery  Mechanism of injury: Patient underwent right total knee arthroplasty on 10/22/18  Patient spent 3-4 days in the hospital and had a minor complication in the hospital due to an adverse reaction to his medication  For half a day he presented with verbal aphasia, which has now returned to normal  Stroked ruled out  Upon discharge to home Patient received no physical or occupational therapy services  Patient's pain is fairly well controlled with over-the-counter NSAIDs and prescribed Oxycodone  Patient has new onset of right lateral right knee numbness since his surgery  Patient is using a commode for toileting and his spouse is sponge bathing him  Patient's next f/u appointment with his surgeon is scheduled for 18  Pain  Current pain rating: 3  At best pain rating: 3  At worst pain ratin  Location: right knee  Quality: deep, constant, ache  Relieving factors: ice and medications  Exacerbated by: taking a misstep, squatting, bending knee, walking, negotiating steps      Treatments  Discharged from (in last 30 days): inpatient hospitalization  Patient Goals  Patient goals for therapy: decreased edema, decreased pain, improved balance, increased motion, return to sport/leisure activities, increased strength and independence with ADLs/IADLs          Objective     Active Range of Motion   Left Knee   Flexion: 126 degrees   Extension: 0 degrees     Right Knee   Flexion: 96 degrees   Extension: -16 degrees     Passive Range of Motion   Left Knee   Flexion: 129 degrees   Extension: 0 degrees     Right Knee   Flexion: 98 degrees   Extension: -14 degrees     Mobility   Patellar Mobility:   Left Knee   Hypomobile: left medial, left lateral, left superior and left inferior    Right Knee   Hypomobile: medial, lateral, superior and inferior     Strength/Myotome Testing     Left Hip   Planes of Motion   Flexion: 5  Extension: 5  Abduction: 4-    Right Hip   Planes of Motion   Flexion: 4-  Extension: 3+  Abduction: 3+    Left Knee   Flexion: 5  Extension: 5    Right Knee   Flexion: 4-  Extension: 4-    Left Ankle/Foot   Dorsiflexion: 4+    Right Ankle/Foot   Dorsiflexion: 4    Tests     Additional Tests Details  Negative Melquiades's Sign    Ambulation   Weight-Bearing Status   Weight-Bearing Status (Left): full weight bearing   Weight-Bearing Status (Right): weight-bearing as tolerated    Assistive device used: walker    Ambulation: Level Surfaces   Ambulation with assistive device: independent    Observational Gait   Gait: antalgic     Functional Assessment     Comments  INITIAL EVALUATION (10/8/18): Bilateral squat: poor technique, use of UEs for balance  Timed Up and Go (TUG): 12 12 sec, Independent ambulation, Mod I sit to/from stand with arm rests  Five Times Sit to Stand Test: 20 17 sec, Independent ; 17 23 sec, Mod I with arm rests    REEVALUATION (10/29/18): Bilateral squat: poor technique, use of UEs for balance  Timed Up and Go (TUG): 19 56 sec, Mod I ambulation with RW, Mod I sit to/from stand with b/l arm rests  Five Times Sit to Stand Test: 16 81 sec, Mod I with arm rests    General Comments     Knee Comments  INITIAL EVALUATION (10/8/18): Right Knee Circumference Measurements (cm):     Mid patella: 49 6    10 cm proximal to mid patella: 58 5    10 cm distal to mid patella: 44 2    REEVALUATION (10/29/18): Right Knee Circumference Measurements (cm):    Mid patella: 54 0    10 cm proximal to mid patella: 61 0    10 cm distal to mid patella: 46 0      Flowsheet Rows      Most Recent Value   PT/OT G-Codes   Current Score  28   Projected Score  58   Assessment Type  Re-evaluation   G code set  Mobility: Walking & Moving Around   Mobility: Walking and Moving Around Current Status ()  CK   Mobility: Walking and Moving Around Goal Status ()  CI          Precautions: right TKA (DOS: 10/22/18), trigeminal neuralgia    Daily Treatment Diary     Manual  10/8 10/29           Right knee PROM  NV           Right gastroc, hamstring, hip flexor str  NV           Gr  II-IV pat  mobs  NV           Reevaluation  GR                            Exercise Diary  10/8 10/29           Recumbent bike  10'           VG  5' L5           Long-sitting gastroc str               HR             Heel slides             Quad sets with heel prop             Supine SLR             Standing hip abd, ext with TB             SAQ Seated knee extension, flexion AAROM  18x10"                                                                                                                                                 Modalities  10/8 10/29

## 2018-11-01 ENCOUNTER — OFFICE VISIT (OUTPATIENT)
Dept: PHYSICAL THERAPY | Facility: REHABILITATION | Age: 68
End: 2018-11-01
Payer: MEDICARE

## 2018-11-01 DIAGNOSIS — M17.11 PRIMARY OSTEOARTHRITIS OF RIGHT KNEE: Primary | ICD-10-CM

## 2018-11-01 PROCEDURE — 97140 MANUAL THERAPY 1/> REGIONS: CPT | Performed by: PHYSICAL THERAPIST

## 2018-11-01 PROCEDURE — 97112 NEUROMUSCULAR REEDUCATION: CPT | Performed by: PHYSICAL THERAPIST

## 2018-11-01 PROCEDURE — 97110 THERAPEUTIC EXERCISES: CPT | Performed by: PHYSICAL THERAPIST

## 2018-11-01 NOTE — PROGRESS NOTES
Daily Note     Today's date: 2018  Patient name: Kiana Mejias  : 1950  MRN: [id number]  Referring provider: Allan Mendoza MD  Dx:   Encounter Diagnosis     ICD-10-CM    1  Primary osteoarthritis of right knee M17 11        Start Time: 1105  Stop Time: 1240  Total time in clinic (min): 95 minutes    Subjective: Patient reports that his knee feels a little stiff and achy today  "I'm doing my exercises, but maybe not as much as I should '      Objective: See treatment diary below      Assessment: Tolerated treatment fair  Patient demonstrated fatigue post treatment and would benefit from continued PT  Patient with increased pain into passive and active knee extension > flexion ROM  Patient with increased apprehension with PROM  Poor quadriceps strength and fear of falling during mini squats  Plan: Continue per plan of care  Progress treatment as tolerated  Precautions: right TKA (DOS: 10/22/18), trigeminal neuralgia    Daily Treatment Diary     Manual  10/8 10/29 11/1          Right knee PROM  NV GR          Right gastroc, hamstring, hip flexor str  NV GR (gastroc only)          Gr  II-IV pat  mobs  NV GR          Reevaluation  GR                            Exercise Diary  10/8 10/29 11/1          Recumbent bike  10' 10'          VG  5' L5 5' L6          Long-sitting gastroc str  5x30"          HR   3x10          Heel slides   10x10"          Quad sets with heel prop   20x5"          Supine SLR             Standing hip abd, ext with TB             SAQ   20x5"          Seated knee extension, flexion AAROM  18x10"           Slantboard gastroc str     3x30"          Mini squats   10x                                                                                                                      Modalities  10/8 10/29

## 2018-11-01 NOTE — TELEPHONE ENCOUNTER
Call placed for post-op follow up and caresense alert follow up  Pt reporting he sent me an alert "to let you know all is going well, there are no issues"  Pt reports he is "very pleased"  Rating pain 3/10 and says it is a "dull ache"  He reports he is taking 650mg tylenol three times daily- pt advised MDD 3G  Pt reports he is taking one tablet of oxycodone only post-PT  Pt reporting "a little bit of swelling"  He confirms he is icing and elevating  He confirms he is ambulating with his walker and denies any falls  Pt reports he noticed "2 tiny brown spots on the gauze", pt denies that the area has spread at all  He denies any bleeding/additional drainage  He otherwise reports his dressing is clean, dry and intact  Pt confirms he is self administering his daily lovenox without issues  He denies bleeding from any source  LBM 10/28, he confirms he is taking docusate 1 caplet daily  Pt denies bloody stools  Pt reporting PT is "turning out very well- I am further along than I expected"  AVS, AVS med list and F/Us reviewed with pt  Pt denies having any S&S concerning to him  He denies any CP/SOB/dizziness/calf pain/fevers  Pt denies having any concerns/questions at this time  Pt encouraged to call me with any questions, concerns or issues

## 2018-11-05 ENCOUNTER — OFFICE VISIT (OUTPATIENT)
Dept: PHYSICAL THERAPY | Facility: REHABILITATION | Age: 68
End: 2018-11-05
Payer: MEDICARE

## 2018-11-05 DIAGNOSIS — G50.0 TRIGEMINAL NEURALGIA: ICD-10-CM

## 2018-11-05 DIAGNOSIS — M17.11 PRIMARY OSTEOARTHRITIS OF RIGHT KNEE: Primary | ICD-10-CM

## 2018-11-05 PROCEDURE — 97110 THERAPEUTIC EXERCISES: CPT | Performed by: PHYSICAL THERAPIST

## 2018-11-05 PROCEDURE — 97112 NEUROMUSCULAR REEDUCATION: CPT | Performed by: PHYSICAL THERAPIST

## 2018-11-05 PROCEDURE — 97140 MANUAL THERAPY 1/> REGIONS: CPT | Performed by: PHYSICAL THERAPIST

## 2018-11-05 RX ORDER — PHENYTOIN 50 MG/1
TABLET, CHEWABLE ORAL
Qty: 540 TABLET | Refills: 0 | Status: SHIPPED | OUTPATIENT
Start: 2018-11-05 | End: 2018-11-12 | Stop reason: SDUPTHER

## 2018-11-05 NOTE — PROGRESS NOTES
Daily Note     Today's date: 2018  Patient name: Harinder Agarwal  : 1950  MRN: [id number]  Referring provider: Kayla Victor MD  Dx:   Encounter Diagnosis     ICD-10-CM    1  Primary osteoarthritis of right knee M17 11        Start Time: 1115  Stop Time: 1200  Total time in clinic (min): 45 minutes    Subjective: Patient reports feeling more sore today then last visit  Educated on time on feet  Objective: See treatment diary below      Assessment: Tolerated treatment well  Patient RW height adjuest for comfort  Knee flexion PROM 115 deg  Knee ext +10 deg  Firm end feel  Plan: Continue per plan of care  Precautions: right TKA (DOS: 10/22/18), trigeminal neuralgia    Daily Treatment Diary     Manual  10/8 10/29 11/1 11/5         Right knee PROM  NV GR AB-flex/ext         Right gastroc, hamstring, hip flexor str  NV GR (gastroc only)          Gr  II-IV pat  mobs  NV GR AB         Reevaluation  GR                            Exercise Diary  10/8 10/29 11/1 11         Recumbent bike  10' 10' 10'         VG  5' L5 5' L6 5' L5         Long-sitting gastroc str  5x30"          HR   3x10          Heel slides   10x10"          Quad sets with heel prop   20x5" 10"x10         Supine SLR             Standing hip abd, ext with TB             SAQ   20x5"          Seated knee extension, flexion AAROM  18x10"           Slantboard gastroc str     3x30" 3x30"         Mini squats   10x 10x         LAQ    3x10                                                                                                        Modalities  10/8 10/29

## 2018-11-06 ENCOUNTER — OFFICE VISIT (OUTPATIENT)
Dept: OBGYN CLINIC | Facility: HOSPITAL | Age: 68
End: 2018-11-06

## 2018-11-06 ENCOUNTER — HOSPITAL ENCOUNTER (OUTPATIENT)
Dept: RADIOLOGY | Facility: HOSPITAL | Age: 68
Discharge: HOME | End: 2018-11-06
Attending: ORTHOPAEDIC SURGERY
Payer: MEDICARE

## 2018-11-06 VITALS
BODY MASS INDEX: 38.74 KG/M2 | DIASTOLIC BLOOD PRESSURE: 78 MMHG | SYSTOLIC BLOOD PRESSURE: 111 MMHG | WEIGHT: 270 LBS | HEART RATE: 72 BPM

## 2018-11-06 DIAGNOSIS — Z96.651 AFTERCARE FOLLOWING RIGHT KNEE JOINT REPLACEMENT SURGERY: Primary | ICD-10-CM

## 2018-11-06 DIAGNOSIS — Z96.651 AFTERCARE FOLLOWING RIGHT KNEE JOINT REPLACEMENT SURGERY: ICD-10-CM

## 2018-11-06 DIAGNOSIS — Z47.1 AFTERCARE FOLLOWING RIGHT KNEE JOINT REPLACEMENT SURGERY: ICD-10-CM

## 2018-11-06 DIAGNOSIS — Z47.1 AFTERCARE FOLLOWING RIGHT KNEE JOINT REPLACEMENT SURGERY: Primary | ICD-10-CM

## 2018-11-06 PROCEDURE — 99024 POSTOP FOLLOW-UP VISIT: CPT | Performed by: ORTHOPAEDIC SURGERY

## 2018-11-06 PROCEDURE — 73560 X-RAY EXAM OF KNEE 1 OR 2: CPT

## 2018-11-06 RX ORDER — SENNOSIDES 8.6 MG
650 CAPSULE ORAL EVERY 8 HOURS PRN
Qty: 30 TABLET | Refills: 0 | Status: SHIPPED | OUTPATIENT
Start: 2018-11-06 | End: 2019-03-29 | Stop reason: ALTCHOICE

## 2018-11-06 NOTE — PROGRESS NOTES
76 y o male presents for 2 weeks postoperative visit status post right TKA (op date: 10/22/2018)  Patient's postoperative course has progressed uneventfully and he continues to work with physical therapy  He does complain of some laterally based knee numbness and tightness on the medial aspect of the thigh  He continues to walk with a walker  He has no other complaints this time  Review of Systems  Review of systems negative unless otherwise specified in HPI    Past Medical History  Past Medical History:   Diagnosis Date    Arthritis     Right knee    Chronic pain disorder     Trigeminal neuralgia pain     Ulcer of bile duct        Past Surgical History  Past Surgical History:   Procedure Laterality Date    APPENDECTOMY  1962    BRAIN SURGERY  2005    Gamma Knife for Trigeminal Neuralgia    ESOPHAGOGASTRODUODENOSCOPY      HERNIA REPAIR  1952    OTHER SURGICAL HISTORY      gamma knife RT in 2007 and 2010    TX STEREO TREAT TRIGEMINAL NERVE Right 3/6/2018    Procedure: RHIZOTOMY TRIGEMINAL NERVES (V2 & V3);   Surgeon: Sergio Hines MD;  Location: [facility];  Service: Neurosurgery    TX TOTAL KNEE ARTHROPLASTY Right 10/22/2018    Procedure: ARTHROPLASTY KNEE TOTAL;  Surgeon: Melvin Pathak MD;  Location: [facility];  Service: Orthopedics    RHIZOTOMY W/ RADIOFREQUENCY ABLATION Right 08/08/2014    Stereotactic Ablation of Gasserian Ganglion Right sided trigeminal V2 radiofrequency rhizotomy x2       Current Medications  Current Outpatient Prescriptions on File Prior to Visit   Medication Sig Dispense Refill    Acetaminophen (TYLENOL PO) Take by mouth as needed      acetaminophen (TYLENOL) 650 mg CR tablet Take 1 tablet (650 mg total) by mouth every 8 (eight) hours as needed for mild pain for up to 30 days 30 tablet 0    ascorbic acid (VITAMIN C) 500 MG tablet Take 1 tablet (500 mg total) by mouth daily 60 tablet 0    Calcium 250 MG CAPS Take 600 mg by mouth daily        Cholecalciferol (VITAMIN D3) 1000 units CAPS Take by mouth      cyanocobalamin (VITAMIN B-12) 1,000 mcg tablet Take 1 5 tablets by mouth daily        docusate sodium (COLACE) 100 mg capsule Take 1 capsule (100 mg total) by mouth 2 (two) times a day 10 capsule 0    enoxaparin (LOVENOX) 40 mg/0 4 mL 1 subcutaneous injection daily x 28 days AFTER surgery 28 Syringe 0    enoxaparin (LOVENOX) 40 mg/0 4 mL 1 subcutaneous injection daily x 28 days AFTER surgery 28 Syringe 0    ferrous sulfate 324 (65 Fe) mg Take 1 tablet (324 mg total) by mouth 2 (two) times a day before meals 60 tablet 0    folic acid (FOLVITE) 1 mg tablet Take 1 tablet (1 mg total) by mouth daily 30 tablet 0    Ketoprofen 10 % CREA as needed    2    MELATONIN PO Take by mouth      Multiple Vitamins-Minerals (PRESERVISION AREDS) TABS Take 1 tablet by mouth daily      oxyCODONE (ROXICODONE) 5 mg immediate release tablet Take 1-2 tablets every 4-6 hrs as needed for pain control 30 tablet 0    phenytoin (DILANTIN) 50 mg tablet 2 tabs  tablet 0    pregabalin (LYRICA) 200 MG capsule Take 2 tabs in the am, 1 tab in the afternoon, 2 tabs at bedtime 150 capsule 2    pyridoxine (VITAMIN B6) 100 mg tablet Take 2 tablets by mouth daily        TURMERIC CURCUMIN PO Take 1,500 mg by mouth daily       No current facility-administered medications on file prior to visit          Recent Labs [facility])    0  Lab Value Date/Time   HCT 38 4 10/25/2018 0450   HCT 43 6 06/24/2015 1107   HGB 12 9 10/25/2018 0450   HGB 14 7 06/24/2015 1107   WBC 9 02 10/25/2018 0450   WBC 5 76 06/24/2015 1107   INR 0 99 09/26/2018 1408   INR 0 94 07/29/2014 1048   ESR 2 06/24/2015 1107   GLUCOSE 78 12/19/2015 0816   HGBA1C 5 1 09/26/2018 1408         Physical exam  right Knee exam  ·  anterior knee incision clean, dry and intact  ·  range of motion 5-110 degrees of flexion active  ·  edema appreciated lower extremity  ·  stable to varus and valgus stress  ·  patient walks with antalgic gait  ·  decreased sensation over the lateral aspect of the knee  ·  motor  Grossly intact  ·  Limb warm and well perfused    Imaging   radiographs of the right lower extremity were obtained today and personally reviewed by myself Dr Obi Rey  The reveal a right knee status post arthroplasty in stable alignment with no periprosthetic lucency or signs of loosening      Procedure   no procedures performed during this visit     a 51-year-old male 2 weeks status post right total knee arthroplasty progressing well  ·  weightbearing as tolerated to the right lower extremity  ·  patient may take Tylenol arthritis as directed for knee pain  ·  he is encouraged to continue physical therapy with a focus on restoring full knee extension  ·  patient should continue DVT prophylaxis as directed  ·  he should continue physical therapy as directed  ·  patient instructed to follow up in 2 months  ·  patient understands and agrees with the plan above

## 2018-11-08 ENCOUNTER — OFFICE VISIT (OUTPATIENT)
Dept: PHYSICAL THERAPY | Facility: REHABILITATION | Age: 68
End: 2018-11-08
Payer: MEDICARE

## 2018-11-08 DIAGNOSIS — M17.11 PRIMARY OSTEOARTHRITIS OF RIGHT KNEE: Primary | ICD-10-CM

## 2018-11-08 PROCEDURE — 97140 MANUAL THERAPY 1/> REGIONS: CPT

## 2018-11-08 PROCEDURE — 97110 THERAPEUTIC EXERCISES: CPT

## 2018-11-08 PROCEDURE — 97112 NEUROMUSCULAR REEDUCATION: CPT

## 2018-11-08 NOTE — PROGRESS NOTES
Daily Note     Today's date: 2018  Patient name: Krystal Jauregui  : 1950  MRN: [id number]  Referring provider: Lakesha Calix MD  Dx:   Encounter Diagnosis     ICD-10-CM    1  Primary osteoarthritis of right knee M17 11        Start Time: 1100  Stop Time: 1200  Total time in clinic (min): 60 minutes    Subjective: Patient reports feeling really good, pt reports that sometimes he does not use his RW for short distances  Objective: See treatment diary below      Assessment: Tolerated treatment with pain  Pt educated in knee extension stretch with CP at home  Pt is still limited with knee extension, end feel empty  Pt had minimal pain with knee flexion this session, end feel firm  Pt able to do full revolutions on the recumbent bike  Plan: Continue per plan of care  Precautions: right TKA (DOS: 10/22/18), trigeminal neuralgia    Daily Treatment Diary     Manual  10/8 10/29 11/1 11/5 11/8        Right knee PROM  NV GR AB-flex/ext LK        Right gastroc, hamstring, hip flexor str  NV GR (gastroc only)          Gr  II-IV pat  mobs  NV GR AB prom        Reevaluation  GR                            Exercise Diary  10/8 10/29 11/1 11/5 11/8        Recumbent bike  10' 10' 10' 10'        VG  5' L5 5' L6 5' L5 5' L5        Long-sitting gastroc str  5x30"          HR   3x10          Heel slides   10x10"          Quad sets with heel prop   20x5" 10"x10 10x10"        Supine SLR             Standing hip abd, ext with TB             SAQ   20x5"          Seated knee extension, flexion AAROM  18x10"           Slantboard gastroc str     3x30" 3x30" 3x30"        Mini squats   10x 10x 15x        LAQ    3x10 3x10                                                                                                       Modalities  10/8 10/29   11/8        CP knee ext     defered  Will do at home

## 2018-11-12 ENCOUNTER — OFFICE VISIT (OUTPATIENT)
Dept: PHYSICAL THERAPY | Facility: REHABILITATION | Age: 68
End: 2018-11-12
Payer: MEDICARE

## 2018-11-12 DIAGNOSIS — M17.11 PRIMARY OSTEOARTHRITIS OF RIGHT KNEE: Primary | ICD-10-CM

## 2018-11-12 DIAGNOSIS — G50.0 TRIGEMINAL NEURALGIA: ICD-10-CM

## 2018-11-12 PROCEDURE — 97530 THERAPEUTIC ACTIVITIES: CPT | Performed by: PHYSICAL THERAPIST

## 2018-11-12 PROCEDURE — 97110 THERAPEUTIC EXERCISES: CPT | Performed by: PHYSICAL THERAPIST

## 2018-11-12 NOTE — PROGRESS NOTES
Daily Note     Today's date: 2018  Patient name: Kiana Mejias  : 1950  MRN: [id number]  Referring provider: Allan Mendoza MD  Dx:   Encounter Diagnosis     ICD-10-CM    1  Primary osteoarthritis of right knee M17 11        Start Time: 1105  Stop Time: 1230  Total time in clinic (min): 85 minutes    Subjective: Patient reports that today is a bad day and that his knee is "acting goofy"  Objective: See treatment diary below      Assessment: Tolerated treatment fair  Patient demonstrated fatigue post treatment and would benefit from continued PT  Patient with increased anxiousness and fatigue this visit  Patient with poor tolerance for self stretches  Patient with difficulty performing FSU/FSD secondary to quadriceps weakness  Plan: Continue per plan of care  Progress treatment as tolerated  Progress treament per protocol  Precautions: right TKA (DOS: 10/22/18), trigeminal neuralgia    Daily Treatment Diary     Manual  10/8 10/29 11/1 11/5 11/8 11/12       Right knee PROM  NV GR AB-flex/ext LK Deferred       Right gastroc, hamstring, hip flexor str  NV GR (gastroc only)   Deferred       Gr  II-IV pat  mobs  NV GR AB prom Deferred       Reevaluation  GR                            Exercise Diary  10/8 10/29 11/1 11/5 11/8 11/12       Recumbent bike  10' 10' 10' 10' 10'       VG  5' L5 5' L6 5' L5 5' L5 5' L7       Long-sitting gastroc str  5x30"          HR   3x10          Heel slides   10x10"          Quad sets with heel prop   20x5" 10"x10 10x10"        Supine SLR             Standing hip abd, ext with TB             SAQ   20x5"          Seated knee extension, flexion AAROM  18x10"           Slantboard gastroc str  3x30" 3x30" 3x30" 3x30"       Mini squats   10x 10x 15x        LAQ    3x10 3x10        FSU      2x10 4"       FSD      2x10 2"       Supine wall slides      20x10"       Supine knee ext   LLPS      20x10" 5#       TKE      3x10 5" GTB Modalities  10/8 10/29   11/8        CP knee ext     defered  Will do at home

## 2018-11-13 RX ORDER — PHENYTOIN 50 MG/1
TABLET, CHEWABLE ORAL
Qty: 540 TABLET | Refills: 0 | Status: SHIPPED | OUTPATIENT
Start: 2018-11-13 | End: 2019-03-29 | Stop reason: ALTCHOICE

## 2018-11-14 ENCOUNTER — TELEPHONE (OUTPATIENT)
Dept: OBGYN CLINIC | Facility: HOSPITAL | Age: 68
End: 2018-11-14

## 2018-11-14 NOTE — TELEPHONE ENCOUNTER
Patient made aware  His pain is better  It hurts when he moves it  He is going to go to PT tomorrow to have therapist evaluate and let us know what they say

## 2018-11-14 NOTE — TELEPHONE ENCOUNTER
It may be too late to see him this afternoon in we[place], the patient may need to be seen in the emergency room if he is unable to ambulate on his right lower extremity, patient may be seen in [place] and tomorrow's afternoon hours should his pain persist   Please advised patient to present to the emergency department should he have any signs or symptoms of DVT/PE

## 2018-11-14 NOTE — TELEPHONE ENCOUNTER
Dr Sierra Patel patient R TKA 10/22/18    Patient calling that he had PT on 11/12 and since then his pain  In calf to knee is much worse  When he turns his R foot toward his L foot there is a shooting pain in calf to knee and behind the knee  He was not needing to take oxycodone anymore very often but now he has increased pain  Advised him to take his oxycodone 5mg pain medication and ice 20 min on 20 min off  Denies calf swelling and redness  Please advise if you would like to evaluate in [place] this afternoon

## 2018-11-14 NOTE — TELEPHONE ENCOUNTER
Patient sees Dr Sharad Hannon for the right knee  Patient had a right knee replacement on 10/22/18  Patient advised he had his physical therapy appt on Monday 11/12/18 and after the appt, his knee was in extreme pain  He says he can hardly move it now    He would like to know if this is normal, as it doesn't feel normal       Please call patient at home: #[phone]

## 2018-11-14 NOTE — TELEPHONE ENCOUNTER
Patient will take the oxycodone and ice the area and if no improvement in the next 2 hrs, he will [facility]  Patient is worried to take the oxycodone on a regular basis, he wanted to know how long his RX will be filled post surgery?

## 2018-11-15 ENCOUNTER — OFFICE VISIT (OUTPATIENT)
Dept: PHYSICAL THERAPY | Facility: REHABILITATION | Age: 68
End: 2018-11-15
Payer: MEDICARE

## 2018-11-15 DIAGNOSIS — M17.11 PRIMARY OSTEOARTHRITIS OF RIGHT KNEE: Primary | ICD-10-CM

## 2018-11-15 PROCEDURE — 97110 THERAPEUTIC EXERCISES: CPT

## 2018-11-15 NOTE — PROGRESS NOTES
Daily Note     Today's date: 11/15/2018  Patient name: Micheline Trevino  : 1950  MRN: [id number]  Referring provider: Savanna Vernon MD  Dx:   Encounter Diagnosis     ICD-10-CM    1  Primary osteoarthritis of right knee M17 11                11:10 - 12:15     Subjective:  Pt reports he is 8/10 pain with movement in his right knee and down into in calf  Pt states he is 5/10 pain with no movement  Pt states when he left on Monday he was at home doing some exercise and he started to get a shooting up on the outside of his leg when he turns his right foot toward the left  Pt states he called the doctor to let them know  Objective: See treatment diary below      Assessment:  Supervising PT notified of pt concerns of a DVT  Supervising PT asessed patient and found no signs of infection or DVT  Pt did have increased tenderness along the lateral aspect of his right calf just proximal to his pre existing varicose veins  Well's criteria score was low probability for DVT as well  Therapeutic exercises was limited d/c pain  PT tolerated gentel TE fair with moderate fatigue near end reps  Pt is limited with ROM d/t pain and muscle guarding  Plan: Continue per plan of care  Progress treatment as tolerated  Progress treament per protocol  Precautions: right TKA (DOS: 10/22/18), trigeminal neuralgia    Daily Treatment Diary     Manual  10/8 10/29 11/1 11/5 11/8 11/12 11/15      Right knee PROM  NV GR AB-flex/ext LK Deferred deferred      Right gastroc, hamstring, hip flexor str  NV GR (gastroc only)   Deferred defered      Gr  II-IV pat  mobs  NV GR AB prom Deferred defered      Reevaluation  GR                            Exercise Diary  10/8 10/29 11/1 11/5 11/8 11/12 11/15      Recumbent bike  10' 10' 10' 10' 10'       VG  5' L5 5' L6 5' L5 5' L5 5' L7 np      Long-sitting gastroc str     5x30"          HR   3x10          Heel slides   10x10"    30 x       Quad sets with heel prop   20x5" 10"x10 10x10"  10 x10 "       Supine SLR             Standing hip abd, ext with TB             SAQ   20x5"    10 x 5"      Seated knee extension, flexion AAROM  18x10"           Slantboard gastroc str  3x30" 3x30" 3x30" 3x30" Strap stretch supine  3 x30"       Mini squats   10x 10x 15x        LAQ    3x10 3x10        FSU      2x10 4"       FSD      2x10 2"       Supine wall slides      20x10"       Supine knee ext   LLPS      20x10" 5#       TKE      3x10 5" GTB                                     Modalities  10/8 10/29   11/8  1/15      CP knee ext     defered  Will do at home  Cold pack with extention 10'

## 2018-11-16 DIAGNOSIS — Z96.651 STATUS POST TOTAL KNEE REPLACEMENT, RIGHT: ICD-10-CM

## 2018-11-19 ENCOUNTER — TELEPHONE (OUTPATIENT)
Dept: NEUROLOGY | Facility: CLINIC | Age: 68
End: 2018-11-19

## 2018-11-19 ENCOUNTER — OFFICE VISIT (OUTPATIENT)
Dept: PHYSICAL THERAPY | Facility: REHABILITATION | Age: 68
End: 2018-11-19
Payer: MEDICARE

## 2018-11-19 DIAGNOSIS — M17.11 PRIMARY OSTEOARTHRITIS OF RIGHT KNEE: Primary | ICD-10-CM

## 2018-11-19 PROCEDURE — 97110 THERAPEUTIC EXERCISES: CPT | Performed by: PHYSICAL THERAPIST

## 2018-11-19 PROCEDURE — G8978 MOBILITY CURRENT STATUS: HCPCS | Performed by: PHYSICAL THERAPIST

## 2018-11-19 PROCEDURE — G8979 MOBILITY GOAL STATUS: HCPCS | Performed by: PHYSICAL THERAPIST

## 2018-11-19 PROCEDURE — 97112 NEUROMUSCULAR REEDUCATION: CPT | Performed by: PHYSICAL THERAPIST

## 2018-11-19 NOTE — PROGRESS NOTES
Daily Note     Today's date: 2018  Patient name: Gilma Calles  : 1950  MRN: [id number]  Referring provider: Carlita Ruiz MD  Dx:   Encounter Diagnosis     ICD-10-CM    1  Primary osteoarthritis of right knee M17 11        Start Time: 1108  Stop Time: 1230  Total time in clinic (min): 82 minutes    Subjective: Patient reports that after his previous treatment session he went home and rested his knee  Patient states that his knee feels much less painful and at times he has been walking without his walker  "I'm ready to get rid of this thing "      Objective: See treatment diary below      Assessment: Tolerated treatment well  Patient demonstrated fatigue post treatment, exhibited good technique with therapeutic exercises and would benefit from continued PT  Patient with improved tolerance for there ex with gentle manual PROM  Progressed exercises minimally to prevent exacerbation of pain  Plan: Continue per plan of care  Progress treatment as tolerated  Precautions: right TKA (DOS: 10/22/18), trigeminal neuralgia    Daily Treatment Diary     Manual  10/8 10/29 11/1 11/5 11/8 11/12 11/15 11/19     Right knee PROM  NV GR AB-flex/ext LK Deferred deferred NV     Right gastroc, hamstring, hip flexor str  NV GR (gastroc only)   Deferred defered NV     Gr  II-IV pat  mobs  NV GR AB prom Deferred defered NV     Reevaluation  GR                            Exercise Diary  10/8 10/29 11/1 11/5 11/8 11/12 11/15 11/19     Recumbent bike  10' 10' 10' 10' 10'  10'     VG  5' L5 5' L6 5' L5 5' L5 5' L7 np 5' L7     Long-sitting gastroc str  5x30"          HR   3x10          Heel slides   10x10"    30 x       Quad sets with heel prop   20x5" 10"x10 10x10"  10 x10 "       Supine SLR        2x10 2#     Standing hip abd, ext with TB             SAQ   20x5"    10 x 5" 2x10 2# 5"     Seated knee extension, flexion AAROM  18x10"           Slantboard gastroc str     3x30" 3x30" 3x30" 3x30" Strap stretch supine  3 x30"  5x30" standing     Mini squats   10x 10x 15x        LAQ    3x10 3x10        FSU      2x10 4"       FSD      2x10 2"       Supine wall slides      20x10"       Supine knee ext   LLPS      20x10" 5#       TKE      3x10 5" GTB  2x10 5" GTB                                   Modalities  10/8 10/29   11/8  1/15      CP knee ext     defered  Will do at home  Cold pack with extention 10'

## 2018-11-20 RX ORDER — OXYCODONE HYDROCHLORIDE 5 MG/1
TABLET ORAL
Qty: 30 TABLET | Refills: 0 | Status: SHIPPED | OUTPATIENT
Start: 2018-11-20 | End: 2019-01-08

## 2018-11-21 ENCOUNTER — OFFICE VISIT (OUTPATIENT)
Dept: PHYSICAL THERAPY | Facility: REHABILITATION | Age: 68
End: 2018-11-21
Payer: MEDICARE

## 2018-11-21 DIAGNOSIS — M17.11 PRIMARY OSTEOARTHRITIS OF RIGHT KNEE: Primary | ICD-10-CM

## 2018-11-21 PROCEDURE — 97530 THERAPEUTIC ACTIVITIES: CPT | Performed by: PHYSICAL THERAPIST

## 2018-11-21 PROCEDURE — 97140 MANUAL THERAPY 1/> REGIONS: CPT | Performed by: PHYSICAL THERAPIST

## 2018-11-21 PROCEDURE — 97110 THERAPEUTIC EXERCISES: CPT | Performed by: PHYSICAL THERAPIST

## 2018-11-21 NOTE — PROGRESS NOTES
Daily Note     Today's date: 2018  Patient name: Vini Crocker  : 1950  MRN: [id number]  Referring provider: Ran Paiz MD  Dx:   Encounter Diagnosis     ICD-10-CM    1  Primary osteoarthritis of right knee M17 11        Start Time: 1105  Stop Time: 1200  Total time in clinic (min): 55 minutes    Subjective: Patient reports that his knee has been feeling better and he has transitioned from the walker to a SPC  Objective: See treatment diary below      Assessment: Tolerated treatment well  Patient demonstrated fatigue post treatment, exhibited good technique with therapeutic exercises and would benefit from continued PT  Patient with improved quality of gait and less anxiousness  Patient with good tolerance for gentle to moderate PROM into both extension and flexion  Patient with improved ability to negotiate steps  Plan: Continue per plan of care  Progress treatment as tolerated  Precautions: right TKA (DOS: 10/22/18), trigeminal neuralgia    Daily Treatment Diary     Manual  10/8 10/29 11/1 11/5 11/8 11/12 11/15 11/19 11/21    Right knee PROM  NV GR AB-flex/ext LK Deferred deferred NV GR    Right gastroc, hamstring, hip flexor str  NV GR (gastroc only)   Deferred defered NV     Gr  II-IV pat  mobs  NV GR AB prom Deferred defered NV GR    Reevaluation  GR                            Exercise Diary  10/8 10/29 11/1 11/5 11/8 11/12 11/15 11/19 11/21    Recumbent bike  10' 10' 10' 10' 10'  10' 10'    VG  5' L5 5' L6 5' L5 5' L5 5' L7 np 5' L7     Long-sitting gastroc str  5x30"          HR   3x10          Heel slides   10x10"    30 x       Quad sets with heel prop   20x5" 10"x10 10x10"  10 x10 "       Supine SLR        2x10 2# 3x10 2#    Standing hip abd, ext with TB             SAQ   20x5"    10 x 5" 2x10 2# 5" 3x10 2# 5"    Seated knee extension, flexion AAROM  18x10"           Slantboard gastroc str     3x30" 3x30" 3x30" 3x30" Strap stretch supine  3 x30"  5x30" standing 5x30"    Mini squats   10x 10x 15x        LAQ    3x10 3x10        FSU      2x10 4"   3x10 4"    FSD      2x10 2"   3x10 4"    Supine wall slides      20x10"       Supine knee ext   LLPS      20x10" 5#       TKE      3x10 5" GTB  2x10 5" GTB                                   Modalities  10/8 10/29   11/8  1/15      CP knee ext     defered  Will do at home  Cold pack with extention 10'

## 2018-11-26 ENCOUNTER — OFFICE VISIT (OUTPATIENT)
Dept: PHYSICAL THERAPY | Facility: REHABILITATION | Age: 68
End: 2018-11-26
Payer: MEDICARE

## 2018-11-26 DIAGNOSIS — M17.11 PRIMARY OSTEOARTHRITIS OF RIGHT KNEE: Primary | ICD-10-CM

## 2018-11-26 PROCEDURE — 97110 THERAPEUTIC EXERCISES: CPT

## 2018-11-26 PROCEDURE — 97140 MANUAL THERAPY 1/> REGIONS: CPT

## 2018-11-26 PROCEDURE — 97112 NEUROMUSCULAR REEDUCATION: CPT

## 2018-11-26 NOTE — PROGRESS NOTES
Daily Note     Today's date: 2018  Patient name: Shavonne Maloney  : 1950  MRN: [id number]  Referring provider: Lilli Fiore MD  Dx:   Encounter Diagnosis     ICD-10-CM    1  Primary osteoarthritis of right knee M17 11        Start Time: 1100  Stop Time: 1145  Total time in clinic (min): 45 minutes    Subjective: Pt states that he has some clicking in his knee, but no pain  Pt was able to go up 3 stairs leading with R LE with difficulty  Objective: See treatment diary below      Assessment: Tolerated treatment well  Patient demonstrated fatigue post treatment, exhibited good technique with therapeutic exercises and would benefit from continued PT Pt able to complete 6" step ups this session with some difficulty  Plan: Continue per plan of care  Progress treatment as tolerated  Precautions: right TKA (DOS: 10/22/18), trigeminal neuralgia    Daily Treatment Diary     Manual  10/8 10/29 11/1 11/5 11/8 11/12 11/15 11/19 11/21 11/26   Right knee PROM  NV GR AB-flex/ext LK Deferred deferred NV GR LK   Right gastroc, hamstring, hip flexor str  NV GR (gastroc only)   Deferred defered NV     Gr  II-IV pat  mobs  NV GR AB prom Deferred defered NV GR LK   Reevaluation  GR                            Exercise Diary  10/8 10/29 11/1 11/5 11/8 11/12 11/15 11/19 11/21 11/26   Recumbent bike  10' 10' 10' 10' 10'  10' 10' 10'   VG  5' L5 5' L6 5' L5 5' L5 5' L7 np 5' L7     Long-sitting gastroc str  5x30"          HR   3x10          Heel slides   10x10"    30 x       Quad sets with heel prop   20x5" 10"x10 10x10"  10 x10 "       Supine SLR        2x10 2# 3x10 2# 3x10  2#   Standing hip abd, ext with TB          2x10 no TB   SAQ   20x5"    10 x 5" 2x10 2# 5" 3x10 2# 5" 3x10  2#  5"   Seated knee extension, flexion AAROM  18x10"           Slantboard gastroc str     3x30" 3x30" 3x30" 3x30" Strap stretch supine  3 x30"  5x30" standing 5x30"    Mini squats   10x 10x 15x        LAQ    3x10 3x10 FSU      2x10 4"   3x10 4" 2x10  6"   FSD      2x10 2"   3x10 4" 2x10  6"   Supine wall slides      20x10"       Supine knee ext   LLPS      20x10" 5#       TKE      3x10 5" GTB  2x10 5" GTB                                   Modalities  10/8 10/29   11/8  1/15      CP knee ext     defered  Will do at home  Cold pack with extention 10'

## 2018-11-27 ENCOUNTER — TELEPHONE (OUTPATIENT)
Dept: OBGYN CLINIC | Facility: HOSPITAL | Age: 68
End: 2018-11-27

## 2018-11-29 ENCOUNTER — EVALUATION (OUTPATIENT)
Dept: PHYSICAL THERAPY | Facility: REHABILITATION | Age: 68
End: 2018-11-29
Payer: MEDICARE

## 2018-11-29 DIAGNOSIS — M17.11 PRIMARY OSTEOARTHRITIS OF RIGHT KNEE: Primary | ICD-10-CM

## 2018-11-29 PROCEDURE — G8978 MOBILITY CURRENT STATUS: HCPCS | Performed by: PHYSICAL THERAPIST

## 2018-11-29 PROCEDURE — 97140 MANUAL THERAPY 1/> REGIONS: CPT | Performed by: PHYSICAL THERAPIST

## 2018-11-29 PROCEDURE — 97110 THERAPEUTIC EXERCISES: CPT | Performed by: PHYSICAL THERAPIST

## 2018-11-29 PROCEDURE — G8979 MOBILITY GOAL STATUS: HCPCS | Performed by: PHYSICAL THERAPIST

## 2018-11-29 NOTE — PROGRESS NOTES
PT Re-Evaluation     Today's date: 2018  Patient name: Cristo Hardy  : 1950  MRN: [id number]  Referring provider: Teddy Orellana MD  Dx:   Encounter Diagnosis     ICD-10-CM    1  Primary osteoarthritis of right knee M17 11        Start Time: 1055  Stop Time: 1215  Total time in clinic (min): 80 minutes    Assessment  Assessment details: Since beginning physical therapy, Emory Whatley has attended a total number of 11 visits and has maintained excellent compliance with established POC  Patient has made significant improvements in all areas, including decreased pain, increased strength, increased ROM, improved flexibility, improved joint mobility, decreased joint effusion, improved balance, improved quality of gait and improved overall level of function  Patient is reporting improved ability to perform a/iadls and engaging in social activities  Despite these improvements, Patient continues to present with pain, decreased strength, decreased ROM, decreased joint mobility, joint effusion and ambulatory dysfunction  Patient would continue to benefit from skilled physical therapy to address his aforementioned impairments, improve their level of function and to improve their overall quality of life  Impairments: abnormal coordination, abnormal gait, abnormal muscle firing, abnormal or restricted ROM, abnormal movement, activity intolerance, impaired balance, impaired physical strength, lacks appropriate home exercise program, pain with function, safety issue and weight-bearing intolerance  Understanding of Dx/Px/POC: excellent  Goals  Short Term Goals: to be achieved in 2 weeks ALL GOALS MET  1) Patient to be independent with basic HEP  2) Patient to demonstrate safe technique ambulating with SPC and RW  UPDATED POST-OPERATIVE GOALS:    Short Term Goals: to be achieved by 4 weeks  1) Patient to be independent with basic HEP   MET  2) Decrease pain by 5/10 at its worst  MET  3) Increase knee flexion ROM > 100 deg  MET  4) Increase knee extension ROM by > 5 deg  MET  5) Increase LE strength by 1/2 MMT grade in all deficient planes  MET  6) Patient to negotiate steps with a step-to pattern with use of HR  MET  7) Patient to report decreased sleep interruption secondary to pain  MET  8) Increase ambulatory tolerance by 20 min with LRAD  MET    Long Term Goals: to be achieved by discharge  1) FOTO equal to or greater than 58   2) Patient to be independent with comprehensive HEP  3) Abolish pain for improved quality of life  4) Increase LE strength to 5/5 MMT grade in all planes to improve a/iadls  5) Achieve full knee extension ROM to improve a/iadls  6) Increase knee flexion ROM to within 10 deg of contralateral LE to improve a/iadls  7) Patient to negotiate steps with a reciprocal pattern with use of Hr  8) Increase ambulatory tolerance to 45 min to improve participation in social activities  9) Patient to report no sleep interruption secondary to pain  Plan  Patient would benefit from: skilled PT  Planned modality interventions: biofeedback, cryotherapy, hydrotherapy, TENS and unattended electrical stimulation  Planned therapy interventions: activity modification, ADL retraining, balance, balance/weight bearing training, behavior modification, body mechanics training, functional ROM exercises, gait training, home exercise program, IADL retraining, joint mobilization, manual therapy, massage, neuromuscular re-education, patient education, strengthening, stretching, therapeutic activities, therapeutic exercise and transfer training  Frequency: 1-2x week  Duration in weeks: 8  Treatment plan discussed with: patient        Subjective Evaluation    History of Present Illness  Mechanism of injury: Patient reports overall improvement in his right knee since his surgery  Patient is reporting decreased pain in his knee and overall improved mobility  Patient has transitioned from his  to a [facility]   Patient is able to negotiate steps with a step-to pattern with use of a HR, but this remains painful  Patient is able to perform a/iadls with less difficulty overall  Patient has noticed intermittent pain-free clicking in his knee  Pain  Current pain ratin  At best pain ratin  At worst pain ratin  Location: right knee - anterior, lateral, posterior  Quality: dull ache and sharp  Exacerbated by: negotiating steps, prolonged walking (> 10 min), end range knee flexion/extension  Treatments  Current treatment: physical therapy  Patient Goals  Patient goals for therapy: decreased edema, decreased pain, improved balance, increased motion, return to sport/leisure activities, independence with ADLs/IADLs and increased strength          Objective     Observations     Right Knee   Positive for effusion and incision  Spasms: clean, well approximated, no signs of infection ; moderately decreased scar tissue mobility       Active Range of Motion   Left Knee   Flexion: 126 degrees   Extension: 0 degrees     Right Knee   Flexion: 117 degrees   Extension: -3 degrees     Passive Range of Motion   Left Knee   Flexion: 129 degrees   Extension: 0 degrees     Right Knee   Flexion: 127 degrees   Extension: 0 degrees     Mobility   Patellar Mobility:     Right Knee   WFL: medial, lateral, superior and inferior    Strength/Myotome Testing     Left Hip   Planes of Motion   Flexion: 5  Extension: 5  Abduction: 4    Right Hip   Planes of Motion   Flexion: 4+  Extension: 4+  Abduction: 4    Left Knee   Flexion: 5  Extension: 5    Right Knee   Flexion: 5  Extension: 4+    Left Ankle/Foot   Dorsiflexion: 4+    Right Ankle/Foot   Dorsiflexion: 4+    Ambulation   Weight-Bearing Status   Weight-Bearing Status (Left): full weight bearing   Weight-Bearing Status (Right): full weight-bearing    Assistive device used: single point cane    Ambulation: Level Surfaces   Ambulation with assistive device: independent    Observational Gait   Gait: antalgic     Functional Assessment     Single Leg Stance - Eyes Open     Right  Trial 1: 1 seconds    Comments  INITIAL EVALUATION (10/8/18): Bilateral squat: poor technique, use of UEs for balance  Timed Up and Go (TUG): 12 12 sec, Independent ambulation, Mod I sit to/from stand with arm rests  Five Times Sit to Stand Test: 20 17 sec, Independent ; 17 23 sec, Mod I with arm rests    REEVALUATION (10/29/18): Bilateral squat: poor technique, use of UEs for balance  Timed Up and Go (TUG): 19 56 sec, Mod I ambulation with RW, Mod I sit to/from stand with b/l arm rests  Five Times Sit to Stand Test: 16 81 sec, Mod I with arm rests    REEVALUATION (11/29/18): Bilateral squat: good technique, use of UEs for balance  Timed Up and Go (TUG): 15 35 sec, Independent ambulation, Mod I sit to/from stand with b/l arm rests ; 13 16 sec, Mod I ambulation with SPC, Mod I sit to/from stand with b/l arm rest  Five Times Sit to Stand Test: 11 91 sec, Mod I with use of b/l arm rests    General Comments     Knee Comments  INITIAL EVALUATION (10/8/18): Right Knee Circumference Measurements (cm):     Mid patella: 49 6    10 cm proximal to mid patella: 58 5    10 cm distal to mid patella: 44 2    REEVALUATION (10/29/18): Right Knee Circumference Measurements (cm):    Mid patella: 54 0    10 cm proximal to mid patella: 61 0    10 cm distal to mid patella: 46 0    REEVALUATION (11/29/18):   Right Knee Circumference Measurements (cm):    Mid patella: 50 7    10 cm proximal to mid patella: 59 0    10 cm distal to mid patella: 45 2      Flowsheet Rows      Most Recent Value   PT/OT G-Codes   Current Score  49   Projected Score  58   FOTO information reviewed  Yes   Assessment Type  Re-evaluation   G code set  Mobility: Walking & Moving Around   Mobility: Walking and Moving Around Current Status ()  CK   Mobility: Walking and Moving Around Goal Status ()  CI        Precautions: right TKA (DOS: 10/22/18), trigeminal neuralgia    Daily Treatment Diary     Manual  11/29            Right knee PROM GR            Right gastroc, hamstring, hip flexor str  Gr  II-IV pat  mobs GR            Reevaluation GR                             Exercise Diary  11/29            Recumbent bike 10'            VG 7' L7            Long-sitting gastroc str  TKE             Heel slides             Supine knee ext  LLPS             Supine SLR             Standing hip abd, ext with TB             SAQ             Seated knee extension, flexion AAROM             Slantboard gastroc str               Mini squats             LAQ             FSU             FSD             Supine wall slides                                                                     Modalities              CP knee ext

## 2018-12-05 ENCOUNTER — OFFICE VISIT (OUTPATIENT)
Dept: PHYSICAL THERAPY | Facility: REHABILITATION | Age: 68
End: 2018-12-05
Payer: MEDICARE

## 2018-12-05 DIAGNOSIS — M17.11 PRIMARY OSTEOARTHRITIS OF RIGHT KNEE: Primary | ICD-10-CM

## 2018-12-05 PROCEDURE — 97112 NEUROMUSCULAR REEDUCATION: CPT

## 2018-12-05 PROCEDURE — 97140 MANUAL THERAPY 1/> REGIONS: CPT

## 2018-12-05 PROCEDURE — 97110 THERAPEUTIC EXERCISES: CPT

## 2018-12-05 NOTE — PROGRESS NOTES
Daily Note     Today's date: 2018  Patient name: Vini Crocker  : 1950  MRN: [id number]  Referring provider: Ran Paiz MD  Dx:   Encounter Diagnosis     ICD-10-CM    1  Primary osteoarthritis of right knee M17 11        Start Time: 1630  Stop Time: 1715  Total time in clinic (min): 45 minutes    Subjective: Patient reports pain when leading with the R leg half way up the flight of stairs and even less steps going down  Patient reports that he continues to have aches t/o the day  Objective: See treatment diary below    Assessment: Tolerated treatment well  Patient demonstrated fatigue post treatment, exhibited good technique with therapeutic exercises and would benefit from continued PT    Plan: Continue per plan of care  Precautions: right TKA (DOS: 10/22/18), trigeminal neuralgia    Daily Treatment Diary     Manual             Right knee PROM GR JM           Right gastroc, hamstring, hip flexor str  Gr  II-IV pat  mobs GR            Reevaluation GR                             Exercise Diary             Recumbent bike 10' 10'           VG 7' L7 5' L7           Long-sitting gastroc str  TKE             Heel slides             Supine knee ext  LLPS             Supine SLR  2#  3x10           Standing hip abd, ext with TB             SAQ  2# 5"  3x10           Seated knee extension, flexion AAROM             Slantboard gastroc str    30"x5           Mini squats             LAQ  2#  3x10           FSU  6"  2x10           FSD  6"  2x10           Supine wall slides                                                                     Modalities              CP knee ext

## 2018-12-07 ENCOUNTER — OFFICE VISIT (OUTPATIENT)
Dept: PHYSICAL THERAPY | Facility: REHABILITATION | Age: 68
End: 2018-12-07
Payer: MEDICARE

## 2018-12-07 DIAGNOSIS — M17.11 PRIMARY OSTEOARTHRITIS OF RIGHT KNEE: Primary | ICD-10-CM

## 2018-12-07 PROCEDURE — 97110 THERAPEUTIC EXERCISES: CPT

## 2018-12-07 PROCEDURE — 97112 NEUROMUSCULAR REEDUCATION: CPT

## 2018-12-07 PROCEDURE — 97140 MANUAL THERAPY 1/> REGIONS: CPT

## 2018-12-11 ENCOUNTER — OFFICE VISIT (OUTPATIENT)
Dept: PHYSICAL THERAPY | Facility: REHABILITATION | Age: 68
End: 2018-12-11
Payer: MEDICARE

## 2018-12-11 DIAGNOSIS — M17.11 PRIMARY OSTEOARTHRITIS OF RIGHT KNEE: Primary | ICD-10-CM

## 2018-12-11 PROCEDURE — 97140 MANUAL THERAPY 1/> REGIONS: CPT | Performed by: PHYSICAL THERAPIST

## 2018-12-11 PROCEDURE — 97112 NEUROMUSCULAR REEDUCATION: CPT | Performed by: PHYSICAL THERAPIST

## 2018-12-11 PROCEDURE — 97110 THERAPEUTIC EXERCISES: CPT | Performed by: PHYSICAL THERAPIST

## 2018-12-11 NOTE — PROGRESS NOTES
Daily Note     Today's date: 2018  Patient name: Asad Shirley  : 1950  MRN: [id number]  Referring provider: Cheryle Ek, MD  Dx:   Encounter Diagnosis     ICD-10-CM    1  Primary osteoarthritis of right knee M17 11                   Subjective: Pt reports that he does not have much pain but he has more trouble with stiffness  Objective: See treatment diary below  Assessment: Tolerated treatment well  Patient would benefit from continued PT  Pt presents with flexion WNL but continues to have some limitation in TKE  Pt presented with decreased hamstring length this visit along with tenderness over distal hamstrings  Pt was able to perform TE without complaints of pain but did demonstrate fatigue  Plan: Continue per plan of care  Progress treatment as tolerated  Precautions: right TKA (DOS: 10/22/18), trigeminal neuralgia    Daily Treatment Diary     Manual           Right knee PROM GR JM AB Jamarcus         Right gastroc, hamstring, hip flexor str  JAMARCUS         Gr  II-IV pat  mobs GR   JAMARCUS         Reevaluation GR                             Exercise Diary           Recumbent bike 10' 10' 10' 10'         VG 7' L7 5' L7 5' L7 5' L7         Long-sitting gastroc str  TKE             Heel slides             Supine knee ext  LLPS             Supine SLR  2#  3x10 2#  3x10 2# 3x10         Standing hip abd, ext with TB             SAQ  2# 5"  3x10 2# 5"  3x10 2# 3x10         Seated knee extension, flexion AAROM             Slantboard gastroc str    30"x5 30"x5          Mini squats             LAQ  2#  3x10 2#  3x10 2# 3x10         FSU  6"  2x10 6"  2x10 6" 2x10         FSD  6"  2x10 6"  2x10 6" 2x10         Supine wall slides                                                                     Modalities              CP knee ext

## 2018-12-13 ENCOUNTER — OFFICE VISIT (OUTPATIENT)
Dept: PHYSICAL THERAPY | Facility: REHABILITATION | Age: 68
End: 2018-12-13
Payer: MEDICARE

## 2018-12-13 DIAGNOSIS — M17.11 PRIMARY OSTEOARTHRITIS OF RIGHT KNEE: Primary | ICD-10-CM

## 2018-12-13 PROCEDURE — 97110 THERAPEUTIC EXERCISES: CPT | Performed by: PHYSICAL THERAPIST

## 2018-12-13 PROCEDURE — 97530 THERAPEUTIC ACTIVITIES: CPT | Performed by: PHYSICAL THERAPIST

## 2018-12-13 PROCEDURE — 97140 MANUAL THERAPY 1/> REGIONS: CPT | Performed by: PHYSICAL THERAPIST

## 2018-12-13 NOTE — PROGRESS NOTES
Daily Note     Today's date: 2018  Patient name: Marcia Bowen  : 1950  MRN: [id number]  Referring provider: Cuca Moore MD  Dx:   Encounter Diagnosis     ICD-10-CM    1  Primary osteoarthritis of right knee M17 11        Start Time: 1150  Stop Time: 1300  Total time in clinic (min): 70 minutes    Subjective: Patient reports that his knee is achy today, but he has been using the steps a lot more and has been more active  Objective: See treatment diary below      Assessment: Tolerated treatment well  Patient demonstrated fatigue post treatment, exhibited good technique with therapeutic exercises and would benefit from continued PT  Patient's right knee flexion/extension ROM continues to improve with better tolerance  Patient with good eccentric control off step, but does have greater difficulty  Plan: Continue per plan of care  Progress treatment as tolerated  Precautions: right TKA (DOS: 10/22/18), trigeminal neuralgia    Daily Treatment Diary     Manual          Right knee PROM GR JM AB JADON GR        Right gastroc, hamstring, hip flexor str  JADON         Gr  II-IV pat  Valery Abts        Reevaluation GR                             Exercise Diary          Recumbent bike 10' 10' 10' 10' 10'        VG 7' L7 5' L7 5' L7 5' L7 5' L7        Long-sitting gastroc str  TKE             Knee flexion mach  3x10 50#        Knee ext  Mach  3x10 50#        Supine SLR  2#  3x10 2#  3x10 2# 3x10         Standing hip abd, ext with TB             SAQ  2# 5"  3x10 2# 5"  3x10 2# 3x10         Seated knee extension, flexion AAROM             Slantboard gastroc str    30"x5 30"x5  5x30"        Mini squats             LAQ  2#  3x10 2#  3x10 2# 3x10         FSU  6"  2x10 6"  2x10 6" 2x10 3x10 6"        FSD  6"  2x10 6"  2x10 6" 2x10 3x10 6"        Supine wall slides             VG: u/l, L6     2x10        LSD     2x10 4" Leg press     3x10 120#                         Modalities              CP knee ext

## 2018-12-18 ENCOUNTER — OFFICE VISIT (OUTPATIENT)
Dept: PHYSICAL THERAPY | Facility: REHABILITATION | Age: 68
End: 2018-12-18
Payer: MEDICARE

## 2018-12-18 DIAGNOSIS — M17.11 PRIMARY OSTEOARTHRITIS OF RIGHT KNEE: Primary | ICD-10-CM

## 2018-12-18 PROCEDURE — 97110 THERAPEUTIC EXERCISES: CPT | Performed by: PHYSICAL THERAPIST

## 2018-12-18 PROCEDURE — 97140 MANUAL THERAPY 1/> REGIONS: CPT | Performed by: PHYSICAL THERAPIST

## 2018-12-18 NOTE — PROGRESS NOTES
Daily Note     Today's date: 2018  Patient name: Asad Shirley  : 1950  MRN: [id number]  Referring provider: Cheryle Ek, MD  Dx:   Encounter Diagnosis     ICD-10-CM    1  Primary osteoarthritis of right knee M17 11        Start Time: 935  Stop Time: 1030  Total time in clinic (min): 55 minutes    Subjective: Patient reports that overall his knee seems to be better, but it remains painful in the front  Objective: See treatment diary below      Assessment: Tolerated treatment well  Patient demonstrated fatigue post treatment, exhibited good technique with therapeutic exercises and would benefit from continued PT  Patient continues to progress well per POC  Patient educated that his pain is likely due to increase in overall activity level  Plan: Continue per plan of care  Progress treatment as tolerated  Precautions: right TKA (DOS: 10/22/18), trigeminal neuralgia    Daily Treatment Diary     Manual         Right knee PROM GR JM AB JADON GR GR       Right gastroc, hamstring, hip flexor str  JADON  GR       Gr  II-IV patJuliet Galicia GR       Reevaluation GR                             Exercise Diary         Recumbent bike 10' 10' 10' 10' 10' 10'       VG 7' L7 5' L7 5' L7 5' L7 5' L7        Long-sitting gastroc str  TKE             Knee flexion mach  3x10 50# 3x10 50#       Knee ext  Mach  3x10 50# 3x10 50#       Supine SLR  2#  3x10 2#  3x10 2# 3x10         Standing hip abd, ext with TB             SAQ  2# 5"  3x10 2# 5"  3x10 2# 3x10         Seated knee extension, flexion AAROM             Slantboard gastroc str    30"x5 30"x5  5x30"        Mini squats             LAQ  2#  3x10 2#  3x10 2# 3x10         FSU  6"  2x10 6"  2x10 6" 2x10 3x10 6"        FSD  6"  2x10 6"  2x10 6" 2x10 3x10 6"        Supine wall slides             VG: u/l, L6     2x10 3x10       LSD     2x10 4"        Leg press 3x10 120# 3x10 120#                        Modalities              CP knee ext

## 2018-12-20 ENCOUNTER — OFFICE VISIT (OUTPATIENT)
Dept: PHYSICAL THERAPY | Facility: REHABILITATION | Age: 68
End: 2018-12-20
Payer: MEDICARE

## 2018-12-20 DIAGNOSIS — M17.11 PRIMARY OSTEOARTHRITIS OF RIGHT KNEE: Primary | ICD-10-CM

## 2018-12-20 PROCEDURE — 97140 MANUAL THERAPY 1/> REGIONS: CPT

## 2018-12-20 PROCEDURE — G8979 MOBILITY GOAL STATUS: HCPCS | Performed by: PHYSICAL THERAPIST

## 2018-12-20 PROCEDURE — 97110 THERAPEUTIC EXERCISES: CPT

## 2018-12-20 PROCEDURE — G8978 MOBILITY CURRENT STATUS: HCPCS | Performed by: PHYSICAL THERAPIST

## 2018-12-20 NOTE — PROGRESS NOTES
Daily Note     Today's date: 2018  Patient name: Claudia Winston  : 1950  MRN: [id number]  Referring provider: Ana Paula Ayala MD  Dx:   Encounter Diagnosis     ICD-10-CM    1  Primary osteoarthritis of right knee M17 11        Start Time: 1100  Stop Time: 1200  Total time in clinic (min): 60 minutes    Subjective: Patient reports that he is able to walk around the block and go up/down stairs without pain  Objective: See treatment diary below      Assessment: Tolerated treatment well  Patient demonstrated fatigue post treatment, exhibited good technique with therapeutic exercises and would benefit from continued PT  Patient had better pain free PROM this session and tolerated increases in weight to strengthen interventions listed below  Plan: Continue per plan of care  Progress treatment as tolerated  Precautions: right TKA (DOS: 10/22/18), trigeminal neuralgia    Daily Treatment Diary     Manual        Right knee PROM GR JM AB JADON GR GR LK      Right gastroc, hamstring, hip flexor str  JADON  GR LK      Gr  II-IV patMagali Erichsen GR Prom   LK      Reevaluation GR                             Exercise Diary        Recumbent bike 10' 10' 10' 10' 10' 10' 10'      VG 7' L7 5' L7 5' L7 5' L7 5' L7        Long-sitting gastroc str  TKE             Knee flexion mach  3x10 50# 3x10 50# 55#  3x10      Knee ext  Mach  3x10 50# 3x10 50# 55#  3x10      Supine SLR  2#  3x10 2#  3x10 2# 3x10         Standing hip abd, ext with TB             SAQ  2# 5"  3x10 2# 5"  3x10 2# 3x10         Seated knee extension, flexion AAROM             Slantboard gastroc str    30"x5 30"x5  5x30"  5x30"      Mini squats             LAQ  2#  3x10 2#  3x10 2# 3x10         FSU  6"  2x10 6"  2x10 6" 2x10 3x10 6"  8" 5x  6" 30x      FSD  6"  2x10 6"  2x10 6" 2x10 3x10 6"  8" 5x  6" 30x      Supine wall slides VG: u/l, L6     2x10 3x10 5'       LSD     2x10 4"        Leg press     3x10 120# 3x10 120# 130#  3x10                       Modalities              CP knee ext

## 2018-12-26 ENCOUNTER — OFFICE VISIT (OUTPATIENT)
Dept: PHYSICAL THERAPY | Facility: REHABILITATION | Age: 68
End: 2018-12-26
Payer: MEDICARE

## 2018-12-26 DIAGNOSIS — M17.11 PRIMARY OSTEOARTHRITIS OF RIGHT KNEE: Primary | ICD-10-CM

## 2018-12-26 PROCEDURE — 97110 THERAPEUTIC EXERCISES: CPT | Performed by: PHYSICAL THERAPIST

## 2018-12-26 PROCEDURE — 97140 MANUAL THERAPY 1/> REGIONS: CPT | Performed by: PHYSICAL THERAPIST

## 2018-12-26 PROCEDURE — 97530 THERAPEUTIC ACTIVITIES: CPT | Performed by: PHYSICAL THERAPIST

## 2018-12-26 NOTE — PROGRESS NOTES
Daily Note     Today's date: 2018  Patient name: Kat Maier  : 1950  MRN: [id number]  Referring provider: Deborah Caldera MD  Dx:   Encounter Diagnosis     ICD-10-CM    1  Primary osteoarthritis of right knee M17 11        Start Time: 1135  Stop Time: 1230  Total time in clinic (min): 55 minutes    Subjective: Patient reports that he continues to have some soreness in the back and sides of his knee, but overall it is feeling much better  Objective: See treatment diary below      Assessment: Tolerated treatment well  Patient demonstrated fatigue post treatment, exhibited good technique with therapeutic exercises and would benefit from continued PT  Patient with excellent right knee flexion and extension ROM  Patient with improving eccentric control descending steps, but did have difficulty with 8" step  Plan: Continue per plan of care  Progress treatment as tolerated  Precautions: right TKA (DOS: 10/22/18), trigeminal neuralgia    Daily Treatment Diary     Manual       Right knee PROM GR JM AB JADON GR GR LK      Right gastroc, hamstring, hip flexor str  JADON  GR LK      Gr  II-IV pat  Perry Copping GR Prom   LK      Reevaluation GR                             Exercise Diary       Recumbent bike 10' 10' 10' 10' 10' 10' 10' 10'     VG 7' L7 5' L7 5' L7 5' L7 5' L7        Long-sitting gastroc str  TKE             Knee flexion mach  3x10 50# 3x10 50# 55#  3x10 3x10 50#     Knee ext  Mach  3x10 50# 3x10 50# 55#  3x10 3x10 50#     Supine SLR  2#  3x10 2#  3x10 2# 3x10         Standing hip abd, ext with TB             SAQ  2# 5"  3x10 2# 5"  3x10 2# 3x10         Seated knee extension, flexion AAROM             Slantboard gastroc str    30"x5 30"x5  5x30"  5x30" 5x30"                  LAQ  2#  3x10 2#  3x10 2# 3x10         FSU  6"  2x10 6"  2x10 6" 2x10 3x10 6"  8" 5x  6" 30x 3x10 8"     FSD  6"  2x10 6"  2x10 6" 2x10 3x10 6"  8" 5x  6" 30x 3x10 6"                  VG: u/l, L6     2x10 3x10 5'  3x10 6"     LSD     2x10 4"        Leg press     3x10 120# 3x10 120# 130#  3x10 3x10 135#                      Modalities              CP knee ext

## 2018-12-28 ENCOUNTER — OFFICE VISIT (OUTPATIENT)
Dept: PHYSICAL THERAPY | Facility: REHABILITATION | Age: 68
End: 2018-12-28
Payer: MEDICARE

## 2018-12-28 DIAGNOSIS — M17.11 PRIMARY OSTEOARTHRITIS OF RIGHT KNEE: Primary | ICD-10-CM

## 2018-12-28 PROCEDURE — 97110 THERAPEUTIC EXERCISES: CPT | Performed by: PHYSICAL THERAPIST

## 2018-12-28 PROCEDURE — 97140 MANUAL THERAPY 1/> REGIONS: CPT | Performed by: PHYSICAL THERAPIST
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
## 2019-01-02 ENCOUNTER — OFFICE VISIT (OUTPATIENT)
Dept: PHYSICAL THERAPY | Facility: REHABILITATION | Age: 69
End: 2019-01-02
Payer: MEDICARE

## 2019-01-02 DIAGNOSIS — M17.11 PRIMARY OSTEOARTHRITIS OF RIGHT KNEE: Primary | ICD-10-CM

## 2019-01-02 PROCEDURE — 97110 THERAPEUTIC EXERCISES: CPT | Performed by: PHYSICAL THERAPIST

## 2019-01-02 PROCEDURE — 97140 MANUAL THERAPY 1/> REGIONS: CPT | Performed by: PHYSICAL THERAPIST

## 2019-01-02 NOTE — PROGRESS NOTES
Daily Note     Today's date: 2019  Patient name: Alba Aiken  : 1950  MRN: [id number]  Referring provider: Hendrick Goltz, MD  Dx:   Encounter Diagnosis     ICD-10-CM    1  Primary osteoarthritis of right knee M17 11        Start Time: 1130  Stop Time: 1240  Total time in clinic (min): 70 minutes    Subjective: Patient reports that he continues to have pain and weakness with ascending steps and his overall ambulatory tolerance is limited  Objective: See treatment diary below      Assessment: Tolerated treatment well  Patient demonstrated fatigue post treatment, exhibited good technique with therapeutic exercises and would benefit from continued PT  Initiated discharge planning with Patient, discussing eventual transition to independent gym program  Patient overall continues to progress well per POC, and it was reexplained to Patient about healing timeframe following knee replacement  Plan: Continue per plan of care  Progress treatment as tolerated  Precautions: right TKA (DOS: 10/22/18), trigeminal neuralgia    Daily Treatment Diary     Manual   1/2   Right knee PROM GR JM AB JADON GR GR LK  GR    Right gastroc, hamstring, hip flexor str  JADON  GR LK  GR    Gr  II-IV pat  Malissa Negrito GR Prom   LK  GR    Reevaluation GR                             Exercise Diary   1/2   Recumbent bike 10' 10' 10' 10' 10' 10' 10' 10' 10'    VG 7' L7 5' L7 5' L7 5' L7 5' L7        Long-sitting gastroc str  TKE             Knee flexion mach  3x10 50# 3x10 50# 55#  3x10 3x10 50#     Knee ext  Mach  3x10 50# 3x10 50# 55#  3x10 3x10 50#     Supine SLR  2#  3x10 2#  3x10 2# 3x10      3x10 3#   Standing hip abd, ext with TB             SAQ  2# 5"  3x10 2# 5"  3x10 2# 3x10         Seated knee extension, flexion AAROM             Slantboard gastroc str    30"x5 30"x5  5x30"  5x30" 5x30" 5x30"                 LAQ  2#  3x10 2#  3x10 2# 3x10         FSU  6"  2x10 6"  2x10 6" 2x10 3x10 6"  8" 5x  6" 30x 3x10 8"     FSD  6"  2x10 6"  2x10 6" 2x10 3x10 6"  8" 5x  6" 30x 3x10 6" 3x10 6"    SLS         5x20"    VG: u/l, L7     2x10 3x10 5'  3x10 4' 4'   LSD     2x10 4"    2x10 4"    Leg press     3x10 120# 3x10 120# 130#  3x10 3x10 135#     Backwards lunges with slider         2x10    Hip abduction - s/l         3x10 3x10   Treadmill                 Modalities              CP knee ext

## 2019-01-04 ENCOUNTER — EVALUATION (OUTPATIENT)
Dept: PHYSICAL THERAPY | Facility: REHABILITATION | Age: 69
End: 2019-01-04
Payer: MEDICARE

## 2019-01-04 DIAGNOSIS — M17.11 PRIMARY OSTEOARTHRITIS OF RIGHT KNEE: Primary | ICD-10-CM

## 2019-01-04 PROCEDURE — G8978 MOBILITY CURRENT STATUS: HCPCS | Performed by: PHYSICAL THERAPIST

## 2019-01-04 PROCEDURE — 97140 MANUAL THERAPY 1/> REGIONS: CPT | Performed by: PHYSICAL THERAPIST

## 2019-01-04 PROCEDURE — G8979 MOBILITY GOAL STATUS: HCPCS | Performed by: PHYSICAL THERAPIST

## 2019-01-04 NOTE — PROGRESS NOTES
PT Re-Evaluation     Today's date: 2019  Patient name: Ramya Eden  : 1950  MRN: [id number]  Referring provider: Rosy Pickett MD  Dx:   Encounter Diagnosis     ICD-10-CM    1  Primary osteoarthritis of right knee M17 11        Start Time: 1103  Stop Time: 1200  Total time in clinic (min): 57 minutes    Assessment  Assessment details: Since beginning physical therapy, Yasmine Enamorado has attended a total number of 21 visits and has maintained excellent compliance with established POC  Patient has made significant improvements in all areas, including decreased pain, increased strength, increased ROM, improved flexibility, improved joint mobility, decreased joint effusion, improved balance and improved quality of gait  Patient's overall mobility has significantly improved as demonstrated by improvements in Five Times Sit to Stand and TUG  Patient is reporting improved ability to perform a/iadls and engaging in social activities  Despite these improvements, Patient continues to present with pain, decreased strength, decreased ROM, decreased joint mobility, joint effusion and ambulatory dysfunction  Patient would continue to benefit from skilled physical therapy to address his aforementioned impairments, improve their level of function and to improve their overall quality of life  Impairments: abnormal coordination, abnormal gait, abnormal muscle firing, abnormal or restricted ROM, abnormal movement, activity intolerance, impaired balance, impaired physical strength, lacks appropriate home exercise program, pain with function, safety issue and weight-bearing intolerance  Understanding of Dx/Px/POC: excellent  Goals  Short Term Goals: to be achieved in 2 weeks ALL GOALS MET  1) Patient to be independent with basic HEP  2) Patient to demonstrate safe technique ambulating with SPC and RW      UPDATED POST-OPERATIVE GOALS:    Short Term Goals: to be achieved by 4 weeks  1) Patient to be independent with basic HEP  MET  2) Decrease pain by 5/10 at its worst  MET  3) Increase knee flexion ROM > 100 deg  MET  4) Increase knee extension ROM by > 5 deg  MET  5) Increase LE strength by 1/2 MMT grade in all deficient planes  MET  6) Patient to negotiate steps with a step-to pattern with use of HR  MET  7) Patient to report decreased sleep interruption secondary to pain  MET  8) Increase ambulatory tolerance by 20 min with LRAD  MET    Long Term Goals: to be achieved by discharge ALL GOALS PROGRESSING  1) FOTO equal to or greater than 58   2) Patient to be independent with comprehensive HEP  3) Abolish pain for improved quality of life  4) Increase LE strength to 5/5 MMT grade in all planes to improve a/iadls  5) Achieve full knee extension ROM to improve a/iadls  6) Increase knee flexion ROM to within 10 deg of contralateral LE to improve a/iadls  7) Patient to negotiate steps with a reciprocal pattern with use of Hr  8) Increase ambulatory tolerance to 45 min to improve participation in social activities  9) Patient to report no sleep interruption secondary to pain  Plan  Patient would benefit from: skilled PT  Planned modality interventions: biofeedback, cryotherapy, hydrotherapy, TENS and unattended electrical stimulation  Planned therapy interventions: activity modification, ADL retraining, balance, balance/weight bearing training, behavior modification, body mechanics training, functional ROM exercises, gait training, home exercise program, IADL retraining, joint mobilization, manual therapy, massage, neuromuscular re-education, patient education, strengthening, stretching, therapeutic activities, therapeutic exercise and transfer training  Frequency: 1-2x week  Duration in weeks: 8  Treatment plan discussed with: patient        Subjective Evaluation    History of Present Illness  Mechanism of injury: 80% contralateral knee:   Intermittent clicking in right knee  Lacking: sit to stands, ascending > descending steps     10 wks    19 f/u appointment  Pain  Current pain ratin  At best pain ratin  At worst pain ratin  Location: lateral > medial right knee  Quality: dull ache and throbbing  Exacerbated by: twisting  Patient Goals  Patient goals for therapy: decreased edema, improved balance, decreased pain, increased motion, return to sport/leisure activities, independence with ADLs/IADLs and increased strength          Objective     Observations     Right Knee   Positive for incision (clean, well approximated, no signs of infection)  Active Range of Motion   Left Knee   Flexion: 126 degrees   Extension: 0 degrees     Right Knee   Flexion: 115 degrees     Passive Range of Motion   Left Knee   Flexion: 129 degrees   Extension: 0 degrees     Right Knee   Flexion: 125 degrees   Extension: 0 degrees     Mobility   Patellar Mobility:     Right Knee   WFL: medial, lateral, superior and inferior    Strength/Myotome Testing     Left Hip   Planes of Motion   Flexion: 5  Extension: 5  Abduction: 5    Right Hip   Planes of Motion   Flexion: 5  Extension: 5  Abduction: 5    Left Knee   Flexion: 5  Extension: 5    Right Knee   Flexion: 5  Extension: 5    Left Ankle/Foot   Dorsiflexion: 5    Right Ankle/Foot   Dorsiflexion: 5    Ambulation   Weight-Bearing Status   Weight-Bearing Status (Left): full weight bearing   Weight-Bearing Status (Right): full weight-bearing      Ambulation: Level Surfaces   Ambulation without assistive device: independent    Observational Gait   Gait: within functional limits     Functional Assessment     Single Leg Stance - Eyes Open   Left  Trial 1: 1 seconds    Right  Trial 1: 1 seconds    Comments  INITIAL EVALUATION (10/8/18):   Bilateral squat: poor technique, use of UEs for balance  Timed Up and Go (TUG): 12 12 sec, Independent ambulation, Mod I sit to/from stand with arm rests  Five Times Sit to Stand Test: 20 17 sec, Independent ; 17 23 sec, Mod I with arm rests    REEVALUATION (10/29/18): Bilateral squat: poor technique, use of UEs for balance  Timed Up and Go (TUG): 19 56 sec, Mod I ambulation with RW, Mod I sit to/from stand with b/l arm rests  Five Times Sit to Stand Test: 16 81 sec, Mod I with arm rests    REEVALUATION (11/29/18): Bilateral squat: good technique, use of UEs for balance  Timed Up and Go (TUG): 15 35 sec, Independent ambulation, Mod I sit to/from stand with b/l arm rests ; 13 16 sec, Mod I ambulation with SPC, Mod I sit to/from stand with b/l arm rest  Five Times Sit to Stand Test: 11 91 sec, Mod I with use of b/l arm rests    REEVALUATION (1/4/19): Bilateral squat: good technique  Timed Up and Go (TUG): 10 63 sec, Independent ambulation and sit to /from stand ; 7 41 sec, Independent ambulation, Mod I sit to/from with arm rests  Five Times Sit to Stand Test: 14 84 sec, Independent with good eccentric control; 8 68 sec, Mod I with arm rests with good eccentric control    General Comments     Knee Comments  INITIAL EVALUATION (10/8/18): Right Knee Circumference Measurements (cm):     Mid patella: 49 6    10 cm proximal to mid patella: 58 5    10 cm distal to mid patella: 44 2    REEVALUATION (10/29/18): Right Knee Circumference Measurements (cm):    Mid patella: 54 0    10 cm proximal to mid patella: 61 0    10 cm distal to mid patella: 46 0    REEVALUATION (11/29/18):   Right Knee Circumference Measurements (cm):    Mid patella: 50 7    10 cm proximal to mid patella: 59 0    10 cm distal to mid patella: 45 2      Flowsheet Rows      Most Recent Value   PT/OT G-Codes   Current Score  51   Projected Score  58   FOTO information reviewed  Yes   Assessment Type  Re-evaluation   G code set  Mobility: Walking & Moving Around   Mobility: Walking and Moving Around Current Status ()  CJ   Mobility: Walking and Moving Around Goal Status ()  CI          Precautions: right TKA (DOS: 10/22/18), trigeminal neuralgia    Daily Treatment Diary Manual  1/4            Right knee PROM             Right gastroc, hamstring, hip flexor str  Gr  II-IV pat  mobs             Reevaluation GR                             Exercise Diary  1/4            Recumbent bike 10'            VG             Long-sitting gastroc str  TKE             Knee flexion mach  Knee ext  Mach  Supine SLR             Standing hip abd, ext with TB             SAQ             Seated knee extension, flexion AAROM             Slantboard gastroc str                            LAQ             FSU             FSD             SLS             VG: u/l, L7 4'            LSD             Leg press             Backwards lunges with slider             Hip abduction - s/l             Treadmill                 Modalities  1/4            CP knee ext

## 2019-01-08 ENCOUNTER — HOSPITAL ENCOUNTER (OUTPATIENT)
Dept: RADIOLOGY | Facility: HOSPITAL | Age: 69
Discharge: HOME | End: 2019-01-08
Attending: ORTHOPAEDIC SURGERY
Payer: MEDICARE

## 2019-01-08 ENCOUNTER — OFFICE VISIT (OUTPATIENT)
Dept: OBGYN CLINIC | Facility: HOSPITAL | Age: 69
End: 2019-01-08

## 2019-01-08 VITALS
BODY MASS INDEX: 37.94 KG/M2 | DIASTOLIC BLOOD PRESSURE: 76 MMHG | WEIGHT: 264.4 LBS | SYSTOLIC BLOOD PRESSURE: 120 MMHG | HEART RATE: 81 BPM

## 2019-01-08 DIAGNOSIS — M25.561 ACUTE PAIN OF RIGHT KNEE: Primary | ICD-10-CM

## 2019-01-08 DIAGNOSIS — M25.561 ACUTE PAIN OF RIGHT KNEE: ICD-10-CM

## 2019-01-08 DIAGNOSIS — Z96.651 STATUS POST TOTAL KNEE REPLACEMENT, RIGHT: ICD-10-CM

## 2019-01-08 PROCEDURE — 73560 X-RAY EXAM OF KNEE 1 OR 2: CPT

## 2019-01-08 PROCEDURE — 99024 POSTOP FOLLOW-UP VISIT: CPT | Performed by: ORTHOPAEDIC SURGERY

## 2019-01-08 NOTE — PROGRESS NOTES
76 y o male presents for 3 months postoperative visit status post right TKA  Patient states he is doing very well regarding his right total knee arthroplasty  He states he has minimal to no pain in his right knee  He does ambulate with the assistance of a cane for stability purposes  Patient is currently with physical therapy  He is very happy regarding his right total knee arthroplasty resulted this time  Denies any calf pain chest pain shortness of breath numbness tingling right lower extremity  Review of Systems  Review of systems negative unless otherwise specified in HPI    Past Medical History  Past Medical History:   Diagnosis Date    Arthritis     Right knee    Chronic pain disorder     Trigeminal neuralgia pain     Ulcer of bile duct        Past Surgical History  Past Surgical History:   Procedure Laterality Date    APPENDECTOMY  1962    BRAIN SURGERY  2005    Gamma Knife for Trigeminal Neuralgia    ESOPHAGOGASTRODUODENOSCOPY      HERNIA REPAIR  1952    OTHER SURGICAL HISTORY      gamma knife RT in 2007 and 2010    CA STEREO TREAT TRIGEMINAL NERVE Right 3/6/2018    Procedure: RHIZOTOMY TRIGEMINAL NERVES (V2 & V3);   Surgeon: Jg Hansen MD;  Location: [facility];  Service: Neurosurgery    CA TOTAL KNEE ARTHROPLASTY Right 10/22/2018    Procedure: ARTHROPLASTY KNEE TOTAL;  Surgeon: Fabricio Lewis MD;  Location: [facility];  Service: Orthopedics    RHIZOTOMY W/ RADIOFREQUENCY ABLATION Right 08/08/2014    Stereotactic Ablation of Gasserian Ganglion Right sided trigeminal V2 radiofrequency rhizotomy x2       Current Medications  Current Outpatient Prescriptions on File Prior to Visit   Medication Sig Dispense Refill    Acetaminophen (TYLENOL PO) Take by mouth as needed      acetaminophen (TYLENOL) 650 mg CR tablet Take 1 tablet (650 mg total) by mouth every 8 (eight) hours as needed for mild pain 30 tablet 0    ascorbic acid (VITAMIN C) 500 MG tablet Take 1 tablet (500 mg total) by mouth daily 60 tablet 0    Calcium 250 MG CAPS Take 600 mg by mouth daily        Cholecalciferol (VITAMIN D3) 1000 units CAPS Take by mouth      cyanocobalamin (VITAMIN B-12) 1,000 mcg tablet Take 1 5 tablets by mouth daily        Ketoprofen 10 % CREA as needed    2    MELATONIN PO Take by mouth      Multiple Vitamins-Minerals (PRESERVISION AREDS) TABS Take 1 tablet by mouth daily      phenytoin (DILANTIN) 50 mg tablet 2 tabs  tablet 0    pregabalin (LYRICA) 200 MG capsule Take 2 tabs in the am, 1 tab in the afternoon, 2 tabs at bedtime 150 capsule 2    pyridoxine (VITAMIN B6) 100 mg tablet Take 2 tablets by mouth daily        TURMERIC CURCUMIN PO Take 1,500 mg by mouth daily      [DISCONTINUED] docusate sodium (COLACE) 100 mg capsule Take 1 capsule (100 mg total) by mouth 2 (two) times a day 10 capsule 0    [DISCONTINUED] enoxaparin (LOVENOX) 40 mg/0 4 mL 1 subcutaneous injection daily x 28 days AFTER surgery 28 Syringe 0    [DISCONTINUED] enoxaparin (LOVENOX) 40 mg/0 4 mL 1 subcutaneous injection daily x 28 days AFTER surgery 28 Syringe 0    [DISCONTINUED] ferrous sulfate 324 (65 Fe) mg Take 1 tablet (324 mg total) by mouth 2 (two) times a day before meals 60 tablet 0    [DISCONTINUED] folic acid (FOLVITE) 1 mg tablet Take 1 tablet (1 mg total) by mouth daily 30 tablet 0    [DISCONTINUED] oxyCODONE (ROXICODONE) 5 mg immediate release tablet take 1-2 tablets by mouth EVERY 4-6 HOURS AS NEEDED FOR PAIN 30 tablet 0     No current facility-administered medications on file prior to visit          Recent Labs [facility])    0  Lab Value Date/Time   HCT 38 4 10/25/2018 0450   HCT 43 6 06/24/2015 1107   HGB 12 9 10/25/2018 0450   HGB 14 7 06/24/2015 1107   WBC 9 02 10/25/2018 0450   WBC 5 76 06/24/2015 1107   INR 0 99 09/26/2018 1408   INR 0 94 07/29/2014 1048   ESR 2 06/24/2015 1107   GLUCOSE 78 12/19/2015 0816   HGBA1C 5 1 09/26/2018 1408         Physical exam  · General: Awake, Alert, Oriented  · Eyes: Pupils equal, round and reactive to light  · Heart: regular rate and rhythm  · Lungs: No audible wheezing    right Knee exam  · Examination patient's right knee well-healed anterior incision full active extension flexion greater than 115° quadriceps strength 5/5 sensation intact distal pulses present    Imaging  X-rays reviewed by myself in the office today x-rays do reveal no loosening of the right total knee implant in acceptable alignment      Assessment:  Status post 2 and half months right total knee arthroplasty doing well  Plan:  Weightbearing as tolerated lower extremity  Lifetime dental antibiotic prophylaxis recommended  Activities as tolerated  Continue physical therapy range of motion strengthening exercise  Follow-up in 9 months time repeat x-rays two views right knee

## 2019-01-09 ENCOUNTER — OFFICE VISIT (OUTPATIENT)
Dept: PHYSICAL THERAPY | Facility: REHABILITATION | Age: 69
End: 2019-01-09
Payer: MEDICARE

## 2019-01-09 DIAGNOSIS — M17.11 PRIMARY OSTEOARTHRITIS OF RIGHT KNEE: Primary | ICD-10-CM

## 2019-01-09 PROCEDURE — 97140 MANUAL THERAPY 1/> REGIONS: CPT

## 2019-01-09 PROCEDURE — 97110 THERAPEUTIC EXERCISES: CPT

## 2019-01-09 PROCEDURE — 97112 NEUROMUSCULAR REEDUCATION: CPT

## 2019-01-11 ENCOUNTER — OFFICE VISIT (OUTPATIENT)
Dept: PHYSICAL THERAPY | Facility: REHABILITATION | Age: 69
End: 2019-01-11
Payer: MEDICARE

## 2019-01-11 DIAGNOSIS — M17.11 PRIMARY OSTEOARTHRITIS OF RIGHT KNEE: Primary | ICD-10-CM

## 2019-01-11 PROCEDURE — 97110 THERAPEUTIC EXERCISES: CPT | Performed by: PHYSICAL THERAPIST

## 2019-01-11 PROCEDURE — 97530 THERAPEUTIC ACTIVITIES: CPT | Performed by: PHYSICAL THERAPIST

## 2019-01-11 PROCEDURE — 97112 NEUROMUSCULAR REEDUCATION: CPT | Performed by: PHYSICAL THERAPIST

## 2019-01-11 NOTE — PROGRESS NOTES
Daily Note     Today's date: 2019  Patient name: Kat Maier  : 1950  MRN: [id number]  Referring provider: Deborah Caldera MD  Dx:   Encounter Diagnosis     ICD-10-CM    1  Primary osteoarthritis of right knee M17 11                   Subjective: ***      Objective: See treatment diary below      Assessment: Tolerated treatment well  Patient demonstrated fatigue post treatment, exhibited good technique with therapeutic exercises and would benefit from continued PT  Plan: Continue per plan of care  Progress treatment as tolerated  Precautions: right TKA (DOS: 10/22/18), trigeminal neuralgia    Daily Treatment Diary     Manual  19          Right knee PROM  VK           Right gastroc, hamstring, hip flexor str  VK           Gr  II-IV pat  mobs             Reevaluation GR                             Exercise Diary  19          Recumbent bike 10' 10min           VG             Long-sitting gastroc str  TKE             Knee flexion mach  50# 3x10           Knee ext  Mach  50# 3x10           Supine SLR  3# 3x10           Standing hip abd, ext with TB             SAQ             Seated knee extension, flexion AAROM             Slantboard gastroc str    5x30"                        LAQ             FSU  6" 3x10           FSD  6" 3x10           SLS             VG: u/l, L7 4' 4 min           LSD             Leg press  135# 3x10           Backwards lunges with slider             Hip abduction - s/l             Treadmill                 Modalities              CP knee ext

## 2019-01-11 NOTE — PROGRESS NOTES
Daily Note     Today's date: 2019  Patient name: Nima Vargas  : 1950  MRN: [id number]  Referring provider: Ryan Banuelos MD  Dx:   Encounter Diagnosis     ICD-10-CM    1  Primary osteoarthritis of right knee M17 11        Start Time: 1100  Stop Time: 1200  Total time in clinic (min): 60 minutes    Subjective: Patient reports that he had a f/u appointment with his surgeon who was very pleased with his progress to date  Patient states that he feels great today and much more comfortable negotiating steps  Patient states that he would like to be discharged from therapy next visit and would like to transition to an independent gym program       Objective: See treatment diary below      Assessment: Tolerated treatment well  Patient demonstrated fatigue post treatment, exhibited good technique with therapeutic exercises and would benefit from continued PT  Patient with good eccentric control descending step  Patient did fatigue with ascending step secondary to quadriceps weakness  Patient plans on transitioning to comprehensive home/gym program next visit with discharge from formal PT  Plan: Continue per plan of care  Potential discharge next visit  Progress treatment as tolerated  Precautions: right TKA (DOS: 10/22/18), trigeminal neuralgia    Daily Treatment Diary     Manual  19          Right knee PROM  VK GR          Right gastroc, hamstring, hip flexor str  VK GR          Gr  II-IV pat  mobs   GR          Reevaluation GR                             Exercise Diary  19          Recumbent bike 10' 10min 10'          VG             Long-sitting gastroc str  TKE             Knee flexion mach  50# 3x10           Knee ext  Mach  50# 3x10           Supine SLR  3# 3x10           Standing hip abd, ext with TB             SAQ             Seated knee extension, flexion AAROM             Slantboard gastroc str    5x30" 5x30"                       LAQ FSU  6" 3x10 3x10 8"          FSD  6" 3x10 3x10 6"          SLS             VG: u/l, L7 4' 4 min 4'          LSD   3x10 6"          Leg press  135# 3x10           Backwards lunges with slider   3x10          Hip abduction - s/l             Treadmill                 Modalities  1/4            CP knee ext

## 2019-01-14 DIAGNOSIS — G50.0 TRIGEMINAL NEURALGIA OF RIGHT SIDE OF FACE: ICD-10-CM

## 2019-01-14 RX ORDER — PREGABALIN 200 MG/1
CAPSULE ORAL
Qty: 150 CAPSULE | Refills: 2 | Status: SHIPPED | OUTPATIENT
Start: 2019-01-14 | End: 2019-01-23 | Stop reason: SDUPTHER

## 2019-01-14 NOTE — TELEPHONE ENCOUNTER
Received a vm from patient requesting a refill on lyrica be sent to [facility]

## 2019-01-15 ENCOUNTER — TELEPHONE (OUTPATIENT)
Dept: NEUROLOGY | Facility: CLINIC | Age: 69
End: 2019-01-15

## 2019-01-15 ENCOUNTER — OFFICE VISIT (OUTPATIENT)
Dept: PHYSICAL THERAPY | Facility: REHABILITATION | Age: 69
End: 2019-01-15
Payer: MEDICARE

## 2019-01-15 DIAGNOSIS — M17.11 PRIMARY OSTEOARTHRITIS OF RIGHT KNEE: Primary | ICD-10-CM

## 2019-01-15 PROCEDURE — G8979 MOBILITY GOAL STATUS: HCPCS | Performed by: PHYSICAL THERAPIST

## 2019-01-15 PROCEDURE — G8980 MOBILITY D/C STATUS: HCPCS | Performed by: PHYSICAL THERAPIST

## 2019-01-15 PROCEDURE — 97110 THERAPEUTIC EXERCISES: CPT | Performed by: PHYSICAL THERAPIST

## 2019-01-15 NOTE — TELEPHONE ENCOUNTER
[facility] informing patient that we need to reschedule his appointment for tomorrow, 1/16, as Dr Dyan Esparza will be out of the office  Left call back number for patient  Please schedule next available appt with Dr Dyan Esparza when pt calls back

## 2019-01-15 NOTE — TELEPHONE ENCOUNTER
When patient calls back can be scheduled for sooner appt with Alessandra Cano, Pooja Rosado per Doris and Luis Deleon  If patient only wants Dr Dyan Esparza then schedule for first available

## 2019-01-15 NOTE — PROGRESS NOTES
PT Re-Evaluation     Today's date: 1/15/2019  Patient name: Luis Cole  : 1950  MRN: [id number]  Referring provider: Carmen Garcia MD  Dx:   Encounter Diagnosis     ICD-10-CM    1  Primary osteoarthritis of right knee M17 11        Start Time: 1045  Stop Time: 1138  Total time in clinic (min): 53 minutes    Assessment  Assessment details: Deivn Page is approximately 11 wks s/p right TKA and has attended a total number of 23 visits to date  Patient has maintained excellent compliance with established POC  Patient has made significant improvements in all areas, including decreased pain, increased strength, increased ROM, improved flexibility, improved joint mobility, decreased joint effusion, improved balance and improved quality of gait  Patient's overall mobility has significantly improved as demonstrated by improvements in Five Times Sit to Stand and TUG  Patient is reporting improved ability to perform a/iadls and engaging in social activities  Patient is independent with his current comprehensive HEP and will be transitioning to an independent gym program  Patient has been instructed to contact PT if he begins to notice a decline in function or if he has any questions or concerns  Patient will be discharged at this time  Patient is in agreement with plan  Understanding of Dx/Px/POC: excellent  Goals  Short Term Goals: to be achieved in 2 weeks ALL GOALS MET  1) Patient to be independent with basic HEP  2) Patient to demonstrate safe technique ambulating with SPC and RW  UPDATED POST-OPERATIVE GOALS:    Short Term Goals: to be achieved by 4 weeks  1) Patient to be independent with basic HEP  MET  2) Decrease pain by 5/10 at its worst  MET  3) Increase knee flexion ROM > 100 deg  MET  4) Increase knee extension ROM by > 5 deg  MET  5) Increase LE strength by 1/2 MMT grade in all deficient planes  MET  6) Patient to negotiate steps with a step-to pattern with use of HR   MET  7) Patient to report decreased sleep interruption secondary to pain  MET  8) Increase ambulatory tolerance by 20 min with LRAD  MET    Long Term Goals: to be achieved by discharge   1) FOTO equal to or greater than 58  MET  2) Patient to be independent with comprehensive HEP  MET  3) Abolish pain for improved quality of life  PROGRESSED, BUT NOT MET  4) Increase LE strength to 5/5 MMT grade in all planes to improve a/iadls  MET  5) Achieve full knee extension ROM to improve a/iadls  MET  6) Increase knee flexion ROM to within 10 deg of contralateral LE to improve a/iadls  MET  7) Patient to negotiate steps with a reciprocal pattern with use of HR  MET  8) Increase ambulatory tolerance to 45 min to improve participation in social activities  MET  9) Patient to report no sleep interruption secondary to pain  MET    Plan  Planned therapy interventions: home exercise program  Treatment plan discussed with: patient        Subjective Evaluation    History of Present Illness  Mechanism of injury: Patient currently estimates his right knee overall level of function to be approximately 80% of his contralateral knee  Patient describes having intermittent pain-free clicking in his knee while negotiating steps  Patient does continue to have mild difficulty transitioning from a seated to standing position and ascending > descending steps, however, attributes this to the clicking in his knee which has made him feel apprehensive  Patient is overall happy with his progress to date and wishes to transition to an independent HEP and gym program   Pain  No pain reported  Current pain ratin  At best pain ratin  At worst pain ratin  Location: lateral > medial right knee  Quality: dull ache and throbbing  Exacerbated by: twisting      Treatments  Current treatment: physical therapy  Patient Goals  Patient goals for therapy: decreased edema, improved balance, decreased pain, increased motion, return to sport/leisure activities, independence with ADLs/IADLs and increased strength          Objective     Observations     Right Knee   Positive for incision (clean, well approximated, no signs of infection)  Active Range of Motion   Left Knee   Flexion: 126 degrees   Extension: 0 degrees     Right Knee   Flexion: 115 degrees     Passive Range of Motion   Left Knee   Flexion: 129 degrees   Extension: 0 degrees     Right Knee   Flexion: 125 degrees   Extension: 0 degrees     Mobility   Patellar Mobility:     Right Knee   WFL: medial, lateral, superior and inferior    Strength/Myotome Testing     Left Hip   Planes of Motion   Flexion: 5  Extension: 5  Abduction: 5    Right Hip   Planes of Motion   Flexion: 5  Extension: 5  Abduction: 5    Left Knee   Flexion: 5  Extension: 5    Right Knee   Flexion: 5  Extension: 5    Left Ankle/Foot   Dorsiflexion: 5    Right Ankle/Foot   Dorsiflexion: 5    Ambulation   Weight-Bearing Status   Weight-Bearing Status (Left): full weight bearing   Weight-Bearing Status (Right): full weight-bearing      Ambulation: Level Surfaces   Ambulation without assistive device: independent    Observational Gait   Gait: within functional limits     Functional Assessment     Single Leg Stance - Eyes Open   Left  Trial 1: 1 seconds    Right  Trial 1: 1 seconds    Comments  INITIAL EVALUATION (10/8/18): Bilateral squat: poor technique, use of UEs for balance  Timed Up and Go (TUG): 12 12 sec, Independent ambulation, Mod I sit to/from stand with arm rests  Five Times Sit to Stand Test: 20 17 sec, Independent ; 17 23 sec, Mod I with arm rests    REEVALUATION (10/29/18): Bilateral squat: poor technique, use of UEs for balance  Timed Up and Go (TUG): 19 56 sec, Mod I ambulation with RW, Mod I sit to/from stand with b/l arm rests  Five Times Sit to Stand Test: 16 81 sec, Mod I with arm rests    REEVALUATION (11/29/18):   Bilateral squat: good technique, use of UEs for balance  Timed Up and Go (TUG): 15 35 sec, Independent ambulation, Mod I sit to/from stand with b/l arm rests ; 13 16 sec, Mod I ambulation with SPC, Mod I sit to/from stand with b/l arm rest  Five Times Sit to Stand Test: 11 91 sec, Mod I with use of b/l arm rests    REEVALUATION (1/4/19): Bilateral squat: good technique  Timed Up and Go (TUG): 10 63 sec, Independent ambulation and sit to /from stand ; 7 41 sec, Independent ambulation, Mod I sit to/from with arm rests  Five Times Sit to Stand Test: 14 84 sec, Independent with good eccentric control; 8 68 sec, Mod I with arm rests with good eccentric control    General Comments     Knee Comments  INITIAL EVALUATION (10/8/18): Right Knee Circumference Measurements (cm):     Mid patella: 49 6    10 cm proximal to mid patella: 58 5    10 cm distal to mid patella: 44 2    REEVALUATION (10/29/18): Right Knee Circumference Measurements (cm):    Mid patella: 54 0    10 cm proximal to mid patella: 61 0    10 cm distal to mid patella: 46 0    REEVALUATION (11/29/18): Right Knee Circumference Measurements (cm):    Mid patella: 50 7    10 cm proximal to mid patella: 59 0    10 cm distal to mid patella: 45 2      Flowsheet Rows      Most Recent Value   PT/OT G-Codes   Current Score  60   Projected Score  58   FOTO information reviewed  Yes   Assessment Type  Discharge   G code set  Mobility: Walking & Moving Around   Mobility: Walking and Moving Around Goal Status ()  CI   Mobility: Walking and Moving Around Discharge Status ()  CI          Precautions: right TKA (DOS: 10/22/18), trigeminal neuralgia    Daily Treatment Diary     Manual  1/4 1/15           Right knee PROM             Right gastroc, hamstring, hip flexor str  Gr  II-IV pat  mobs             Reevaluation GR                             Exercise Diary  1/4 1/15           Recumbent bike 10' 10'           VG             Long-sitting gastroc str  TKE             Knee flexion mach  Knee ext  Mach               Supine SLR  3x10 YTB Standing hip abd, ext with TB in SLS  2x10 YTB ea  b/l           SAQ             Seated knee extension, flexion AAROM             Slantboard gastroc str    HEP                        LAQ  HEP           FSU  HEP           FSD  HEP           SLS  HEP           VG: u/l, L7 4' 4'           LSD  HEP           Leg press  HEP           Backwards lunges with slider  HEP           Hip abduction - s/l  3x10 YTB           Treadmill             Squats at plinthe  2x10               Modalities  1/4            CP knee ext

## 2019-01-16 ENCOUNTER — OFFICE VISIT (OUTPATIENT)
Dept: NEUROLOGY | Facility: CLINIC | Age: 69
End: 2019-01-16
Payer: MEDICARE

## 2019-01-16 VITALS
BODY MASS INDEX: 38.6 KG/M2 | HEIGHT: 70 IN | WEIGHT: 269.6 LBS | SYSTOLIC BLOOD PRESSURE: 126 MMHG | DIASTOLIC BLOOD PRESSURE: 68 MMHG

## 2019-01-16 DIAGNOSIS — G50.0 TRIGEMINAL NEURALGIA: Primary | ICD-10-CM

## 2019-01-16 PROCEDURE — 99214 OFFICE O/P EST MOD 30 MIN: CPT | Performed by: NURSE PRACTITIONER

## 2019-01-16 RX ORDER — TOPIRAMATE 50 MG/1
25 TABLET, FILM COATED ORAL
Qty: 90 TABLET | Refills: 3 | Status: SHIPPED | OUTPATIENT
Start: 2019-01-16 | End: 2019-03-22 | Stop reason: SDUPTHER

## 2019-01-16 NOTE — PROGRESS NOTES
Patient ID: Jad Hunter is a 76 y o  male  Assessment/Plan:    Trigeminal neuralgia  Pt noting worse pain over the past week, which he feels may be related to coming off of pain medications following his recent knee surgery  He continues to feel that the dilantin is not providing much benefit and that only the lyrica is helpful  Unfortunately he is already on very high doses of this medication  He has also tried and failed many other first line treatments in the past  He has previously had nerve blocks, which he felt was effective, so I will refer him back to pain management for consideration of another nerve block  In the meantime, I will have him try topiramate (there is some limited data showing efficacy of topiramate in trigeminal neuralgia)  He will start on 25mg at bedtime and increase to 50mg after one week  From there he will continue to increase by 25mg every two weeks with a goal dose of 150mg at HS  Side effects were discussed and he was encouraged to call any report anything that he may experience  I have encouraged him to continue with the titration even if he does not see initial results  If he finds topiramate to be helpful, we could possibly consider weaning off of dilantin at his next visit  He is already scheduled to follow-up with Dr Urbano Coleman in March, but will call with any questions or concerns if needed  Subjective: Yogesh Russellwesley Cadena is a 78 year old male presenting to the office today in neurological follow up of trigeminal neuralgia  He stopped working Jan of 2018  He was last seen by Marycruz Kat on 10/16/18 at which time he was continued on dilantin 100mg TID and lyrica 400-200-400  It was recommended that he return to pain management for consideration of injections vs botox vs nerve block  He presents for follow-up today and states that he is still having a lot of pain  He describes pain that can move around but originated under his right eye   He continues to experience the most intense pain under his right eye but more recently he is having pain across his right eyebrow and forehead  It remains right sided  It is not overly painful to light touch and he describes a deeper pain, but if he presses on it it will send a "jolt of pain " He describes the pain as electrical rated as a 7/10 currently but it can be 10/10  He feels that it has been much worse over the past week  He is never pain free  He feels that the lyrica has helped more than any other medications  He is only currently using tylenol for "flare ups" which he feels takes the edge off  He does not feel that the dilantin provides him with any benefit at all  He recently had some knee surgery  He tells me that he was given some of the "loopy" medications (tramadol) in the hospital            Trigeminal Neuralgia:   Surgery:   -  2 gamma knife procedures (~2008 and ~2011)  -  8/8/2014 - S/p Right V2 Rhizotomy by Dr Fady Martino went away for 6 months but the pain did not get worse for 3 years)   - 3/6/2018 S/p Rhizotomy TN V2 & V3      Medication used:  - Prevention: Lyrica (helped the most), amitriptyline (stopped due to fatigue), carbamazepine-makes goofy/confusion, cymbalta- no improvement, trileptal-dizzy, confusion, Neurontin (confusion), Lamictal (confusion), botox injections (did not help)  - Abortive: baclofen, aspirin, tramadol      Since the surgery has noted numbness on the right side of the face along with change in taste as well which I told the patient is typical following the surgery  Davie Aden has not noticed any improvement in his symptoms since last seen  Davie Aden brought a picture today showing me where his typical trigger points in his face are        Tinnitus: right ear only     Meningoma:   Stable     MRI head:   IMPRESSION-    1   Stable nonenhancing rounded T2 hypointense structure in right Meckel's cave correlating to the previously present calcified structure noted on the prior 2013 head CT, stable from the previous MRI, possibly   a treated or calcified meningioma   Cisternal segments of both 5th nerves are otherwise normal in signal without abnormal enhancement   No brainstem abnormality   No interval change from the previous study  2   No acute infarction, intracranial hemorrhage or mass effect       MRI brain- stable          The following portions of the patient's history were reviewed and updated as appropriate: past family history, past social history and past surgical history  Objective:    Blood pressure 126/68, height 5' 10" (1 778 m), weight 122 kg (269 lb 9 6 oz)  Physical Exam   Constitutional: He appears well-developed and well-nourished  HENT:   Head: Normocephalic  Eyes: Pupils are equal, round, and reactive to light  Lids are normal    Neurological: He has normal strength  Psychiatric: He has a normal mood and affect  His speech is normal and behavior is normal    Vitals reviewed  Neurological Exam  Mental Status  Awake, alert and oriented to person, place and time  Speech is normal  Language is fluent with no aphasia  Attention and concentration are normal  Fund of knowledge is appropriate for level of education  Cranial Nerves  CN II: Visual acuity is normal  Visual fields full to confrontation  CN III, IV, VI: Extraocular movements intact bilaterally  Normal lids and orbits bilaterally  Pupils equal round and reactive to light bilaterally  CN V: Facial sensation is normal   CN VII: Full and symmetric facial movement  CN VIII: Hearing is normal   CN IX, X: Palate elevates symmetrically  Normal gag reflex  CN XI: Shoulder shrug strength is normal   CN XII: Tongue midline without atrophy or fasciculations  Motor   Strength is 5/5 throughout all four extremities  Sensory  Light touch is normal in upper and lower extremities  Gait  Casual gait is normal including stance, stride, and arm swing          ROS:    Review of Systems   Constitutional: Positive for appetite change (decrease) and fatigue  HENT: Negative  Eyes: Negative  Respiratory: Negative  Cardiovascular: Negative  Gastrointestinal: Negative  Endocrine: Negative  Genitourinary: Negative  Musculoskeletal: Positive for back pain and neck pain (back of neck)  Skin: Negative  Allergic/Immunologic: Negative  Neurological: Positive for speech difficulty, numbness (one side) and headaches  Hematological: Negative  Psychiatric/Behavioral: Positive for confusion, decreased concentration and sleep disturbance  All other systems reviewed and are negative

## 2019-01-16 NOTE — ASSESSMENT & PLAN NOTE
Pt noting worse pain over the past week, which he feels may be related to coming off of pain medications following his recent knee surgery  He continues to feel that the dilantin is not providing much benefit and that only the lyrica is helpful  Unfortunately he is already on very high doses of this medication  He has also tried and failed many other first line treatments in the past  He has previously had nerve blocks, which he felt was effective, so I will refer him back to pain management for consideration of another nerve block  In the meantime, I will have him try topiramate (there is some limited data showing efficacy of topiramate in trigeminal neuralgia)  He will start on 25mg at bedtime and increase to 50mg after one week  From there he will continue to increase by 25mg every two weeks with a goal dose of 150mg at HS  Side effects were discussed and he was encouraged to call any report anything that he may experience  I have encouraged him to continue with the titration even if he does not see initial results  If he finds topiramate to be helpful, we could possibly consider weaning off of dilantin at his next visit  He is already scheduled to follow-up with Dr Jose D Gil in March, but will call with any questions or concerns if needed

## 2019-01-16 NOTE — PATIENT INSTRUCTIONS
1  Start topiramate per the following schedule: Take 1/2 tab (25mg) at bedtime for one week then,  Take 1 tab (50mg) at bedtime for 2 weeks then,  Take 1 5 tabs (75mg) at bedtime for 2 weeks then,  Take 2 tabs (100mg) at bedtime for 2 weeks then,  Take 2 5 tabs (125mg) at bedtime for 2 weeks then,  Take 3 tabs (150mg) at bedtime  2  The most common side effects with topiramate are feeling tired or feeling mentally foggy  Hopefully since you are taking this at bedtime, you will sleep through any potential side effects  Please call us if you find any side effects to be overly bothersome  3  Continue on the lyrica and dilantin as currently prescribed  If the topiramate is helpful, we can consider weaning you off of the dilantin  4  Please call pain management for consideration of another nerve block

## 2019-01-16 NOTE — TELEPHONE ENCOUNTER
Received fax from [address] [place] [address] stating Lyrica requires PA  I spoke with pharmacist who states he believes it is because patient is taking over 600mg per day which is the max per insurance without PA  I attempted to submit PA on CMM and received message "Clinical Override not needed"  Call placed to [id number] [place] to obtain further information  Per representative, PA is still good and pharmacy will need to contact [id number] [place]  [employer] pharmacy help desk to resolve error  Call placed to [facility] and pharmacist, Jarred Damian, made aware of above    He states he will contact help desk

## 2019-01-18 ENCOUNTER — OFFICE VISIT (OUTPATIENT)
Dept: PAIN MEDICINE | Facility: CLINIC | Age: 69
End: 2019-01-18
Payer: MEDICARE

## 2019-01-18 ENCOUNTER — APPOINTMENT (OUTPATIENT)
Dept: PHYSICAL THERAPY | Facility: REHABILITATION | Age: 69
End: 2019-01-18
Payer: MEDICARE

## 2019-01-18 VITALS
RESPIRATION RATE: 18 BRPM | SYSTOLIC BLOOD PRESSURE: 140 MMHG | BODY MASS INDEX: 38.51 KG/M2 | DIASTOLIC BLOOD PRESSURE: 80 MMHG | HEIGHT: 70 IN | TEMPERATURE: 98.4 F | WEIGHT: 269 LBS

## 2019-01-18 DIAGNOSIS — G50.0 TRIGEMINAL NEURALGIA: ICD-10-CM

## 2019-01-18 DIAGNOSIS — F11.20 UNCOMPLICATED OPIOID DEPENDENCE (HCC): ICD-10-CM

## 2019-01-18 DIAGNOSIS — Z79.891 LONG-TERM CURRENT USE OF OPIATE ANALGESIC: ICD-10-CM

## 2019-01-18 DIAGNOSIS — G89.4 CHRONIC PAIN SYNDROME: Primary | ICD-10-CM

## 2019-01-18 PROCEDURE — 99214 OFFICE O/P EST MOD 30 MIN: CPT | Performed by: NURSE PRACTITIONER

## 2019-01-18 PROCEDURE — 80305 DRUG TEST PRSMV DIR OPT OBS: CPT | Performed by: NURSE PRACTITIONER

## 2019-01-18 RX ORDER — OXYCODONE HYDROCHLORIDE AND ACETAMINOPHEN 5; 325 MG/1; MG/1
TABLET ORAL
Qty: 7 TABLET | Refills: 0 | Status: SHIPPED | OUTPATIENT
Start: 2019-01-18 | End: 2019-03-29 | Stop reason: ALTCHOICE

## 2019-01-18 NOTE — PROGRESS NOTES
Pt c/o facial pain due to trigeminal neuralgia     Assessment:  1  Chronic pain syndrome    2  Trigeminal neuralgia    3  Uncomplicated opioid dependence ([facility] [address] )    4  Long-term current use of opiate analgesic        Plan:  Mariano Bernheim is a 76 y o  male with a history of trigeminal neuralgia  The patient continue with right-sided facial pain, which is consistent with trigeminal neuralgia  The patient reported a couple months relief of symptoms after undergoing a right V1 trigeminal nerve block under ultrasound guidance  He is requesting to repeat this injection at this time  He was educated on the procedures most common risks  He verbalized understanding, would like to proceed with the procedure  Felix Canavan will be scheduled for this procedure  The patient reports that he has spikes in pain some days where his pain has been unbearable  He reports when his pain flares he is not even able to touch his skin due to increased pain  He reported that he underwent right knee surgery and was prescribed oxycodone which helped both with his postoperative pain as well as his trigeminal neuralgia  Therefore at this time, I will provide the patient with a 1 week prescription of oxycodone 5/325 mg 1 tablet daily, 7 day supply, 7 tablets to help with his pain  I will also conduct a baseline urine drug screen this office visit and if the urine drug screen is appropriate I will be able to provide him with an additional prescriptions  He reported that he has no history of abuse with drugs or alcohol  He was educated on the medications most common side effects which include dizziness, drowsiness, constipation and nausea  He was instructed not to drive or operate heavy machinery while taking this medication  He verbalized understanding  An opioid contract was reviewed with the patient  The patient was made aware they are only to receive opioid medication from our office, and must take the medication as prescribed  If the medication is lost or stolen, it will not be replaced  We also do not condone the use of illegal substances as well as the use of alcohol with opioid medication  Random urine drug screens will also be performed at office visits  Lastly, he was informed that office visits are needed for refills  Patient was agreeable and signed the contract  There are risks associated with opioid medications, including dependence, addiction and tolerance  The patient understands and agrees to use these medications only as prescribed  Potential side effects of the medications include, but are not limited to, constipation, drowsiness, addiction, impaired judgment and risk of fatal overdose if not taken as prescribed  The patient was warned against driving while taking sedation medications  Sharing medications is a felony  At this point in time, the patient is showing no signs of addiction, abuse, diversion or suicidal ideation  A urine drug screen was collected at today's office visit as part of our medication management protocol  The point of care testing results were appropriate for what was being prescribed  The specimen will be sent for confirmatory testing  The drug screen is medically necessary because the patient is either dependent on opioid medication or is being considered for opioid medication therapy and the results could impact ongoing or future treatment  The drug screen is to evaluate for the presences or absence of prescribed, non-prescribed, and/or illicit drugs/substances  [place] Prescription Drug Monitoring Program report was reviewed and was appropriate     The patient will follow up in 4 weeks or sooner with the worsening of symptoms  My impressions and treatment recommendations were discussed in detail with the patient who verbalized understanding and had no further questions  Discharge instructions were provided   I personally saw and examined the patient and I agree with the above discussed plan of care  Orders Placed This Encounter   Procedures    US guidance     Standing Status:   Future     Standing Expiration Date:   1/18/2023     Scheduling Instructions:      No prep required  Please bring your insurance cards, a form of photo ID and a list of your medications with you  Arrive 15 minutes prior to your appointment time in order to register  To schedule this appointment, please contact Central Scheduling at [phone]  Order Specific Question:   Reason for Exam:     Answer:   right V1 trigeminal nerve block under ultrasound guidance     Order Specific Question:   What is the patient's sedation requirement? Answer:   No Sedation     No orders of the defined types were placed in this encounter  History of Present Illness:  Daniella Smith is a 76 y o  male with a history of trigeminal neuralgia  The patient was last seen office on 9/21/2018 where he underwent a right V1 trigeminal nerve block under ultrasound guidance  Which provided him a couple of months relief of symptoms  He presents for a follow up office visit in regards to Facial Pain (right sided)  The patients current symptoms include right-sided facial pain that starts under his right eye and radiates into his forehead  He reports intermittent flares of pain where he is unable to touch his skin due to increased pain  He describes his pain as dull aching, sharp pain that is constant nature occurring mostly during the evening hours  The patient reports that his pain is symptoms have worsened since last office visit  He denies any recent injury or predisposing trauma  He currently rates his pain as 7 out 10 numeric pain scale  Current pain medications includes:  Lyrica 200 mg 2 capsules in the a m , 1 capsule in the afternoon to 2 capsules at bedtime  The patient reports that this regimen is providing minimal-moderate % pain relief    The patient is reporting no side effects from this pain medication regimen  Pain Contract Signed: 1/18/2019, Last Urine Drug Screen: 1/18/2019    I have personally reviewed and/or updated the patient's past medical history, past surgical history, family history, social history, current medications, allergies, and vital signs today  Review of Systems    Patient Active Problem List   Diagnosis    Trigeminal neuralgia    Facial spasm    Anemia    Bilateral patellofemoral syndrome    Diverticulosis of colon    Esophageal reflux    Intracranial meningioma ([address] )    Vertigo    Vitamin D deficiency    Need for pneumococcal vaccination    Preoperative evaluation of a medical condition to rule out surgical contraindications (TAR required)    Insomnia    Primary osteoarthritis of right knee    Pre-op examination       Past Medical History:   Diagnosis Date    Arthritis     Right knee    Chronic pain disorder     Trigeminal neuralgia pain     Ulcer of bile duct        Past Surgical History:   Procedure Laterality Date    APPENDECTOMY  1962    BRAIN SURGERY  2005    Gamma Knife for Trigeminal Neuralgia    ESOPHAGOGASTRODUODENOSCOPY      HERNIA REPAIR  1952    OTHER SURGICAL HISTORY      gamma knife RT in 2007 and 2010    NC STEREO TREAT TRIGEMINAL NERVE Right 3/6/2018    Procedure: RHIZOTOMY TRIGEMINAL NERVES (V2 & V3);   Surgeon: Toni Herrmann MD;  Location: [facility];  Service: Neurosurgery    NC TOTAL KNEE ARTHROPLASTY Right 10/22/2018    Procedure: ARTHROPLASTY KNEE TOTAL;  Surgeon: Suzanne Cedeno MD;  [facility];  Service: Orthopedics    RHIZOTOMY W/ RADIOFREQUENCY ABLATION Right 08/08/2014    Stereotactic Ablation of Gasserian Ganglion Right sided trigeminal V2 radiofrequency rhizotomy x2       Family History   Problem Relation Age of Onset    Emphysema Mother     Colon cancer Father     Skin cancer Maternal Grandmother        Social History     Occupational History    Retired      21 years [employer] &  Social History Main Topics    Smoking status: Never Smoker    Smokeless tobacco: Never Used    Alcohol use No    Drug use: No    Sexual activity: Yes       Current Outpatient Prescriptions on File Prior to Visit   Medication Sig    Acetaminophen (TYLENOL PO) Take by mouth as needed    acetaminophen (TYLENOL) 650 mg CR tablet Take 1 tablet (650 mg total) by mouth every 8 (eight) hours as needed for mild pain    ascorbic acid (VITAMIN C) 500 MG tablet Take 1 tablet (500 mg total) by mouth daily    Calcium 250 MG CAPS Take 600 mg by mouth daily      Cholecalciferol (VITAMIN D3) 1000 units CAPS Take by mouth    cyanocobalamin (VITAMIN B-12) 1,000 mcg tablet Take 1 5 tablets by mouth daily      Ketoprofen 10 % CREA as needed      MELATONIN PO Take by mouth    Multiple Vitamins-Minerals (PRESERVISION AREDS) TABS Take 1 tablet by mouth daily    phenytoin (DILANTIN) 50 mg tablet 2 tabs TID    pregabalin (LYRICA) 200 MG capsule Take 2 tabs in the am, 1 tab in the afternoon, 2 tabs at bedtime    pyridoxine (VITAMIN B6) 100 mg tablet Take 2 tablets by mouth daily      topiramate (TOPAMAX) 50 MG tablet Take 0 5 tablets (25 mg total) by mouth daily at bedtime X 1 week then increase to 1 tab (50mg) at HS then follow printed instructions    TURMERIC CURCUMIN PO Take 1,500 mg by mouth daily     No current facility-administered medications on file prior to visit  Allergies   Allergen Reactions    Gabapentin Delerium    Oxcarbazepine Dizziness    Tramadol        Physical Exam:    /80   Temp 98 4 °F (36 9 °C)   Resp 18   Ht 5' 10" (1 778 m)   Wt 122 kg (269 lb)   BMI 38 60 kg/m²     Constitutional:normal, well developed, well nourished, alert, in no distress and non-toxic and no overt pain behavior   and obese  Eyes:anicteric  HEENT:grossly intact  Neck:supple, symmetric, trachea midline and no masses   Pulmonary:even and unlabored  Cardiovascular:No edema or pitting edema present  Skin:Normal without rashes or lesions and well hydrated  Psychiatric:Mood and affect appropriate  Neurologic:Cranial Nerves II-XII grossly intact  Musculoskeletal:normal           Imaging

## 2019-01-23 ENCOUNTER — APPOINTMENT (OUTPATIENT)
Dept: PHYSICAL THERAPY | Facility: REHABILITATION | Age: 69
End: 2019-01-23
Payer: MEDICARE

## 2019-01-23 DIAGNOSIS — G50.0 TRIGEMINAL NEURALGIA OF RIGHT SIDE OF FACE: ICD-10-CM

## 2019-01-23 RX ORDER — PREGABALIN 200 MG/1
CAPSULE ORAL
Qty: 450 CAPSULE | Refills: 0 | Status: SHIPPED | OUTPATIENT
Start: 2019-01-23 | End: 2019-03-29 | Stop reason: SDUPTHER

## 2019-01-25 ENCOUNTER — APPOINTMENT (OUTPATIENT)
Dept: PHYSICAL THERAPY | Facility: REHABILITATION | Age: 69
End: 2019-01-25
Payer: MEDICARE

## 2019-01-29 ENCOUNTER — APPOINTMENT (OUTPATIENT)
Dept: PHYSICAL THERAPY | Facility: REHABILITATION | Age: 69
End: 2019-01-29
Payer: MEDICARE

## 2019-02-01 ENCOUNTER — PROCEDURE VISIT (OUTPATIENT)
Dept: PAIN MEDICINE | Facility: CLINIC | Age: 69
End: 2019-02-01
Payer: MEDICARE

## 2019-02-01 VITALS — HEART RATE: 62 BPM | SYSTOLIC BLOOD PRESSURE: 120 MMHG | TEMPERATURE: 97.6 F | DIASTOLIC BLOOD PRESSURE: 74 MMHG

## 2019-02-01 DIAGNOSIS — G50.0 TRIGEMINAL NEURALGIA: Primary | ICD-10-CM

## 2019-02-01 PROCEDURE — 76942 ECHO GUIDE FOR BIOPSY: CPT | Performed by: ANESTHESIOLOGY

## 2019-02-01 PROCEDURE — 64400 NJX AA&/STRD TRIGEMINAL NRV: CPT | Performed by: ANESTHESIOLOGY

## 2019-02-01 RX ORDER — BUPIVACAINE HYDROCHLORIDE 5 MG/ML
INJECTION, SOLUTION EPIDURAL; INTRACAUDAL ONCE
Status: CANCELLED | OUTPATIENT
Start: 2019-02-01 | End: 2019-02-01

## 2019-02-01 RX ORDER — BUPIVACAINE HYDROCHLORIDE 5 MG/ML
1 INJECTION, SOLUTION PERINEURAL ONCE
Status: COMPLETED | OUTPATIENT
Start: 2019-02-01 | End: 2019-02-01

## 2019-02-01 RX ADMIN — BUPIVACAINE HYDROCHLORIDE 1 ML: 5 INJECTION, SOLUTION PERINEURAL at 15:07

## 2019-02-01 NOTE — PROGRESS NOTES
Pre-procedure Diagnosis:   1  Trigeminal neuralgia       Post-procedure Diagnosis:   1  Trigeminal neuralgia       Operation Title(s):  Right V1 trigeminal nerve block under ultrasound guidance  Attending Surgeon:   Cornelius Arellano MD  Anesthesia:   Local     Indications: The patient is a 76y o  year-old male with a diagnosis of   1  Trigeminal neuralgia      The patient's history and physical exam were reviewed  The risks, benefits and alternatives to the procedure were discussed, and all questions were answered to the patient's satisfaction  The patient agreed to proceed, and written informed consent was obtained      Procedure in Detail: The patient was brought into the exam room and placed in the supine position on the exam room table  The right forehead was prepped with chloraprep and draped in a sterile manner       A sterile ultrasound probe cover and gel was placed over a 3 75 MHz curvilinear transducer probe  The probe was placed over the right forehead and eye to visualize the supraorbital notch  Optimal needle path was determined  Then, under ultrasound guidance, a 2 inch ultrasound needle was advanced until the needle entered the target area  After visualization of the tip in the target area and negative aspiration for blood, a solution consisting of 1 mL mL 0 5% bupivacaine and 1 mL mL Depo-Medrol (40mg/ml) was easily injected      The patient's forehead was cleaned and a bandage was placed over the sites of needle insertion      Disposition: The patient tolerated the procedure well and there were no apparent complications  The patient was given written discharge instructions for the procedure

## 2019-03-22 DIAGNOSIS — G50.0 TRIGEMINAL NEURALGIA: ICD-10-CM

## 2019-03-22 RX ORDER — TOPIRAMATE 100 MG/1
TABLET, FILM COATED ORAL
Qty: 90 TABLET | Refills: 3 | Status: SHIPPED | OUTPATIENT
Start: 2019-03-22 | End: 2019-07-18 | Stop reason: SDUPTHER

## 2019-03-22 NOTE — TELEPHONE ENCOUNTER
Pt calling [facility], he was agreeable to taking the 1 100mg tablet instead of 2 50mg tablets   (States that he never needed to increase further)

## 2019-03-28 NOTE — PROGRESS NOTES
[facility]  PATIENT:  Brisa Stokes  MRN:  [id number]  :  1950  DATE OF SERVICE:  3/29/2019      Assessment/Plan:      Problem List Items Addressed This Visit        Nervous and Auditory    Trigeminal neuralgia - Primary    Relevant Medications    pregabalin (LYRICA) 200 MG capsule    clonazePAM (KlonoPIN) 0 25 MG disintegrating tablet    Intracranial meningioma (HCC)       Other    Facial spasm      Other Visit Diagnoses     Trigeminal neuralgia of right side of face        Relevant Medications    pregabalin (LYRICA) 200 MG capsule    clonazePAM (KlonoPIN) 0 25 MG disintegrating tablet    Other Relevant Orders    MRI brain with and without contrast    Comprehensive metabolic panel         Facial pain:  - Lyrica 200mg in am and one in the afternoon and two in pm    - Topamax 100mg one in pm, will increase up slowly to 200 mg at bedtime  - I will add Klonopin 0 25 to take at bedtime as well to see if this helps with his pain as well  - Talked to patient about possibly using CBD  He is to call with any questions concerns or any side effects from either of these medications  Will get MRI of the head     History of Present Illness: We had the pleasure of evaluating Brisa Stokes in neurological follow up  today for headaches  As you know,  he is a 76 y o  right handedmale  Patient retired in 2018  He is here today for evaluation of his trigeminal neuralgia  Tinnitus: right ear only  - still there and it only bother him more so when he has to marii to something especially when talking to his wife  - Did discuss with pt to go see ENT    Meningoma:   Stable, last MRI     Trigeminal neuralgia  What medications do you take or have you taken for your headaches?    PREVENTATIVE:  - Lyrica (help the most), Trileptal (dizziness and confusion), Tegretol (confusion/goofy), Neurontin (confusion), Lamictal (confusion)  - Amitriptyline (stopped due to fatigue), Cymbalta (no improvement)  - Botox (did not help)  ABORTIVE:  - baclofen,  - tramadol ([place] and ended up in the ER)  - aspirin, Tylenol,     Surgery:   -  2 gamma knife procedures (~2008 and ~2011)  -  8/8/2014 - S/p Right V2 Rhizotomy by Dr Milly Hanson went away for 6 months but the pain did not get worse for 3 years)   - 3/6/2018 S/p Rhizotomy TN V2 & V3       - 09/21/2018 nerve - Right V1 trigeminal nerve block under ultrasound guidance - some relief from this, constant but less intense  Alternative therapies used? none  Pain triggers:  Sneezing, high pressure    What is your current pain level - 6/10    How often does the pain occur?   constant pain: there all the time  Severe pain: 1-2 times a day     Are you ever pain free? No  Aura/warning and how long does it last - none    What time of the day does the pain start? Constant: there all the time  Severe pain: morning when he wakes up and then again at 6-7 pm    How long do the pain  last?   Constant pain: there all the time  Severe pain: 15 min to 2 hours in am and 10 min but may occur few times in the evening    Describe your usual pain:   Constant pain: aching   Severe pain: shooting pain    Where is your pain located? Constant Pain: right v2 under the eye on the superiors nasal region, at time he has dull pain on the right frontal and between the eye brow  Severe Pain:  v2 and at the medial nasal region    What is the intensity of pain? Constant Pain: 2 to 6/10  Severe Pain: 10/10    Associated symptoms:   None    Have you used CBD or THC for your headaches and how often? No  Have you ever had any Brain imaging? yes  I personally reviewed these images  Recent laboratory data was reviewed  Medications and allergies were reviewed  2015 MRI head:   IMPRESSION-    1   Stable nonenhancing rounded T2 hypointense structure in right Meckel's cave correlating to the previously present calcified structure noted on the prior 2013 head CT, stable from the previous MRI, possibly   a treated or calcified meningioma   Cisternal segments of both 5th nerves are otherwise normal in signal without abnormal enhancement   No brainstem abnormality   No interval change from the previous study     2   No acute infarction, intracranial hemorrhage or mass effect       MRI brain- stable        Past Medical History:   Diagnosis Date    Arthritis     Right knee    Chronic pain disorder     Trigeminal neuralgia pain     Ulcer of bile duct        Patient Active Problem List   Diagnosis    Trigeminal neuralgia    Facial spasm    Anemia    Bilateral patellofemoral syndrome    Diverticulosis of colon    Esophageal reflux    Intracranial meningioma (HCC)    Vertigo    Vitamin D deficiency    Need for pneumococcal vaccination    Preoperative evaluation of a medical condition to rule out surgical contraindications (TAR required)    Insomnia    Primary osteoarthritis of right knee    Pre-op examination       Medications:      Current Outpatient Medications   Medication Sig Dispense Refill    Acetaminophen (TYLENOL PO) Take 2 tablets by mouth as needed       ascorbic acid (VITAMIN C) 500 MG tablet Take 1 tablet (500 mg total) by mouth daily 60 tablet 0    Calcium 250 MG CAPS Take 600 mg by mouth daily        Cholecalciferol (VITAMIN D3) 1000 units CAPS Take 1 capsule by mouth daily       cyanocobalamin (VITAMIN B-12) 1,000 mcg tablet Take 1 tablet by mouth daily       Ketoprofen 10 % CREA Apply 1 application topically as needed   2    MELATONIN PO Take 10 mg by mouth daily at bedtime       Multiple Vitamins-Minerals (PRESERVISION AREDS) TABS Take 1 tablet by mouth daily      pregabalin (LYRICA) 200 MG capsule Take 2 tabs in the am, 1 tab in the afternoon, 2 tabs at bedtime 450 capsule 0    pyridoxine (VITAMIN B6) 100 mg tablet Take 1 tablet by mouth daily       topiramate (TOPAMAX) 100 mg tablet Take 1 tablet at bedtime (Patient taking differently: Take 100 mg by mouth daily at bedtime Take 1 tablet at bedtime) 90 tablet 3    TURMERIC CURCUMIN PO Take 1,500 mg by mouth daily      clonazePAM (KlonoPIN) 0 25 MG disintegrating tablet 1 at bedtime daily 30 tablet 3     No current facility-administered medications for this visit  Allergies: Allergies   Allergen Reactions    Gabapentin Delerium    Oxcarbazepine Dizziness    Lamictal [Lamotrigine] Confusion     Difficulty to think clearly     Tramadol        Family History:     Family History   Problem Relation Age of Onset    Emphysema Mother     Colon cancer Father     Skin cancer Maternal Grandmother        Social History:     Social History     Socioeconomic History    Marital status: /Civil Union     Spouse name:  Jesus Pretty Number of children: 2    Years of education: BS Degree plus addl classes    Highest education level: Not on file   Occupational History    Occupation: Retired     Comment: 21 years [address] resource strain: Not on file    Food insecurity:     Worry: Not on file     Inability: Not on file   [employer] needs:     Medical: Not on file     Non-medical: Not on file   Tobacco Use    Smoking status: Never Smoker    Smokeless tobacco: Never Used   Substance and Sexual Activity    Alcohol use: No    Drug use: No    Sexual activity: Yes   Lifestyle    Physical activity:     Days per week: Not on file     Minutes per session: Not on file    Stress: Not on file   Relationships    Social connections:     Talks on phone: Not on file     Gets together: Not on file     Attends Rastafarian service: Not on file     Active member of club or organization: Not on file     Attends meetings of clubs or organizations: Not on file     Relationship status: Not on file    Intimate partner violence:     Fear of current or ex partner: Not on file     Emotionally abused: Not on file     Physically abused: Not on file     Forced sexual activity: Not on file   Other Topics Concern    Not on file   Social History Narrative    Caffeine use: Daily caffeinated cola, drinks coffee    Lives at home with wife Viki Harrsion         Objective:   Physical Exam:                                                                   Vitals:            /57 (BP Location: Left arm, Patient Position: Sitting, Cuff Size: Large)   Pulse 70   Ht 5' 10" (1 778 m)   Wt 122 kg (268 lb 14 4 oz)   BMI 38 58 kg/m²   BP Readings from Last 3 Encounters:   03/29/19 109/57   02/01/19 120/74   01/18/19 140/80     Pulse Readings from Last 3 Encounters:   03/29/19 70   02/01/19 62   01/08/19 81            CONSTITUTIONAL: Well developed, well nourished, well groomed  No dysmorphic features  Eyes:  PERRLA, EOM normal      Neck:  Normal ROM, neck supple  HEENT:  Normocephalic atraumatic  No meningismus  Oropharynx is clear and moist  No oral mucosal lesions  Chest:  Respirations regular and unlabored  Cardiovascular:  Distal extremities warm without palpable edema or tenderness, no observed significant swelling  Musculoskeletal:  Full range of motion  Skin:  warm and dry   Psychiatric:  Normal behavior and appropriate affect        Neurological Examination:   Mental status/cognitive function: Orientated to time, place and person  Cranial Nerves: 2 to 12 intact  Motor Exam:  5/5 upper and lower extremity  Sensory: grossly intact light touch in all extremities  Reflexes: 1/4 throughout  Coordination: Finger nose finger intact bilaterally, no tremor noted  Gait:  Wide-based gait     Review of Systems:   Review of Systems   Constitutional: Negative  HENT: Positive for tinnitus  Eyes: Negative  Respiratory: Negative  Cardiovascular: Negative  Gastrointestinal: Negative  Endocrine: Negative  Genitourinary: Negative  Musculoskeletal: Positive for neck stiffness  Skin: Negative  Allergic/Immunologic: Negative  Neurological: Negative  Hematological: Negative  Psychiatric/Behavioral: Negative  I personally reviewed and updated the ROS that was entered by the medical assistant       I have spent 30 minutes with Patient  today in which greater than 50% of this time was spent in counseling/coordination of care regarding Diagnostic results, Prognosis, Risks and benefits of tx options, Intructions for management, Patient and family education, Importance of tx compliance, Risk factor reductions, Impressions and Plan of care as above        Author:  Hebert Newsome MD 3/29/2019 1:26 PM

## 2019-03-29 ENCOUNTER — OFFICE VISIT (OUTPATIENT)
Dept: NEUROLOGY | Facility: CLINIC | Age: 69
End: 2019-03-29
Payer: MEDICARE

## 2019-03-29 VITALS
DIASTOLIC BLOOD PRESSURE: 57 MMHG | SYSTOLIC BLOOD PRESSURE: 109 MMHG | HEIGHT: 70 IN | HEART RATE: 70 BPM | WEIGHT: 268.9 LBS | BODY MASS INDEX: 38.5 KG/M2

## 2019-03-29 DIAGNOSIS — G50.0 TRIGEMINAL NEURALGIA: Primary | ICD-10-CM

## 2019-03-29 DIAGNOSIS — D32.0 INTRACRANIAL MENINGIOMA (HCC): ICD-10-CM

## 2019-03-29 DIAGNOSIS — G50.0 TRIGEMINAL NEURALGIA OF RIGHT SIDE OF FACE: ICD-10-CM

## 2019-03-29 DIAGNOSIS — G51.39 FACIAL SPASM: ICD-10-CM

## 2019-03-29 PROCEDURE — 99214 OFFICE O/P EST MOD 30 MIN: CPT | Performed by: PSYCHIATRY & NEUROLOGY

## 2019-03-29 RX ORDER — CLONAZEPAM 0.25 MG/1
TABLET, ORALLY DISINTEGRATING ORAL
Qty: 30 TABLET | Refills: 3 | Status: SHIPPED | OUTPATIENT
Start: 2019-03-29 | End: 2019-06-27 | Stop reason: ALTCHOICE

## 2019-03-29 RX ORDER — PREGABALIN 200 MG/1
CAPSULE ORAL
Qty: 450 CAPSULE | Refills: 0 | Status: SHIPPED | OUTPATIENT
Start: 2019-03-29 | End: 2019-07-18 | Stop reason: SDUPTHER

## 2019-03-29 NOTE — PATIENT INSTRUCTIONS
Facial pain:  - Lyrica 200mg in am and one in the afternoon and two in pm    - Topamax 100mg one in pm, will increase up slowly to 200 mg at bedtime  - I will add Klonopin 0 25 to take at bedtime as well to see if this helps with his pain as well    He is to call with any questions concerns or any side effects from either of these medications      Will get MRI of the head

## 2019-04-03 ENCOUNTER — APPOINTMENT (OUTPATIENT)
Dept: LAB | Facility: CLINIC | Age: 69
End: 2019-04-03
Payer: MEDICARE

## 2019-04-03 DIAGNOSIS — G50.0 TRIGEMINAL NEURALGIA OF RIGHT SIDE OF FACE: ICD-10-CM

## 2019-04-03 LAB
ALBUMIN SERPL BCP-MCNC: 3.6 G/DL (ref 3.5–5)
ALP SERPL-CCNC: 89 U/L (ref 46–116)
ALT SERPL W P-5'-P-CCNC: 24 U/L (ref 12–78)
ANION GAP SERPL CALCULATED.3IONS-SCNC: 2 MMOL/L (ref 4–13)
AST SERPL W P-5'-P-CCNC: 12 U/L (ref 5–45)
BILIRUB SERPL-MCNC: 0.52 MG/DL (ref 0.2–1)
BUN SERPL-MCNC: 10 MG/DL (ref 5–25)
CALCIUM SERPL-MCNC: 9.7 MG/DL (ref 8.3–10.1)
CHLORIDE SERPL-SCNC: 115 MMOL/L (ref 100–108)
CO2 SERPL-SCNC: 25 MMOL/L (ref 21–32)
CREAT SERPL-MCNC: 1.05 MG/DL (ref 0.6–1.3)
GFR SERPL CREATININE-BSD FRML MDRD: 73 ML/MIN/1.73SQ M
GLUCOSE P FAST SERPL-MCNC: 97 MG/DL (ref 65–99)
POTASSIUM SERPL-SCNC: 4.3 MMOL/L (ref 3.5–5.3)
PROT SERPL-MCNC: 6.5 G/DL (ref 6.4–8.2)
SODIUM SERPL-SCNC: 142 MMOL/L (ref 136–145)

## 2019-04-03 PROCEDURE — 80053 COMPREHEN METABOLIC PANEL: CPT

## 2019-04-03 PROCEDURE — 36415 COLL VENOUS BLD VENIPUNCTURE: CPT

## 2019-04-17 NOTE — TELEPHONE ENCOUNTER
Call placed for caresense alert F/U and post-op F/U, spoke with pt  Pt reporting "things are going well"  Pt reporting his pain is currently 5/10  He reports he is taking 1000mg tylenol 2-3 times per day- pt reminded MDD 3G  Pt also taking lyrica as prescribed (3x/day) and denies oxycodone use currently  Pt reporting he is ambulating using his cane, he denies any falls  Pt reports he is "getting around nicely and now trying steps"  Pt reporting his incision is "clean", he denies any drainage/bleeding/redness  Pt reports he finished his lovenox injection series on 11/23  LBM 11/26, pt denies colace use but reports he is taking one tablet of dulcolax daily as needed  He reports he is having "regular" BMs at an interval of every other day  Pt denies bloody stools  Pt reports his outpt PT is "going well, my therapist thinks my movement is very good"  Pt denies any CP/SOB/dizziness/calf pain/fevers  Pt does report intermittent clicking of the R knee when ambulating, pt denies any falls  Pt would like to know if this is "normal"- surgeon messaged with pt's question  Pt denies additional concerning S&S to him  He denies any questions at this time  Pt encouraged to call me with any questions, concerns or issues  5

## 2019-04-21 ENCOUNTER — HOSPITAL ENCOUNTER (OUTPATIENT)
Dept: MRI IMAGING | Facility: HOSPITAL | Age: 69
Discharge: HOME | End: 2019-04-21
Attending: PSYCHIATRY & NEUROLOGY
Payer: MEDICARE

## 2019-04-21 DIAGNOSIS — G50.0 TRIGEMINAL NEURALGIA OF RIGHT SIDE OF FACE: ICD-10-CM

## 2019-04-21 PROCEDURE — 70553 MRI BRAIN STEM W/O & W/DYE: CPT

## 2019-04-21 PROCEDURE — A9585 GADOBUTROL INJECTION: HCPCS | Performed by: PSYCHIATRY & NEUROLOGY

## 2019-04-21 RX ADMIN — GADOBUTROL 10 ML: 604.72 INJECTION INTRAVENOUS at 12:17

## 2019-06-27 ENCOUNTER — OFFICE VISIT (OUTPATIENT)
Dept: PAIN MEDICINE | Facility: CLINIC | Age: 69
End: 2019-06-27
Payer: MEDICARE

## 2019-06-27 ENCOUNTER — OFFICE VISIT (OUTPATIENT)
Dept: NEUROLOGY | Facility: CLINIC | Age: 69
End: 2019-06-27
Payer: MEDICARE

## 2019-06-27 VITALS
HEART RATE: 65 BPM | WEIGHT: 268 LBS | HEIGHT: 70 IN | DIASTOLIC BLOOD PRESSURE: 78 MMHG | SYSTOLIC BLOOD PRESSURE: 124 MMHG | RESPIRATION RATE: 18 BRPM | BODY MASS INDEX: 38.37 KG/M2

## 2019-06-27 VITALS — SYSTOLIC BLOOD PRESSURE: 116 MMHG | DIASTOLIC BLOOD PRESSURE: 59 MMHG | HEART RATE: 62 BPM | TEMPERATURE: 97.4 F

## 2019-06-27 DIAGNOSIS — G89.4 CHRONIC PAIN SYNDROME: Primary | ICD-10-CM

## 2019-06-27 DIAGNOSIS — Z79.891 LONG-TERM CURRENT USE OF OPIATE ANALGESIC: ICD-10-CM

## 2019-06-27 DIAGNOSIS — G50.0 TRIGEMINAL NEURALGIA: Primary | ICD-10-CM

## 2019-06-27 DIAGNOSIS — F11.20 UNCOMPLICATED OPIOID DEPENDENCE (HCC): ICD-10-CM

## 2019-06-27 DIAGNOSIS — G50.0 TRIGEMINAL NEURALGIA: ICD-10-CM

## 2019-06-27 PROCEDURE — 99214 OFFICE O/P EST MOD 30 MIN: CPT | Performed by: PSYCHIATRY & NEUROLOGY

## 2019-06-27 PROCEDURE — 99214 OFFICE O/P EST MOD 30 MIN: CPT | Performed by: NURSE PRACTITIONER

## 2019-06-27 PROCEDURE — 80305 DRUG TEST PRSMV DIR OPT OBS: CPT | Performed by: NURSE PRACTITIONER

## 2019-06-27 RX ORDER — OLANZAPINE 5 MG/1
TABLET ORAL
Qty: 30 TABLET | Refills: 1 | Status: SHIPPED | OUTPATIENT
Start: 2019-06-27 | End: 2019-08-09

## 2019-06-27 RX ORDER — TOPIRAMATE 100 MG/1
TABLET, FILM COATED ORAL
Qty: 60 TABLET | Refills: 3 | Status: SHIPPED | OUTPATIENT
Start: 2019-06-27 | End: 2020-09-15

## 2019-06-27 RX ORDER — OXYCODONE HYDROCHLORIDE AND ACETAMINOPHEN 5; 325 MG/1; MG/1
TABLET ORAL
Qty: 30 TABLET | Refills: 0 | Status: SHIPPED | OUTPATIENT
Start: 2019-06-27 | End: 2019-08-28 | Stop reason: SDUPTHER

## 2019-07-02 ENCOUNTER — TELEPHONE (OUTPATIENT)
Dept: NEUROLOGY | Facility: CLINIC | Age: 69
End: 2019-07-02

## 2019-07-02 NOTE — TELEPHONE ENCOUNTER
Per Dr Sony Maya patient needs 4-6 week follow up appt with [place]  Spoke with patient and scheduled him for follow up on 8/9/19 at 0730 am in [facility]  Patient requesting an appt later in AM if something available opens I will call patient  If not he is ok with keeping this appt

## 2019-07-18 DIAGNOSIS — G50.0 TRIGEMINAL NEURALGIA OF RIGHT SIDE OF FACE: ICD-10-CM

## 2019-07-18 DIAGNOSIS — G50.0 TRIGEMINAL NEURALGIA: ICD-10-CM

## 2019-07-18 RX ORDER — PREGABALIN 200 MG/1
CAPSULE ORAL
Qty: 450 CAPSULE | Refills: 0 | Status: SHIPPED | OUTPATIENT
Start: 2019-07-18 | End: 2019-07-23 | Stop reason: SDUPTHER

## 2019-07-18 RX ORDER — TOPIRAMATE 100 MG/1
TABLET, FILM COATED ORAL
Qty: 90 TABLET | Refills: 3 | Status: SHIPPED | OUTPATIENT
Start: 2019-07-18 | End: 2019-07-23 | Stop reason: SDUPTHER

## 2019-07-18 NOTE — TELEPHONE ENCOUNTER
Received a request for a 90 day supply  Patient scheduled for a follow up 8/9/19 and last script was done on 6/27/19 for #60 with 3 refills  Please authorize script if appropriate  Also got a request for Lyrica

## 2019-07-23 ENCOUNTER — TELEPHONE (OUTPATIENT)
Dept: NEUROLOGY | Facility: CLINIC | Age: 69
End: 2019-07-23

## 2019-07-23 RX ORDER — PREGABALIN 200 MG/1
CAPSULE ORAL
Qty: 450 CAPSULE | Refills: 0 | Status: SHIPPED | OUTPATIENT
Start: 2019-07-23 | End: 2019-10-15 | Stop reason: SDUPTHER

## 2019-07-23 RX ORDER — TOPIRAMATE 100 MG/1
TABLET, FILM COATED ORAL
Qty: 90 TABLET | Refills: 3 | Status: SHIPPED | OUTPATIENT
Start: 2019-07-23 | End: 2019-08-09

## 2019-07-23 NOTE — TELEPHONE ENCOUNTER
Dr Nile LAINEZ:    Patient called in to update us on status of medication  Patient had taken olanzepine - 1 tablet at  for 1 week and has since stopped medication as he feels this was causing him negative side effects  He was sleeping all day and all night and was having a hard time functioning  Just wanted to let us know he has stopped medication  Is not in need of anything to replace at this time but will let us know if he does later on

## 2019-07-23 NOTE — TELEPHONE ENCOUNTER
Pt's wife called regarding refills  She states that these went to local pharm and they need to go to [facility]  I called and cancelled both scripts  I re-entered scripts to be sent to [facility]    Please sign off

## 2019-07-25 ENCOUNTER — OFFICE VISIT (OUTPATIENT)
Dept: PAIN MEDICINE | Facility: CLINIC | Age: 69
End: 2019-07-25
Payer: MEDICARE

## 2019-07-25 VITALS
WEIGHT: 268 LBS | BODY MASS INDEX: 38.37 KG/M2 | SYSTOLIC BLOOD PRESSURE: 124 MMHG | RESPIRATION RATE: 16 BRPM | DIASTOLIC BLOOD PRESSURE: 78 MMHG | HEIGHT: 70 IN

## 2019-07-25 DIAGNOSIS — G89.4 CHRONIC PAIN SYNDROME: Primary | ICD-10-CM

## 2019-07-25 DIAGNOSIS — G50.0 TRIGEMINAL NEURALGIA: ICD-10-CM

## 2019-07-25 DIAGNOSIS — G51.39 FACIAL SPASM: ICD-10-CM

## 2019-07-25 PROCEDURE — 99214 OFFICE O/P EST MOD 30 MIN: CPT | Performed by: NURSE PRACTITIONER

## 2019-07-25 NOTE — PROGRESS NOTES
Pt c/o facial neuralgia    Assessment:  1  Chronic pain syndrome    2  Trigeminal neuralgia    3  Facial spasm        Plan:  Becka Hess is a 71 y o  male chronic pain syndrome secondary to trigeminal neuralgia  The patient continues with right-sided facial pain, which is most consistent with trigeminal neuralgia  He has been seen Neurology who started him on Olanzapine  He reports that this medication was making him sleepy,so he stopped taking this medication  He was prescribed oxycodone 5/325 mg 1 tablet 1-2 times daily as needed for pain when he had a flare up of his trigeminal neuralgia  However the patient was prescribed 30 tablets on 6/27/2019 and reports that he has only used 7 tablets  He reports that he is afraid to take oxycodone and uses the medication sparingly  I encouraged the patient that if he is having a flare up of pain that she should take the oxycodone/acetaminophen q 6 hours up to 3 doses daily as needed when he has a flare up in pain  He reports that his flare ups occur every couple of days and are unpredictable  He will follow up in 1 month to reassess his medication needs or sooner if he needs refills or his pain worsens  The patient has an office visit scheduled with Sav Renae on Monday to discuss surgical options as referred by Neurology to discuss possible removal of calcification within the right Meckel's cave status post gamma knife procedure  The patient will continue on Lyrica as prescribed by Dr Villegas Monday  There are risks associated with opioid medications, including dependence, addiction and tolerance  The patient understands and agrees to use these medications only as prescribed  Potential side effects of the medications include, but are not limited to, constipation, drowsiness, addiction, impaired judgment and risk of fatal overdose if not taken as prescribed  The patient was warned against driving while taking sedation medications    Sharing medications is a angel  At this point in time, the patient is showing no signs of addiction, abuse, diversion or suicidal ideation  [place] Prescription Drug Monitoring Program report was reviewed and was appropriate     The patient will follow up in 4 weeks or sooner with the worsening of symptoms  My impressions and treatment recommendations were discussed in detail with the patient who verbalized understanding and had no further questions  Discharge instructions were provided  I personally saw and examined the patient and I agree with the above discussed plan of care  No orders of the defined types were placed in this encounter  No orders of the defined types were placed in this encounter  History of Present Illness:  Brigid Flores is a 71 y o  male chronic pain syndrome secondary to trigeminal neuralgia  The patient was last seen office on 6/27/2019 where he was prescribed oxycodone/acetaminophen 5/325 mg 1 tablet daily as needed for pain  He presents for a follow up office visit in regards to Facial Pain (neuralgia)  The patients current symptoms include right-sided facial pain that radiates above and below his I and into his right cheek  He describes his pain as dull aching, pressure-like, shooting, numbness, pins and needles pain that is constant nature  He reports that his pain is symptoms are unchanged since last office visit  He currently rates his pain as 6/10 numeric pain scale  Current pain medications includes:  Oxycodone/acetaminophen 5/325 mg 1 tablet 1-2 times daily as needed for pain  The patient reports that this regimen is providing 30% pain relief  The patient is reporting no side effects from this pain medication regimen      Pain Contract Signed: 1/22/2019, Last Urine Drug Screen: 6/27/2019    I have personally reviewed and/or updated the patient's past medical history, past surgical history, family history, social history, current medications, allergies, and vital signs today      Review of Systems   Respiratory: Negative for shortness of breath  Cardiovascular: Negative for chest pain  Gastrointestinal: Negative for constipation, diarrhea, nausea and vomiting  Musculoskeletal: Positive for gait problem  Negative for arthralgias, joint swelling and myalgias  Decreased ROM   Skin: Positive for rash  Neurological: Positive for dizziness  Negative for seizures and weakness  Memory loss   All other systems reviewed and are negative  Patient Active Problem List   Diagnosis    Trigeminal neuralgia    Facial spasm    Anemia    Bilateral patellofemoral syndrome    Diverticulosis of colon    Esophageal reflux    Intracranial meningioma ([address] )    Vertigo    Vitamin D deficiency    Need for pneumococcal vaccination    Preoperative evaluation of a medical condition to rule out surgical contraindications (TAR required)    Insomnia    Primary osteoarthritis of right knee    Pre-op examination       Past Medical History:   Diagnosis Date    Arthritis     Right knee    Chronic pain disorder     Trigeminal neuralgia pain     Ulcer of bile duct        Past Surgical History:   Procedure Laterality Date    APPENDECTOMY  1962    BRAIN SURGERY  2005    Gamma Knife for Trigeminal Neuralgia    ESOPHAGOGASTRODUODENOSCOPY      HERNIA REPAIR  1952    OTHER SURGICAL HISTORY      gamma knife RT in 2007 and 2010    IN STEREO TREAT TRIGEMINAL NERVE Right 3/6/2018    Procedure: RHIZOTOMY TRIGEMINAL NERVES (V2 & V3);   Surgeon: Ciera Tucker MD;  Location: [facility];  Service: Neurosurgery    IN TOTAL KNEE ARTHROPLASTY Right 10/22/2018    Procedure: ARTHROPLASTY KNEE TOTAL;  Surgeon: Remy Hodges MD;  [facility];  Service: Orthopedics    RHIZOTOMY W/ RADIOFREQUENCY ABLATION Right 08/08/2014    Stereotactic Ablation of Gasserian Ganglion Right sided trigeminal V2 radiofrequency rhizotomy x2       Family History   Problem Relation Age of Onset  Emphysema Mother     Colon cancer Father     Skin cancer Maternal Grandmother        Social History     Occupational History    Occupation: Retired     Comment: 21 years [employer] &    Tobacco Use    Smoking status: Never Smoker    Smokeless tobacco: Never Used   Substance and Sexual Activity    Alcohol use: No    Drug use: No    Sexual activity: Yes       Current Outpatient Medications on File Prior to Visit   Medication Sig    Acetaminophen (TYLENOL PO) Take 2 tablets by mouth as needed     ascorbic acid (VITAMIN C) 500 MG tablet Take 1 tablet (500 mg total) by mouth daily    Calcium 250 MG CAPS Take 600 mg by mouth daily      Cholecalciferol (VITAMIN D3) 1000 units CAPS Take 1 capsule by mouth daily     cyanocobalamin (VITAMIN B-12) 1,000 mcg tablet Take 1 tablet by mouth daily     Ketoprofen 10 % CREA Apply 1 application topically as needed     MELATONIN PO Take 10 mg by mouth daily at bedtime     Multiple Vitamins-Minerals (PRESERVISION AREDS) TABS Take 1 tablet by mouth daily    oxyCODONE-acetaminophen (PERCOCET) 5-325 mg per tablet Take 1 tablet 1-2 times daily as needed for pain   pregabalin (LYRICA) 200 MG capsule Take 2 tabs in the am, 1 tab in the afternoon, 2 tabs at bedtime    pyridoxine (VITAMIN B6) 100 mg tablet Take 1 tablet by mouth daily     topiramate (TOPAMAX) 100 mg tablet Half a tab in the morning 1 tab at bedtime for 5 days then 1 tab in the morning and 1 tab at bedtime after that    topiramate (TOPAMAX) 100 mg tablet Take 1 tablet at bedtime    TURMERIC CURCUMIN PO Take 1,500 mg by mouth daily    OLANZapine (ZyPREXA) 5 mg tablet 1 tab at bedtime daily for 5 days then 2 tabs after that if needed  No current facility-administered medications on file prior to visit          Allergies   Allergen Reactions    Gabapentin Delerium    Oxcarbazepine Dizziness    Lamictal [Lamotrigine] Confusion     Difficulty to think clearly     Tramadol        Physical Exam:    /78 (BP Location: Right arm, Patient Position: Sitting, Cuff Size: Standard)   Resp 16   Ht 5' 10" (1 778 m)   Wt 122 kg (268 lb)   BMI 38 45 kg/m²     Constitutional:normal, well developed, well nourished, alert, in no distress and non-toxic and no overt pain behavior   and obese  Eyes:anicteric  HEENT:grossly intact  Neck:supple, symmetric, trachea midline and no masses   Pulmonary:even and unlabored  Cardiovascular:No edema or pitting edema present  Skin:Normal without rashes or lesions and well hydrated  Psychiatric:Mood and affect appropriate  Neurologic:Cranial Nerves II-XII grossly intact  Musculoskeletal:normal    Imaging

## 2019-07-25 NOTE — PATIENT INSTRUCTIONS
Opioid Pain Management   WHAT YOU NEED TO KNOW:   An opioid is a type of medicine used to treat pain  Examples of opioids are oxycodone, morphine, fentanyl, or codeine  DISCHARGE INSTRUCTIONS:   Call 911 or have someone call 911 for any of the following:   · You are breathing slower than normal, or you have trouble breathing  · You cannot be woken  · You have a seizure  Seek care immediately if:   · Your heart is beating slower than usual     · Your heart feels like it is jumping or fluttering  · You are so sleepy that you cannot stay awake  · You have severe muscle pain or weakness  · You see or hear things that are not real   Contact your healthcare provider if:   · You are too dizzy to stand up  · Your pain gets worse or you have new pain  · You cannot do your usual activities because of side effects from the opioid  · You are constipated or have abdominal pain  · You have questions or concerns about your condition or care  Take opioid medicines as directed, for the condition it is prescribed:  Common problems that may occur when you do not take opioid medicines as directed include the following:  · Health problems  may occur  You may have trouble breathing  You may also develop liver or kidney damage, or stomach bleeding  Any of these health problems can become life-threatening  · Opioid dependence  means your body needs the opioid medicine to keep it from going through withdrawal      · Opioid tolerance  means the opioid does not control pain as well as it used to  You need higher doses of the opioid to get pain relief  · Opioid addiction  means you are not able to control the use of the opioid medicine  You use it when you do not have pain  You crave the opioid medicine  Opioid safety measures:   · Take your medicine as directed  Ask if you need more information on how to take your medicine correctly  Follow up with your healthcare provider regularly   You may need to have your dose adjusted  Do not use opioid medicine if you are pregnant or breastfeeding  · Give your healthcare provider a list of all your medicines  Include any over-the-counter medicines, vitamins, and herbs  It can be dangerous to take opioids with certain other medicines, such as antihistamines  · Keep opioid medicine in a safe place  Store your opioid medicine in a locked cabinet to keep it away from children and others  · Do not drink alcohol while you use opioids  Alcohol use with an opioid medicine can make you sleepy and slow your breathing rate  You may stop breathing completely  · Do not drive or operate heavy machinery after you take opioid medicine  Opioid medicine can make you drowsy and make it hard to concentrate  You may injure yourself or others if you drive or operate heavy machinery while taking your medicine  · Drink liquids and eat high-fiber foods  liquids and fiber will help prevent constipation  Ask your healthcare provider what liquids are right for you and how much you should drink  Also ask for a list of foods that contain fiber  Follow up with your healthcare provider as directed: You may need to have your dose adjusted  You may be referred to a pain specialist  Write down your questions so you remember to ask them during your visits  © 2017 [employer] Information is for End User's use only and may not be sold, redistributed or otherwise used for commercial purposes  All illustrations and images included in CareNotes® are the copyrighted property of [employer]  or Nitesh Giraldo  The above information is an  only  It is not intended as medical advice for individual conditions or treatments  Talk to your doctor, nurse or pharmacist before following any medical regimen to see if it is safe and effective for you

## 2019-07-29 ENCOUNTER — OFFICE VISIT (OUTPATIENT)
Dept: NEUROSURGERY | Facility: CLINIC | Age: 69
End: 2019-07-29
Payer: MEDICARE

## 2019-07-29 VITALS
SYSTOLIC BLOOD PRESSURE: 118 MMHG | HEART RATE: 62 BPM | BODY MASS INDEX: 38.08 KG/M2 | HEIGHT: 70 IN | RESPIRATION RATE: 16 BRPM | WEIGHT: 266 LBS | DIASTOLIC BLOOD PRESSURE: 74 MMHG | TEMPERATURE: 97.1 F

## 2019-07-29 DIAGNOSIS — G93.9 BRAIN LESION: ICD-10-CM

## 2019-07-29 DIAGNOSIS — G50.0 TRIGEMINAL NEURALGIA: Primary | ICD-10-CM

## 2019-07-29 DIAGNOSIS — G50.9 NEUROPATHIC TRIGEMINAL SENSORY NERVE: ICD-10-CM

## 2019-07-29 PROCEDURE — 99214 OFFICE O/P EST MOD 30 MIN: CPT | Performed by: NEUROLOGICAL SURGERY

## 2019-07-29 NOTE — PROGRESS NOTES
Assessment/Plan:    No problem-specific Assessment & Plan notes found for this encounter  History, physical examination and diagnostic tests were reviewed and questions answered  Diagnosis, care plan and treatment options were discussed  The patient understand instructions and will follow up as directed  Patient with trigeminal neuralgia and likely a component of neuropathic/deafferentation pain due to prior gamma knife and rhizotomy procedures  I would like to repeat and MRI next year to rule out a developing benign lesion at [facility] due to history of radiation  At that time I did discuss consideration of neuromodulation via a stimulator or pain pump for residual persistent pain  IMAGING REVIEWED: MRI brain shows atrophy of trigeminal cisternal component on the right  No obvious compressive vessel noted  A calcified mass noted at the [facility] the [facility]em[facility] nerve [facility]  Diagnoses and all orders for this visit:    Trigeminal neuralgia  -     Ambulatory referral to Neurosurgery  -     MRI brain w wo contrast; Future    Brain lesion  -     MRI brain w wo contrast; Future    Neuropathic trigeminal sensory nerve  -     MRI brain w wo contrast; Future          Subjective:      Patient ID: Jesus Pichardo is a 71 y o  male  Patient is a 71year old male with symptoms of constant burning right facial pain  Pain is constant and moderate to severe  Pain distribution is V1 and V2 no significant V3 pain  Pain is worse with sneezing  The pain has been present for several years  The patient underwent the following conservative treatments including medications  He tried gamma knife and rhizotomy with only 3-6 month of relief  He reports persistent numbness in V2 and V3 distributions none in V1        The following portions of the patient's history were reviewed and updated as appropriate:   He  has a past medical history of Arthritis, Chronic pain disorder, Trigeminal neuralgia pain, and Ulcer of bile duct  He   Patient Active Problem List    Diagnosis Date Noted    Pre-op examination 10/03/2018    Primary osteoarthritis of right knee 09/14/2018    Insomnia 07/23/2018    Need for pneumococcal vaccination 02/22/2018    Preoperative evaluation of a medical condition to rule out surgical contraindications (TAR required) 02/22/2018    Facial spasm 02/21/2018    Trigeminal neuralgia 02/12/2018    Diverticulosis of colon 08/15/2017    Vitamin D deficiency 02/10/2017    Intracranial meningioma ([facility] [address] ) 12/04/2015    Bilateral patellofemoral syndrome 07/09/2015    Esophageal reflux 06/24/2015    Vertigo 02/22/2013    Anemia 02/04/2013     He  has a past surgical history that includes Appendectomy (1962); Hernia repair (0586); Rhizotomy w/ radiofrequency ablation (Right, 08/08/2014); Esophagogastroduodenoscopy; pr stereo treat trigeminal nerve (Right, 3/6/2018); Other surgical history; Brain surgery (2005); and pr total knee arthroplasty (Right, 10/22/2018)  His family history includes Colon cancer in his father; Emphysema in his mother; Skin cancer in his maternal grandmother  He  reports that he has never smoked  He has never used smokeless tobacco  He reports that he does not drink alcohol or use drugs    Current Outpatient Medications   Medication Sig Dispense Refill    Acetaminophen (TYLENOL PO) Take 2 tablets by mouth as needed       ascorbic acid (VITAMIN C) 500 MG tablet Take 1 tablet (500 mg total) by mouth daily 60 tablet 0    Calcium 250 MG CAPS Take 600 mg by mouth daily        Cholecalciferol (VITAMIN D3) 1000 units CAPS Take 1 capsule by mouth daily       cyanocobalamin (VITAMIN B-12) 1,000 mcg tablet Take 1 tablet by mouth daily       MELATONIN PO Take 10 mg by mouth daily at bedtime       pregabalin (LYRICA) 200 MG capsule Take 2 tabs in the am, 1 tab in the afternoon, 2 tabs at bedtime 450 capsule 0    pyridoxine (VITAMIN B6) 100 mg tablet Take 1 tablet by mouth daily  topiramate (TOPAMAX) 100 mg tablet Half a tab in the morning 1 tab at bedtime for 5 days then 1 tab in the morning and 1 tab at bedtime after that (Patient taking differently: 1 tab in the morning and 1 tab at bedtime) 60 tablet 3    TURMERIC CURCUMIN PO Take 1,500 mg by mouth daily      Ketoprofen 10 % CREA Apply 1 application topically as needed   2    Multiple Vitamins-Minerals (PRESERVISION AREDS) TABS Take 1 tablet by mouth daily      OLANZapine (ZyPREXA) 5 mg tablet 1 tab at bedtime daily for 5 days then 2 tabs after that if needed  (Patient not taking: Reported on 7/29/2019) 30 tablet 1    oxyCODONE-acetaminophen (PERCOCET) 5-325 mg per tablet Take 1 tablet 1-2 times daily as needed for pain  (Patient not taking: Reported on 7/29/2019) 30 tablet 0    topiramate (TOPAMAX) 100 mg tablet Take 1 tablet at bedtime (Patient not taking: Reported on 7/29/2019) 90 tablet 3     No current facility-administered medications for this visit        Current Outpatient Medications on File Prior to Visit   Medication Sig    Acetaminophen (TYLENOL PO) Take 2 tablets by mouth as needed     ascorbic acid (VITAMIN C) 500 MG tablet Take 1 tablet (500 mg total) by mouth daily    Calcium 250 MG CAPS Take 600 mg by mouth daily      Cholecalciferol (VITAMIN D3) 1000 units CAPS Take 1 capsule by mouth daily     cyanocobalamin (VITAMIN B-12) 1,000 mcg tablet Take 1 tablet by mouth daily     MELATONIN PO Take 10 mg by mouth daily at bedtime     pregabalin (LYRICA) 200 MG capsule Take 2 tabs in the am, 1 tab in the afternoon, 2 tabs at bedtime    pyridoxine (VITAMIN B6) 100 mg tablet Take 1 tablet by mouth daily     topiramate (TOPAMAX) 100 mg tablet Half a tab in the morning 1 tab at bedtime for 5 days then 1 tab in the morning and 1 tab at bedtime after that (Patient taking differently: 1 tab in the morning and 1 tab at bedtime)    TURMERIC CURCUMIN PO Take 1,500 mg by mouth daily    Ketoprofen 10 % CREA Apply 1 application topically as needed     Multiple Vitamins-Minerals (PRESERVISION AREDS) TABS Take 1 tablet by mouth daily    OLANZapine (ZyPREXA) 5 mg tablet 1 tab at bedtime daily for 5 days then 2 tabs after that if needed  (Patient not taking: Reported on 7/29/2019)    oxyCODONE-acetaminophen (PERCOCET) 5-325 mg per tablet Take 1 tablet 1-2 times daily as needed for pain  (Patient not taking: Reported on 7/29/2019)    topiramate (TOPAMAX) 100 mg tablet Take 1 tablet at bedtime (Patient not taking: Reported on 7/29/2019)     No current facility-administered medications on file prior to visit  He is allergic to gabapentin; oxcarbazepine; lamictal [lamotrigine]; and tramadol       Review of Systems   Constitutional: Negative  HENT:        Buzzing pressure bilateral ears, worse on right   Eyes: Negative  Respiratory: Negative  Cardiovascular: Negative  Gastrointestinal: Negative  Endocrine: Negative  Genitourinary: Negative  Skin: Negative  Allergic/Immunologic: Negative  Neurological: Positive for light-headedness, numbness (numbness from rhizotomy ) and headaches (right forehead)  Right facial pain just below right eye, top of right forehead and right below ear lobe  Hematological: Negative  Psychiatric/Behavioral: Negative  Objective:      /74 (BP Location: Left arm)   Pulse 62   Temp (!) 97 1 °F (36 2 °C) (Tympanic)   Resp 16   Ht 5' 10" (1 778 m)   Wt 121 kg (266 lb)   BMI 38 17 kg/m²          Physical Exam   Constitutional: He is oriented to person, place, and time  He appears well-developed  No distress  HENT:   Head: Normocephalic and atraumatic  Nose: Nose normal    Eyes: Pupils are equal, round, and reactive to light  Conjunctivae and EOM are normal  Right eye exhibits no discharge  Left eye exhibits no discharge  No scleral icterus  Neck: Normal range of motion  No tracheal deviation present  No thyromegaly present     Cardiovascular: Normal rate  Pulmonary/Chest: Effort normal and breath sounds normal  No stridor  No respiratory distress  Abdominal: Soft  Neurological: He is alert and oriented to person, place, and time  He has normal strength  No cranial nerve deficit or sensory deficit  Skin: Skin is warm and dry  He is not diaphoretic  No erythema  Psychiatric: He has a normal mood and affect   His behavior is normal  Judgment and thought content normal      numbness noted V2-V3 right

## 2019-07-29 NOTE — LETTER
July 29, 2019     Amy Barber, [address] [address]    Patient: Yusra Altman   YOB: 1950   Date of Visit: 7/29/2019       Dear Dr Daisha Souza:    Thank you for referring Yolanda Sterling to me for evaluation  Below are my notes for this consultation  If you have questions, please do not hesitate to call me  I look forward to following your patient along with you  Sincerely,        Marylee Amsterdam, MD        CC: No Recipients  Marylee Amsterdam, MD  7/29/2019 12:59 PM  Sign at close encounter  Assessment/Plan:    No problem-specific Assessment & Plan notes found for this encounter  History, physical examination and diagnostic tests were reviewed and questions answered  Diagnosis, care plan and treatment options were discussed  The patient understand instructions and will follow up as directed  Patient with trigeminal neuralgia and likely a component of neuropathic/deafferentation pain due to prior gamma knife and rhizotomy procedures  I would like to repeat and MRI next year to rule out a developing benign lesion at [facility] due to history of radiation  At that time I did discuss consideration of neuromodulation via a stimulator or pain pump for residual persistent pain  IMAGING REVIEWED: MRI brain shows atrophy of trigeminal cisternal component on the right  No obvious compressive vessel noted  A calcified mass noted [facility]  Diagnoses and all orders for this visit:    Trigeminal neuralgia  -     Ambulatory referral to Neurosurgery  -     MRI brain w wo contrast; Future    Brain lesion  -     MRI brain w wo contrast; Future    Neuropathic trigeminal sensory nerve  -     MRI brain w wo contrast; Future          Subjective:      Patient ID: Yusra Altman is a 71 y o  male  Patient is a 71year old male with symptoms of constant burning right facial pain  Pain is constant and moderate to severe   Pain distribution is V1 and V2 no significant V3 pain  Pain is worse with sneezing  The pain has been present for several years  The patient underwent the following conservative treatments including medications  He tried gamma knife and rhizotomy with only 3-6 month of relief  He reports persistent numbness in V2 and V3 distributions none in V1  The following portions of the patient's history were reviewed and updated as appropriate:   He  has a past medical history of Arthritis, Chronic pain disorder, Trigeminal neuralgia pain, and Ulcer of bile duct  He   Patient Active Problem List    Diagnosis Date Noted    Pre-op examination 10/03/2018    Primary osteoarthritis of right knee 09/14/2018    Insomnia 07/23/2018    Need for pneumococcal vaccination 02/22/2018    Preoperative evaluation of a medical condition to rule out surgical contraindications (TAR required) 02/22/2018    Facial spasm 02/21/2018    Trigeminal neuralgia 02/12/2018    Diverticulosis of colon 08/15/2017    Vitamin D deficiency 02/10/2017    Intracranial meningioma ([facility] [address] ) 12/04/2015    Bilateral patellofemoral syndrome 07/09/2015    Esophageal reflux 06/24/2015    Vertigo 02/22/2013    Anemia 02/04/2013     He  has a past surgical history that includes Appendectomy (1962); Hernia repair (9810); Rhizotomy w/ radiofrequency ablation (Right, 08/08/2014); Esophagogastroduodenoscopy; pr stereo treat trigeminal nerve (Right, 3/6/2018); Other surgical history; Brain surgery (2005); and pr total knee arthroplasty (Right, 10/22/2018)  His family history includes Colon cancer in his father; Emphysema in his mother; Skin cancer in his maternal grandmother  He  reports that he has never smoked  He has never used smokeless tobacco  He reports that he does not drink alcohol or use drugs    Current Outpatient Medications   Medication Sig Dispense Refill    Acetaminophen (TYLENOL PO) Take 2 tablets by mouth as needed       ascorbic acid (VITAMIN C) 500 MG tablet Take 1 tablet (500 mg total) by mouth daily 60 tablet 0    Calcium 250 MG CAPS Take 600 mg by mouth daily        Cholecalciferol (VITAMIN D3) 1000 units CAPS Take 1 capsule by mouth daily       cyanocobalamin (VITAMIN B-12) 1,000 mcg tablet Take 1 tablet by mouth daily       MELATONIN PO Take 10 mg by mouth daily at bedtime       pregabalin (LYRICA) 200 MG capsule Take 2 tabs in the am, 1 tab in the afternoon, 2 tabs at bedtime 450 capsule 0    pyridoxine (VITAMIN B6) 100 mg tablet Take 1 tablet by mouth daily       topiramate (TOPAMAX) 100 mg tablet Half a tab in the morning 1 tab at bedtime for 5 days then 1 tab in the morning and 1 tab at bedtime after that (Patient taking differently: 1 tab in the morning and 1 tab at bedtime) 60 tablet 3    TURMERIC CURCUMIN PO Take 1,500 mg by mouth daily      Ketoprofen 10 % CREA Apply 1 application topically as needed   2    Multiple Vitamins-Minerals (PRESERVISION AREDS) TABS Take 1 tablet by mouth daily      OLANZapine (ZyPREXA) 5 mg tablet 1 tab at bedtime daily for 5 days then 2 tabs after that if needed  (Patient not taking: Reported on 7/29/2019) 30 tablet 1    oxyCODONE-acetaminophen (PERCOCET) 5-325 mg per tablet Take 1 tablet 1-2 times daily as needed for pain  (Patient not taking: Reported on 7/29/2019) 30 tablet 0    topiramate (TOPAMAX) 100 mg tablet Take 1 tablet at bedtime (Patient not taking: Reported on 7/29/2019) 90 tablet 3     No current facility-administered medications for this visit        Current Outpatient Medications on File Prior to Visit   Medication Sig    Acetaminophen (TYLENOL PO) Take 2 tablets by mouth as needed     ascorbic acid (VITAMIN C) 500 MG tablet Take 1 tablet (500 mg total) by mouth daily    Calcium 250 MG CAPS Take 600 mg by mouth daily      Cholecalciferol (VITAMIN D3) 1000 units CAPS Take 1 capsule by mouth daily     cyanocobalamin (VITAMIN B-12) 1,000 mcg tablet Take 1 tablet by mouth daily     MELATONIN PO Take 10 mg by mouth daily at bedtime     pregabalin (LYRICA) 200 MG capsule Take 2 tabs in the am, 1 tab in the afternoon, 2 tabs at bedtime    pyridoxine (VITAMIN B6) 100 mg tablet Take 1 tablet by mouth daily     topiramate (TOPAMAX) 100 mg tablet Half a tab in the morning 1 tab at bedtime for 5 days then 1 tab in the morning and 1 tab at bedtime after that (Patient taking differently: 1 tab in the morning and 1 tab at bedtime)    TURMERIC CURCUMIN PO Take 1,500 mg by mouth daily    Ketoprofen 10 % CREA Apply 1 application topically as needed     Multiple Vitamins-Minerals (PRESERVISION AREDS) TABS Take 1 tablet by mouth daily    OLANZapine (ZyPREXA) 5 mg tablet 1 tab at bedtime daily for 5 days then 2 tabs after that if needed  (Patient not taking: Reported on 7/29/2019)    oxyCODONE-acetaminophen (PERCOCET) 5-325 mg per tablet Take 1 tablet 1-2 times daily as needed for pain  (Patient not taking: Reported on 7/29/2019)    topiramate (TOPAMAX) 100 mg tablet Take 1 tablet at bedtime (Patient not taking: Reported on 7/29/2019)     No current facility-administered medications on file prior to visit  He is allergic to gabapentin; oxcarbazepine; lamictal [lamotrigine]; and tramadol       Review of Systems   Constitutional: Negative  HENT:        Buzzing pressure bilateral ears, worse on right   Eyes: Negative  Respiratory: Negative  Cardiovascular: Negative  Gastrointestinal: Negative  Endocrine: Negative  Genitourinary: Negative  Skin: Negative  Allergic/Immunologic: Negative  Neurological: Positive for light-headedness, numbness (numbness from rhizotomy ) and headaches (right forehead)  Right facial pain just below right eye, top of right forehead and right below ear lobe  Hematological: Negative  Psychiatric/Behavioral: Negative            Objective:      /74 (BP Location: Left arm)   Pulse 62   Temp (!) 97 1 °F (36 2 °C) (Tympanic)   Resp 16  5' 10" (1 778 m)   Wt 121 kg (266 lb)   BMI 38 17 kg/m²           Physical Exam   Constitutional: He is oriented to person, place, and time  He appears well-developed  No distress  HENT:   Head: Normocephalic and atraumatic  Nose: Nose normal    Eyes: Pupils are equal, round, and reactive to light  Conjunctivae and EOM are normal  Right eye exhibits no discharge  Left eye exhibits no discharge  No scleral icterus  Neck: Normal range of motion  No tracheal deviation present  No thyromegaly present  Cardiovascular: Normal rate  Pulmonary/Chest: Effort normal and breath sounds normal  No stridor  No respiratory distress  Abdominal: Soft  Neurological: He is alert and oriented to person, place, and time  He has normal strength  No cranial nerve deficit or sensory deficit  Skin: Skin is warm and dry  He is not diaphoretic  No erythema  Psychiatric: He has a normal mood and affect   His behavior is normal  Judgment and thought content normal

## 2019-08-06 NOTE — PROGRESS NOTES
[facility]  PATIENT:  Jesus Pichardo  MRN:  [id number]  :  1950  DATE OF SERVICE:  2019      Assessment/Plan:     Trigeminal neuralgia  Taking Tylenol 500 milligram 2 tabs every 3 days  It helps somewhat  Lyrica 200 milligrams 2 tabs in a m , 200 milligrams at noon   Topiramate 100 milligrams in the morning and 100 milligrams at bedtime  Melatonin at bedtime    Vitamin B6 50 milligrams once a day          Problem List Items Addressed This Visit        Nervous and Auditory    Trigeminal neuralgia - Primary     Taking Tylenol 500 milligram 2 tabs every 3 days  It helps somewhat  Lyrica 200 milligrams 2 tabs in a m , 200 milligrams at noon   Topiramate 100 milligrams in the morning and 100 milligrams at bedtime  Melatonin at bedtime    Vitamin B6 50 milligrams once a day                    History of Present Illness: We had the pleasure of evaluating Jesus Pichardo in neurological follow up  today for facial pain  As you know,  he is a 71 y o  right handedmale   Patient retired in 2018  [facility] is here today for f/u evaluation of his trigeminal neuralgia       Tinnitus: bilateral, right more so than left, began after nerve block in 2018  - 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 (some noise insensitivity attributed by pt to musical pursuits)  - recalled previous conversation regarding ENT visit and is still considering     Numbness: right V2 and V3 region post nerve block, feels like the numb before a tooth pulling     Dizzy: some lightheadness for past few monthsthat leads to an occasional misstep a few times a week, no syncopal episodes, no change in frequency      Meningoma:   As per radiology report, increasing calcification within right Meckel's cave s/p gamma knife   Slight bowing of lateral margin of dura noted which has gradually increased in size over several years, last MRI 2019     Trigeminal neuralgia  What medications do you take or have you taken for your headaches? PREVENTATIVE:  - Lyrica (help the most), Topamax (helps with pain, seems to help with sleep too), Trileptal (dizziness and confusion), Tegretol (confusion/goofy), Neurontin (confusion), Lamictal (confusion), Olanzapine (confusion/goofy/dizzy)  - Amitriptyline (stopped due to fatigue), Cymbalta (no improvement)  - Botox (did not help)  ABORTIVE:  - baclofen,  - tramadol ([place] and ended up in the ER)  - aspirin, Tylenol,   - oxycodone (pain management clinic, appt scheduled for 06/27 pm with Virginie Granda RN)        Surgery:   -  2 gamma knife procedures (~2008 and ~2011)  -  8/8/2014 - S/p Right V2 Rhizotomy by Dr Ruiz Aguila went away for 6 months but the pain did not get worse for 3 years)   - 3/6/2018 S/p Rhizotomy TN V2 & V3       - 09/21/2018 nerve - Right V1 trigeminal nerve block under ultrasound guidance - some relief from this, constant but less intense       - 02/01/2019 - right V1 trigeminal nerve block until ultrasound guidance - some relief but also tinnitus, v1 and v2 numbness, and right side loss of taste following     Alternative therapies used? none  Pain triggers:  Sneezing, high pressure, touching     What is your current pain level -3/10     How often does the pain occur?   constant pain: there all the time  Severe pain: 3-5 times a week was constant severe pain 06/21 and 06/22 and then was also 1-2 times a day     Are you ever pain free? No  Aura/warning and how long does it last - none     What time of the day does the pain start?    Constant: there all the time  Severe pain: varies; the episodes have been scattered throughout day     How long do the pain  last?   Constant pain: there all the time  Severe pain: 15 min to 2 hours; average is 15-30 minutes; 6/21 and 06/22 was all day with very few moments of lesser pain      Describe your usual pain:   Constant pain: aching   Severe pain, as of 2-3 weeks ago: each area of face changes during the episodes and is different in each episode - shooting, stabbing, ice pick, numbness, tingling + new shock like pathway from lateral nasal fold to mid cheek bone     Where is your pain located? Constant Pain: right v2 under the eye on the superiors nasal region, currently at the "origins": below eye, lateral eyebrow, hair line above eyebrow, mandible directly inferior to corner of mouth, inferior mastoid region     Severe Pain:  v2 and at the medial nasal region, v3, lateral eyebrow and directly superior at face/hair line; v1, mandible below corner of mouth; inferior mastoid - these locations will be more painful and receive a shooting pain originating from below the eye     What is the intensity of pain? Constant Pain: 2 to 6/10  Severe Pain: 10/10     Associated symptoms:   None     Have you used CBD or THC for your headaches and how often? No, but discussed further use of CBD   Have you ever had any Brain imaging? yes  I personally reviewed these images  Recent laboratory data was reviewed  Medications and allergies were reviewed      2015 MRI head:   IMPRESSION-    1  Stable nonenhancing rounded T2 hypointense structure in right Meckel's cave correlating to the previously present calcified structure noted on the prior 2013 head CT, stable from the previous MRI, possibly   a treated or calcified meningioma   Cisternal segments of both 5th nerves are otherwise normal in signal without abnormal enhancement   No brainstem abnormality   No interval change from the previous study  2   No acute infarction, intracranial hemorrhage or mass effect      - Reviewed old notes from physician seen in the past- see above HPI for summary of previous encounters       Past Medical History:   Diagnosis Date    Arthritis     Right knee    Chronic pain disorder     Trigeminal neuralgia pain     Ulcer of bile duct        Patient Active Problem List   Diagnosis    Trigeminal neuralgia    Facial spasm    Anemia    Bilateral patellofemoral syndrome    Diverticulosis of colon    Esophageal reflux    Intracranial meningioma (HCC)    Vertigo    Vitamin D deficiency    Need for pneumococcal vaccination    Preoperative evaluation of a medical condition to rule out surgical contraindications (TAR required)    Insomnia    Primary osteoarthritis of right knee    Pre-op examination       Medications:      Current Outpatient Medications   Medication Sig Dispense Refill    ascorbic acid (VITAMIN C) 500 MG tablet Take 1 tablet (500 mg total) by mouth daily 60 tablet 0    Calcium 250 MG CAPS Take 600 mg by mouth daily        cyanocobalamin (VITAMIN B-12) 1,000 mcg tablet Take 1 tablet by mouth daily       Ketoprofen 10 % CREA Apply 1 application topically as needed   2    MELATONIN PO Take 10 mg by mouth daily at bedtime       Multiple Vitamins-Minerals (PRESERVISION AREDS) TABS Take 1 tablet by mouth daily      oxyCODONE-acetaminophen (PERCOCET) 5-325 mg per tablet Take 1 tablet 1-2 times daily as needed for pain  30 tablet 0    pregabalin (LYRICA) 200 MG capsule Take 2 tabs in the am, 1 tab in the afternoon, 2 tabs at bedtime 450 capsule 0    topiramate (TOPAMAX) 100 mg tablet Half a tab in the morning 1 tab at bedtime for 5 days then 1 tab in the morning and 1 tab at bedtime after that (Patient taking differently: 1 tab in the morning and 1 tab at bedtime) 60 tablet 3    TURMERIC CURCUMIN PO Take 1,500 mg by mouth daily      Acetaminophen (TYLENOL PO) Take 2 tablets by mouth as needed       Cholecalciferol (VITAMIN D3) 1000 units CAPS Take 1 capsule by mouth daily       pyridoxine (VITAMIN B6) 100 mg tablet Take 1 tablet by mouth daily        No current facility-administered medications for this visit  Allergies:       Allergies   Allergen Reactions    Gabapentin Delerium    Oxcarbazepine Dizziness    Lamictal [Lamotrigine] Confusion     Difficulty to think clearly     Tramadol        Family History:     Family History   Problem Relation Age of Onset    Emphysema Mother     Colon cancer Father     Skin cancer Maternal Grandmother        Social History:     Social History     Socioeconomic History    Marital status: /Civil Union     Spouse name:  Sandi Nice Number of children: 2    Years of education: BS Degree plus addl classes    Highest education level: Not on file   Occupational History    Occupation: Retired     Comment: 21 years [employer] resource strain: Not on file    Food insecurity:     Worry: Not on file     Inability: Not on file   [employer] needs:     Medical: Not on file     Non-medical: Not on file   Tobacco Use    Smoking status: Never Smoker    Smokeless tobacco: Never Used   Substance and Sexual Activity    Alcohol use: No    Drug use: No    Sexual activity: Yes   Lifestyle    Physical activity:     Days per week: Not on file     Minutes per session: Not on file    Stress: Not on file   Relationships    Social connections:     Talks on phone: Not on file     Gets together: Not on file     Attends Shinto service: Not on file     Active member of club or organization: Not on file     Attends meetings of clubs or organizations: Not on file     Relationship status: Not on file    Intimate partner violence:     Fear of current or ex partner: Not on file     Emotionally abused: Not on file     Physically abused: Not on file     Forced sexual activity: Not on file   Other Topics Concern    Not on file   Social History Narrative    Caffeine use: Daily caffeinated cola, drinks coffee    Lives at home with wife Susanne Pino         Objective:   Physical Exam:                                                                   Vitals:            /58 (BP Location: Left arm, Patient Position: Sitting, Cuff Size: Extra-Large)   Pulse 65   Ht 5' 10" (1 778 m)   Wt 122 kg (268 lb)   BMI 38 45 kg/m²   BP Readings from Last 3 Encounters:   08/09/19 112/58 07/29/19 118/74   07/25/19 124/78     Pulse Readings from Last 3 Encounters:   08/09/19 65   07/29/19 62   06/27/19 65                CONSTITUTIONAL: Well developed, well nourished, well groomed  No dysmorphic features  Eyes:  PERRLA, EOM normal      Neck:  Normal ROM, neck supple  HEENT:  Normocephalic atraumatic  No meningismus  Oropharynx is clear and moist  No oral mucosal lesions  Chest:  Respirations regular and unlabored  Cardiovascular:  Distal extremities warm without palpable edema or tenderness, no observed significant swelling  Musculoskeletal:  Full range of motion  (see below under neurologic exam for evaluation of motor function and gait)   Skin:  warm and dry   Psychiatric:  Normal behavior and appropriate affect    SKULL AND SPINE  ROM: Full range of motion  Tenderness: No focal tenderness    MENTAL STATUS  Orientation: Alert and oriented x 3  Fund of knowledge: Intact  CRANIAL NERVES  II: PERRL  Visual fields full  III, IV, VI: Extraocular movements intact  No nystagmus  V: Facial sensation decreased V2-V3  VII: Facial movements normal and symmetric  VIII: Intact hearing bilaterally  IX, X: Palate elevation normal and symmetric  XI: Intact trapezius, SCM strength  XII: Tongue protrudes to the midline    MOTOR (Upper and lower extremities)   Bulk/tone/abnormal movement: Normal muscle bulk and tone  Strength: Strength 5/5 throughout  COORDINATION   Station/Gait: Normal baseline gait  Normal tandem gait  SENSORY  Romberg sign absent  Reflexes:  deep tendon reflexes are 2+/4 throughout, flexor response bilaterally       Review of Systems:   Review of Systems   Constitutional: Negative  HENT: Positive for ear pain and tinnitus  Eyes: Negative  Respiratory: Positive for shortness of breath  Cardiovascular: Negative  Gastrointestinal: Negative  Endocrine: Negative  Genitourinary: Negative  Musculoskeletal: Negative  Skin: Negative  Allergic/Immunologic: Negative  Neurological: Positive for light-headedness, numbness and headaches  Hematological: Negative  Psychiatric/Behavioral: Negative  I personally reviewed and updated the ROS that was entered by the medical assistant       I have spent 25 minutes with Patient and family today in which greater than 50% of this time was spent in counseling/coordination of care regarding Prognosis, Risks and benefits of tx options, Intructions for management and Patient and family education        Author:  Samuel Baron PA-C 8/9/2019 2:04 PM

## 2019-08-09 ENCOUNTER — OFFICE VISIT (OUTPATIENT)
Dept: NEUROLOGY | Facility: CLINIC | Age: 69
End: 2019-08-09
Payer: MEDICARE

## 2019-08-09 VITALS
WEIGHT: 268 LBS | HEIGHT: 70 IN | BODY MASS INDEX: 38.37 KG/M2 | DIASTOLIC BLOOD PRESSURE: 58 MMHG | SYSTOLIC BLOOD PRESSURE: 112 MMHG | HEART RATE: 65 BPM

## 2019-08-09 DIAGNOSIS — G50.0 TRIGEMINAL NEURALGIA: Primary | ICD-10-CM

## 2019-08-09 PROCEDURE — 1124F ACP DISCUSS-NO DSCNMKR DOCD: CPT | Performed by: PHYSICIAN ASSISTANT

## 2019-08-09 PROCEDURE — 99214 OFFICE O/P EST MOD 30 MIN: CPT | Performed by: PHYSICIAN ASSISTANT

## 2019-08-09 NOTE — PROGRESS NOTES
Review of Systems   Constitutional: Negative  HENT: Positive for ear pain and tinnitus  Eyes: Negative  Respiratory: Positive for shortness of breath  Cardiovascular: Negative  Gastrointestinal: Negative  Endocrine: Negative  Genitourinary: Negative  Musculoskeletal: Negative  Allergic/Immunologic: Negative  Neurological: Positive for light-headedness, numbness (right jaw line ) and headaches  Hematological: Negative  Psychiatric/Behavioral: Negative

## 2019-08-09 NOTE — PATIENT INSTRUCTIONS
Taking Tylenol 500 milligram 2 tabs every 3 days  It helps somewhat  Lyrica 200 milligrams 2 tabs in a m , 200 milligrams at noon   Topiramate 100 milligrams in the morning and 100 milligrams at bedtime    Melatonin at bedtime    Vitamin B6 50 milligrams once a day

## 2019-08-28 ENCOUNTER — OFFICE VISIT (OUTPATIENT)
Dept: PAIN MEDICINE | Facility: CLINIC | Age: 69
End: 2019-08-28
Payer: MEDICARE

## 2019-08-28 VITALS
BODY MASS INDEX: 38.37 KG/M2 | SYSTOLIC BLOOD PRESSURE: 124 MMHG | WEIGHT: 268 LBS | HEIGHT: 70 IN | DIASTOLIC BLOOD PRESSURE: 82 MMHG | RESPIRATION RATE: 16 BRPM

## 2019-08-28 DIAGNOSIS — G89.4 CHRONIC PAIN SYNDROME: Primary | ICD-10-CM

## 2019-08-28 DIAGNOSIS — G50.0 TRIGEMINAL NEURALGIA: ICD-10-CM

## 2019-08-28 DIAGNOSIS — G51.39 FACIAL SPASM: ICD-10-CM

## 2019-08-28 PROCEDURE — 99214 OFFICE O/P EST MOD 30 MIN: CPT | Performed by: NURSE PRACTITIONER

## 2019-08-28 RX ORDER — OXYCODONE HYDROCHLORIDE AND ACETAMINOPHEN 5; 325 MG/1; MG/1
TABLET ORAL
Qty: 60 TABLET | Refills: 0 | Status: SHIPPED | OUTPATIENT
Start: 2019-08-28 | End: 2019-11-12 | Stop reason: SDUPTHER

## 2019-08-28 NOTE — PROGRESS NOTES
Pt c/o facial neuralgia on the right side    Last dose Percocet 08/25 PM    Assessment:  1  Chronic pain syndrome    2  Trigeminal neuralgia    3  Facial spasm        Plan:  Jazmyn Long is a 71 y o  male with a history of chronic pain syndrome secondary to trigeminal neuralgia and facial spasms  The patient continues with ongoing right-sided facial pain consistent with trigeminal neuralgia  He recently seen Kita Miller since last office visit for surgical consultation  He reported that Kita Miller wants to hold off on any surgical interventions at this time  The patient has been taking oxycodone/acetaminophen 5/325 mg 1 tablets  2-3 daily as needed for flare-ups of his trigeminal neuralgia  His last prescription was prescribed in June for 30 tablets  He uses this medication very sparingly and reports that he has 8 remaining tablets  Therefore, at this time I will continue the patient on oxycodone/acetaminophen 5/325 mg 1 tablet 2-3 times daily for pain  I will send a prescription to his pharmacy on file for 60 tablets which he can fill today  There are risks associated with opioid medications, including dependence, addiction and tolerance  The patient understands and agrees to use these medications only as prescribed  Potential side effects of the medications include, but are not limited to, constipation, drowsiness, addiction, impaired judgment and risk of fatal overdose if not taken as prescribed  The patient was warned against driving while taking sedation medications  Sharing medications is a felony  At this point in time, the patient is showing no signs of addiction, abuse, diversion or suicidal ideation  [place] Prescription Drug Monitoring Program report was reviewed and was appropriate     The patient will follow up in 8 weeks or sooner with the worsening of symptoms       My impressions and treatment recommendations were discussed in detail with the patient who verbalized understanding and had no further questions  Discharge instructions were provided  I personally saw and examined the patient and I agree with the above discussed plan of care  No orders of the defined types were placed in this encounter  New Medications Ordered This Visit   Medications    oxyCODONE-acetaminophen (PERCOCET) 5-325 mg per tablet     Sig: Take 1 tablet 2-3 times daily as needed for pain  Dispense:  60 tablet     Refill:  0       History of Present Illness:  Yusra Altman is a 71 y o  male with a history of chronic pain syndrome secondary to trigeminal neuralgia and facial spasms  The patient was last seen office on 7/25/2019 where he was continued on oxycodone 5/325 mg 1 tablet 1-2 times daily as needed for pain  He presents for a follow up office visit in regards to Facial Pain (right sided) and Neuralgia  The patients current symptoms include right-sided facial pain, which is unchanged since last office visit  He describes his pain as dull aching, sharp, pressure-like, shooting, numbness, pins and needles pain that is constant nature  Current pain medications includes:  Oxycodone/acetaminophen 5/325 mg 1 tablet 2-3 times daily as needed for pain   The patient reports that this regimen is providing 50 % pain relief  The patient is reporting no side effects from this pain medication regimen  Pain Contract Signed: 01/22/2019, Last Urine Drug Screen: 06/27/2019    I have personally reviewed and/or updated the patient's past medical history, past surgical history, family history, social history, current medications, allergies, and vital signs today  Review of Systems   Respiratory: Positive for shortness of breath  Cardiovascular: Negative for chest pain  Gastrointestinal: Negative for constipation, diarrhea, nausea and vomiting  Musculoskeletal: Positive for gait problem  Negative for arthralgias, joint swelling and myalgias          Decreased rom  Joint stiffness     Skin: Negative for rash  Neurological: Negative for dizziness, seizures and weakness  All other systems reviewed and are negative  Patient Active Problem List   Diagnosis    Trigeminal neuralgia    Facial spasm    Anemia    Bilateral patellofemoral syndrome    Diverticulosis of colon    Esophageal reflux    Intracranial meningioma ([address] )    Vertigo    Vitamin D deficiency    Need for pneumococcal vaccination    Preoperative evaluation of a medical condition to rule out surgical contraindications (TAR required)    Insomnia    Primary osteoarthritis of right knee    Pre-op examination       Past Medical History:   Diagnosis Date    Arthritis     Right knee    Chronic pain disorder     Trigeminal neuralgia pain     Ulcer of bile duct        Past Surgical History:   Procedure Laterality Date    APPENDECTOMY  1962    BRAIN SURGERY  2005    Gamma Knife for Trigeminal Neuralgia    ESOPHAGOGASTRODUODENOSCOPY      HERNIA REPAIR  1952    OTHER SURGICAL HISTORY      gamma knife RT in 2007 and 2010    MT STEREO TREAT TRIGEMINAL NERVE Right 3/6/2018    Procedure: RHIZOTOMY TRIGEMINAL NERVES (V2 & V3);   Surgeon: Jenna Ruiz MD;  Location: [facility];  Service: Neurosurgery    MT TOTAL KNEE ARTHROPLASTY Right 10/22/2018    Procedure: ARTHROPLASTY KNEE TOTAL;  Surgeon: eBck Michelle MD;  [facility];  Service: Orthopedics    RHIZOTOMY W/ RADIOFREQUENCY ABLATION Right 08/08/2014    Stereotactic Ablation of Gasserian Ganglion Right sided trigeminal V2 radiofrequency rhizotomy x2       Family History   Problem Relation Age of Onset    Emphysema Mother     Colon cancer Father     Skin cancer Maternal Grandmother        Social History     Occupational History    Occupation: Retired     Comment: 21 years [employer] &    Tobacco Use    Smoking status: Never Smoker    Smokeless tobacco: Never Used   Substance and Sexual Activity    Alcohol use: No    Drug use: No    Sexual activity: Yes Current Outpatient Medications on File Prior to Visit   Medication Sig    Acetaminophen (TYLENOL PO) Take 2 tablets by mouth as needed     ascorbic acid (VITAMIN C) 500 MG tablet Take 1 tablet (500 mg total) by mouth daily    Calcium 250 MG CAPS Take 600 mg by mouth daily      Cholecalciferol (VITAMIN D3) 1000 units CAPS Take 1 capsule by mouth daily     cyanocobalamin (VITAMIN B-12) 1,000 mcg tablet Take 1 tablet by mouth daily     Ketoprofen 10 % CREA Apply 1 application topically as needed     MELATONIN PO Take 10 mg by mouth daily at bedtime     Multiple Vitamins-Minerals (PRESERVISION AREDS) TABS Take 1 tablet by mouth daily    pregabalin (LYRICA) 200 MG capsule Take 2 tabs in the am, 1 tab in the afternoon, 2 tabs at bedtime    pyridoxine (VITAMIN B6) 100 mg tablet Take 1 tablet by mouth daily     topiramate (TOPAMAX) 100 mg tablet Half a tab in the morning 1 tab at bedtime for 5 days then 1 tab in the morning and 1 tab at bedtime after that (Patient taking differently: 1 tab in the morning and 1 tab at bedtime)    TURMERIC CURCUMIN PO Take 1,500 mg by mouth daily    [DISCONTINUED] oxyCODONE-acetaminophen (PERCOCET) 5-325 mg per tablet Take 1 tablet 1-2 times daily as needed for pain  No current facility-administered medications on file prior to visit  Allergies   Allergen Reactions    Gabapentin Delirium    Oxcarbazepine Dizziness    Lamictal [Lamotrigine] Confusion     Difficulty to think clearly     Tramadol        Physical Exam:    /82 (BP Location: Left arm, Patient Position: Sitting, Cuff Size: Standard)   Resp 16   Ht 5' 10" (1 778 m)   Wt 122 kg (268 lb)   BMI 38 45 kg/m²     Constitutional:normal, well developed, well nourished, alert, in no distress and non-toxic and no overt pain behavior   and obese  Eyes:anicteric  HEENT:grossly intact  Neck:supple, symmetric, trachea midline and no masses   Pulmonary:even and unlabored  Cardiovascular:No edema or pitting edema present  Skin:Normal without rashes or lesions and well hydrated  Psychiatric:Mood and affect appropriate  Neurologic:Cranial Nerves II-XII grossly intact  Musculoskeletal:normal    Imaging

## 2019-08-29 NOTE — PATIENT INSTRUCTIONS
Opioid Pain Management   WHAT YOU NEED TO KNOW:   An opioid is a type of medicine used to treat pain  Examples of opioids are oxycodone, morphine, fentanyl, or codeine  DISCHARGE INSTRUCTIONS:   Call 911 or have someone call 911 for any of the following:   · You are breathing slower than normal, or you have trouble breathing  · You cannot be woken  · You have a seizure  Seek care immediately if:   · Your heart is beating slower than usual     · Your heart feels like it is jumping or fluttering  · You are so sleepy that you cannot stay awake  · You have severe muscle pain or weakness  · You see or hear things that are not real   Contact your healthcare provider if:   · You are too dizzy to stand up  · Your pain gets worse or you have new pain  · You cannot do your usual activities because of side effects from the opioid  · You are constipated or have abdominal pain  · You have questions or concerns about your condition or care  Take opioid medicines as directed, for the condition it is prescribed:  Common problems that may occur when you do not take opioid medicines as directed include the following:  · Health problems  may occur  You may have trouble breathing  You may also develop liver or kidney damage, or stomach bleeding  Any of these health problems can become life-threatening  · Opioid dependence  means your body needs the opioid medicine to keep it from going through withdrawal      · Opioid tolerance  means the opioid does not control pain as well as it used to  You need higher doses of the opioid to get pain relief  · Opioid addiction  means you are not able to control the use of the opioid medicine  You use it when you do not have pain  You crave the opioid medicine  Opioid safety measures:   · Take your medicine as directed  Ask if you need more information on how to take your medicine correctly  Follow up with your healthcare provider regularly   You may need to have your dose adjusted  Do not use opioid medicine if you are pregnant or breastfeeding  · Give your healthcare provider a list of all your medicines  Include any over-the-counter medicines, vitamins, and herbs  It can be dangerous to take opioids with certain other medicines, such as antihistamines  · Keep opioid medicine in a safe place  Store your opioid medicine in a locked cabinet to keep it away from children and others  · Do not drink alcohol while you use opioids  Alcohol use with an opioid medicine can make you sleepy and slow your breathing rate  You may stop breathing completely  · Do not drive or operate heavy machinery after you take opioid medicine  Opioid medicine can make you drowsy and make it hard to concentrate  You may injure yourself or others if you drive or operate heavy machinery while taking your medicine  · Drink liquids and eat high-fiber foods  liquids and fiber will help prevent constipation  Ask your healthcare provider what liquids are right for you and how much you should drink  Also ask for a list of foods that contain fiber  Follow up with your healthcare provider as directed: You may need to have your dose adjusted  You may be referred to a pain specialist  Write down your questions so you remember to ask them during your visits  © 2017 [employer] Information is for End User's use only and may not be sold, redistributed or otherwise used for commercial purposes  All illustrations and images included in CareNotes® are the copyrighted property of [employer]  or Nitesh Giraldo  The above information is an  only  It is not intended as medical advice for individual conditions or treatments  Talk to your doctor, nurse or pharmacist before following any medical regimen to see if it is safe and effective for you

## 2019-09-12 ENCOUNTER — TELEPHONE (OUTPATIENT)
Dept: NEUROLOGY | Facility: CLINIC | Age: 69
End: 2019-09-12

## 2019-09-12 NOTE — TELEPHONE ENCOUNTER
fyi:    Dr Gloria Crowe (dentist) called and states that pt has h/o trigeminal neuralgia  Pt continues to c/o severe pain whenever he wears his dentures  She does not think dentures is causing the pain  Asking what Dr Kate Cruz finding are  To better treat the pt, Dr Gloria Crowe is requesting most recent office notes be faxed to [phone]  Called pt and he gave me permission to fax most recent office notes to Dr Gloria Crowe   Office notes faxed to Dr Gloria Crowe at [phone]

## 2019-10-08 ENCOUNTER — OFFICE VISIT (OUTPATIENT)
Dept: OBGYN CLINIC | Facility: HOSPITAL | Age: 69
End: 2019-10-08
Payer: MEDICARE

## 2019-10-08 ENCOUNTER — HOSPITAL ENCOUNTER (OUTPATIENT)
Dept: RADIOLOGY | Facility: HOSPITAL | Age: 69
Discharge: HOME | End: 2019-10-08
Attending: ORTHOPAEDIC SURGERY
Payer: MEDICARE

## 2019-10-08 VITALS
SYSTOLIC BLOOD PRESSURE: 104 MMHG | WEIGHT: 263 LBS | HEART RATE: 105 BPM | DIASTOLIC BLOOD PRESSURE: 73 MMHG | BODY MASS INDEX: 37.65 KG/M2 | HEIGHT: 70 IN

## 2019-10-08 DIAGNOSIS — Z47.1 AFTERCARE FOLLOWING RIGHT KNEE JOINT REPLACEMENT SURGERY: Primary | ICD-10-CM

## 2019-10-08 DIAGNOSIS — Z96.651 AFTERCARE FOLLOWING RIGHT KNEE JOINT REPLACEMENT SURGERY: ICD-10-CM

## 2019-10-08 DIAGNOSIS — Z96.651 AFTERCARE FOLLOWING RIGHT KNEE JOINT REPLACEMENT SURGERY: Primary | ICD-10-CM

## 2019-10-08 DIAGNOSIS — Z47.1 AFTERCARE FOLLOWING RIGHT KNEE JOINT REPLACEMENT SURGERY: ICD-10-CM

## 2019-10-08 PROCEDURE — 73560 X-RAY EXAM OF KNEE 1 OR 2: CPT

## 2019-10-08 PROCEDURE — 99213 OFFICE O/P EST LOW 20 MIN: CPT | Performed by: ORTHOPAEDIC SURGERY

## 2019-10-08 NOTE — PROGRESS NOTES
Duyen Late Nauroth 71 y o  male MRN: [id number]      Chief Complaint:   right knee pain    HPI:   71 y o male complaining of right knee pain  Patient status post 1 year right total knee arthroplasty  Patient states his right knee is doing very well  He states having no pain in his right knee  Patient has been having some numbness and tingling is right lower extremity  He he states he is doing well with physical therapy transition to home exercises  Denies any bowel or bladder incontinence states having some pain in his lower back  States he has not been doing any lower back exercises and physical therapy  States numbness and tingling is aggravated after walking for significant distances  Review Of Systems:   · Skin: Normal  · Neuro: See HPI  · Musculoskeletal: See HPI  · All other systems reviewed and are negative    Past Medical History:   Past Medical History:   Diagnosis Date    Arthritis     Right knee    Chronic pain disorder     Trigeminal neuralgia pain     Ulcer of bile duct        Past Surgical History:   Past Surgical History:   Procedure Laterality Date    APPENDECTOMY  1962    BRAIN SURGERY  2005    Gamma Knife for Trigeminal Neuralgia    ESOPHAGOGASTRODUODENOSCOPY      HERNIA REPAIR  1952    OTHER SURGICAL HISTORY      gamma knife RT in 2007 and 2010    FL STEREO TREAT TRIGEMINAL NERVE Right 3/6/2018    Procedure: RHIZOTOMY TRIGEMINAL NERVES (V2 & V3);   Surgeon: Miguel Angel Woodard MD;  [facility];  Service: Neurosurgery    FL TOTAL KNEE ARTHROPLASTY Right 10/22/2018    Procedure: ARTHROPLASTY KNEE TOTAL;  Surgeon: Ja Heredia MD;  [facility];  Service: Orthopedics    RHIZOTOMY W/ RADIOFREQUENCY ABLATION Right 08/08/2014    Stereotactic Ablation of Gasserian Ganglion Right sided trigeminal V2 radiofrequency rhizotomy x2       Family History:  Family history reviewed and non-contributory  Family History   Problem Relation Age of Onset    Emphysema Mother     Colon cancer Father     Skin cancer Maternal Grandmother          Social History:  Social History     Socioeconomic History    Marital status: /Civil Union     Spouse name: Shira Gee Number of children: 2    Years of education: BS Degree plus addl classes    Highest education level: None   Occupational History    Occupation: Retired     Comment: 20 years [employer] &    Social Needs    Financial resource strain: None    Food insecurity:     Worry: None     Inability: None    Transportation needs:     Medical: None     Non-medical: None   Tobacco Use    Smoking status: Never Smoker    Smokeless tobacco: Never Used   Substance and Sexual Activity    Alcohol use: No    Drug use: No    Sexual activity: Yes   Lifestyle    Physical activity:     Days per week: None     Minutes per session: None    Stress: None   Relationships    Social connections:     Talks on phone: None     Gets together: None     Attends Synagogue service: None     Active member of club or organization: None     Attends meetings of clubs or organizations: None     Relationship status: None    Intimate partner violence:     Fear of current or ex partner: None     Emotionally abused: None     Physically abused: None     Forced sexual activity: None   Other Topics Concern    None   Social History Narrative    Caffeine use: Daily caffeinated cola, drinks coffee    Lives at home with wife Carrie Saravia       Allergies:    Allergies   Allergen Reactions    Gabapentin Delirium    Oxcarbazepine Dizziness    Lamictal [Lamotrigine] Confusion     Difficulty to think clearly     Tramadol        Labs:  0   Lab Value Date/Time    HCT 38 4 10/25/2018 0450    HCT 37 2 10/24/2018 0507    HCT 39 9 10/23/2018 0456    HCT 43 6 06/24/2015 1107    HCT 44 8 07/29/2014 1048    HGB 12 9 10/25/2018 0450    HGB 12 6 10/24/2018 0507    HGB 13 3 10/23/2018 0456    HGB 14 7 06/24/2015 1107    HGB 15 0 07/29/2014 1048    INR 0 99 09/26/2018 1408    INR 0 94 07/29/2014 1048    WBC 9 02 10/25/2018 0450    WBC 10 21 (H) 10/24/2018 0507    WBC 9 19 10/23/2018 0456    WBC 5 76 06/24/2015 1107    WBC 4 81 07/29/2014 1048    ESR 2 06/24/2015 1107       Meds:    Current Outpatient Medications:     Acetaminophen (TYLENOL PO), Take 2 tablets by mouth as needed , Disp: , Rfl:     ascorbic acid (VITAMIN C) 500 MG tablet, Take 1 tablet (500 mg total) by mouth daily, Disp: 60 tablet, Rfl: 0    Calcium 250 MG CAPS, Take 600 mg by mouth daily  , Disp: , Rfl:     Cholecalciferol (VITAMIN D3) 1000 units CAPS, Take 1 capsule by mouth daily , Disp: , Rfl:     cyanocobalamin (VITAMIN B-12) 1,000 mcg tablet, Take 1 tablet by mouth daily , Disp: , Rfl:     Ketoprofen 10 % CREA, Apply 1 application topically as needed , Disp: , Rfl: 2    MELATONIN PO, Take 10 mg by mouth daily at bedtime , Disp: , Rfl:     Multiple Vitamins-Minerals (PRESERVISION AREDS) TABS, Take 1 tablet by mouth daily, Disp: , Rfl:     oxyCODONE-acetaminophen (PERCOCET) 5-325 mg per tablet, Take 1 tablet 2-3 times daily as needed for pain , Disp: 60 tablet, Rfl: 0    pregabalin (LYRICA) 200 MG capsule, Take 2 tabs in the am, 1 tab in the afternoon, 2 tabs at bedtime, Disp: 450 capsule, Rfl: 0    pyridoxine (VITAMIN B6) 100 mg tablet, Take 1 tablet by mouth daily , Disp: , Rfl:     topiramate (TOPAMAX) 100 mg tablet, Half a tab in the morning 1 tab at bedtime for 5 days then 1 tab in the morning and 1 tab at bedtime after that (Patient taking differently: 1 tab in the morning and 1 tab at bedtime), Disp: 60 tablet, Rfl: 3    TURMERIC CURCUMIN PO, Take 1,500 mg by mouth daily, Disp: , Rfl:       Physical Exam:     General Appearance:    Alert, cooperative, no distress, appears stated age   Head:    Normocephalic, without obvious abnormality, atraumatic   Eyes:    conjunctiva/corneas clear, both eyes        Nose:   Nares normal, septum midline, no drainage    Throat:   Lips normal; teeth and gums normal   Neck:    symmetrical, trachea midline, ;     thyroid:  no enlargement/   Back:     Symmetric, no curvature, ROM normal   Lungs:   No audible wheezing or labored breathing   Chest Wall:    No tenderness or deformity    Heart:    Regular rate and rhythm               Pulses:   2+ and symmetric all extremities   Skin:   Skin color, texture, turgor normal, no rashes or lesions   Neurologic:   normal strength, sensation and reflexes     throughout       Musculoskeletal: right lower extremity  · Examination patient's right lower extremity well-healed anterior incision full extension flexion greater than 110° quadriceps strength 5/5 ankle dorsi plantar flexion strength 5/5 mildly decreased sensation lateral aspect of the lower leg no decreased sensation over the right foot as compared to the left foot throughout examination lumbar spine active forward flexion extension without pain pain on palpation of the lower midline of his lower back no pain palpation of the bilateral SI joints  Negative straight leg raise bilaterally hip flexion adduction abduction strength 5/5    Radiology:   I personally reviewed the films    X-rays right knee shows no loosening right total knee implant    _*_*_*_*_*_*_*_*_*_*_*_*_*_*_*_*_*_*_*_*_*_*_*_*_*_*_*_*_*_*_*_*_*_*_*_*_*_*_*_*_*    Assessment:  69 y o male with right knee status post 1 year right total knee arthroplasty and right lower extremity sciatic symptoms    Plan:   · Weight bearing as tolerated  right lower extremity  · Continue home range of motion stretching strengthening exercise  · Patient wished to refrain from formal physical therapy home range of motion exercise given for patient's lumbar spine  · Should patient's lumbar spine pain worsen or right lower extremity symptoms worsen he may be candidate for formal physical therapy versus following up with an non operative spine specialist  · Follow-up an as-needed basis      Chance Chin EDUARDO

## 2019-10-15 DIAGNOSIS — G50.0 TRIGEMINAL NEURALGIA OF RIGHT SIDE OF FACE: ICD-10-CM

## 2019-10-15 RX ORDER — PREGABALIN 200 MG/1
CAPSULE ORAL
Qty: 450 CAPSULE | Refills: 0 | Status: SHIPPED | OUTPATIENT
Start: 2019-10-15 | End: 2020-01-27 | Stop reason: SDUPTHER

## 2019-10-15 NOTE — TELEPHONE ENCOUNTER
[facility] requesting a refill on patient Lyrica  200mg     2 caps in the Am  1 Cap in the afternoon  2 caps at bedtime

## 2019-10-16 PROBLEM — J34.2 DNS (DEVIATED NASAL SEPTUM): Chronic | Status: ACTIVE | Noted: 2019-10-16

## 2019-11-12 ENCOUNTER — OFFICE VISIT (OUTPATIENT)
Dept: PAIN MEDICINE | Facility: CLINIC | Age: 69
End: 2019-11-12
Payer: MEDICARE

## 2019-11-12 VITALS
TEMPERATURE: 97.6 F | HEART RATE: 63 BPM | DIASTOLIC BLOOD PRESSURE: 66 MMHG | SYSTOLIC BLOOD PRESSURE: 115 MMHG | BODY MASS INDEX: 37.74 KG/M2 | WEIGHT: 263 LBS

## 2019-11-12 DIAGNOSIS — G51.39 FACIAL SPASM: ICD-10-CM

## 2019-11-12 DIAGNOSIS — G50.0 TRIGEMINAL NEURALGIA: ICD-10-CM

## 2019-11-12 DIAGNOSIS — F11.20 UNCOMPLICATED OPIOID DEPENDENCE (HCC): ICD-10-CM

## 2019-11-12 DIAGNOSIS — Z79.891 LONG-TERM CURRENT USE OF OPIATE ANALGESIC: ICD-10-CM

## 2019-11-12 DIAGNOSIS — G89.4 CHRONIC PAIN SYNDROME: Primary | ICD-10-CM

## 2019-11-12 PROCEDURE — 99214 OFFICE O/P EST MOD 30 MIN: CPT | Performed by: NURSE PRACTITIONER

## 2019-11-12 PROCEDURE — 80305 DRUG TEST PRSMV DIR OPT OBS: CPT | Performed by: NURSE PRACTITIONER

## 2019-11-12 RX ORDER — OXYCODONE HYDROCHLORIDE AND ACETAMINOPHEN 5; 325 MG/1; MG/1
TABLET ORAL
Qty: 60 TABLET | Refills: 0 | Status: SHIPPED | OUTPATIENT
Start: 2019-11-12 | End: 2020-01-09 | Stop reason: SDUPTHER

## 2019-11-12 NOTE — PROGRESS NOTES
Assessment:  1  Chronic pain syndrome    2  Trigeminal neuralgia    3  Facial spasm    4  Long-term current use of opiate analgesic    5  Uncomplicated opioid dependence ([facility] [address] )        Plan:  Criselda Coleman is a 71 y o  male chronic pain syndrome secondary to trigeminal neuralgia and facial spasm  The patient continues with ongoing right sided facial pain, which is unchanged since last office visit  He reports that his trigeminal flare ups are well managed with the use of oxycodone/acetaminophen 5/325 mg 1 tablet up to 3 times daily when spasm and flare-ups occur  He reports greater than 50% pain relief with the use of this medication  He does use this medication very sparingly and his last prescription was prescribed on 8/29/2019 for 60 tablets  He reports that he has 12 remaining tablets at home  Therefore, at this time we will continue the patient on this medication as prescribed and a prescription for oxycodone/acetaminophen 5/325 mg 1 tablet 2-3 times daily, 60 tablets monthly was sent to the patient's pharmacy on file which he can fill today  The patient did not bring his prescription pill bottle to this office visit  He was instructed that he must bring his prescription pill bottle to each office visit per office policy for possible random pill count as required for future refills  He verbalized understanding and stated he would bring his prescription pill bottle next office visit  The patient will continue on Lyrica as prescribed by his neurologist     There are risks associated with opioid medications, including dependence, addiction and tolerance  The patient understands and agrees to use these medications only as prescribed  Potential side effects of the medications include, but are not limited to, constipation, drowsiness, addiction, impaired judgment and risk of fatal overdose if not taken as prescribed  The patient was warned against driving while taking sedation medications    Sharing medications is a felony  At this point in time, the patient is showing no signs of addiction, abuse, diversion or suicidal ideation  A urine drug screen was collected at today's office visit as part of our medication management protocol  The point of care testing results were appropriate for what was being prescribed  The specimen will be sent for confirmatory testing  The drug screen is medically necessary because the patient is either dependent on opioid medication or is being considered for opioid medication therapy and the results could impact ongoing or future treatment  The drug screen is to evaluate for the presences or absence of prescribed, non-prescribed, and/or illicit drugs/substances  [place] Prescription Drug Monitoring Program report was reviewed and was appropriate     The patient will follow up in 8 weeks or sooner with the worsening of symptoms  My impressions and treatment recommendations were discussed in detail with the patient who verbalized understanding and had no further questions  Discharge instructions were provided  I personally saw and examined the patient and I agree with the above discussed plan of care  No orders of the defined types were placed in this encounter  New Medications Ordered This Visit   Medications    oxyCODONE-acetaminophen (PERCOCET) 5-325 mg per tablet     Sig: Take 1 tablet 2-3 times daily as needed for pain  Dispense:  60 tablet     Refill:  0       History of Present Illness:  Burgess Sinclair is a 71 y o  male chronic pain syndrome secondary to trigeminal neuralgia and facial spasm  The patient was last seen office on 8/28/2019 where he was continued oxycodone/acetaminophen 5/325 mg 1 tablet 2-3 times daily as needed for flareups  He presents for a follow up office visit in regards to Headache and Facial Pain     The patients current symptoms include right sided facial pain that radiates into his right cheek and into his forehead causing headaches  He denies bilateral arm weakness, or bowel or bladder issues  He describes his pain as sharp, shooting, numbness, pins and needles pain that is constant nature  He reports that his pain and symptoms are unchanged since last office visit  He currently rates his pain as 6/10 numeric pain scale  Current pain medications includes:  Oxycodone/acetaminophen 5/325 mg 1 tablet 2-3 times daily  The patient reports that this regimen is providing 50% pain relief  The patient is reporting no side effects from this pain medication regimen  Pain Contract Signed: 01/22/2019, Last Urine Drug Screen: 06/27/2019  repeat uds completed this office visit  I have personally reviewed and/or updated the patient's past medical history, past surgical history, family history, social history, current medications, allergies, and vital signs today  Review of Systems   Respiratory: Positive for shortness of breath  Cardiovascular: Negative for chest pain  Gastrointestinal: Negative for constipation, diarrhea, nausea and vomiting  Musculoskeletal: Positive for gait problem  Negative for arthralgias, joint swelling and myalgias  Skin: Negative for rash  Neurological: Positive for dizziness  Negative for seizures and weakness  All other systems reviewed and are negative        Patient Active Problem List   Diagnosis    Trigeminal neuralgia    Facial spasm    Anemia    Bilateral patellofemoral syndrome    Diverticulosis of colon    Esophageal reflux    Intracranial meningioma (HCC)    Vertigo    Vitamin D deficiency    Need for pneumococcal vaccination    Preoperative evaluation of a medical condition to rule out surgical contraindications (TAR required)    Insomnia    Primary osteoarthritis of right knee    Pre-op examination    DNS (deviated nasal septum)       Past Medical History:   Diagnosis Date    Arthritis     Right knee    Chronic pain disorder     Trigeminal neuralgia pain  Ulcer of bile duct        Past Surgical History:   Procedure Laterality Date    APPENDECTOMY  1962    BRAIN SURGERY  2005    Gamma Knife for Trigeminal Neuralgia    ESOPHAGOGASTRODUODENOSCOPY      HERNIA REPAIR  1952    OTHER SURGICAL HISTORY      gamma knife RT in 2007 and 2010    GA STEREO TREAT TRIGEMINAL NERVE Right 3/6/2018    Procedure: RHIZOTOMY TRIGEMINAL NERVES (V2 & V3);   Surgeon: Kennedy Murphy MD;  [facility];  Service: Neurosurgery    GA TOTAL KNEE ARTHROPLASTY Right 10/22/2018    Procedure: ARTHROPLASTY KNEE TOTAL;  Surgeon: Nas Flores MD;  [facility];  Service: Orthopedics    RHIZOTOMY W/ RADIOFREQUENCY ABLATION Right 08/08/2014    Stereotactic Ablation of Gasserian Ganglion Right sided trigeminal V2 radiofrequency rhizotomy x2       Family History   Problem Relation Age of Onset    Emphysema Mother     Colon cancer Father     Skin cancer Maternal Grandmother        Social History     Occupational History    Occupation: Retired     Comment: 21 years [employer] &    Tobacco Use    Smoking status: Never Smoker    Smokeless tobacco: Never Used   Substance and Sexual Activity    Alcohol use: No    Drug use: No    Sexual activity: Yes       Current Outpatient Medications on File Prior to Visit   Medication Sig    Acetaminophen (TYLENOL PO) Take 2 tablets by mouth as needed     ascorbic acid (VITAMIN C) 500 MG tablet Take 1 tablet (500 mg total) by mouth daily    Calcium 250 MG CAPS Take 600 mg by mouth daily      Cholecalciferol (VITAMIN D3) 1000 units CAPS Take 1 capsule by mouth daily     cyanocobalamin (VITAMIN B-12) 1,000 mcg tablet Take 1 tablet by mouth daily     Ketoprofen 10 % CREA Apply 1 application topically as needed     MELATONIN PO Take 10 mg by mouth daily at bedtime     Multiple Vitamins-Minerals (PRESERVISION AREDS) TABS Take 1 tablet by mouth daily    pregabalin (LYRICA) 200 MG capsule Take 2 tabs in the am, 1 tab in the afternoon, 2 tabs at bedtime    pyridoxine (VITAMIN B6) 100 mg tablet Take 1 tablet by mouth daily     topiramate (TOPAMAX) 100 mg tablet Half a tab in the morning 1 tab at bedtime for 5 days then 1 tab in the morning and 1 tab at bedtime after that (Patient taking differently: 1 tab in the morning and 1 tab at bedtime)    TURMERIC CURCUMIN PO Take 1,500 mg by mouth daily    [DISCONTINUED] oxyCODONE-acetaminophen (PERCOCET) 5-325 mg per tablet Take 1 tablet 2-3 times daily as needed for pain  No current facility-administered medications on file prior to visit  Allergies   Allergen Reactions    Gabapentin Delirium    Oxcarbazepine Dizziness    Lamictal [Lamotrigine] Confusion     Difficulty to think clearly     Tramadol        Physical Exam:    /66   Pulse 63   Temp 97 6 °F (36 4 °C) (Oral)   Wt 119 kg (263 lb)   BMI 37 74 kg/m²     Constitutional:normal, well developed, well nourished, alert, in no distress and non-toxic and no overt pain behavior   and obese  Eyes:anicteric  HEENT:grossly intact  Neck:supple, symmetric, trachea midline and no masses   Pulmonary:even and unlabored  Cardiovascular:No edema or pitting edema present  Skin:Normal without rashes or lesions and well hydrated  Psychiatric:Mood and affect appropriate  Neurologic:Cranial Nerves II-XII grossly intact  Musculoskeletal:normal    Imaging      Last dose of Oxcycodone was 11/11/19 @ 10:30am

## 2019-11-12 NOTE — PATIENT INSTRUCTIONS
Opioid Pain Management   AMBULATORY CARE:   An opioid  is a type of medicine used to treat pain  Examples of opioids are oxycodone, morphine, fentanyl, or codeine  Take opioid medicines as directed, for the condition it is prescribed:  Common problems that may occur when you do not take opioid medicines as directed include the following:  · Health problems  may occur  You may have trouble breathing  You may also develop liver or kidney damage, or stomach bleeding  Any of these health problems can become life-threatening  · Opioid dependence  means your body needs the opioid medicine to keep it from going through withdrawal      · Opioid tolerance  means the opioid does not control pain as well as it used to  You need higher doses of the opioid to get pain relief  · Opioid addiction  means you are not able to control the use of the opioid medicine  You use it when you do not have pain  You crave the opioid medicine  Call 911 or have someone call 911 for any of the following:   · You are breathing slower than normal, or you have trouble breathing  · You cannot be awakened  · You have a seizure  Seek care immediately if:   · Your heart is beating slower than usual     · Your heart feels like it is jumping or fluttering  · You are so sleepy that you cannot stay awake  · You have severe muscle pain or weakness  · You see or hear things that are not real   Contact your healthcare provider if:   · You are too dizzy to stand up  · Your pain gets worse or you have new pain  · You cannot do your usual activities because of side effects from the opioid  · You are constipated or have abdominal pain  · You have questions or concerns about your condition or care  Opioid safety measures:   · Take your medicine as directed  Ask if you need more information on how to take your medicine correctly  Follow up with your healthcare provider regularly  You may need to have your dose adjusted   Do not use opioid medicine if you are pregnant or breastfeeding  · Give your healthcare provider a list of all your medicines  Include any over-the-counter medicines, vitamins, and herbs  It can be dangerous to take opioids with certain other medicines, such as antihistamines  · Keep opioid medicine in a safe place  Store your opioid medicine in a locked cabinet to keep it away from children and others  · Do not drink alcohol while you use opioids  Alcohol use with an opioid medicine can make you sleepy and slow your breathing rate  You may stop breathing completely  · Do not drive or operate heavy machinery after you take opioid medicine  Opioid medicine can make you drowsy and make it hard to concentrate  You may injure yourself or others if you drive or operate heavy machinery while taking your medicine  · Drink fluids and eat high-fiber foods  Fluids and fiber will help prevent constipation  Ask your healthcare provider what fluids are right for you and how much you should drink  Also ask for a list of foods that contain fiber  Follow up with your healthcare provider as directed: You may need to have your dose adjusted  You may be referred to a pain specialist  Write down your questions so you remember to ask them during your visits  © 2017 [employer] Information is for End User's use only and may not be sold, redistributed or otherwise used for commercial purposes  All illustrations and images included in CareNotes® are the copyrighted property of [employer]  or Nitesh Giraldo  The above information is an  only  It is not intended as medical advice for individual conditions or treatments  Talk to your doctor, nurse or pharmacist before following any medical regimen to see if it is safe and effective for you

## 2019-11-14 LAB
6MAM UR QL CFM: NEGATIVE NG/ML
7-OH-MITRAGYNINE (KRATOM ALKALOID) QUANTIFICATION: NEGATIVE NG/ML
7AMINOCLONAZEPAM UR QL CFM: NEGATIVE NG/ML
A-OH ALPRAZ UR QL CFM: NEGATIVE NG/ML
AMPHET UR QL CFM: NEGATIVE NG/ML
AMPHET UR QL CFM: NEGATIVE NG/ML
B-HCG UR QL: NEGATIVE NG/ML
BUPRENORPHINE UR QL CFM: NEGATIVE NG/ML
BUTALBITAL UR QL CFM: NEGATIVE NG/ML
BZE UR QL CFM: NEGATIVE NG/ML
CODEINE UR QL CFM: NEGATIVE NG/ML
CONFIRM APTT STACLOT: NORMAL
DEPRECATED SCALLOP IGE RAST QL: NEGATIVE NG/ML
EDDP UR QL CFM: NEGATIVE NG/ML
ETHYL GLUCURONIDE UR QL CFM: NEGATIVE NG/ML
ETHYL SULFATE UR QL SCN: NEGATIVE NG/ML
FENTANYL UR QL CFM: NEGATIVE NG/ML
HYDROCODONE UR QL CFM: NEGATIVE NG/ML
HYDROCODONE UR QL CFM: NEGATIVE NG/ML
HYDROMORPHONE UR QL CFM: NEGATIVE NG/ML
LORAZEPAM UR QL CFM: NEGATIVE NG/ML
MDMA UR QL CFM: NEGATIVE NG/ML
ME-PHENIDATE UR QL CFM: NEGATIVE NG/ML
MEPERIDINE UR QL CFM: NEGATIVE NG/ML
METHADONE UR QL CFM: NEGATIVE NG/ML
METHAMPHET UR QL CFM: NEGATIVE NG/ML
MITOCHONDRIA AB TITR SER IF: NEGATIVE NG/ML
MORPHINE UR QL CFM: NEGATIVE NG/ML
MORPHINE UR QL CFM: NEGATIVE NG/ML
NORBUPRENORPHINE UR QL CFM: NEGATIVE NG/ML
NORDIAZEPAM UR QL CFM: NEGATIVE NG/ML
NORFENTANYL UR QL CFM: NEGATIVE NG/ML
NORHYDROCODONE UR QL CFM: NEGATIVE NG/ML
NORHYDROCODONE UR QL CFM: NEGATIVE NG/ML
NORMEPERIDINE UR QL CFM: NEGATIVE NG/ML
NOROXYCODONE UR CFM-MCNC: ABNORMAL NG/ML
OXAZEPAM UR QL CFM: NEGATIVE NG/ML
OXYCODONE UR CFM-MCNC: ABNORMAL NG/ML
OXYMORPHONE UR CFM-MCNC: ABNORMAL NG/ML
OXYMORPHONE UR CFM-MCNC: ABNORMAL NG/ML
PCP UR QL CFM: NEGATIVE NG/ML
PHENOBARB UR QL CFM: NEGATIVE NG/ML
PHENTERMINE UR QL CFM: NEGATIVE NG/ML
SECOBARBITAL UR QL CFM: NEGATIVE NG/ML
SL AMB 3-METHYL-FENTANYL QUANTIFICATION: NORMAL NG/ML
SL AMB 4-ANPP QUANTIFICATION: NORMAL NG/ML
SL AMB 4-FIBF QUANTIFICATION: NORMAL NG/ML
SL AMB 5F-ADB-M7 METABOLITE QUANTIFICATION: NEGATIVE NG/ML
SL AMB AB-FUBINACA-M3 METABOLITE QUANTIFICATION: NEGATIVE NG/ML
SL AMB ACETYL FENTANYL QUANTIFICATION: NORMAL NG/ML
SL AMB ACETYL NORFENTANYL QUANTIFICATION: NORMAL NG/ML
SL AMB ACRYL FENTANYL QUANTIFICATION: NORMAL NG/ML
SL AMB BUTRYL FENTANYL QUANTIFICATION: NORMAL NG/ML
SL AMB CARFENTANIL QUANTIFICATION: NORMAL NG/ML
SL AMB CYCLOPROPYL FENTANYL QUANTIFICATION: NORMAL NG/ML
SL AMB DEXTROMETHORPHAN QUANTIFICATION: NEGATIVE NG/ML
SL AMB DEXTRORPHAN (DEXTROMETHORPHAN METABOLITE) QUANT: NEGATIVE NG/ML
SL AMB DEXTRORPHAN (DEXTROMETHORPHAN METABOLITE) QUANT: NEGATIVE NG/ML
SL AMB FURANYL FENTANYL QUANTIFICATION: NORMAL NG/ML
SL AMB JWH073 METABOLITE QUANTIFICATION: NEGATIVE NG/ML
SL AMB MDMB-FUBINACA-M1 METABOLITE QUANTIFICATION: NEGATIVE NG/ML
SL AMB METHOXYACETYL FENTANYL QUANTIFICATION: NORMAL NG/ML
SL AMB N-DESMETHYL U-47700 QUANTIFICATION: NORMAL NG/ML
SL AMB PHENTERMINE QUANTIFICATION-CREATININE NORMALIZED: NEGATIVE NG/ML
SL AMB PHENTERMINE QUANTIFICATION: NEGATIVE NG/ML
SL AMB RISPERIDONE QUANTIFICATION: NEGATIVE NG/ML
SL AMB U-47700 QUANTIFICATION: NORMAL NG/ML
SPECIMEN DRAWN SERPL: NEGATIVE NG/ML
TAPENTADOL UR QL CFM: NEGATIVE NG/ML
TEMAZEPAM UR QL CFM: NEGATIVE NG/ML
TEMAZEPAM UR QL CFM: NEGATIVE NG/ML
TRAMADOL UR QL CFM: NEGATIVE NG/ML
URATE/CREAT 24H UR: NEGATIVE NG/ML

## 2019-11-19 RX ORDER — PREDNISOLONE ACETATE 10 MG/ML
SUSPENSION/ DROPS OPHTHALMIC
Refills: 0 | COMMUNITY
Start: 2019-10-29 | End: 2020-01-09

## 2019-11-19 RX ORDER — OFLOXACIN 3 MG/ML
SOLUTION/ DROPS OPHTHALMIC
Refills: 0 | COMMUNITY
Start: 2019-10-29 | End: 2021-04-06

## 2019-11-19 RX ORDER — BROMFENAC 1.03 MG/ML
SOLUTION/ DROPS OPHTHALMIC
Refills: 0 | COMMUNITY
Start: 2019-10-29 | End: 2020-01-09

## 2019-11-21 ENCOUNTER — OFFICE VISIT (OUTPATIENT)
Dept: FAMILY MEDICINE CLINIC | Facility: CLINIC | Age: 69
End: 2019-11-21
Payer: MEDICARE

## 2019-11-21 VITALS
WEIGHT: 259.6 LBS | HEART RATE: 60 BPM | TEMPERATURE: 96.3 F | SYSTOLIC BLOOD PRESSURE: 112 MMHG | OXYGEN SATURATION: 97 % | BODY MASS INDEX: 37.25 KG/M2 | DIASTOLIC BLOOD PRESSURE: 68 MMHG

## 2019-11-21 DIAGNOSIS — G50.0 TRIGEMINAL NEURALGIA: ICD-10-CM

## 2019-11-21 DIAGNOSIS — R35.1 NOCTURIA: ICD-10-CM

## 2019-11-21 DIAGNOSIS — Z01.818 PREOPERATIVE EVALUATION OF A MEDICAL CONDITION TO RULE OUT SURGICAL CONTRAINDICATIONS (TAR REQUIRED): Primary | ICD-10-CM

## 2019-11-21 LAB
SL AMB  POCT GLUCOSE, UA: NORMAL
SL AMB LEUKOCYTE ESTERASE,UA: NORMAL
SL AMB POCT BILIRUBIN,UA: NORMAL
SL AMB POCT BLOOD,UA: NORMAL
SL AMB POCT CLARITY,UA: CLEAR
SL AMB POCT COLOR,UA: YELLOW
SL AMB POCT KETONES,UA: NORMAL
SL AMB POCT NITRITE,UA: NORMAL
SL AMB POCT PH,UA: 6
SL AMB POCT SPECIFIC GRAVITY,UA: 1.02
SL AMB POCT URINE PROTEIN: NORMAL
SL AMB POCT UROBILINOGEN: 0.2

## 2019-11-21 PROCEDURE — G0439 PPPS, SUBSEQ VISIT: HCPCS | Performed by: FAMILY MEDICINE

## 2019-11-21 PROCEDURE — 99214 OFFICE O/P EST MOD 30 MIN: CPT | Performed by: FAMILY MEDICINE

## 2019-11-21 PROCEDURE — 81003 URINALYSIS AUTO W/O SCOPE: CPT | Performed by: FAMILY MEDICINE

## 2019-11-21 NOTE — PATIENT INSTRUCTIONS

## 2019-11-21 NOTE — PROGRESS NOTES
FAMILY PRACTICE OFFICE VISIT       NAME: Hafsa Lozano  AGE: 71 y o  SEX: male       : 1950        MRN: [id number]    DATE: 2019  TIME: 9:06 AM    Assessment and Plan     Problem List Items Addressed This Visit        Nervous and Auditory    Trigeminal neuralgia    Relevant Orders    CBC    Comprehensive metabolic panel    Lipid panel    TSH, 3rd generation      Other Visit Diagnoses     Nocturia    -  Primary    Relevant Orders    PSA, Total Screen    CBC    Comprehensive metabolic panel    Lipid panel    TSH, 3rd generation              Chief Complaint     Chief Complaint   Patient presents with    Medicare Wellness Visit    Pre-op Exam     Cataract surgery        History of Present Illness     Patient scheduled to have cataract surgery on his right eye on 2019  He denies any recent illness  I reviewed his latest list of medications  Review of Systems   Review of Systems   Constitutional: Negative  HENT: Negative  Eyes:        Decreased visual acuity right eye due to cataract formation   Respiratory: Negative  Cardiovascular: Negative  Gastrointestinal: Negative  Genitourinary: Negative  Musculoskeletal:        Patient with continues stiffness of knees due to degenerative joint disease  Patient status post right total knee replacement surgery approximately 14 months ago   Neurological: Negative  Psychiatric/Behavioral: Negative          Active Problem List     Patient Active Problem List   Diagnosis    Trigeminal neuralgia    Facial spasm    Anemia    Bilateral patellofemoral syndrome    Diverticulosis of colon    Esophageal reflux    Intracranial meningioma (HCC)    Vertigo    Vitamin D deficiency    Need for pneumococcal vaccination    Preoperative evaluation of a medical condition to rule out surgical contraindications (TAR required)    Insomnia    Primary osteoarthritis of right knee    Pre-op examination    DNS (deviated nasal septum)       Past Medical History:  Past Medical History:   Diagnosis Date    Arthritis     Right knee    Chronic pain disorder     Trigeminal neuralgia pain     Ulcer of bile duct        Past Surgical History:  Past Surgical History:   Procedure Laterality Date    APPENDECTOMY  1962    BRAIN SURGERY  2005    Gamma Knife for Trigeminal Neuralgia    ESOPHAGOGASTRODUODENOSCOPY      HERNIA REPAIR  1952    OTHER SURGICAL HISTORY      gamma knife RT in 2007 and 2010    ID STEREO TREAT TRIGEMINAL NERVE Right 3/6/2018    Procedure: RHIZOTOMY TRIGEMINAL NERVES (V2 & V3); Surgeon: Deonna Nuñez MD;  Location: [facility];  Service: Neurosurgery    ID TOTAL KNEE ARTHROPLASTY Right 10/22/2018    Procedure: ARTHROPLASTY KNEE TOTAL;  Surgeon: Leo Gonzalez MD;  Location: [facility];  Service: Orthopedics    RHIZOTOMY W/ RADIOFREQUENCY ABLATION Right 08/08/2014    Stereotactic Ablation of Gasserian Ganglion Right sided trigeminal V2 radiofrequency rhizotomy x2       Family History:  Family History   Problem Relation Age of Onset    Emphysema Mother     Colon cancer Father     Skin cancer Maternal Grandmother        Social History:  Social History     Socioeconomic History    Marital status: /Civil Union     Spouse name:  Bereket Mckeon Number of children: 2    Years of education: BS Degree plus addl classes    Highest education level: Not on file   Occupational History    Occupation: Retired     Comment: 21 years [employer] [employer] Financial resource strain: Not on file    Food insecurity:     Worry: Not on file     Inability: Not on file   [facility] [place] needs:     Medical: Not on file     Non-medical: Not on file   Tobacco Use    Smoking status: Never Smoker    Smokeless tobacco: Never Used   Substance and Sexual Activity    Alcohol use: No    Drug use: No    Sexual activity: Yes   Lifestyle    Physical activity:     Days per week: Not on file     Minutes per session: Not on file    Stress: Not on file   Relationships    Social connections:     Talks on phone: Not on file     Gets together: Not on file     Attends [employer] service: Not on file     Active member of club or organization: Not on file     Attends meetings of clubs or organizations: Not on file     Relationship status: Not on file    Intimate partner violence:     Fear of current or ex partner: Not on file     Emotionally abused: Not on file     Physically abused: Not on file     Forced sexual activity: Not on file   Other Topics Concern    Not on file   Social History Narrative    Caffeine use: Daily caffeinated cola, drinks coffee    Lives at home with wife Tammy Bennett       Objective     Vitals:    11/21/19 0809   BP: 112/68   Pulse: 60   Temp: (!) 96 3 °F (35 7 °C)   SpO2: 97%     Wt Readings from Last 3 Encounters:   11/21/19 118 kg (259 lb 9 6 oz)   11/12/19 119 kg (263 lb)   10/10/19 119 kg (263 lb)       Physical Exam   Constitutional: He is oriented to person, place, and time  No distress  HENT:   Right Ear: External ear normal    Left Ear: External ear normal    Mouth/Throat: Oropharynx is clear and moist  No oropharyngeal exudate  Tympanic membranes within normal limits bilaterally   Cardiovascular: Normal heart sounds  Regular rate and rhythm with no murmurs   Pulmonary/Chest: Breath sounds normal    Lungs are clear to auscultation without wheezes,rales, or rhonchi   Abdominal:   Abdomen is soft, nontender with positive bowel sounds  There is no rebound or guarding  No masses palpated   Musculoskeletal: He exhibits no edema  Lymphadenopathy:     He has no cervical adenopathy  Neurological: He is alert and oriented to person, place, and time  No cranial nerve deficit  Psychiatric: He has a normal mood and affect   His behavior is normal  Judgment and thought content normal      EKG showed sinus bradycardia 57 beats per minute , first-degree AV block, left axis deviation , negative acute ischemic changes    Pertinent Laboratory/Diagnostic Studies:  Lab Results   Component Value Date    GLUCOSE 78 12/19/2015    BUN 10 04/03/2019    CREATININE 1 05 04/03/2019    CALCIUM 9 7 04/03/2019     12/19/2015    K 4 3 04/03/2019    CO2 25 04/03/2019     (H) 04/03/2019     Lab Results   Component Value Date    ALT 24 04/03/2019    AST 12 04/03/2019    ALKPHOS 89 04/03/2019    BILITOT 0 45 06/24/2015       Lab Results   Component Value Date    WBC 9 02 10/25/2018    HGB 12 9 10/25/2018    HCT 38 4 10/25/2018    MCV 94 10/25/2018     (L) 10/25/2018       No results found for: TSH    Lab Results   Component Value Date    CHOL 185 06/24/2015     Lab Results   Component Value Date    TRIG 67 06/24/2015     Lab Results   Component Value Date    HDL 46 06/24/2015     Lab Results   Component Value Date    LDLCALC 126 (H) 06/24/2015     Lab Results   Component Value Date    HGBA1C 5 1 09/26/2018       Results for orders placed or performed in visit on 11/12/19   MM ALL_Prescribed Meds and Special Instructions   Result Value Ref Range    O-desmethyl-tramadol Quant-Creatinine Normalized Not Applicable    MM DT_Alprazolam Definitive Test   Result Value Ref Range    Alpha-Hydroxyalprazolam Quantification UR negative 20 ng/mL   MM DT_Amphetamine Definitive Test   Result Value Ref Range    Amphetamine Quantification negative 100 ng/mL   MM DT_Buprenorphine Definitive Test   Result Value Ref Range    Buprenorphine Quantification negative 5 ng/mL    Norbuprenorphine Quantification negative 20 ng/mL   MM DT_Butalbital Definitive Test   Result Value Ref Range    Butalbital Quantification negative 200 ng/mL   MM DT_Clonazepam Definitive Test   Result Value Ref Range    7-Amino-Clonazepam Quantification UR negative 20 ng/mL   MM DT_Cocaine Definitive Test   Result Value Ref Range    Cocaine metabolite Quantification negative 50 ng/mL   MM DT_Codeine Definitive Test   Result Value Ref Range    Codeine Quantification negative 50 ng/mL    Morphine Quantification negative 50 ng/mL    Hydrocodone Quantification negative 50 ng/mL    Norhydrocodone Quantification negative 50 ng/mL    Hydromorphone Quantification negative 50 ng/mL   MM DT_Dextromethorphan Definitive Test   Result Value Ref Range    Dextromethorphan Quantification negative 50 ng/mL    Dextrorphan (Dextromethorphan metabolite) Quant negative 50 ng/mL   MM Diazepam Definitive Test   Result Value Ref Range    Nordiazepam Quantification negative 40 ng/mL    Temazepam Quantification negative 50 ng/mL    Oxazepam Quantification negative 40 ng/mL   MM DT_Ethyl Glucuronide/Ethyl Sulfate Definitive Test   Result Value Ref Range    Ethyl Glucuronide Quantification negative 500 ng/mL    Ethyl Sulfate Quantification negative 500 ng/mL   MM DT_Fentanyl Definitive Test   Result Value Ref Range    Fentanyl Quantification negative 1 ng/mL    Norfentanyl Quantification negative 8 ng/mL    3-methyl-fentanyl Quantification Fen Neg 2 ng/mL    4-ANPP Quantification Fen Neg 2 ng/mL    4-FiBF Quantification Fen Neg 2 ng/mL    Acetyl fentanyl Quantification Fen Neg 2 ng/mL    Acetyl norfentanyl Quantification Fen Neg 5 ng/mL    Acryl fentanyl Quantification Fen Neg 1 ng/mL    Butryl fentanyl Quantification Fen Neg 1 ng/mL    Carfentanil Quantification Fen Neg 2 ng/mL    Cyclopropyl fentanyl Quantification Fen Neg 1 ng/mL    Furanyl fentanyl Quantification Fen Neg 2 ng/mL    Methoxyacetyl fentanyl Quantification Fen Neg 2 ng/mL    [id number] Quantification Fen Neg 2 ng/mL    N-desmethyl [id number] Quantification Fen Neg 2 ng/mL   MM DT_Heroin Definitive Test   Result Value Ref Range    6-CRISTIAN (Heroin metabolite) Quantification UR negative 10 ng/mL   MM DT_Hydrocodone Definitive Test   Result Value Ref Range    Hydrocodone Quantification negative 50 ng/mL    Norhydrocodone Quantification negative 50 ng/mL    Hydromorphone Quantification negative 50 ng/mL   MM DT_Hydromorphone Definitive Test   Result Value Ref Range    Hydromorphone Quantification negative 50 ng/mL   MM DT_Kratom Definitive Test   Result Value Ref Range    Imipramine Quantification negative 1 ng/mL    4-DN-[id number] (KRATOM ALKALOID) QUANTIFICATION negative 1 ng/mL   MM DT_Levorphanol Definitive Test   Result Value Ref Range    Dextrorphan (Dextromethorphan metabolite) Quant negative 50 ng/mL   MM DT_MDMA Definitive Test   Result Value Ref Range    MDMA Quantification negative 100 ng/mL   MM DT_Meperidine Definitive Test   Result Value Ref Range    Meperidine Quantification negative 50 ng/mL    Normeperidine Quantification negative 50 ng/mL   MM DT_Methadone Definitive Test   Result Value Ref Range    Methadone Quantification negative 100 ng/mL    EDDP (Methadone metabolite) Quantification negative 100 ng/mL   MM DT_Methylphenidate Definitive Test   Result Value Ref Range    Methylphenidate Quantification negative 50 ng/mL    Methamphetamine Quant-Creatinine Normalized negative 50 ng/mL   MM DT_Methamphetamine Definitive Test   Result Value Ref Range    Amphetamine Quantification negative 100 ng/mL    Methamphetamine Quantification negative 100 ng/mL   MM DT_Morphine Definitive Test   Result Value Ref Range    Morphine Quantification negative 50 ng/mL    Hydromorphone Quantification negative 50 ng/mL   MM Lorazepam Definitive Test   Result Value Ref Range    Lorazepam Quantification negative 40 ng/mL   MM DT_Oxazepam Definitive Test   Result Value Ref Range    Oxazepam Quantification negative 40 ng/mL   MM DT_Oxycodone Definitive Test   Result Value Ref Range    Oxycodone Quantification-Measured Result 283 805-I (A) 50 ng/mL    Noroxycodone Quantification-Measured Result 851 039-I (A) 50 ng/mL    Oxymorphone Quantification-Measured Result 367 553-I (A) 50 ng/mL   MM DT_Oxymorphone Definitive Test   Result Value Ref Range    Oxymorphone Quantification-Measured Result 367 553-I (A) 50 ng/mL   MM DT_Phencyclidine Definitive Test   Result Value Ref Range Phencyclidine Quantification negative 10 ng/mL   MM DT_Phenobarbital Definitive Test   Result Value Ref Range    Phenobarbital Quantification negative 200 ng/mL   MM DT_Phentermine Definitive Test   Result Value Ref Range    PHENTERMINE QUANTIFICATION negative 50 ng/mL   MM DT_Secobarbital Definitive Test   Result Value Ref Range    Secobarbital Quantification negative 200 ng/mL   MM DT_Spice Definitive Test   Result Value Ref Range    5F-[id number] negative 10 ng/mL    VU-[id number]-K8 negative 10 ng/mL    AIJP-[id number]-E8 negative 10 ng/mL    Phentermine Quantification-Creatinine Normalized negative 10 ng/mL    Phentermine Quantification-Measured Result negative 10 ng/mL    Risperidone Quantification negative 10 ng/mL    pH negative 10 ng/mL   MM DT_Tapentadol Definitive Test   Result Value Ref Range    Tapentadol Quantification negative 50 ng/mL   MM DT_Temazapam Definitive Test   Result Value Ref Range    Temazepam Quantification negative 50 ng/mL    Oxazepam Quantification negative 40 ng/mL   MM DT_THC Definitive Test   Result Value Ref Range    [id number] metabolite Quantification-Measured Result negative 15 ng/mL   MM DT_Tramadol Definitive Test   Result Value Ref Range    Tramadol Quantification negative 100 ng/mL    O-desmethyl-tramadol Quantification negative 100 ng/mL    VWW115 metabolite Quantification negative 100 ng/mL       Orders Placed This Encounter   Procedures    PSA, Total Screen    CBC    Comprehensive metabolic panel    Lipid panel    TSH, 3rd generation       ALLERGIES:  Allergies   Allergen Reactions    Gabapentin Delirium    Oxcarbazepine Dizziness    Lamictal [Lamotrigine] Confusion     Difficulty to think clearly     Tramadol        Current Medications     Current Outpatient Medications   Medication Sig Dispense Refill    Acetaminophen (TYLENOL PO) Take 2 tablets by mouth as needed       ascorbic acid (VITAMIN C) 500 MG tablet Take 1 tablet (500 mg total) by mouth daily 60 tablet 0    Calcium 250 MG CAPS Take 600 mg by mouth daily        Cholecalciferol (VITAMIN D3) 1000 units CAPS Take 1 capsule by mouth daily       cyanocobalamin (VITAMIN B-12) 1,000 mcg tablet Take 1 tablet by mouth daily       Ketoprofen 10 % CREA Apply 1 application topically as needed   2    MELATONIN PO Take 10 mg by mouth daily at bedtime       Multiple Vitamins-Minerals (PRESERVISION AREDS) TABS Take 1 tablet by mouth daily      ofloxacin (OCUFLOX) 0 3 % ophthalmic solution INSTILL 1 DROP INTO RIGHT EYE 4 TIMES A DAY AS DIRECTED, START 3 DAYS PRIOR TO SURGERY  0    oxyCODONE-acetaminophen (PERCOCET) 5-325 mg per tablet Take 1 tablet 2-3 times daily as needed for pain  60 tablet 0    pregabalin (LYRICA) 200 MG capsule Take 2 tabs in the am, 1 tab in the afternoon, 2 tabs at bedtime 450 capsule 0    pyridoxine (VITAMIN B6) 100 mg tablet Take 1 tablet by mouth daily       topiramate (TOPAMAX) 100 mg tablet Half a tab in the morning 1 tab at bedtime for 5 days then 1 tab in the morning and 1 tab at bedtime after that (Patient taking differently: 1 tab in the morning and 1 tab at bedtime) 60 tablet 3    TURMERIC CURCUMIN PO Take 1,500 mg by mouth once as needed       Bromfenac Sodium, Once-Daily, 0 09 % SOLN INSTILL 1 DROP INTO RIGHT EYE AT BEDTIME AS DIRECTED AFTER SURGERY  0    prednisoLONE acetate (PRED FORTE) 1 % ophthalmic suspension INSTILL 1 DROP INTO RIGHT EYE FOUR TIMES A DAY AS DIRECTED AFTER SURGERY  0     No current facility-administered medications for this visit            Health Maintenance     Health Maintenance   Topic Date Due    Hepatitis C Screening  1950   Emaline Folds Medicare Annual Wellness Visit (AWV)  1950    SLP PLAN OF CARE  1950    DTaP,Tdap,and Td Vaccines (1 - Tdap) 06/18/1961    BMI: Followup Plan  06/18/1968    Fall Risk  10/08/2019    Depression Screening PHQ  10/08/2019    BMI: Adult  11/12/2020    CRC Screening: Colonoscopy  08/15/2027    Influenza Vaccine Completed    Pneumococcal Vaccine: 65+ Years  Completed    Pneumococcal Vaccine: Pediatrics (0 to 5 Years) and At-Risk Patients (6 to 59 Years)  Aged Out    HIB Vaccine  Aged Out    Hepatitis B Vaccine  Aged Out    IPV Vaccine  Aged Out    Hepatitis A Vaccine  Aged Out    Meningococcal ACWY Vaccine  Aged Out    HPV Vaccine  Aged [employer] History   Administered Date(s) Administered    INFLUENZA 11/07/2018    Influenza Split High Dose Preservative Free IM 11/07/2018, 09/27/2019    Pneumococcal Conjugate 13-Valent 02/22/2018    Pneumococcal Polysaccharide PPV23 52/15/8473       Sunday MD [place]

## 2019-11-21 NOTE — ASSESSMENT & PLAN NOTE
Preoperative consultation  Overall patient appears to be in stable health    He is medically cleared to have upcoming cataract surgery as described

## 2019-11-21 NOTE — PROGRESS NOTES
Assessment and Plan:     Problem List Items Addressed This Visit        Nervous and Auditory    Trigeminal neuralgia    Relevant Orders    CBC    Comprehensive metabolic panel    Lipid panel    TSH, 3rd generation       Other    Preoperative evaluation of a medical condition to rule out surgical contraindications (TAR required)     Preoperative consultation  Overall patient appears to be in stable health  He is medically cleared to have upcoming cataract surgery as described           Other Visit Diagnoses     Nocturia    -  Primary    Relevant Orders    PSA, Total Screen    CBC    Comprehensive metabolic panel    Lipid panel    TSH, 3rd generation           Preventive health issues were discussed with patient, and age appropriate screening tests were ordered as noted in patient's After Visit Summary  Personalized health advice and appropriate referrals for health education or preventive services given if needed, as noted in patient's After Visit Summary       History of Present Illness:     Patient presents for Medicare Annual Wellness visit    Patient Care Team:  Mariana English MD as PCP - General (Family Medicine)  Miguel Angel Woodard MD  JaMD Mariana Lopez MD Sheilah Number, MD     Problem List:     Patient Active Problem List   Diagnosis    Trigeminal neuralgia    Facial spasm    Anemia    Bilateral patellofemoral syndrome    Diverticulosis of colon    Esophageal reflux    Intracranial meningioma ([address] )    Vertigo    Vitamin D deficiency    Need for pneumococcal vaccination    Preoperative evaluation of a medical condition to rule out surgical contraindications (TAR required)    Insomnia    Primary osteoarthritis of right knee    Pre-op examination    DNS (deviated nasal septum)      Past Medical and Surgical History:     Past Medical History:   Diagnosis Date    Arthritis     Right knee    Chronic pain disorder     Trigeminal neuralgia pain     Ulcer of bile duct      Past Surgical History:   Procedure Laterality Date    APPENDECTOMY  1962    BRAIN SURGERY  2005    Gamma Knife for Trigeminal Neuralgia    ESOPHAGOGASTRODUODENOSCOPY      HERNIA REPAIR  1952    OTHER SURGICAL HISTORY      gamma knife RT in 2007 and 2010    WA STEREO TREAT TRIGEMINAL NERVE Right 3/6/2018    Procedure: RHIZOTOMY TRIGEMINAL NERVES (V2 & V3); Surgeon: Kandice Calloway MD;  [facility];  Service: Neurosurgery    WA TOTAL KNEE ARTHROPLASTY Right 10/22/2018    Procedure: ARTHROPLASTY KNEE TOTAL;  Surgeon: Terri Song MD;  [facility];  Service: Orthopedics    RHIZOTOMY W/ RADIOFREQUENCY ABLATION Right 08/08/2014    Stereotactic Ablation of Gasserian Ganglion Right sided trigeminal V2 radiofrequency rhizotomy x2      Family History:     Family History   Problem Relation Age of Onset    Emphysema Mother     Colon cancer Father     Skin cancer Maternal Grandmother       Social History:     Social History     Socioeconomic History    Marital status: /Civil Union     Spouse name:  Sebastián Henry Number of children: 2    Years of education: BS Degree plus addl classes    Highest education level: Not on file   Occupational History    Occupation: Retired     Comment: 21 years [employer] [facility] [employer] resource strain: Not on file    Food insecurity:     Worry: Not on file     Inability: Not on file   [employer] needs:     Medical: Not on file     Non-medical: Not on file   Tobacco Use    Smoking status: Never Smoker    Smokeless tobacco: Never Used   Substance and Sexual Activity    Alcohol use: No    Drug use: No    Sexual activity: Yes   Lifestyle    Physical activity:     Days per week: Not on file     Minutes per session: Not on file    Stress: Not on file   Relationships    Social connections:     Talks on phone: Not on file     Gets together: Not on file     Attends [employer] service: Not on file     Active member of club or organization: Not on file     Attends meetings of clubs or organizations: Not on file     Relationship status: Not on file    Intimate partner violence:     Fear of current or ex partner: Not on file     Emotionally abused: Not on file     Physically abused: Not on file     Forced sexual activity: Not on file   Other Topics Concern    Not on file   Social History Narrative    Caffeine use: Daily caffeinated cola, drinks coffee    Lives at home with wife Noel Miles       Medications and Allergies:     Current Outpatient Medications   Medication Sig Dispense Refill    Acetaminophen (TYLENOL PO) Take 2 tablets by mouth as needed       ascorbic acid (VITAMIN C) 500 MG tablet Take 1 tablet (500 mg total) by mouth daily 60 tablet 0    Calcium 250 MG CAPS Take 600 mg by mouth daily        Cholecalciferol (VITAMIN D3) 1000 units CAPS Take 1 capsule by mouth daily       cyanocobalamin (VITAMIN B-12) 1,000 mcg tablet Take 1 tablet by mouth daily       Ketoprofen 10 % CREA Apply 1 application topically as needed   2    MELATONIN PO Take 10 mg by mouth daily at bedtime       Multiple Vitamins-Minerals (PRESERVISION AREDS) TABS Take 1 tablet by mouth daily      ofloxacin (OCUFLOX) 0 3 % ophthalmic solution INSTILL 1 DROP INTO RIGHT EYE 4 TIMES A DAY AS DIRECTED, START 3 DAYS PRIOR TO SURGERY  0    oxyCODONE-acetaminophen (PERCOCET) 5-325 mg per tablet Take 1 tablet 2-3 times daily as needed for pain   60 tablet 0    pregabalin (LYRICA) 200 MG capsule Take 2 tabs in the am, 1 tab in the afternoon, 2 tabs at bedtime 450 capsule 0    pyridoxine (VITAMIN B6) 100 mg tablet Take 1 tablet by mouth daily       topiramate (TOPAMAX) 100 mg tablet Half a tab in the morning 1 tab at bedtime for 5 days then 1 tab in the morning and 1 tab at bedtime after that (Patient taking differently: 1 tab in the morning and 1 tab at bedtime) 60 tablet 3    TURMERIC CURCUMIN PO Take 1,500 mg by mouth once as needed       Bromfenac Sodium, Once-Daily, 0 09 % SOLN INSTILL 1 DROP INTO RIGHT EYE AT BEDTIME AS DIRECTED AFTER SURGERY  0    prednisoLONE acetate (PRED FORTE) 1 % ophthalmic suspension INSTILL 1 DROP INTO RIGHT EYE FOUR TIMES A DAY AS DIRECTED AFTER SURGERY  0     No current facility-administered medications for this visit  Allergies   Allergen Reactions    Gabapentin Delirium    Oxcarbazepine Dizziness    Lamictal [Lamotrigine] Confusion     Difficulty to think clearly     Tramadol       Immunizations:     Immunization History   Administered Date(s) Administered    INFLUENZA 11/07/2018    Influenza Split High Dose Preservative Free IM 11/07/2018, 09/27/2019    Pneumococcal Conjugate 13-Valent 02/22/2018    Pneumococcal Polysaccharide PPV23 02/10/2017      Health Maintenance:         Topic Date Due    Hepatitis C Screening  1950    CRC Screening: Colonoscopy  08/15/2027         Topic Date Due    DTaP,Tdap,and Td Vaccines (1 - Tdap) 06/18/1961      Medicare Health Risk Assessment:     /68 (BP Location: Left arm, Patient Position: Sitting, Cuff Size: Large)   Pulse 60   Temp (!) 96 3 °F (35 7 °C) (Tympanic)   Wt 118 kg (259 lb 9 6 oz)   SpO2 97%   BMI 37 25 kg/m²      Candice Leyva is here for his Subsequent Wellness visit  Health Risk Assessment:   Patient rates overall health as good  Patient feels that their physical health rating is slightly better  Eyesight was rated as slightly worse  Hearing was rated as much worse  Patient feels that their emotional and mental health rating is same  Pain experienced in the last 7 days has been some  Patient's pain rating has been 7/10  Depression Screening:   PHQ-2 Score: 0      Fall Risk Screening: In the past year, patient has experienced: no history of falling in past year      Home Safety:  Patient has trouble with stairs inside or outside of their home  Home safety hazards include: none  Nutrition:   Current diet is Regular and Frequent junk food  Medications:   Patient is currently taking over-the-counter supplements  OTC medications include: see medication list  Patient is able to manage medications  Activities of Daily Living (ADLs)/Instrumental Activities of Daily Living (IADLs):   Walk and transfer into and out of bed and chair?: Yes  Dress and groom yourself?: Yes    Bathe or shower yourself?: Yes    Feed yourself? Yes  Do your laundry/housekeeping?: Yes  Manage your money, pay your bills and track your expenses?: Yes  Make your own meals?: Yes    Do your own shopping?: Yes    Previous Hospitalizations:   Any hospitalizations or ED visits within the last 12 months?: No      Advance Care Planning:   Living will: Yes    Durable POA for healthcare:  Yes    Advanced directive: Yes      Cognitive Screening:   Provider or family/friend/caregiver concerned regarding cognition?: No    PREVENTIVE SCREENINGS      Cardiovascular Screening:    General: Screening Current      Diabetes Screening:     General: Screening Current      Colorectal Cancer Screening:     General: Screening Current      Abdominal Aortic Aneurysm (AAA) Screening:    Risk factors include: age between 73-67 yo        Neftali Doty MD

## 2019-12-18 ENCOUNTER — APPOINTMENT (OUTPATIENT)
Dept: LAB | Facility: CLINIC | Age: 69
End: 2019-12-18
Payer: MEDICARE

## 2019-12-18 DIAGNOSIS — R35.1 NOCTURIA: ICD-10-CM

## 2019-12-18 DIAGNOSIS — G50.0 TRIGEMINAL NEURALGIA: ICD-10-CM

## 2019-12-18 LAB
ALBUMIN SERPL BCP-MCNC: 3.5 G/DL (ref 3.5–5)
ALP SERPL-CCNC: 72 U/L (ref 46–116)
ALT SERPL W P-5'-P-CCNC: 21 U/L (ref 12–78)
ANION GAP SERPL CALCULATED.3IONS-SCNC: 1 MMOL/L (ref 4–13)
AST SERPL W P-5'-P-CCNC: 10 U/L (ref 5–45)
BILIRUB SERPL-MCNC: 0.35 MG/DL (ref 0.2–1)
BUN SERPL-MCNC: 10 MG/DL (ref 5–25)
CALCIUM SERPL-MCNC: 9.6 MG/DL (ref 8.3–10.1)
CHLORIDE SERPL-SCNC: 115 MMOL/L (ref 100–108)
CHOLEST SERPL-MCNC: 172 MG/DL (ref 50–200)
CO2 SERPL-SCNC: 28 MMOL/L (ref 21–32)
CREAT SERPL-MCNC: 0.98 MG/DL (ref 0.6–1.3)
ERYTHROCYTE [DISTWIDTH] IN BLOOD BY AUTOMATED COUNT: 14.8 % (ref 11.6–15.1)
GFR SERPL CREATININE-BSD FRML MDRD: 78 ML/MIN/1.73SQ M
GLUCOSE P FAST SERPL-MCNC: 100 MG/DL (ref 65–99)
HCT VFR BLD AUTO: 47.8 % (ref 36.5–49.3)
HDLC SERPL-MCNC: 44 MG/DL
HGB BLD-MCNC: 15.3 G/DL (ref 12–17)
LDLC SERPL CALC-MCNC: 106 MG/DL (ref 0–100)
MCH RBC QN AUTO: 30.1 PG (ref 26.8–34.3)
MCHC RBC AUTO-ENTMCNC: 32 G/DL (ref 31.4–37.4)
MCV RBC AUTO: 94 FL (ref 82–98)
NONHDLC SERPL-MCNC: 128 MG/DL
PLATELET # BLD AUTO: 192 THOUSANDS/UL (ref 149–390)
PMV BLD AUTO: 10.2 FL (ref 8.9–12.7)
POTASSIUM SERPL-SCNC: 4.1 MMOL/L (ref 3.5–5.3)
PROT SERPL-MCNC: 6.5 G/DL (ref 6.4–8.2)
PSA SERPL-MCNC: 0.9 NG/ML (ref 0–4)
RBC # BLD AUTO: 5.09 MILLION/UL (ref 3.88–5.62)
SODIUM SERPL-SCNC: 144 MMOL/L (ref 136–145)
TRIGL SERPL-MCNC: 108 MG/DL
TSH SERPL DL<=0.05 MIU/L-ACNC: 1.68 UIU/ML (ref 0.36–3.74)
WBC # BLD AUTO: 5.53 THOUSAND/UL (ref 4.31–10.16)

## 2019-12-18 PROCEDURE — 80053 COMPREHEN METABOLIC PANEL: CPT

## 2019-12-18 PROCEDURE — 36415 COLL VENOUS BLD VENIPUNCTURE: CPT

## 2019-12-18 PROCEDURE — 85027 COMPLETE CBC AUTOMATED: CPT

## 2019-12-18 PROCEDURE — 80061 LIPID PANEL: CPT

## 2019-12-18 PROCEDURE — G0103 PSA SCREENING: HCPCS

## 2019-12-18 PROCEDURE — 84443 ASSAY THYROID STIM HORMONE: CPT

## 2019-12-19 ENCOUNTER — TELEPHONE (OUTPATIENT)
Dept: FAMILY MEDICINE CLINIC | Facility: CLINIC | Age: 69
End: 2019-12-19

## 2019-12-19 NOTE — TELEPHONE ENCOUNTER
----- Message from Anuj Aguillon MD sent at 28/21/4934  6:59 AM EST -----  All recent bw stable for pt

## 2020-01-08 PROBLEM — G89.4 CHRONIC PAIN SYNDROME: Status: ACTIVE | Noted: 2020-01-08

## 2020-01-08 PROBLEM — F11.20 UNCOMPLICATED OPIOID DEPENDENCE (HCC): Status: ACTIVE | Noted: 2020-01-08

## 2020-01-08 PROBLEM — R51.9 FACIAL PAIN: Status: ACTIVE | Noted: 2020-01-08

## 2020-01-08 PROBLEM — Z79.891 LONG-TERM CURRENT USE OF OPIATE ANALGESIC: Status: ACTIVE | Noted: 2020-01-08

## 2020-01-09 ENCOUNTER — OFFICE VISIT (OUTPATIENT)
Dept: PAIN MEDICINE | Facility: CLINIC | Age: 70
End: 2020-01-09
Payer: MEDICARE

## 2020-01-09 VITALS
HEART RATE: 64 BPM | BODY MASS INDEX: 38.74 KG/M2 | TEMPERATURE: 98.2 F | WEIGHT: 270 LBS | SYSTOLIC BLOOD PRESSURE: 107 MMHG | DIASTOLIC BLOOD PRESSURE: 71 MMHG

## 2020-01-09 DIAGNOSIS — R51.9 FACIAL PAIN: ICD-10-CM

## 2020-01-09 DIAGNOSIS — G50.0 TRIGEMINAL NEURALGIA: ICD-10-CM

## 2020-01-09 DIAGNOSIS — Z79.891 LONG-TERM CURRENT USE OF OPIATE ANALGESIC: ICD-10-CM

## 2020-01-09 DIAGNOSIS — G89.4 CHRONIC PAIN SYNDROME: Primary | ICD-10-CM

## 2020-01-09 DIAGNOSIS — F11.20 UNCOMPLICATED OPIOID DEPENDENCE (HCC): ICD-10-CM

## 2020-01-09 PROCEDURE — 80305 DRUG TEST PRSMV DIR OPT OBS: CPT | Performed by: NURSE PRACTITIONER

## 2020-01-09 PROCEDURE — 99214 OFFICE O/P EST MOD 30 MIN: CPT | Performed by: NURSE PRACTITIONER

## 2020-01-09 RX ORDER — OXYCODONE HYDROCHLORIDE AND ACETAMINOPHEN 5; 325 MG/1; MG/1
TABLET ORAL
Qty: 60 TABLET | Refills: 0 | Status: SHIPPED | OUTPATIENT
Start: 2020-01-09 | End: 2020-01-09 | Stop reason: SDUPTHER

## 2020-01-09 RX ORDER — OXYCODONE HYDROCHLORIDE AND ACETAMINOPHEN 5; 325 MG/1; MG/1
TABLET ORAL
Qty: 60 TABLET | Refills: 0 | Status: SHIPPED | OUTPATIENT
Start: 2020-01-09 | End: 2020-04-24 | Stop reason: SDUPTHER

## 2020-01-09 NOTE — PATIENT INSTRUCTIONS
Opioid Pain Management   AMBULATORY CARE:   An opioid  is a type of medicine used to treat pain  Examples of opioids are oxycodone, morphine, fentanyl, or codeine  Take opioid medicines as directed, for the condition it is prescribed:  Common problems that may occur when you do not take opioid medicines as directed include the following:  · Health problems  may occur  You may have trouble breathing  You may also develop liver or kidney damage, or stomach bleeding  Any of these health problems can become life-threatening  · Opioid dependence  means your body needs the opioid medicine to keep it from going through withdrawal      · Opioid tolerance  means the opioid does not control pain as well as it used to  You need higher doses of the opioid to get pain relief  · Opioid addiction  means you are not able to control the use of the opioid medicine  You use it when you do not have pain  You crave the opioid medicine  Call 911 or have someone call 911 for any of the following:   · You are breathing slower than normal, or you have trouble breathing  · You cannot be awakened  · You have a seizure  Seek care immediately if:   · Your heart is beating slower than usual     · Your heart feels like it is jumping or fluttering  · You are so sleepy that you cannot stay awake  · You have severe muscle pain or weakness  · You see or hear things that are not real   Contact your healthcare provider if:   · You are too dizzy to stand up  · Your pain gets worse or you have new pain  · You cannot do your usual activities because of side effects from the opioid  · You are constipated or have abdominal pain  · You have questions or concerns about your condition or care  Opioid safety measures:   · Take your medicine as directed  Ask if you need more information on how to take your medicine correctly  Follow up with your healthcare provider regularly  You may need to have your dose adjusted  Do not use opioid medicine if you are pregnant or breastfeeding  · Give your healthcare provider a list of all your medicines  Include any over-the-counter medicines, vitamins, and herbs  It can be dangerous to take opioids with certain other medicines, such as antihistamines  · Keep opioid medicine in a safe place  Store your opioid medicine in a locked cabinet to keep it away from children and others  · Do not drink alcohol while you use opioids  Alcohol use with an opioid medicine can make you sleepy and slow your breathing rate  You may stop breathing completely  · Do not drive or operate heavy machinery after you take opioid medicine  Opioid medicine can make you drowsy and make it hard to concentrate  You may injure yourself or others if you drive or operate heavy machinery while taking your medicine  · Drink fluids and eat high-fiber foods  Fluids and fiber will help prevent constipation  Ask your healthcare provider what fluids are right for you and how much you should drink  Also ask for a list of foods that contain fiber  Follow up with your healthcare provider as directed: You may need to have your dose adjusted  You may be referred to a pain specialist  Write down your questions so you remember to ask them during your visits  © 2017 [employer]  Information is for End User's use only and may not be sold, redistributed or otherwise used for commercial purposes  All illustrations and images included in CareNotes® are the copyrighted property of [employer]  or Nitesh Giraldo  The above information is an  only  It is not intended as medical advice for individual conditions or treatments  Talk to your doctor, nurse or pharmacist before following any medical regimen to see if it is safe and effective for you

## 2020-01-09 NOTE — PROGRESS NOTES
Assessment:  1  Chronic pain syndrome    2  Facial pain    3  Trigeminal neuralgia    4  Uncomplicated opioid dependence ([address] )    5  Long-term current use of opiate analgesic        Plan:  The patient is a 71 y o  male with a history of chronic pain syndrome secondary to facial pain, and trigeminal neuralgia  Patient presents today with ongoing facial pain  He is currently taking Percocet 5/325 mg which he takes as needed anywhere from 1 to 2 times a day depending upon his pain  He uses the medication very sparingly, because he is worried about taking opioid medications  We did discuss the risks and had to safely take the medication  He will continue on Percocet as prescribed, and a prescription was sent to the pharmacy which can be filled today, and 1 for next month they do not fill date of February 7, 2020  The patient was also completed a BECKS depression inventory and SOAPP-R  today, as part of our yearly opioid management program        An opioid contract was reviewed with the patient  The patient was made aware they are only to receive opioid medication from our office, and must take the medication as prescribed  If the medication is lost or stolen, it will not be replaced  We also do not condone the use of illegal substances or alcohol with opioid medication  Random urine drug screens and pill counts will also be performed at office visits  Lastly, the patient was informed that office visits are needed for refills  Patient was agreeable and signed the contract  [place] Prescription Drug Monitoring Program report was reviewed and was appropriate     A urine drug screen was collected at today's office visit as part of our medication management protocol  The point of care testing results were appropriate for what was being prescribed  The specimen will be sent for confirmatory testing   The drug screen is medically necessary because the patient is either dependent on opioid medication or is being considered for opioid medication therapy and the results could impact ongoing or future treatment  The drug screen is to evaluate for the presences or absence of prescribed, non-prescribed, and/or illicit drugs/substances  There are risks associated with opioid medications, including dependence, addiction and tolerance  The patient understands and agrees to use these medications only as prescribed  Potential side effects of the medications include, but are not limited to, constipation, drowsiness, addiction, impaired judgment and risk of fatal overdose if not taken as prescribed  The patient was warned against driving while taking sedation medications  Sharing medications is a felony  At this point in time, the patient is showing no signs of addiction, abuse, diversion or suicidal ideation  The patient will follow-up in 8 weeks for medication prescription refill and reevaluation  The patient was advised to contact the office should their symptoms worsen in the interim  The patient was agreeable and verbalized an understanding  History of Present Illness: The patient is a 71 y o  male with a history of chronic pain syndrome secondary to facial pain, and trigeminal neuralgia  Patient presents today with ongoing facial pain which is located on the right side  The pain remains unchanged since the last office visit  It is constant occurring mostly in the morning  He describes as sharp, pressure-like shooting and pins and needles  He is rating his pain a 6/10 on numeric rating scale  He is currently taking Percocet 5/325 mg which he takes 1 to 2 times a day depending upon his pain  He states there are days when he does not take the medication at all  His wife is present for the office visit, and states that he does not take the medication very often and does experience more pain because of that  He states the Percocet is providing 50% pain relief but does cause drowsiness    He is also taking Lyrica 200 mg 2 tabs in the a m , 1 tab in the afternoon, and 2 tablets at bedtime, which is prescribed by Neurology  Pain Contract Signed: 1/9/20  Last Urine Drug Screen: 1/9/20    I have personally reviewed and/or updated the patient's past medical history, past surgical history, family history, social history, current medications, allergies, and vital signs today  Review of Systems:    Review of Systems   Respiratory: Negative for shortness of breath  Cardiovascular: Negative for chest pain  Gastrointestinal: Negative for constipation, diarrhea, nausea and vomiting  Musculoskeletal: Positive for gait problem  Negative for arthralgias, joint swelling and myalgias  Skin: Negative for rash  Neurological: Positive for weakness  Negative for dizziness and seizures  All other systems reviewed and are negative  Past Medical History:   Diagnosis Date    Arthritis     Right knee    Chronic pain disorder     Trigeminal neuralgia pain     Ulcer of bile duct        Past Surgical History:   Procedure Laterality Date    APPENDECTOMY  1962    BRAIN SURGERY  2005    Gamma Knife for Trigeminal Neuralgia    ESOPHAGOGASTRODUODENOSCOPY      HERNIA REPAIR  1952    OTHER SURGICAL HISTORY      gamma knife RT in 2007 and 2010    MN STEREO TREAT TRIGEMINAL NERVE Right 3/6/2018    Procedure: RHIZOTOMY TRIGEMINAL NERVES (V2 & V3);   Surgeon: Twila Mccarthy MD;  [facility];  Service: Neurosurgery    MN TOTAL KNEE ARTHROPLASTY Right 10/22/2018    Procedure: ARTHROPLASTY KNEE TOTAL;  Surgeon: Xi Pacheco MD;  [facility];  Service: Orthopedics    RHIZOTOMY W/ RADIOFREQUENCY ABLATION Right 08/08/2014    Stereotactic Ablation of Gasserian Ganglion Right sided trigeminal V2 radiofrequency rhizotomy x2       Family History   Problem Relation Age of Onset    Emphysema Mother     Colon cancer Father     Skin cancer Maternal Grandmother        Social History Occupational History    Occupation: Retired     Comment: 20 years [employer] &    Tobacco Use    Smoking status: Never Smoker    Smokeless tobacco: Never Used   Substance and Sexual Activity    Alcohol use: No    Drug use: No    Sexual activity: Yes         Current Outpatient Medications:     Acetaminophen (TYLENOL PO), Take 2 tablets by mouth as needed , Disp: , Rfl:     ascorbic acid (VITAMIN C) 500 MG tablet, Take 1 tablet (500 mg total) by mouth daily, Disp: 60 tablet, Rfl: 0    Calcium 250 MG CAPS, Take 600 mg by mouth daily  , Disp: , Rfl:     Cholecalciferol (VITAMIN D3) 1000 units CAPS, Take 1 capsule by mouth daily , Disp: , Rfl:     cyanocobalamin (VITAMIN B-12) 1,000 mcg tablet, Take 1 tablet by mouth daily , Disp: , Rfl:     Ketoprofen 10 % CREA, Apply 1 application topically as needed , Disp: , Rfl: 2    MELATONIN PO, Take 10 mg by mouth daily at bedtime , Disp: , Rfl:     Multiple Vitamins-Minerals (PRESERVISION AREDS) TABS, Take 1 tablet by mouth daily, Disp: , Rfl:     ofloxacin (OCUFLOX) 0 3 % ophthalmic solution, INSTILL 1 DROP INTO RIGHT EYE 4 TIMES A DAY AS DIRECTED, START 3 DAYS PRIOR TO SURGERY, Disp: , Rfl: 0    oxyCODONE-acetaminophen (PERCOCET) 5-325 mg per tablet, Take 1 tablet 2-3 times daily as needed for pain  Do not fill until 2/7/20   2nd month script, Disp: 60 tablet, Rfl: 0    pregabalin (LYRICA) 200 MG capsule, Take 2 tabs in the am, 1 tab in the afternoon, 2 tabs at bedtime, Disp: 450 capsule, Rfl: 0    pyridoxine (VITAMIN B6) 100 mg tablet, Take 1 tablet by mouth daily , Disp: , Rfl:     topiramate (TOPAMAX) 100 mg tablet, Half a tab in the morning 1 tab at bedtime for 5 days then 1 tab in the morning and 1 tab at bedtime after that (Patient taking differently: 1 tab in the morning and 1 tab at bedtime), Disp: 60 tablet, Rfl: 3    TURMERIC CURCUMIN PO, Take 1,500 mg by mouth once as needed , Disp: , Rfl:     Allergies   Allergen Reactions    Gabapentin Delirium    Oxcarbazepine Dizziness    Lamictal [Lamotrigine] Confusion     Difficulty to think clearly     Tramadol        Physical Exam:    /71   Pulse 64   Temp 98 2 °F (36 8 °C) (Oral)   Wt 122 kg (270 lb)   BMI 38 74 kg/m²     Constitutional:normal, well developed, well nourished, alert, in no distress and non-toxic and no overt pain behavior    Eyes:anicteric  HEENT:grossly intact  Neck:supple, symmetric, trachea midline and no masses   Pulmonary:even and unlabored  Cardiovascular:No edema or pitting edema present  Skin:Normal without rashes or lesions and well hydrated  Psychiatric:Mood and affect appropriate  Neurologic:Cranial Nerves II-XII grossly intact  Musculoskeletal:normal      Last dose of Oxycodone was 1/8/20 @ 7:00pm    Orders Placed This Encounter   Procedures    MM ALL_Prescribed Meds and Special Instructions    MM DT_Alprazolam Definitive Test    MM DT_Amphetamine Definitive Test    MM DT_Buprenorphine Definitive Test    MM DT_Butalbital Definitive Test    MM DT_Clonazepam Definitive Test    MM DT_Cocaine Definitive Test    MM DT_Codeine Definitive Test    MM DT_Dextromethorphan Definitive Test    MM Diazepam Definitive Test    MM DT_Ethyl Glucuronide/Ethyl Sulfate Definitive Test    MM DT_Fentanyl Definitive Test    MM DT_Heroin Definitive Test    MM DT_Hydrocodone Definitive Test    MM DT_Hydromorphone Definitive Test    MM DT_Kratom Definitive Test    MM DT_Levorphanol Definitive Test    MM DT_MDMA Definitive Test    MM DT_Meperidine Definitive Test    MM DT_Methadone Definitive Test    MM DT_Methylphenidate Definitive Test    MM DT_Methamphetamine Definitive Test    MM DT_Morphine Definitive Test    MM Lorazepam Definitive Test    MM DT_Oxazepam Definitive Test    MM DT_Oxycodone Definitive Test    MM DT_Oxymorphone Definitive Test    MM DT_Phencyclidine Definitive Test    MM DT_Phenobarbital Definitive Test    MM DT_Phentermine Definitive Test   Jennie Crystal MM DT_Secobarbital Definitive Test    MM DT_Spice Definitive Test    MM DT_Tapentadol Definitive Test    MM DT_Temazapam Definitive Test    MM DT_THC Definitive Test    MM DT_Tramadol Definitive Test

## 2020-01-11 LAB
6MAM UR QL CFM: NEGATIVE NG/ML
7AMINOCLONAZEPAM UR QL CFM: NEGATIVE NG/ML
A-OH ALPRAZ UR QL CFM: NEGATIVE NG/ML
AMPHET UR QL CFM: NEGATIVE NG/ML
AMPHET UR QL CFM: NEGATIVE NG/ML
BUPRENORPHINE UR QL CFM: NEGATIVE NG/ML
BUTALBITAL UR QL CFM: NEGATIVE NG/ML
BZE UR QL CFM: NEGATIVE NG/ML
CODEINE UR QL CFM: NEGATIVE NG/ML
EDDP UR QL CFM: NEGATIVE NG/ML
ETHYL GLUCURONIDE UR QL CFM: NEGATIVE NG/ML
ETHYL SULFATE UR QL SCN: NEGATIVE NG/ML
FENTANYL UR QL CFM: NEGATIVE NG/ML
GLIADIN IGG SER IA-ACNC: NEGATIVE NG/ML
HYDROCODONE UR QL CFM: NEGATIVE NG/ML
HYDROCODONE UR QL CFM: NEGATIVE NG/ML
HYDROMORPHONE UR QL CFM: NEGATIVE NG/ML
LORAZEPAM UR QL CFM: NEGATIVE NG/ML
MDMA UR QL CFM: NEGATIVE NG/ML
ME-PHENIDATE UR QL CFM: NEGATIVE NG/ML
MEPERIDINE UR QL CFM: NEGATIVE NG/ML
METHADONE UR QL CFM: NEGATIVE NG/ML
METHAMPHET UR QL CFM: NEGATIVE NG/ML
MORPHINE UR QL CFM: NEGATIVE NG/ML
MORPHINE UR QL CFM: NEGATIVE NG/ML
NORBUPRENORPHINE UR QL CFM: NEGATIVE NG/ML
NORDIAZEPAM UR QL CFM: NEGATIVE NG/ML
NORFENTANYL UR QL CFM: NEGATIVE NG/ML
NORHYDROCODONE UR QL CFM: NEGATIVE NG/ML
NORHYDROCODONE UR QL CFM: NEGATIVE NG/ML
NORMEPERIDINE UR QL CFM: NEGATIVE NG/ML
NOROXYCODONE UR CFM-MCNC: 262.54 NG/ML
OXAZEPAM UR QL CFM: NEGATIVE NG/ML
OXYCODONE UR CFM-MCNC: 281.52 NG/ML
OXYMORPHONE UR CFM-MCNC: 419.85 NG/ML
OXYMORPHONE UR CFM-MCNC: 419.85 NG/ML
PCP UR QL CFM: NEGATIVE NG/ML
PHENOBARB UR QL CFM: NEGATIVE NG/ML
RESULT ALL_PRESCRIBED MEDS AND SPECIAL INSTRUCTIONS: NORMAL
SECOBARBITAL UR QL CFM: NEGATIVE NG/ML
SL AMB 3-METHYL-FENTANYL QUANTIFICATION: NORMAL NG/ML
SL AMB 4-ANPP QUANTIFICATION: NORMAL NG/ML
SL AMB 4-FIBF QUANTIFICATION: NORMAL NG/ML
SL AMB 5F-ADB-M7 METABOLITE QUANTIFICATION: NEGATIVE NG/ML
SL AMB 7-OH-MITRAGYNINE (KRATOM ALKALOID) QUANTIFICATION: NEGATIVE NG/ML
SL AMB AB-FUBINACA-M3 METABOLITE QUANTIFICATION: NEGATIVE NG/ML
SL AMB ACETYL FENTANYL QUANTIFICATION: NORMAL NG/ML
SL AMB ACETYL NORFENTANYL QUANTIFICATION: NORMAL NG/ML
SL AMB ACRYL FENTANYL QUANTIFICATION: NORMAL NG/ML
SL AMB BUTRYL FENTANYL QUANTIFICATION: NORMAL NG/ML
SL AMB CARFENTANIL QUANTIFICATION: NORMAL NG/ML
SL AMB CTHC (MARIJUANA METABOLITE) QUANTIFICATION: NEGATIVE NG/ML
SL AMB CYCLOPROPYL FENTANYL QUANTIFICATION: NORMAL NG/ML
SL AMB DEXTROMETHORPHAN QUANTIFICATION: NEGATIVE NG/ML
SL AMB DEXTRORPHAN (DEXTROMETHORPHAN METABOLITE) QUANT: NEGATIVE NG/ML
SL AMB DEXTRORPHAN (DEXTROMETHORPHAN METABOLITE) QUANT: NEGATIVE NG/ML
SL AMB FURANYL FENTANYL QUANTIFICATION: NORMAL NG/ML
SL AMB JWH018 METABOLITE QUANTIFICATION: NEGATIVE NG/ML
SL AMB JWH073 METABOLITE QUANTIFICATION: NEGATIVE NG/ML
SL AMB MDMB-FUBINACA-M1 METABOLITE QUANTIFICATION: NEGATIVE NG/ML
SL AMB METHOXYACETYL FENTANYL QUANTIFICATION: NORMAL NG/ML
SL AMB N-DESMETHYL U-47700 QUANTIFICATION: NORMAL NG/ML
SL AMB N-DESMETHYL-TRAMADOL QUANTIFICATION: NEGATIVE NG/ML
SL AMB PHENTERMINE QUANTIFICATION: NEGATIVE NG/ML
SL AMB RCS4 METABOLITE QUANTIFICATION: NEGATIVE NG/ML
SL AMB RITALINIC ACID QUANTIFICATION: NEGATIVE NG/ML
SL AMB U-47700 QUANTIFICATION: NORMAL NG/ML
SPECIMEN DRAWN SERPL: NEGATIVE NG/ML
TAPENTADOL UR QL CFM: NEGATIVE NG/ML
TEMAZEPAM UR QL CFM: NEGATIVE NG/ML
TEMAZEPAM UR QL CFM: NEGATIVE NG/ML
TRAMADOL UR QL CFM: NEGATIVE NG/ML
URATE/CREAT 24H UR: NEGATIVE NG/ML

## 2020-01-27 DIAGNOSIS — G50.0 TRIGEMINAL NEURALGIA OF RIGHT SIDE OF FACE: ICD-10-CM

## 2020-01-27 RX ORDER — PREGABALIN 200 MG/1
CAPSULE ORAL
Qty: 450 CAPSULE | Refills: 0 | Status: SHIPPED | OUTPATIENT
Start: 2020-01-27 | End: 2020-04-15 | Stop reason: SDUPTHER

## 2020-02-28 ENCOUNTER — TELEPHONE (OUTPATIENT)
Dept: NEUROSURGERY | Facility: CLINIC | Age: 70
End: 2020-02-28

## 2020-02-28 DIAGNOSIS — G50.0 TRIGEMINAL NEURALGIA OF RIGHT SIDE OF FACE: ICD-10-CM

## 2020-02-28 RX ORDER — PREGABALIN 200 MG/1
CAPSULE ORAL
Qty: 450 CAPSULE | Refills: 0 | OUTPATIENT
Start: 2020-02-28

## 2020-02-28 NOTE — TELEPHONE ENCOUNTER
Medication refill check list    Correct patient? yes   Correct medication name, dose, and pill size? yes   Correct provider? yes   Last and Next appt  scheduled? Yes, last date 08/09/19 & next date 08/05/20   Right pharmacy listed? yes   Correct quantity for 30 or 90 days? yes   Is the patient out of refills? When was it last prescribed? Yes, last date 01/27/20   Directions match what the patient says they are taking?  yes   Enough refills? (none for controlled substances, 1 year for routine medications) yes       pregabalin (LYRICA) 200 MG capsule

## 2020-03-02 ENCOUNTER — TELEPHONE (OUTPATIENT)
Dept: OTHER | Facility: HOSPITAL | Age: 70
End: 2020-03-02

## 2020-03-02 NOTE — TELEPHONE ENCOUNTER
Patient needs the office to give him a call back to reschedule his appointment on Monday 3/09/2020 @ 10 am he needs it moved after his MRI appointment  It was reschedule for 3/29/2020 due to it was broken

## 2020-03-05 ENCOUNTER — OFFICE VISIT (OUTPATIENT)
Dept: PAIN MEDICINE | Facility: CLINIC | Age: 70
End: 2020-03-05
Payer: MEDICARE

## 2020-03-05 VITALS
DIASTOLIC BLOOD PRESSURE: 67 MMHG | HEART RATE: 60 BPM | SYSTOLIC BLOOD PRESSURE: 100 MMHG | BODY MASS INDEX: 38.6 KG/M2 | WEIGHT: 269 LBS | TEMPERATURE: 97.5 F

## 2020-03-05 DIAGNOSIS — G50.0 TRIGEMINAL NEURALGIA: ICD-10-CM

## 2020-03-05 DIAGNOSIS — G89.4 CHRONIC PAIN SYNDROME: Primary | ICD-10-CM

## 2020-03-05 DIAGNOSIS — Z79.891 LONG-TERM CURRENT USE OF OPIATE ANALGESIC: ICD-10-CM

## 2020-03-05 DIAGNOSIS — R51.9 FACIAL PAIN: ICD-10-CM

## 2020-03-05 DIAGNOSIS — F11.20 UNCOMPLICATED OPIOID DEPENDENCE (HCC): ICD-10-CM

## 2020-03-05 PROCEDURE — 1036F TOBACCO NON-USER: CPT | Performed by: NURSE PRACTITIONER

## 2020-03-05 PROCEDURE — 99214 OFFICE O/P EST MOD 30 MIN: CPT | Performed by: NURSE PRACTITIONER

## 2020-03-05 PROCEDURE — 4040F PNEUMOC VAC/ADMIN/RCVD: CPT | Performed by: NURSE PRACTITIONER

## 2020-03-05 PROCEDURE — 1160F RVW MEDS BY RX/DR IN RCRD: CPT | Performed by: NURSE PRACTITIONER

## 2020-03-05 NOTE — PROGRESS NOTES
Assessment:  1  Chronic pain syndrome    2  Facial pain    3  Trigeminal neuralgia    4  Uncomplicated opioid dependence ([address] )    5  Long-term current use of opiate analgesic        Plan:  The patient is a 71 y o  male with a history of chronic pain syndrome secondary to facial pain, and trigeminal neuralgia  Patient presents today with ongoing right facial pain  This week he has been experiencing a flare-up and has been having more pain than normal   He took the Percocet 3 times yesterday, which did provide moderate pain relief  Since the medication is helpful and he uses very sparingly, I will continue him on the medication as prescribed  No prescriptions for Percocet was sent to the pharmacy today due to an excess on his pill count  A prescription pill count for Percocet 5/325 mg which was filled on February 27, 2020, showed 67 tablets remaining, which is in excess  Since no prescriptions were sent to the pharmacy today, if he would run out prior to his next office visit in 8 weeks, he was instructed to contact the office and I would send a prescription at that time  patient was agreeable    [facility] Prescription Drug Monitoring Program report was reviewed and was appropriate     There are risks associated with opioid medications, including dependence, addiction and tolerance  The patient understands and agrees to use these medications only as prescribed  Potential side effects of the medications include, but are not limited to, constipation, drowsiness, addiction, impaired judgment and risk of fatal overdose if not taken as prescribed  The patient was warned against driving while taking sedation medications  Sharing medications is a felony  At this point in time, the patient is showing no signs of addiction, abuse, diversion or suicidal ideation  The patient will follow-up in 8 weeks for medication prescription refill and reevaluation   The patient was advised to contact the office should their symptoms worsen in the interim  The patient was agreeable and verbalized an understanding  History of Present Illness: The patient is a 71 y o  male with a history of chronic pain syndrome secondary to facial pain, and trigeminal neuralgia  Patient presents today with ongoing facial pain which is located primarily on the right side  The pain remains unchanged since the last office visit  It is constant and occurs throughout the day  He describes as burning, sharp, pressure-like, shooting, and pins and needles  He does state he has occasional flare-ups, and over the past few days the pain has been worse  He is currently rating his pain a 6/10 on the numeric rating scale  He continues to take Percocet 5/325 mg which he states he takes anywhere from 7-10 pills per week  He generally takes 1 tablet at bedtime, and will take either 1 or 2 additional tablets throughout the day on the days when the pain is severe  He is also prescribed Lyrica 200 mg 2 tabs in the a m , 1 tab in the afternoon, and 2 tabs at bedtime which is prescribed by his neurologist   He denies side effects or bowel or bladder issues    Pain Contract Signed: 1/6/20  Last Urine Drug Screen: 1/6/20  Last pill count: 3/5/20    I have personally reviewed and/or updated the patient's past medical history, past surgical history, family history, social history, current with a history of chronic pain syndrome secondary to facial pain trigeminal neuralgia  Patient presents today with ongoing pain that is located on the right side if space medications, allergies, and vital signs today  Review of Systems:    Review of Systems   Respiratory: Negative for shortness of breath  Cardiovascular: Negative for chest pain  Gastrointestinal: Negative for constipation, diarrhea, nausea and vomiting  Musculoskeletal: Negative for arthralgias, gait problem, joint swelling and myalgias  Skin: Negative for rash     Neurological: Negative for dizziness, seizures and weakness  All other systems reviewed and are negative  Past Medical History:   Diagnosis Date    Arthritis     Right knee    Chronic pain disorder     Trigeminal neuralgia pain     Ulcer of bile duct        Past Surgical History:   Procedure Laterality Date    APPENDECTOMY  1962    BRAIN SURGERY  2005    Gamma Knife for Trigeminal Neuralgia    ESOPHAGOGASTRODUODENOSCOPY      HERNIA REPAIR  1952    OTHER SURGICAL HISTORY      gamma knife RT in 2007 and 2010    ND STEREO TREAT TRIGEMINAL NERVE Right 3/6/2018    Procedure: RHIZOTOMY TRIGEMINAL NERVES (V2 & V3);   Surgeon: Moy Mahmood MD;  Location: [facility];  Service: Neurosurgery    ND TOTAL KNEE ARTHROPLASTY Right 10/22/2018    Procedure: ARTHROPLASTY KNEE TOTAL;  Surgeon: Kenia Victor MD;  Location: [facility];  Service: Orthopedics    RHIZOTOMY W/ RADIOFREQUENCY ABLATION Right 08/08/2014    Stereotactic Ablation of Gasserian Ganglion Right sided trigeminal V2 radiofrequency rhizotomy x2       Family History   Problem Relation Age of Onset    Emphysema Mother     Colon cancer Father     Skin cancer Maternal Grandmother        Social History     Occupational History    Occupation: Retired     Comment: 21 years [employer] &    Tobacco Use    Smoking status: Never Smoker    Smokeless tobacco: Never Used   Substance and Sexual Activity    Alcohol use: No    Drug use: No    Sexual activity: Yes         Current Outpatient Medications:     Acetaminophen (TYLENOL PO), Take 2 tablets by mouth as needed , Disp: , Rfl:     ascorbic acid (VITAMIN C) 500 MG tablet, Take 1 tablet (500 mg total) by mouth daily, Disp: 60 tablet, Rfl: 0    Calcium 250 MG CAPS, Take 600 mg by mouth daily  , Disp: , Rfl:     Cholecalciferol (VITAMIN D3) 1000 units CAPS, Take 1 capsule by mouth daily , Disp: , Rfl:     cyanocobalamin (VITAMIN B-12) 1,000 mcg tablet, Take 1 tablet by mouth daily , Disp: , Rfl:     Ketoprofen 10 % CREA, Apply 1 application topically as needed , Disp: , Rfl: 2    MELATONIN PO, Take 10 mg by mouth daily at bedtime , Disp: , Rfl:     Multiple Vitamins-Minerals (PRESERVISION AREDS) TABS, Take 1 tablet by mouth daily, Disp: , Rfl:     ofloxacin (OCUFLOX) 0 3 % ophthalmic solution, INSTILL 1 DROP INTO RIGHT EYE 4 TIMES A DAY AS DIRECTED, START 3 DAYS PRIOR TO SURGERY, Disp: , Rfl: 0    oxyCODONE-acetaminophen (PERCOCET) 5-325 mg per tablet, Take 1 tablet 2-3 times daily as needed for pain  Do not fill until 2/7/20  2nd month script, Disp: 60 tablet, Rfl: 0    pregabalin (LYRICA) 200 MG capsule, Take 2 tabs in the am, 1 tab in the afternoon, 2 tabs at bedtime, Disp: 450 capsule, Rfl: 0    pyridoxine (VITAMIN B6) 100 mg tablet, Take 1 tablet by mouth daily , Disp: , Rfl:     topiramate (TOPAMAX) 100 mg tablet, Half a tab in the morning 1 tab at bedtime for 5 days then 1 tab in the morning and 1 tab at bedtime after that (Patient taking differently: 1 tab in the morning and 1 tab at bedtime), Disp: 60 tablet, Rfl: 3    TURMERIC CURCUMIN PO, Take 1,500 mg by mouth once as needed , Disp: , Rfl:     Allergies   Allergen Reactions    Gabapentin Delirium    Oxcarbazepine Dizziness    Lamictal [Lamotrigine] Confusion     Difficulty to think clearly     Tramadol        Physical Exam:    /67   Pulse 60   Temp 97 5 °F (36 4 °C) (Oral)   Wt 122 kg (269 lb)   BMI 38 60 kg/m²     Constitutional:normal, well developed, well nourished, alert, in no distress and non-toxic and no overt pain behavior    Eyes:anicteric  HEENT:grossly intact  Neck:supple, symmetric, trachea midline and no masses   Pulmonary:even and unlabored  Cardiovascular:No edema or pitting edema present  Skin:Normal without rashes or lesions and well hydrated  Psychiatric:Mood and affect appropriate  Neurologic:Cranial Nerves II-XII grossly intact  Musculoskeletal:normal    Last dose of Oxycodone was 3/5/20 @ 1:00am    No orders of the defined types were placed in this encounter

## 2020-03-05 NOTE — PATIENT INSTRUCTIONS
Opioid Pain Management   AMBULATORY CARE:   An opioid  is a type of medicine used to treat pain  Examples of opioids are oxycodone, morphine, fentanyl, or codeine  Take opioid medicines as directed, for the condition it is prescribed:  Common problems that may occur when you do not take opioid medicines as directed include the following:  · Health problems  may occur  You may have trouble breathing  You may also develop liver or kidney damage, or stomach bleeding  Any of these health problems can become life-threatening  · Opioid dependence  means your body needs the opioid medicine to keep it from going through withdrawal      · Opioid tolerance  means the opioid does not control pain as well as it used to  You need higher doses of the opioid to get pain relief  · Opioid addiction  means you are not able to control the use of the opioid medicine  You use it when you do not have pain  You crave the opioid medicine  Call 911 or have someone call 911 for any of the following:   · You are breathing slower than normal, or you have trouble breathing  · You cannot be awakened  · You have a seizure  Seek care immediately if:   · Your heart is beating slower than usual     · Your heart feels like it is jumping or fluttering  · You are so sleepy that you cannot stay awake  · You have severe muscle pain or weakness  · You see or hear things that are not real   Contact your healthcare provider if:   · You are too dizzy to stand up  · Your pain gets worse or you have new pain  · You cannot do your usual activities because of side effects from the opioid  · You are constipated or have abdominal pain  · You have questions or concerns about your condition or care  Opioid safety measures:   · Take your medicine as directed  Ask if you need more information on how to take your medicine correctly  Follow up with your healthcare provider regularly  You may need to have your dose adjusted   Do not use opioid medicine if you are pregnant or breastfeeding  · Give your healthcare provider a list of all your medicines  Include any over-the-counter medicines, vitamins, and herbs  It can be dangerous to take opioids with certain other medicines, such as antihistamines  · Keep opioid medicine in a safe place  Store your opioid medicine in a locked cabinet to keep it away from children and others  · Do not drink alcohol while you use opioids  Alcohol use with an opioid medicine can make you sleepy and slow your breathing rate  You may stop breathing completely  · Do not drive or operate heavy machinery after you take opioid medicine  Opioid medicine can make you drowsy and make it hard to concentrate  You may injure yourself or others if you drive or operate heavy machinery while taking your medicine  · Drink fluids and eat high-fiber foods  Fluids and fiber will help prevent constipation  Ask your healthcare provider what fluids are right for you and how much you should drink  Also ask for a list of foods that contain fiber  Follow up with your healthcare provider as directed: You may need to have your dose adjusted  You may be referred to a pain specialist  Write down your questions so you remember to ask them during your visits  © 2017 [employer] Information is for End User's use only and may not be sold, redistributed or otherwise used for commercial purposes  All illustrations and images included in CareNotes® are the copyrighted property of [employer]  or Nitesh Giraldo  The above information is an  only  It is not intended as medical advice for individual conditions or treatments  Talk to your doctor, nurse or pharmacist before following any medical regimen to see if it is safe and effective for you

## 2020-03-27 ENCOUNTER — TELEPHONE (OUTPATIENT)
Dept: NEUROSURGERY | Facility: CLINIC | Age: 70
End: 2020-03-27

## 2020-03-27 ENCOUNTER — TELEPHONE (OUTPATIENT)
Dept: MRI IMAGING | Facility: HOSPITAL | Age: 70
End: 2020-03-27

## 2020-03-27 NOTE — TELEPHONE ENCOUNTER
Received a call from Renard Ennis requesting if MRI can be rescheduled  Review of his chart shows fup "next year" and ov to follow-up  Patient states MRI called him to see if he would cancel his Sunday MRI but was directed to contact our office first      Assisted to reschedule her MRI to May and OV to follow that  He was appreciative

## 2020-03-27 NOTE — TELEPHONE ENCOUNTER
I spoke to the patient's wife Lorytrinidad Durand  She will be contacting Dr Tanner Montez office to discuss if Gerhardt Gores would be able to postpone his MRI of the brain due to COVID 19  I provided her Dr Tanner Montez and central scheduling's phone number

## 2020-04-15 DIAGNOSIS — G50.0 TRIGEMINAL NEURALGIA OF RIGHT SIDE OF FACE: ICD-10-CM

## 2020-04-15 RX ORDER — PREGABALIN 200 MG/1
CAPSULE ORAL
Qty: 450 CAPSULE | Refills: 0 | Status: SHIPPED | OUTPATIENT
Start: 2020-04-15 | End: 2020-04-28 | Stop reason: SDUPTHER

## 2020-04-24 ENCOUNTER — TELEMEDICINE (OUTPATIENT)
Dept: PAIN MEDICINE | Facility: CLINIC | Age: 70
End: 2020-04-24
Payer: MEDICARE

## 2020-04-24 DIAGNOSIS — F11.20 UNCOMPLICATED OPIOID DEPENDENCE (HCC): ICD-10-CM

## 2020-04-24 DIAGNOSIS — Z79.891 LONG-TERM CURRENT USE OF OPIATE ANALGESIC: ICD-10-CM

## 2020-04-24 DIAGNOSIS — G50.0 TRIGEMINAL NEURALGIA: ICD-10-CM

## 2020-04-24 DIAGNOSIS — G89.4 CHRONIC PAIN SYNDROME: Primary | ICD-10-CM

## 2020-04-24 DIAGNOSIS — G51.39 FACIAL SPASM: ICD-10-CM

## 2020-04-24 DIAGNOSIS — R51.9 FACIAL PAIN: ICD-10-CM

## 2020-04-24 PROCEDURE — 99442 PR PHYS/QHP TELEPHONE EVALUATION 11-20 MIN: CPT | Performed by: NURSE PRACTITIONER

## 2020-04-24 RX ORDER — OXYCODONE HYDROCHLORIDE AND ACETAMINOPHEN 5; 325 MG/1; MG/1
TABLET ORAL
Qty: 60 TABLET | Refills: 0 | Status: SHIPPED | OUTPATIENT
Start: 2020-04-24 | End: 2020-04-24 | Stop reason: CLARIF

## 2020-04-24 RX ORDER — OXYCODONE HYDROCHLORIDE AND ACETAMINOPHEN 5; 325 MG/1; MG/1
TABLET ORAL
Qty: 60 TABLET | Refills: 0 | Status: SHIPPED | OUTPATIENT
Start: 2020-04-24 | End: 2020-04-24 | Stop reason: SDUPTHER

## 2020-04-24 RX ORDER — OXYCODONE HYDROCHLORIDE AND ACETAMINOPHEN 5; 325 MG/1; MG/1
TABLET ORAL
Qty: 60 TABLET | Refills: 0 | Status: SHIPPED | OUTPATIENT
Start: 2020-04-24 | End: 2020-06-19 | Stop reason: SDUPTHER

## 2020-04-28 DIAGNOSIS — G50.0 TRIGEMINAL NEURALGIA OF RIGHT SIDE OF FACE: ICD-10-CM

## 2020-04-28 RX ORDER — PREGABALIN 200 MG/1
CAPSULE ORAL
Qty: 450 CAPSULE | Refills: 0 | Status: SHIPPED | OUTPATIENT
Start: 2020-04-28 | End: 2020-06-19 | Stop reason: SDUPTHER

## 2020-05-19 ENCOUNTER — HOSPITAL ENCOUNTER (OUTPATIENT)
Dept: MRI IMAGING | Facility: HOSPITAL | Age: 70
Discharge: HOME | End: 2020-05-19
Attending: NEUROLOGICAL SURGERY
Payer: MEDICARE

## 2020-05-19 DIAGNOSIS — G50.9 NEUROPATHIC TRIGEMINAL SENSORY NERVE: ICD-10-CM

## 2020-05-19 DIAGNOSIS — G93.9 BRAIN LESION: ICD-10-CM

## 2020-05-19 DIAGNOSIS — G50.0 TRIGEMINAL NEURALGIA: ICD-10-CM

## 2020-05-19 PROCEDURE — 70553 MRI BRAIN STEM W/O & W/DYE: CPT

## 2020-05-19 PROCEDURE — A9585 GADOBUTROL INJECTION: HCPCS | Performed by: NEUROLOGICAL SURGERY

## 2020-05-19 RX ADMIN — GADOBUTROL 12 ML: 604.72 INJECTION INTRAVENOUS at 10:56

## 2020-05-22 ENCOUNTER — OFFICE VISIT (OUTPATIENT)
Dept: NEUROSURGERY | Facility: CLINIC | Age: 70
End: 2020-05-22
Payer: MEDICARE

## 2020-05-22 VITALS
HEART RATE: 67 BPM | BODY MASS INDEX: 37.22 KG/M2 | RESPIRATION RATE: 16 BRPM | WEIGHT: 260 LBS | TEMPERATURE: 97.2 F | DIASTOLIC BLOOD PRESSURE: 64 MMHG | SYSTOLIC BLOOD PRESSURE: 106 MMHG | HEIGHT: 70 IN

## 2020-05-22 DIAGNOSIS — G89.4 CHRONIC PAIN SYNDROME: Primary | ICD-10-CM

## 2020-05-22 DIAGNOSIS — G50.0 TRIGEMINAL NEURALGIA: ICD-10-CM

## 2020-05-22 DIAGNOSIS — D32.0 INTRACRANIAL MENINGIOMA (HCC): ICD-10-CM

## 2020-05-22 PROCEDURE — 1036F TOBACCO NON-USER: CPT | Performed by: NEUROLOGICAL SURGERY

## 2020-05-22 PROCEDURE — 1160F RVW MEDS BY RX/DR IN RCRD: CPT | Performed by: NEUROLOGICAL SURGERY

## 2020-05-22 PROCEDURE — 3008F BODY MASS INDEX DOCD: CPT | Performed by: NEUROLOGICAL SURGERY

## 2020-05-22 PROCEDURE — 4040F PNEUMOC VAC/ADMIN/RCVD: CPT | Performed by: NEUROLOGICAL SURGERY

## 2020-05-22 PROCEDURE — 99215 OFFICE O/P EST HI 40 MIN: CPT | Performed by: NEUROLOGICAL SURGERY

## 2020-06-19 ENCOUNTER — OFFICE VISIT (OUTPATIENT)
Dept: PAIN MEDICINE | Facility: CLINIC | Age: 70
End: 2020-06-19
Payer: MEDICARE

## 2020-06-19 VITALS
SYSTOLIC BLOOD PRESSURE: 116 MMHG | HEART RATE: 60 BPM | HEIGHT: 70 IN | DIASTOLIC BLOOD PRESSURE: 68 MMHG | BODY MASS INDEX: 37.94 KG/M2 | WEIGHT: 265 LBS | RESPIRATION RATE: 16 BRPM | TEMPERATURE: 97.8 F

## 2020-06-19 DIAGNOSIS — Z79.891 LONG-TERM CURRENT USE OF OPIATE ANALGESIC: ICD-10-CM

## 2020-06-19 DIAGNOSIS — R51.9 FACIAL PAIN: ICD-10-CM

## 2020-06-19 DIAGNOSIS — G89.4 CHRONIC PAIN SYNDROME: Primary | ICD-10-CM

## 2020-06-19 DIAGNOSIS — F11.20 UNCOMPLICATED OPIOID DEPENDENCE (HCC): ICD-10-CM

## 2020-06-19 DIAGNOSIS — G50.0 TRIGEMINAL NEURALGIA: ICD-10-CM

## 2020-06-19 DIAGNOSIS — G50.0 TRIGEMINAL NEURALGIA OF RIGHT SIDE OF FACE: ICD-10-CM

## 2020-06-19 PROCEDURE — 80305 DRUG TEST PRSMV DIR OPT OBS: CPT | Performed by: NURSE PRACTITIONER

## 2020-06-19 PROCEDURE — 3008F BODY MASS INDEX DOCD: CPT | Performed by: NURSE PRACTITIONER

## 2020-06-19 PROCEDURE — 4040F PNEUMOC VAC/ADMIN/RCVD: CPT | Performed by: NURSE PRACTITIONER

## 2020-06-19 PROCEDURE — 1160F RVW MEDS BY RX/DR IN RCRD: CPT | Performed by: NURSE PRACTITIONER

## 2020-06-19 PROCEDURE — 1036F TOBACCO NON-USER: CPT | Performed by: NURSE PRACTITIONER

## 2020-06-19 PROCEDURE — 99214 OFFICE O/P EST MOD 30 MIN: CPT | Performed by: NURSE PRACTITIONER

## 2020-06-19 RX ORDER — PREGABALIN 200 MG/1
CAPSULE ORAL
Qty: 450 CAPSULE | Refills: 0 | Status: SHIPPED | OUTPATIENT
Start: 2020-06-19 | End: 2020-08-14 | Stop reason: SDUPTHER

## 2020-06-19 RX ORDER — PREDNISOLONE ACETATE 10 MG/ML
SUSPENSION/ DROPS OPHTHALMIC
COMMUNITY
Start: 2019-10-29 | End: 2021-04-06

## 2020-06-19 RX ORDER — OXYCODONE HYDROCHLORIDE AND ACETAMINOPHEN 5; 325 MG/1; MG/1
TABLET ORAL
Qty: 60 TABLET | Refills: 0 | Status: SHIPPED | OUTPATIENT
Start: 2020-06-19 | End: 2020-08-07 | Stop reason: SDUPTHER

## 2020-06-24 LAB
A-OH ALPRAZ UR QL CFM: NEGATIVE NG/ML
AMPHET UR QL CFM: NEGATIVE NG/ML

## 2020-07-06 NOTE — ANESTHESIA PREPROCEDURE EVALUATION
Review of Systems/Medical History  Patient summary reviewed  Chart reviewed  No history of anesthetic complications     Cardiovascular  EKG reviewed, Exercise tolerance (METS): >4,     Pulmonary  Negative pulmonary ROS        GI/Hepatic    GERD well controlled,        Negative  ROS        Endo/Other  Negative endo/other ROS      GYN       Hematology  Anemia ,     Musculoskeletal    Arthritis     Neurology      Comment: Trigeminal neuralgia Psychology       EKG 9/26/18  Sinus rhythm with 1st degree A-V block  Incomplete right bundle branch block  Borderline ECG    INR   0 99  HCT   46 3  Plt   209  HgA1c   5 1  Physical Exam    Airway    Mallampati score: I  TM Distance: >3 FB  Neck ROM: full     Dental   upper dentures and lower dentures,     Cardiovascular  Rhythm: regular, Rate: normal,     Pulmonary  Breath sounds clear to auscultation,     Other Findings        Anesthesia Plan  ASA Score- 2     Anesthesia Type- spinal and regional with ASA Monitors  Additional Monitors:   Airway Plan:     Comment: GETA backup  Plan Factors-    Induction- intravenous  Postoperative Plan- Plan for postoperative opioid use  Informed Consent- Anesthetic plan and risks discussed with patient  I personally reviewed this patient with the CRNA  Discussed and agreed on the Anesthesia Plan with the CRNA  Cameron Richards
English

## 2020-08-05 ENCOUNTER — OFFICE VISIT (OUTPATIENT)
Dept: NEUROLOGY | Facility: CLINIC | Age: 70
End: 2020-08-05
Payer: MEDICARE

## 2020-08-05 VITALS
WEIGHT: 256 LBS | HEART RATE: 65 BPM | SYSTOLIC BLOOD PRESSURE: 144 MMHG | TEMPERATURE: 97 F | HEIGHT: 70 IN | DIASTOLIC BLOOD PRESSURE: 65 MMHG | BODY MASS INDEX: 36.65 KG/M2

## 2020-08-05 DIAGNOSIS — G50.0 TRIGEMINAL NEURALGIA: Primary | ICD-10-CM

## 2020-08-05 PROCEDURE — 4040F PNEUMOC VAC/ADMIN/RCVD: CPT | Performed by: PHYSICIAN ASSISTANT

## 2020-08-05 PROCEDURE — 1160F RVW MEDS BY RX/DR IN RCRD: CPT | Performed by: PHYSICIAN ASSISTANT

## 2020-08-05 PROCEDURE — 3008F BODY MASS INDEX DOCD: CPT | Performed by: PHYSICIAN ASSISTANT

## 2020-08-05 PROCEDURE — 1036F TOBACCO NON-USER: CPT | Performed by: PHYSICIAN ASSISTANT

## 2020-08-05 PROCEDURE — 99215 OFFICE O/P EST HI 40 MIN: CPT | Performed by: PHYSICIAN ASSISTANT

## 2020-08-05 NOTE — ASSESSMENT & PLAN NOTE
80 y/o m with h/o meningioma in [address] s/p gamma knife presents as a follow up for facial pain d/t Trigeminal Neuralgia  Plan-   Follow up in 1 year   Topamax 100 mg bid   Try and not take Tylenol > 14 times a month

## 2020-08-05 NOTE — PROGRESS NOTES
Review of Systems   Constitutional: Negative  HENT: Negative  Eyes: Positive for pain (over right eye brow/ 4-5 times a day )  Respiratory: Negative  Cardiovascular: Negative  Gastrointestinal: Negative  Endocrine: Negative  Genitourinary: Negative  Musculoskeletal: Negative  Skin: Negative  Allergic/Immunologic: Negative  Neurological: Negative  Facial pain- electrical shocks right eye brow/cheek/jaw    Hematological: Negative  Psychiatric/Behavioral: Negative

## 2020-08-05 NOTE — PROGRESS NOTES
[facility]  PATIENT:  An Bragg  MRN:  [id number]  :  1950  DATE OF SERVICE:  2020      Assessment/Plan:     Trigeminal neuralgia  78 y/o m with h/o meningioma in Rt Meckles s/p gamma knife presents as a follow up for facial pain d/t Trigeminal Neuralgia  Plan-   Follow up in 1 year   Topamax 100 mg bid   Try and not take Tylenol > 14 times a month  Diagnoses and all orders for this visit:    Trigeminal neuralgia            History of Present Illness: We had the pleasure of evaluating An Bragg in neurological follow up  today for facial pain  As you know,  he is a 79 y o   right handed male  Patient retired in 2018  [facility] is here today for f/u evaluation of his trigeminal neuralgia  To review, Pt's right sided facial pain started in   He was diagnosed with trigeminal neuralgia  He used to feel intense pain on rt side just under the eye  He had constant pain with episodes of severe pain  During my evaluation patient reports feeling mild worse in terms of right facial pain  Pt reports about 4 months ago he started an electric shock like pain  He experiences it in Rt eye brow, Rt orbit and occasionally on Rt jaw last for 10 secs, 8/10 in severity  It worsens with sneezing, first bite of meal  Recently started with CBD oil 3 weeks ago which seems to be helping  Tinnitus: bilateral, right more so than left, began after nerve block in 2018  - 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 (some noise insensitivity attributed by pt to musical pursuits)  - recalled previous conversation regarding ENT visit and is still considering     Numbness: right V2 and V3 region post nerve block, feels like the numb before a tooth pulling     Dizzy: some lightheadness for past few monthsthat leads to an occasional misstep a few times a week, no syncopal episodes, no change in frequency   Recent fall in [address] [place] [address] per radiology report,no significant interval change in the size approximate 1 cm calcified lesion [facility] and thought to represent a calcified meningioma  S/p gamma knife  last MRI 5/19/20       Trigeminal neuralgia  What medications do you take or have you taken for your headaches? Preventive:   Lyrica (help the most), Topamax (once in morning-100 mg poqd helps with pain, seems to help with sleep too)    Tried in past    Trileptal (dizziness and confusion), Tegretol (confusion/goofy), Neurontin (confusion), Lamictal (confusion), Olanzapine (confusion/goofy/dizzy)  - Amitriptyline (stopped due to fatigue), Cymbalta (no improvement)  - Botox (did not help)    Abortive:   Tylenol- 2 tabs 5 days a week for severe pain  Percocet- 1 tab per day  4-5 days/week     Tried in past-   Baclofen (doesn't take anymore)  - tramadol (Goffy and ended up in the ER)  - aspirin    What is your current pain level ?  4-5/10     How often does the pain occur?   constant pain: there all the time  Severe pain: Couple times a week on an average twice a day  Yesterday he had around 4:00 pm lasted for 15 min   Electric shock like pain-3-5 a day last for 10 secs       Are you ever pain free? No  Aura/warning and how long does it last - none     What time of the day does the pain start? Constant: there all the time  Severe pain: varies; the episodes have been scattered throughout day  Electric shock like- first bite of meal, sneezing       How long do the pain  last?   Constant pain: there all the time  Severe pain: 15 min   Electric shock pain- for 10-15 secs       Describe your usual pain:   Constant pain: aching   Severe pain- Feels like somebody is drilling, ice pick, numbness, tingling + new shock like pathway from lateral nasal fold to mid cheek bone usually get it with shock like pain       Where is your pain located?    Constant Pain: right v2 under the eye on the superiors nasal region, currently at the "origins": below eye, lateral eyebrow, hair line above eyebrow, mandible directly inferior to corner of mouth, inferior mastoid region     Severe Pain:  v2 and at the medial nasal region, v3, lateral eyebrow and directly superior at face/hair line; v1, mandible below corner of mouth; inferior mastoid - these locations will be more painful and receive a shooting pain originating from below the eye     Electric Shock- Rt eye brow, Rt orbit then at times Rt jaw  What is the intensity of pain? Constant Pain: 4/10  Severe Pain: 10/10  Electric shock-8/10     Associated symptoms:   None     Have you used CBD or THC for your headaches and how often? Yes, just started 3 weeks ago  Recommended by pain management  Seems like its helping  Have you ever had any Brain imaging? yes    Recent laboratory data was reviewed  Medications and allergies were reviewed      2015 MRI head:   IMPRESSION-    1  Stable nonenhancing rounded T2 hypointense structure in right Meckel's cave correlating to the previously present calcified structure noted on the prior 2013 head CT, stable from the previous MRI, possibly   a treated or calcified meningioma   Cisternal segments of both 5th nerves are otherwise normal in signal without abnormal enhancement   No brainstem abnormality   No interval change from the previous study  2   No acute infarction, intracranial hemorrhage or mass effect      Reviewed old notes from physician seen in the past- see above HPI for summary of previous encounters       Past Medical History:   Diagnosis Date    Arthritis     Right knee    Chronic pain disorder     Trigeminal neuralgia pain     Ulcer of bile duct        Patient Active Problem List   Diagnosis    Trigeminal neuralgia    Facial spasm    Anemia    Bilateral patellofemoral syndrome    Diverticulosis of colon    Esophageal reflux    Intracranial meningioma (HCC)    Vertigo    Vitamin D deficiency    Need for pneumococcal vaccination    Preoperative evaluation of a medical condition to rule out surgical contraindications (TAR required)    Insomnia    Primary osteoarthritis of right knee    Pre-op examination    DNS (deviated nasal septum)    Facial pain    Chronic pain syndrome    Uncomplicated opioid dependence (HCC)    Long-term current use of opiate analgesic       Medications:      Current Outpatient Medications   Medication Sig Dispense Refill    Acetaminophen (TYLENOL PO) Take 2 tablets by mouth as needed       ascorbic acid (VITAMIN C) 500 MG tablet Take 1 tablet (500 mg total) by mouth daily 60 tablet 0    cyanocobalamin (VITAMIN B-12) 1,000 mcg tablet Take 1 tablet by mouth daily       Ketoprofen 10 % CREA Apply 1 application topically as needed   2    MELATONIN PO Take 10 mg by mouth daily at bedtime       Multiple Vitamins-Minerals (PRESERVISION AREDS) TABS Take 1 tablet by mouth daily      ofloxacin (OCUFLOX) 0 3 % ophthalmic solution INSTILL 1 DROP INTO RIGHT EYE 4 TIMES A DAY AS DIRECTED, START 3 DAYS PRIOR TO SURGERY  0    oxyCODONE-acetaminophen (PERCOCET) 5-325 mg per tablet Take 1 tablet 2-3 times daily as needed for pain  (Patient taking differently: Take 1 tablet by mouth Take 1 tablet 2-3 times daily as needed for pain ) 60 tablet 0    prednisoLONE acetate (PRED FORTE) 1 % ophthalmic suspension       pregabalin (Lyrica) 200 MG capsule Take 2 tabs in the am, 1 tab in the afternoon, 2 tabs at bedtime 450 capsule 0    topiramate (TOPAMAX) 100 mg tablet Half a tab in the morning 1 tab at bedtime for 5 days then 1 tab in the morning and 1 tab at bedtime after that (Patient taking differently: Take 100 mg by mouth daily 1 tab in the morning) 60 tablet 3    TURMERIC CURCUMIN PO Take 1,500 mg by mouth once as needed       Calcium 250 MG CAPS Take by mouth      pyridoxine (VITAMIN B6) 100 mg tablet Take 1 tablet by mouth daily        No current facility-administered medications for this visit           Allergies: Allergies   Allergen Reactions    Gabapentin Delirium    Oxcarbazepine Dizziness    Lamictal [Lamotrigine] Confusion     Difficulty to think clearly     Tramadol        Family History:     Family History   Problem Relation Age of Onset    Emphysema Mother     Colon cancer Father     Skin cancer Maternal Grandmother        Social History:     Social History     Socioeconomic History    Marital status: /Civil Union     Spouse name:  Lucius Reyes Number of children: 2    Years of education: BS Degree plus addl classes    Highest education level: Not on file   Occupational History    Occupation: Retired     Comment: [address] years [address] resource strain: Not on file    Food insecurity     Worry: Not on file     Inability: Not on file   [employer] needs     Medical: Not on file     Non-medical: Not on file   Tobacco Use    Smoking status: Never Smoker    Smokeless tobacco: Never Used   Substance and Sexual Activity    Alcohol use: No    Drug use: No    Sexual activity: Yes   Lifestyle    Physical activity     Days per week: Not on file     Minutes per session: Not on file    Stress: Not on file   Relationships    Social connections     Talks on phone: Not on file     Gets together: Not on file     Attends Mosque service: Not on file     Active member of club or organization: Not on file     Attends meetings of clubs or organizations: Not on file     Relationship status: Not on file    Intimate partner violence     Fear of current or ex partner: Not on file     Emotionally abused: Not on file     Physically abused: Not on file     Forced sexual activity: Not on file   Other Topics Concern    Not on file   Social History Narrative    Caffeine use: Daily caffeinated cola, drinks coffee    Lives at home with wife David Moise         Objective:   Physical Exam:                                                                   Vitals:            /65 (BP Location: Left arm, Patient Position: Sitting, Cuff Size: Adult)   Pulse 65   Temp (!) 97 °F (36 1 °C) (Tympanic)   Ht 5' 10"   Wt 116 kg (256 lb)   BMI 36 73 kg/m²   BP Readings from Last 3 Encounters:   08/05/20 144/65   06/19/20 116/68   05/22/20 106/64     Pulse Readings from Last 3 Encounters:   08/05/20 65   06/19/20 60   05/22/20 67          Physical Exam   HENT:   Head: Normocephalic  Nose: Nose normal    Eyes: Pupils are equal, round, and reactive to light  Neck: Normal range of motion  Cardiovascular: Normal rate and regular rhythm  Pulmonary/Chest: Effort normal    Abdominal: Normal appearance and bowel sounds are normal    Musculoskeletal: Normal range of motion  Neurological: He is alert  Neurological Examination:   Mental Status: The patient was awake, alert, attentive, oriented to person, place, and time  Recent and remote memory intact to conversation with no evidence of language dysfunction  Cranial Nerves:   I: smell Not tested   II: visual fields Full to confrontation  Pupils equal, round, reactive to light with normal accomodation  Fundus: benign fundus  III,IV,VI: extraocular muscles EOMI, no nystagmus   V: masseter and pterygoid strength full  Sensation in the V1 through V3 distributions slight increased compared to left side   VII: Face is symmetric with no weakness noted  VIII: Audition intact to finger rub bilaterally  IX/X: Uvula midline  Soft palate elevation symmetric  XI: Trapezius and SCM strength 5/5 B/L  XII: Tongue midline with no atrophy or fasciculations with appropriate movement  Motor Examination:   No pronator drift  Bulk: Normal  No atrophy Tone: Normal  Fasciculations: None        Deltoid Biceps Triceps WE   WF   FF IO     Right        5         5          5         5      5      5   5        Left           5        5          5          5      5     5   5                       IP        Quad   Ham     TA       Gastroc   Right      5 5          5         5                5  Left         5            5         5         5                5       Reflexes:                   Biceps Brachioradialis Triceps Patella Achilles Plantars   Right          2+            2+                  2+        2+       2+         Down   Left            2+             2+                 2+         2+       2+         Down     Clonus: None    Pathological Reflexes:  Hoffmans: negative  Babinsky: negative  Jaw Jerk: negative    Coordination: Patient able to perform normal finger-to-nose and heel to shin appropriately  Normal rapid alternating movements  Sensory: Normal sensation to light touch, pin prick and vibratory sensation throughout  Gait:normal stance and posture, normal stride length and arm swing, normal turn around  Patient able to perform tandem gait without difficulty  Able toe walk and heel walk without difficulty  Romberg negative  Skin: Skin is warm  Psychiatric: Mood normal           Review of Systems:   Review of Systems  Constitutional: Negative  HENT: Negative  Eyes: Positive for pain (over right eye brow/ 4-5 times a day )  Respiratory: Negative  Cardiovascular: Negative  Gastrointestinal: Negative  Endocrine: Negative  Genitourinary: Negative  Musculoskeletal: Negative  Skin: Negative  Allergic/Immunologic: Negative  Neurological: Negative  Facial pain- electrical shocks right eye brow/cheek/jaw    Hematological: Negative      Psychiatric/Behavioral: Negative        I personally reviewed the ROS entered by the MA      Author:  Maria R Calles MD 8/5/2020 10:51 AM  Greater than 50% of the 45 minutes evaluation was a face-to-face discussion regarding  the pathophysiology of his current symptoms and further plan, as well as counseling, educating, and coordinating the patient's care including risks and benefits of treatment, instructions for disease self management, treatment instructions and follow up requirements

## 2020-08-07 ENCOUNTER — TELEPHONE (OUTPATIENT)
Dept: PAIN MEDICINE | Facility: CLINIC | Age: 70
End: 2020-08-07

## 2020-08-07 DIAGNOSIS — G89.4 CHRONIC PAIN SYNDROME: ICD-10-CM

## 2020-08-07 DIAGNOSIS — G50.0 TRIGEMINAL NEURALGIA: ICD-10-CM

## 2020-08-07 RX ORDER — OXYCODONE HYDROCHLORIDE AND ACETAMINOPHEN 5; 325 MG/1; MG/1
TABLET ORAL
Qty: 30 TABLET | Refills: 0 | Status: SHIPPED | OUTPATIENT
Start: 2020-08-07 | End: 2020-08-14 | Stop reason: SDUPTHER

## 2020-08-07 NOTE — TELEPHONE ENCOUNTER
Patient   [phone]  ADRIANNA Wong    Patient is requesting medication  He said that he is going to run out before his next appt on Friday 8/14/20  He said he had been having a bad month this month   Pt contacted Call Center requested refill of their medication          Medication Name: oxyCODONE-acetaminophen (PERCOCET    Dosage of Med: 5-325 mg      Frequency of Med: 1x a day      Remaining Medication: 2 pills left    Pharmacy and Location: [place] [address]

## 2020-08-07 NOTE — TELEPHONE ENCOUNTER
Pt said his pain has been bad lately and most days he only takes 1 percocet a day but he's had to take 2-3 per day recently  Pt has 2 pills left and his next ov isn't until 8/14  Looks last ov was 6/19/20 w/ Marquis Esposito and she had only sent a 1 month script for his percocet  I placed call on hold and d/w Marquis Esposito, she will send script today for pt  I informed pt of the same  No need to c/b once Rx sent

## 2020-08-14 ENCOUNTER — TRANSCRIBE ORDERS (OUTPATIENT)
Dept: PAIN MEDICINE | Facility: CLINIC | Age: 70
End: 2020-08-14

## 2020-08-14 ENCOUNTER — OFFICE VISIT (OUTPATIENT)
Dept: PAIN MEDICINE | Facility: CLINIC | Age: 70
End: 2020-08-14
Payer: MEDICARE

## 2020-08-14 VITALS
SYSTOLIC BLOOD PRESSURE: 128 MMHG | HEIGHT: 70 IN | BODY MASS INDEX: 38.51 KG/M2 | DIASTOLIC BLOOD PRESSURE: 76 MMHG | RESPIRATION RATE: 16 BRPM | WEIGHT: 269 LBS | TEMPERATURE: 97.6 F | HEART RATE: 80 BPM

## 2020-08-14 DIAGNOSIS — R51.9 FACIAL PAIN: ICD-10-CM

## 2020-08-14 DIAGNOSIS — G89.4 CHRONIC PAIN SYNDROME: Primary | ICD-10-CM

## 2020-08-14 DIAGNOSIS — G50.0 TRIGEMINAL NEURALGIA OF RIGHT SIDE OF FACE: ICD-10-CM

## 2020-08-14 PROCEDURE — 1036F TOBACCO NON-USER: CPT | Performed by: NURSE PRACTITIONER

## 2020-08-14 PROCEDURE — 1160F RVW MEDS BY RX/DR IN RCRD: CPT | Performed by: NURSE PRACTITIONER

## 2020-08-14 PROCEDURE — 4040F PNEUMOC VAC/ADMIN/RCVD: CPT | Performed by: NURSE PRACTITIONER

## 2020-08-14 PROCEDURE — 99214 OFFICE O/P EST MOD 30 MIN: CPT | Performed by: NURSE PRACTITIONER

## 2020-08-14 RX ORDER — OXYCODONE HYDROCHLORIDE AND ACETAMINOPHEN 5; 325 MG/1; MG/1
TABLET ORAL
Qty: 30 TABLET | Refills: 0 | Status: SHIPPED | OUTPATIENT
Start: 2020-08-14 | End: 2020-08-14 | Stop reason: SDUPTHER

## 2020-08-14 RX ORDER — OXYCODONE HYDROCHLORIDE AND ACETAMINOPHEN 5; 325 MG/1; MG/1
TABLET ORAL
Qty: 30 TABLET | Refills: 0 | Status: SHIPPED | OUTPATIENT
Start: 2020-08-14 | End: 2020-10-12 | Stop reason: SDUPTHER

## 2020-08-14 RX ORDER — PREGABALIN 200 MG/1
CAPSULE ORAL
Qty: 450 CAPSULE | Refills: 0 | Status: SHIPPED | OUTPATIENT
Start: 2020-08-14 | End: 2020-11-11 | Stop reason: SDUPTHER

## 2020-08-14 NOTE — PATIENT INSTRUCTIONS
You may  your prescriptions on 8/17/2020 and 9/15/2020    Opioid Dependence   WHAT YOU NEED TO KNOW:   What is opioid dependence? Opioids are medicines, such as morphine and codeine, used to treat pain  Dependence happens after you have used opioids regularly for a long period of time  Dependence means that your body gets used to how much medicine you take  Dependence is not the same as addiction  Addiction means that a person uses opioids to get high instead of using them to control pain  What are the signs and symptoms of opioid dependence? · You need more of the opioid to get the same amount of pain relief as you did when you first started taking it  · You have tried to use less opioid medicine but are not able to  · You have withdrawal symptoms when you take less of the opioid  What are the signs and symptoms of opioid withdrawal?  You may have the following signs and symptoms if you suddenly stop taking opioids or if you decrease the amount you normally take:  · Yawning     · Runny nose     · Nausea or vomiting     · Diarrhea     · Chills or goosebumps    · Muscle aches or cramps     · Anxiety  How is opioid dependence diagnosed? Your healthcare provider will do a physical exam  He will ask you questions about your symptoms and your use of opioids  He will also ask about your current and past use of other drugs and any family history of drug abuse  How is opioid dependence treated? You may be treated in a hospital or you may be treated as an outpatient  During detoxification (detox), healthcare providers will slowly decrease your dose of the opioid medicine you are dependent on  They may use another opioid medicine such as methadone to decrease symptoms of withdrawal  You may need to take this or another medicine for some time  Your healthcare provider will also replace the opioid with another pain medicine that is less likely to cause dependence   He may also suggest that you receive counseling and social support during treatment  What are the risks of opioid dependence? There is a risk of overdose during early treatment with methadone  You may become dependent on the medicines used to treat opioid dependence  Without treatment, you may develop other health problems or become addicted to opioids  Your risk of abusing alcohol and other drugs increases  You may also develop risky behaviors that can lead to an overdose, violence, and suicidal thoughts  When should I contact my healthcare provider? · Your speech is slurred  · You have difficulty staying awake  · You have nausea and vomiting  · You are easily upset or cry easily  · You have poor balance  · You have questions or concerns about your condition or care  When should I seek immediate care or call 911? · You feel lightheaded or faint  · You have a fast, slow, or irregular heartbeat  · You have a seizure  CARE AGREEMENT:   You have the right to help plan your care  Learn about your health condition and how it may be treated  Discuss treatment options with your caregivers to decide what care you want to receive  You always have the right to refuse treatment  The above information is an  only  It is not intended as medical advice for individual conditions or treatments  Talk to your doctor, nurse or pharmacist before following any medical regimen to see if it is safe and effective for you  © 2017 [employer] Information is for End User's use only and may not be sold, redistributed or otherwise used for commercial purposes  All illustrations and images included in CareNotes® are the copyrighted property of [employer]  or Nitesh Giraldo

## 2020-08-14 NOTE — PROGRESS NOTES
Assessment:  1  Chronic pain syndrome    2  Facial pain    3  Trigeminal neuralgia of right side of face        Plan:  The patient is a 79 y o  male last seen on 6/19/2020 who presents for a follow up office visit in regards to chronic pain syndrome secondary to facial pain, and trigeminal neuralgia  The patient attributes his increase in pain symptoms to the hot weather  At this time he does not feel he needs make any adjustments to his medication, and would like to see how he does as the weather starts to call off  At this time I will continue the patient on his medications as prescribed  Two months of prescriptions for Percocet 5/325 mg by mouth 2-3 times daily were electronically sent to the patient's pharmacy on file with do not fill dates of 08/17/2020, and 09/15/2020  A prescription for Lyrica was also sent electronically to the patient's pharmacy on file  [place] Prescription Drug Monitoring Program report was reviewed and was appropriate     There are risks associated with opioid medications, including dependence, addiction and tolerance  The patient understands and agrees to use these medications only as prescribed  Potential side effects of the medications include, but are not limited to, constipation, drowsiness, addiction, impaired judgment and risk of fatal overdose if not taken as prescribed  The patient was warned against driving while taking sedation medications  Sharing medications is a felony  At this point in time, the patient is showing no signs of addiction, abuse, diversion or suicidal ideation  The patient will follow-up in 8 weeks for medication prescription refill and reevaluation  The patient was advised to contact the office should their symptoms worsen in the interim  The patient was agreeable and verbalized an understanding  History of Present Illness:     The patient is a 79 y o  male last seen on 6/19/2020 who presents for a follow up office visit in regards to chronic pain syndrome secondary to facial pain, and trigeminal neuralgia  The patient currently reports ongoing right facial pain that is slightly worse since his last office visit  The patient reports he does not know if his increased pain symptoms are related to the heat or the condition itself  The patient reports the pain as constant, sharp, throbbing, shooting pain with pins and needles  The majority of his pain is in the right forehead and underneath the right eye  The patient currently reports his pain as 5/10 on the numeric rating scale      Current pain medications includes:  Percocet 5/325 mg by mouth 2-3 times daily as needed, and Lyrica 200 mg (2 tabs in the morning, 1 tab in afternoon, and 2 at bedtime)  The patient has also been using CBD oil which she reports has provided some relief of his pain symptoms  The patient reports that this regimen is providing 80% pain relief  The patient is reporting no side effects from this pain medication regimen  Pain Contract Signed: 1/9/2020  Last Urine Drug Screen: 6/19/2020  Last Dose: 8/13/2020 @ 10:00 PM    I have personally reviewed and/or updated the patient's past medical history, past surgical history, family history, social history, current medications, allergies, and vital signs today  Review of Systems:    Review of Systems   Constitutional: Negative for fever and unexpected weight change  HENT: Negative for trouble swallowing  Eyes: Negative for visual disturbance  Respiratory: Negative for shortness of breath and wheezing  Cardiovascular: Negative for chest pain and palpitations  Gastrointestinal: Negative for constipation, diarrhea, nausea and vomiting  Endocrine: Negative for cold intolerance, heat intolerance and polydipsia  Genitourinary: Negative for difficulty urinating and frequency  Musculoskeletal: Positive for gait problem  Negative for arthralgias, joint swelling and myalgias          Facial Pain   Skin: Negative for rash  Neurological: Positive for dizziness  Negative for seizures, syncope, weakness and headaches  Hematological: Does not bruise/bleed easily  Psychiatric/Behavioral: Positive for decreased concentration  Negative for dysphoric mood  All other systems reviewed and are negative  Past Medical History:   Diagnosis Date    Arthritis     Right knee    Chronic pain disorder     Trigeminal neuralgia pain     Ulcer of bile duct        Past Surgical History:   Procedure Laterality Date    APPENDECTOMY  1962    BRAIN SURGERY  2005    Gamma Knife for Trigeminal Neuralgia    CATARACT EXTRACTION      ESOPHAGOGASTRODUODENOSCOPY      HERNIA REPAIR  1952    OTHER SURGICAL HISTORY      gamma knife RT in 2007 and 2010    [place] STEREO TREAT TRIGEMINAL NERVE Right 3/6/2018    Procedure: RHIZOTOMY TRIGEMINAL NERVES (V2 & V3);   Surgeon: Laura Fenton MD;  [facility];  Service: Neurosurgery    [place] TOTAL KNEE ARTHROPLASTY Right 10/22/2018    Procedure: ARTHROPLASTY KNEE TOTAL;  Surgeon: Moshe Antonio MD;  [facility];  Service: Orthopedics    RHIZOTOMY W/ RADIOFREQUENCY ABLATION Right 08/08/2014    Stereotactic Ablation of Gasserian Ganglion Right sided trigeminal V2 radiofrequency rhizotomy x2       Family History   Problem Relation Age of Onset    Emphysema Mother     Colon cancer Father     Skin cancer Maternal Grandmother        Social History     Occupational History    Occupation: Retired     Comment: 21 years [employer]    Tobacco Use    Smoking status: Never Smoker    Smokeless tobacco: Never Used   Substance and Sexual Activity    Alcohol use: No    Drug use: No    Sexual activity: Yes         Current Outpatient Medications:     Acetaminophen (TYLENOL PO), Take 2 tablets by mouth as needed , Disp: , Rfl:     ascorbic acid (VITAMIN C) 500 MG tablet, Take 1 tablet (500 mg total) by mouth daily, Disp: 60 tablet, Rfl: 0    Calcium 250 MG CAPS, Take by mouth, Disp: , Rfl:     cyanocobalamin (VITAMIN B-12) 1,000 mcg tablet, Take 1 tablet by mouth daily , Disp: , Rfl:     Ketoprofen 10 % CREA, Apply 1 application topically as needed , Disp: , Rfl: 2    MELATONIN PO, Take 10 mg by mouth daily at bedtime , Disp: , Rfl:     Multiple Vitamins-Minerals (PRESERVISION AREDS) TABS, Take 1 tablet by mouth daily, Disp: , Rfl:     ofloxacin (OCUFLOX) 0 3 % ophthalmic solution, INSTILL 1 DROP INTO RIGHT EYE 4 TIMES A DAY AS DIRECTED, START 3 DAYS PRIOR TO SURGERY, Disp: , Rfl: 0    oxyCODONE-acetaminophen (PERCOCET) 5-325 mg per tablet, Take 1 tablet by mouth 2-3 times daily as needed for pain  Do not fill until 9/15/2020 2nd month script, Disp: 30 tablet, Rfl: 0    prednisoLONE acetate (PRED FORTE) 1 % ophthalmic suspension, , Disp: , Rfl:     pregabalin (Lyrica) 200 MG capsule, Take 2 tabs in the am, 1 tab in the afternoon, 2 tabs at bedtime, Disp: 450 capsule, Rfl: 0    pyridoxine (VITAMIN B6) 100 mg tablet, Take 1 tablet by mouth daily , Disp: , Rfl:     topiramate (TOPAMAX) 100 mg tablet, Half a tab in the morning 1 tab at bedtime for 5 days then 1 tab in the morning and 1 tab at bedtime after that (Patient taking differently: Take 100 mg by mouth daily 1 tab in the morning), Disp: 60 tablet, Rfl: 3    TURMERIC CURCUMIN PO, Take 1,500 mg by mouth once as needed , Disp: , Rfl:     Allergies   Allergen Reactions    Gabapentin Delirium    Oxcarbazepine Dizziness    Lamictal [Lamotrigine] Confusion     Difficulty to think clearly     Tramadol        Physical Exam:    /76   Pulse 80   Temp 97 6 °F (36 4 °C) (Oral)   Resp 16   Ht 5' 10" (1 778 m)   Wt 122 kg (269 lb)   BMI 38 60 kg/m²     Constitutional:normal, well developed, well nourished, alert, in no distress and non-toxic and no overt pain behavior   and overweight  Eyes:anicteric  HEENT:grossly intact  Neck:supple, symmetric, trachea midline and no masses   Pulmonary:even and unlabored  Cardiovascular:No edema or pitting edema present  Skin:Normal without rashes or lesions and well hydrated  Psychiatric:Mood and affect appropriate  Neurologic:Cranial Nerves II-XII grossly intact  Musculoskeletal:normal      Imaging  No orders to display         No orders of the defined types were placed in this encounter

## 2020-08-20 ENCOUNTER — TRANSCRIBE ORDERS (OUTPATIENT)
Dept: PAIN MEDICINE | Facility: CLINIC | Age: 70
End: 2020-08-20

## 2020-09-15 DIAGNOSIS — G50.0 TRIGEMINAL NEURALGIA: ICD-10-CM

## 2020-09-15 RX ORDER — TOPIRAMATE 100 MG/1
TABLET, FILM COATED ORAL
Qty: 90 TABLET | Refills: 3 | Status: SHIPPED | OUTPATIENT
Start: 2020-09-15 | End: 2021-08-04 | Stop reason: SDUPTHER

## 2020-10-12 ENCOUNTER — TELEPHONE (OUTPATIENT)
Dept: PAIN MEDICINE | Facility: CLINIC | Age: 70
End: 2020-10-12

## 2020-10-12 DIAGNOSIS — G89.4 CHRONIC PAIN SYNDROME: ICD-10-CM

## 2020-10-12 RX ORDER — OXYCODONE HYDROCHLORIDE AND ACETAMINOPHEN 5; 325 MG/1; MG/1
TABLET ORAL
Qty: 60 TABLET | Refills: 0 | Status: SHIPPED | OUTPATIENT
Start: 2020-10-12 | End: 2020-11-11 | Stop reason: SDUPTHER

## 2020-11-11 ENCOUNTER — TRANSCRIBE ORDERS (OUTPATIENT)
Dept: PAIN MEDICINE | Facility: CLINIC | Age: 70
End: 2020-11-11

## 2020-11-11 ENCOUNTER — OFFICE VISIT (OUTPATIENT)
Dept: PAIN MEDICINE | Facility: CLINIC | Age: 70
End: 2020-11-11
Payer: MEDICARE

## 2020-11-11 VITALS
TEMPERATURE: 98.1 F | DIASTOLIC BLOOD PRESSURE: 80 MMHG | RESPIRATION RATE: 16 BRPM | SYSTOLIC BLOOD PRESSURE: 130 MMHG | BODY MASS INDEX: 37.94 KG/M2 | WEIGHT: 265 LBS | HEART RATE: 64 BPM | HEIGHT: 70 IN

## 2020-11-11 DIAGNOSIS — G89.4 CHRONIC PAIN SYNDROME: Primary | ICD-10-CM

## 2020-11-11 DIAGNOSIS — G51.39 FACIAL SPASM: ICD-10-CM

## 2020-11-11 DIAGNOSIS — R51.9 FACIAL PAIN: ICD-10-CM

## 2020-11-11 DIAGNOSIS — F11.20 UNCOMPLICATED OPIOID DEPENDENCE (HCC): ICD-10-CM

## 2020-11-11 DIAGNOSIS — G50.0 TRIGEMINAL NEURALGIA OF RIGHT SIDE OF FACE: ICD-10-CM

## 2020-11-11 DIAGNOSIS — Z79.891 LONG-TERM CURRENT USE OF OPIATE ANALGESIC: ICD-10-CM

## 2020-11-11 PROCEDURE — 99214 OFFICE O/P EST MOD 30 MIN: CPT | Performed by: NURSE PRACTITIONER

## 2020-11-11 PROCEDURE — 80305 DRUG TEST PRSMV DIR OPT OBS: CPT | Performed by: NURSE PRACTITIONER

## 2020-11-11 RX ORDER — OXYCODONE HYDROCHLORIDE AND ACETAMINOPHEN 5; 325 MG/1; MG/1
TABLET ORAL
Qty: 60 TABLET | Refills: 0 | Status: SHIPPED | OUTPATIENT
Start: 2020-11-11 | End: 2020-11-11 | Stop reason: SDUPTHER

## 2020-11-11 RX ORDER — OXYCODONE HYDROCHLORIDE AND ACETAMINOPHEN 5; 325 MG/1; MG/1
TABLET ORAL
Qty: 60 TABLET | Refills: 0 | Status: SHIPPED | OUTPATIENT
Start: 2020-11-11 | End: 2021-01-06 | Stop reason: SDUPTHER

## 2020-11-11 RX ORDER — PREGABALIN 200 MG/1
CAPSULE ORAL
Qty: 450 CAPSULE | Refills: 0 | Status: SHIPPED | OUTPATIENT
Start: 2020-11-11 | End: 2021-01-25

## 2020-11-13 LAB

## 2021-01-06 ENCOUNTER — TRANSCRIBE ORDERS (OUTPATIENT)
Dept: PAIN MEDICINE | Facility: CLINIC | Age: 71
End: 2021-01-06

## 2021-01-06 ENCOUNTER — OFFICE VISIT (OUTPATIENT)
Dept: PAIN MEDICINE | Facility: CLINIC | Age: 71
End: 2021-01-06
Payer: MEDICARE

## 2021-01-06 VITALS
RESPIRATION RATE: 16 BRPM | SYSTOLIC BLOOD PRESSURE: 128 MMHG | DIASTOLIC BLOOD PRESSURE: 78 MMHG | WEIGHT: 264 LBS | BODY MASS INDEX: 37.8 KG/M2 | HEIGHT: 70 IN | HEART RATE: 80 BPM

## 2021-01-06 DIAGNOSIS — F11.20 UNCOMPLICATED OPIOID DEPENDENCE (HCC): ICD-10-CM

## 2021-01-06 DIAGNOSIS — G51.39 FACIAL SPASM: ICD-10-CM

## 2021-01-06 DIAGNOSIS — R51.9 FACIAL PAIN: ICD-10-CM

## 2021-01-06 DIAGNOSIS — G89.4 CHRONIC PAIN SYNDROME: Primary | ICD-10-CM

## 2021-01-06 DIAGNOSIS — Z79.891 LONG-TERM CURRENT USE OF OPIATE ANALGESIC: ICD-10-CM

## 2021-01-06 DIAGNOSIS — G50.0 TRIGEMINAL NEURALGIA: ICD-10-CM

## 2021-01-06 PROCEDURE — 99214 OFFICE O/P EST MOD 30 MIN: CPT | Performed by: NURSE PRACTITIONER

## 2021-01-06 PROCEDURE — 80305 DRUG TEST PRSMV DIR OPT OBS: CPT | Performed by: NURSE PRACTITIONER

## 2021-01-06 RX ORDER — OXYCODONE HYDROCHLORIDE AND ACETAMINOPHEN 5; 325 MG/1; MG/1
TABLET ORAL
Qty: 60 TABLET | Refills: 0 | Status: SHIPPED | OUTPATIENT
Start: 2021-01-06 | End: 2021-04-09 | Stop reason: SDUPTHER

## 2021-01-06 RX ORDER — OXYCODONE HYDROCHLORIDE AND ACETAMINOPHEN 5; 325 MG/1; MG/1
TABLET ORAL
Qty: 60 TABLET | Refills: 0 | Status: SHIPPED | OUTPATIENT
Start: 2021-01-06 | End: 2021-01-06 | Stop reason: SDUPTHER

## 2021-01-06 NOTE — PROGRESS NOTES
Assessment:  1  Chronic pain syndrome    2  Facial pain    3  Facial spasm    4  Trigeminal neuralgia    5  Uncomplicated opioid dependence ([address] )    6  Long-term current use of opiate analgesic        Plan:  The patient is a 79 y o  male last seen on 11/11/2020 who presents for a follow up office visit in regards to chronic pain syndrome secondary to facial pain, and trigeminal neuralgia  The patient continues to experience moderate to stable relief of his facial pain symptoms with his current pain medication regimen; therefore, I will continue his medication as prescribed  Two months of prescriptions for Percocet 5/325 mg by mouth 2-3 tabs daily as needed were electronically sent to the patient's pharmacy on file with do not fill dates of 01/07/2021, 02/05/2021  I also discussed with the patient taking breaks during his writing and choosing a time to write when he is well rested  The patient was agreeable and verbalized understanding  While the patient was in the office today an opioid contract was thoroughly reviewed and signed by the patient  The patient was given adequate time to ask questions in regards to the contract and a signed copy was sent home for his/her records  The patient also completed a BECKS depression inventory and SOAPP-R today as part of our yearly opioid management program     [place] Prescription Drug Monitoring Program report was reviewed and was appropriate     A urine drug screen was collected at today's office visit as part of our medication management protocol  The point of care testing results were appropriate for what was being prescribed  The specimen will be sent for confirmatory testing  The drug screen is medically necessary because the patient is either dependent on opioid medication or is being considered for opioid medication therapy and the results could impact ongoing or future treatment   The drug screen is to evaluate for the presences or absence of prescribed, non-prescribed, and/or illicit drugs/substances  There are risks associated with opioid medications, including dependence, addiction and tolerance  The patient understands and agrees to use these medications only as prescribed  Potential side effects of the medications include, but are not limited to, constipation, drowsiness, addiction, impaired judgment and risk of fatal overdose if not taken as prescribed  The patient was warned against driving while taking sedation medications  Sharing medications is a felony  At this point in time, the patient is showing no signs of addiction, abuse, diversion or suicidal ideation  The patient was selected for random pill counts at today's office visit  The medication was available for the count and the amount present was found to be appropriate according to the date(s) filled and the medication prescription instructions noted on the prescription container  The medication was returned to the patient  #36 tabs remaining from Rx dated 12/9/2020  The patient will follow-up in 8 weeks for medication prescription refill and reevaluation  The patient was advised to contact the office should their symptoms worsen in the interim  The patient was agreeable and verbalized an understanding  History of Present Illness: The patient is a 79 y o  male last seen on 11/11/2020 who presents for a follow up office visit in regards to chronic pain syndrome secondary to facial pain, and trigeminal neuralgia  The patient currently reports ongoing facial pain that is unchanged since last office visit  The patient describes his pain as constant, burning, sharp, pressure-like, shooting pain with numbness and pins and needles  The patient reports that he has been discouraged because he is unable to sit and write for prolonged periods of time due to the pain    He reports his pain is well controlled with his current pain medication regimen; however, he is frustrated that he has to rely on the medication in order to complete activities  The patient reports he has had rhizotomies in the past with minimal relief of his pain symptoms  The patient currently rates his pain as 7/10/ on the numeric rating scale  Current pain medications includes:  Percocet 5/325 mg by mouth 2-3 tabs daily as needed  The patient reports that this regimen is providing 70% pain relief  The patient is reporting no side effects from this pain medication regimen  Pain Contract Signed: 1/6/2021  Last Urine Drug Screen: 1/6/2021  Last Dose: 1//6/2021 8:30 AM  Last pill count: 1/6/2021    I have personally reviewed and/or updated the patient's past medical history, past surgical history, family history, social history, current medications, allergies, and vital signs today  Review of Systems:    Review of Systems   Constitutional: Negative for fever and unexpected weight change  HENT: Negative for trouble swallowing  Eyes: Negative for visual disturbance  Respiratory: Negative for shortness of breath and wheezing  Cardiovascular: Negative for chest pain and palpitations  Gastrointestinal: Negative for constipation, diarrhea, nausea and vomiting  Endocrine: Negative for cold intolerance, heat intolerance and polydipsia  Genitourinary: Negative for difficulty urinating and frequency  Musculoskeletal: Negative for arthralgias, gait problem, joint swelling and myalgias  Facial Pain   Skin: Negative for rash  Neurological: Negative for dizziness, seizures, syncope, weakness and headaches  Hematological: Does not bruise/bleed easily  Psychiatric/Behavioral: Negative for dysphoric mood  All other systems reviewed and are negative          Past Medical History:   Diagnosis Date    Arthritis     Right knee    Chronic pain disorder     Trigeminal neuralgia pain     Ulcer of bile duct        Past Surgical History:   Procedure Laterality Date    APPENDECTOMY  1962    BRAIN SURGERY  2005    Gamma Knife for Trigeminal Neuralgia    CATARACT EXTRACTION      ESOPHAGOGASTRODUODENOSCOPY      HERNIA REPAIR  1952    OTHER SURGICAL HISTORY      gamma knife RT in 2007 and 2010    [place] STEREO TREAT TRIGEMINAL NERVE Right 3/6/2018    Procedure: RHIZOTOMY TRIGEMINAL NERVES (V2 & V3);   Surgeon: Flavia Patton MD;  [facility];  Service: Neurosurgery    [place] TOTAL KNEE ARTHROPLASTY Right 10/22/2018    Procedure: ARTHROPLASTY KNEE TOTAL;  Surgeon: Shavonne Garcia MD;  [facility];  Service: Orthopedics    RHIZOTOMY W/ RADIOFREQUENCY ABLATION Right 08/08/2014    Stereotactic Ablation of Gasserian Ganglion Right sided trigeminal V2 radiofrequency rhizotomy x2       Family History   Problem Relation Age of Onset    Emphysema Mother     Colon cancer Father     Skin cancer Maternal Grandmother        Social History     Occupational History    Occupation: Retired     Comment: 21 years [employer] &    Tobacco Use    Smoking status: Never Smoker    Smokeless tobacco: Never Used   Substance and Sexual Activity    Alcohol use: No    Drug use: No    Sexual activity: Yes         Current Outpatient Medications:     Acetaminophen (TYLENOL PO), Take 2 tablets by mouth as needed , Disp: , Rfl:     ascorbic acid (VITAMIN C) 500 MG tablet, Take 1 tablet (500 mg total) by mouth daily, Disp: 60 tablet, Rfl: 0    Calcium 250 MG CAPS, Take by mouth, Disp: , Rfl:     cyanocobalamin (VITAMIN B-12) 1,000 mcg tablet, Take 1 tablet by mouth daily , Disp: , Rfl:     Ketoprofen 10 % CREA, Apply 1 application topically as needed , Disp: , Rfl: 2    MELATONIN PO, Take 10 mg by mouth daily at bedtime , Disp: , Rfl:     Multiple Vitamins-Minerals (PRESERVISION AREDS) TABS, Take 1 tablet by mouth daily, Disp: , Rfl:     ofloxacin (OCUFLOX) 0 3 % ophthalmic solution, INSTILL 1 DROP INTO RIGHT EYE 4 TIMES A DAY AS DIRECTED, START 3 DAYS PRIOR TO SURGERY, Disp: , Rfl: 0   oxyCODONE-acetaminophen (PERCOCET) 5-325 mg per tablet, Take 1 tablet by mouth 2-3 times daily as needed for pain  Do not fill until 2/5/2020 2nd month script, Disp: 60 tablet, Rfl: 0    prednisoLONE acetate (PRED FORTE) 1 % ophthalmic suspension, , Disp: , Rfl:     pregabalin (Lyrica) 200 MG capsule, Take 2 tabs in the am, 1 tab in the afternoon, 2 tabs at bedtime, Disp: 450 capsule, Rfl: 0    pyridoxine (VITAMIN B6) 100 mg tablet, Take 1 tablet by mouth daily , Disp: , Rfl:     topiramate (TOPAMAX) 100 mg tablet, TAKE 1 TABLET AT BEDTIME, Disp: 90 tablet, Rfl: 3    TURMERIC CURCUMIN PO, Take 1,500 mg by mouth once as needed , Disp: , Rfl:     Allergies   Allergen Reactions    Gabapentin Delirium    Oxcarbazepine Dizziness    Lamictal [Lamotrigine] Confusion     Difficulty to think clearly     Tramadol        Physical Exam:    /78   Pulse 80   Resp 16   Ht 5' 10" (1 778 m)   Wt 120 kg (264 lb)   BMI 37 88 kg/m²     Constitutional:normal, well developed, well nourished, alert, in no distress and non-toxic and no overt pain behavior   and overweight  Eyes:anicteric  HEENT:grossly intact  Neck:supple, symmetric, trachea midline and no masses   Pulmonary:even and unlabored  Cardiovascular:No edema or pitting edema present  Skin:Normal without rashes or lesions and well hydrated  Psychiatric:Mood and affect appropriate  Neurologic:Cranial Nerves II-XII grossly intact  Musculoskeletal:normal      Imaging  No orders to display         Orders Placed This Encounter   Procedures    MM ALL_Prescribed Meds and Special Instructions    MM DT_Alprazolam Definitive Test    MM DT_Amphetamine Definitive Test    MM DT_Buprenorphine Definitive Test    MM DT_Butalbital Definitive Test    MM DT_Clonazepam Definitive Test    MM DT_Cocaine Definitive Test    MM DT_Codeine Definitive Test    MM DT_Dextromethorphan Definitive Test    MM Diazepam Definitive Test    MM DT_Ethyl Glucuronide/Ethyl Sulfate Definitive Test    MM DT_Fentanyl Definitive Test    MM DT_Heroin Definitive Test    MM DT_Hydrocodone Definitive Test    MM DT_Hydromorphone Definitive Test    MM DT_Kratom Definitive Test    MM DT_Levorphanol Definitive Test    MM DT_MDMA Definitive Test    MM DT_Meperidine Definitive Test    MM DT_Methadone Definitive Test    MM DT_Methamphetamine Definitive Test    MM DT_Methylphenidate Definitive Test    MM DT_Morphine Definitive Test    MM Lorazepam Definitive Test    MM DT_Oxazepam Definitive Test    MM DT_Oxycodone Definitive Test    MM DT_Oxymorphone Definitive Test    MM DT_Phencyclidine Definitive Test    MM DT_Phenobarbital Definitive Test    MM DT_Phentermine Definitive Test    MM DT_Secobarbital Definitive Test    MM DT_Spice Definitive Test    MM DT_Tapentadol Definitive Test    MM DT_Temazapam Definitive Test    MM DT_THC Definitive Test    MM DT_Tramadol Definitive Test

## 2021-01-06 NOTE — PATIENT INSTRUCTIONS
You may  your prescriptions on 1/7/2021 and 2/5/2021    Opioid Dependence   WHAT YOU NEED TO KNOW:   What is opioid dependence? Opioids are medicines, such as morphine and codeine, used to treat pain  Dependence happens after you have used opioids regularly for a long period of time  Dependence means that your body gets used to how much medicine you take  Dependence is not the same as addiction  Addiction means that a person uses opioids to get high instead of using them to control pain  What are the signs and symptoms of opioid dependence? · You need more of the opioid to get the same amount of pain relief as you did when you first started taking it  · You have tried to use less opioid medicine but are not able to  · You have withdrawal symptoms when you take less of the opioid  What are the signs and symptoms of opioid withdrawal?  You may have the following signs and symptoms if you suddenly stop taking opioids or if you decrease the amount you normally take:  · Yawning     · Runny nose     · Nausea or vomiting     · Diarrhea     · Chills or goosebumps    · Muscle aches or cramps     · Anxiety  How is opioid dependence diagnosed? Your healthcare provider will do a physical exam  He will ask you questions about your symptoms and your use of opioids  He will also ask about your current and past use of other drugs and any family history of drug abuse  How is opioid dependence treated? You may be treated in a hospital or you may be treated as an outpatient  During detoxification (detox), healthcare providers will slowly decrease your dose of the opioid medicine you are dependent on  They may use another opioid medicine such as methadone to decrease symptoms of withdrawal  You may need to take this or another medicine for some time  Your healthcare provider will also replace the opioid with another pain medicine that is less likely to cause dependence   He may also suggest that you receive counseling and social support during treatment  What are the risks of opioid dependence? There is a risk of overdose during early treatment with methadone  You may become dependent on the medicines used to treat opioid dependence  Without treatment, you may develop other health problems or become addicted to opioids  Your risk of abusing alcohol and other drugs increases  You may also develop risky behaviors that can lead to an overdose, violence, and suicidal thoughts  When should I contact my healthcare provider? · Your speech is slurred  · You have difficulty staying awake  · You have nausea and vomiting  · You are easily upset or cry easily  · You have poor balance  · You have questions or concerns about your condition or care  When should I seek immediate care or call 911? · You feel lightheaded or faint  · You have a fast, slow, or irregular heartbeat  · You have a seizure  CARE AGREEMENT:   You have the right to help plan your care  Learn about your health condition and how it may be treated  Discuss treatment options with your caregivers to decide what care you want to receive  You always have the right to refuse treatment  The above information is an  only  It is not intended as medical advice for individual conditions or treatments  Talk to your doctor, nurse or pharmacist before following any medical regimen to see if it is safe and effective for you  © 2017 [employer] Information is for End User's use only and may not be sold, redistributed or otherwise used for commercial purposes  All illustrations and images included in CareNotes® are the copyrighted property of [employer]  or Nitesh Giraldo

## 2021-01-08 LAB

## 2021-01-13 ENCOUNTER — TRANSCRIBE ORDERS (OUTPATIENT)
Dept: PAIN MEDICINE | Facility: CLINIC | Age: 71
End: 2021-01-13

## 2021-01-22 DIAGNOSIS — G50.0 TRIGEMINAL NEURALGIA OF RIGHT SIDE OF FACE: ICD-10-CM

## 2021-01-25 RX ORDER — PREGABALIN 200 MG/1
CAPSULE ORAL
Qty: 450 CAPSULE | Refills: 0 | Status: SHIPPED | OUTPATIENT
Start: 2021-01-25 | End: 2021-05-07 | Stop reason: SDUPTHER

## 2021-02-13 DIAGNOSIS — Z23 ENCOUNTER FOR IMMUNIZATION: ICD-10-CM

## 2021-03-03 ENCOUNTER — IMMUNIZATIONS (OUTPATIENT)
Dept: FAMILY MEDICINE CLINIC | Facility: HOSPITAL | Age: 71
End: 2021-03-03

## 2021-03-03 DIAGNOSIS — Z23 ENCOUNTER FOR IMMUNIZATION: Primary | ICD-10-CM

## 2021-03-03 PROCEDURE — 0001A SARS-COV-2 / COVID-19 MRNA VACCINE (PFIZER-BIONTECH) 30 MCG: CPT

## 2021-03-03 PROCEDURE — 91300 SARS-COV-2 / COVID-19 MRNA VACCINE (PFIZER-BIONTECH) 30 MCG: CPT

## 2021-03-24 ENCOUNTER — IMMUNIZATIONS (OUTPATIENT)
Dept: FAMILY MEDICINE CLINIC | Facility: HOSPITAL | Age: 71
End: 2021-03-24

## 2021-03-24 DIAGNOSIS — Z23 ENCOUNTER FOR IMMUNIZATION: Primary | ICD-10-CM

## 2021-03-24 PROCEDURE — 91300 SARS-COV-2 / COVID-19 MRNA VACCINE (PFIZER-BIONTECH) 30 MCG: CPT

## 2021-03-24 PROCEDURE — 0002A SARS-COV-2 / COVID-19 MRNA VACCINE (PFIZER-BIONTECH) 30 MCG: CPT

## 2021-03-29 ENCOUNTER — TELEPHONE (OUTPATIENT)
Dept: PAIN MEDICINE | Facility: CLINIC | Age: 71
End: 2021-03-29

## 2021-03-29 NOTE — TELEPHONE ENCOUNTER
RN s/w pt and explained ov needed for med RF as it is a controlled substance  Last ov was 1/7/21  Pt flet his remaining 8 pills would prob last him about 2 wks  NM had no avail appts this week and is off 4/5  [place]'s first avail was 4/9 at 3 pm  Pt will make his pills last until then

## 2021-03-29 NOTE — TELEPHONE ENCOUNTER
Pt contacted Call Center requested refill of their medication  Medication Name: Percocet      Dosage of Med: 5 - 325 mg      Frequency of Med: Take 1 tablet by mouth 2-3 times daily as needed for pain  Remaining Medication: 8      Pharmacy and Location: [facility] on file         Pt  Preferred Callback Phone Number: [phone] or [phone]      Thank you

## 2021-04-06 RX ORDER — ASPIRIN 325 MG
325 TABLET ORAL
COMMUNITY
End: 2021-08-30

## 2021-04-09 ENCOUNTER — OFFICE VISIT (OUTPATIENT)
Dept: PAIN MEDICINE | Facility: CLINIC | Age: 71
End: 2021-04-09
Payer: MEDICARE

## 2021-04-09 VITALS — SYSTOLIC BLOOD PRESSURE: 118 MMHG | DIASTOLIC BLOOD PRESSURE: 73 MMHG | HEART RATE: 60 BPM

## 2021-04-09 DIAGNOSIS — R51.9 FACIAL PAIN: ICD-10-CM

## 2021-04-09 DIAGNOSIS — F11.20 UNCOMPLICATED OPIOID DEPENDENCE (HCC): ICD-10-CM

## 2021-04-09 DIAGNOSIS — G51.39 FACIAL SPASM: ICD-10-CM

## 2021-04-09 DIAGNOSIS — Z79.891 LONG-TERM CURRENT USE OF OPIATE ANALGESIC: ICD-10-CM

## 2021-04-09 DIAGNOSIS — G89.4 CHRONIC PAIN SYNDROME: Primary | ICD-10-CM

## 2021-04-09 PROCEDURE — 99214 OFFICE O/P EST MOD 30 MIN: CPT | Performed by: NURSE PRACTITIONER

## 2021-04-09 RX ORDER — OXYCODONE HYDROCHLORIDE AND ACETAMINOPHEN 5; 325 MG/1; MG/1
TABLET ORAL
Qty: 60 TABLET | Refills: 0 | Status: SHIPPED | OUTPATIENT
Start: 2021-04-09 | End: 2021-05-07 | Stop reason: SDUPTHER

## 2021-04-09 NOTE — PROGRESS NOTES
Assessment:  1  Chronic pain syndrome    2  Facial pain    3  Facial spasm    4  Long-term current use of opiate analgesic    5  Uncomplicated opioid dependence ([facility] [address] )        Plan:  The patient is a 79 y o  male last seen on 1/6/2021 who presents for a follow up office visit in regards to chronic pain secondary to facial pain, and trigeminal neuralgia    I discussed with the patient being re-evaluated by a neurosurgeon for his trigeminal pain symptoms, if his pain symptoms comes continue to increase  I also discussed with the patient using his medication when his pain symptoms first occurred her cell that his pain does not get to a point where it becomes distracting  I also discussed with the patient ways to better deal with the stress, and encouraged the patient to find activities that may alleviate his pain symptoms such as getting outside for fresh air, and walking  The patient will continue with this current pain medication regimen as prescribed  A prescription for Percocet 5/325 mg by mouth every,  To 3 tabs daily were electronically sent to the patient's pharmacy on file  [place] Prescription Drug Monitoring Program report was reviewed and was appropriate     There are risks associated with opioid medications, including dependence, addiction and tolerance  The patient understands and agrees to use these medications only as prescribed  Potential side effects of the medications include, but are not limited to, constipation, drowsiness, addiction, impaired judgment and risk of fatal overdose if not taken as prescribed  The patient was warned against driving while taking sedation medications  Sharing medications is a felony  At this point in time, the patient is showing no signs of addiction, abuse, diversion or suicidal ideation  I discussed with the patient evaluation with a neurosurgeon if his pain symptoms continue to increase, and we will revisit this option at the next office visit      The patient will follow-up in 4 weeks for medication prescription refill and reevaluation  The patient was advised to contact the office should their symptoms worsen in the interim  The patient was agreeable and verbalized an understanding  History of Present Illness: The patient is a 79 y o  male last seen on 1/6/2021 who presents for a follow up office visit in regards to chronic pain secondary to facial pain, and trigeminal neuralgia  The patient currently reports  ongoing facial pain that is worse since last office visit  The patient describes his pain as constant, burning, dull aching, sharp, pressure-like, shooting pain with numbness and pins and needles  The patient reports his pain has become more distracting  He wakes up with facial pain above his right eyebrow and under his right eye,  and increased pain with mastication behind his right ear  The patient believes his increased pain symptoms may be related to stress  The patient reports a history of gamma re-treatment is and was not a me for trigeminal neuralgia in the past; however, the patient reports he has not been evaluated by a neurosurgeon recently  The patient currently rates his pain as 8/10 on the numeric rating scale  Current pain medications include Percocet 5/325 mg by mouth every 2-3 hours  The patient reports he has not been taking his pain medication as frequently as revealed by a pill count with a remaining 1 pill from his prescription dated 02/05/2021  The patient reports this pain medication regimen is providing moderate relief of his pain symptoms with no noted side effects  Pain Contract Signed: 1/6/21  Last Urine Drug Screen: 1/6/21  Last dose of Oxycodone was @ 4/8/21 @ 10:00am    I have personally reviewed and/or updated the patient's past medical history, past surgical history, family history, social history, current medications, allergies, and vital signs today         Review of Systems:    Review of Systems Respiratory: Negative for shortness of breath  Cardiovascular: Negative for chest pain  Gastrointestinal: Negative for constipation, diarrhea, nausea and vomiting  Musculoskeletal: Negative for arthralgias, gait problem, joint swelling and myalgias  Skin: Negative for rash  Neurological: Negative for dizziness, seizures and weakness  All other systems reviewed and are negative  Past Medical History:   Diagnosis Date    Arthritis     Right knee    Chronic pain disorder     Trigeminal neuralgia pain     Ulcer of bile duct        Past Surgical History:   Procedure Laterality Date    APPENDECTOMY  1962    BRAIN SURGERY  2005    Gamma Knife for Trigeminal Neuralgia    CATARACT EXTRACTION      ESOPHAGOGASTRODUODENOSCOPY      HERNIA REPAIR  1952    OTHER SURGICAL HISTORY      gamma knife RT in 2007 and 2010    [place] STEREO TREAT TRIGEMINAL NERVE Right 3/6/2018    Procedure: RHIZOTOMY TRIGEMINAL NERVES (V2 & V3);   Surgeon: Natan Irwin MD;  Location: [facility];  Service: Neurosurgery    NJ TOTAL KNEE ARTHROPLASTY Right 10/22/2018    Procedure: ARTHROPLASTY KNEE TOTAL;  Surgeon: Rhonda Palomo MD;  Location: [facility];  Service: Orthopedics    RHIZOTOMY W/ RADIOFREQUENCY ABLATION Right 08/08/2014    Stereotactic Ablation of Gasserian Ganglion Right sided trigeminal V2 radiofrequency rhizotomy x2       Family History   Problem Relation Age of Onset    Emphysema Mother     Colon cancer Father     Skin cancer Maternal Grandmother        Social History     Occupational History    Occupation: Retired     Comment: 21 years [employer] &    Tobacco Use    Smoking status: Never Smoker    Smokeless tobacco: Never Used   Substance and Sexual Activity    Alcohol use: No    Drug use: No    Sexual activity: Yes         Current Outpatient Medications:     Acetaminophen (TYLENOL PO), Take 2 tablets by mouth as needed , Disp: , Rfl:     ascorbic acid (VITAMIN C) 500 MG tablet, Take 1 tablet (500 mg total) by mouth daily, Disp: 60 tablet, Rfl: 0    aspirin 325 mg tablet, Take 325 mg by mouth, Disp: , Rfl:     Calcium 250 MG CAPS, Take by mouth, Disp: , Rfl:     cyanocobalamin (VITAMIN B-12) 1,000 mcg tablet, Take 1 tablet by mouth daily , Disp: , Rfl:     Ketoprofen 10 % CREA, Apply 1 application topically as needed , Disp: , Rfl: 2    MELATONIN PO, Take 10 mg by mouth daily at bedtime , Disp: , Rfl:     oxyCODONE-acetaminophen (PERCOCET) 5-325 mg per tablet, Take 1 tablet by mouth 2-3 times daily as needed for pain , Disp: 60 tablet, Rfl: 0    pregabalin (Lyrica) 200 MG capsule, TAKE 2 CAPSULES IN THE MORNING, 1 CAPSULE IN THE AFTERNOON AND 2 CAPSULES AT BEDTIME, Disp: 450 capsule, Rfl: 0    pyridoxine (VITAMIN B6) 100 mg tablet, Take 1 tablet by mouth daily , Disp: , Rfl:     topiramate (TOPAMAX) 100 mg tablet, TAKE 1 TABLET AT BEDTIME, Disp: 90 tablet, Rfl: 3    TURMERIC CURCUMIN PO, Take 1,500 mg by mouth once as needed , Disp: , Rfl:     Allergies   Allergen Reactions    Gabapentin Delirium    Oxcarbazepine Dizziness    Lamictal [Lamotrigine] Confusion     Difficulty to think clearly     Tramadol        Physical Exam:    /73   Pulse 60     Constitutional:normal, well developed, well nourished, alert, in no distress and non-toxic and no overt pain behavior  and overweight  Eyes:anicteric  HEENT:grossly intact  Neck:supple, symmetric, trachea midline and no masses   Pulmonary:even and unlabored  Cardiovascular:No edema or pitting edema present  Skin:Normal without rashes or lesions and well hydrated  Psychiatric:Mood and affect appropriate  Neurologic:Cranial Nerves II-XII grossly intact  Musculoskeletal:normal      Imaging  No orders to display         No orders of the defined types were placed in this encounter

## 2021-04-09 NOTE — PATIENT INSTRUCTIONS
Opioid Dependence   WHAT YOU NEED TO KNOW:   What is opioid dependence? Opioids are medicines, such as morphine and codeine, used to treat pain  Dependence happens after you have used opioids regularly for a long period of time  Dependence means that your body gets used to how much medicine you take  Dependence is not the same as addiction  Addiction means that a person uses opioids to get high instead of using them to control pain  What are the signs and symptoms of opioid dependence? · You need more of the opioid to get the same amount of pain relief as you did when you first started taking it  · You have tried to use less opioid medicine but are not able to  · You have withdrawal symptoms when you take less of the opioid  What are the signs and symptoms of opioid withdrawal?  You may have the following signs and symptoms if you suddenly stop taking opioids or if you decrease the amount you normally take:  · Yawning     · Runny nose     · Nausea or vomiting     · Diarrhea     · Chills or goosebumps    · Muscle aches or cramps     · Anxiety  How is opioid dependence diagnosed? Your healthcare provider will do a physical exam  He will ask you questions about your symptoms and your use of opioids  He will also ask about your current and past use of other drugs and any family history of drug abuse  How is opioid dependence treated? You may be treated in a hospital or you may be treated as an outpatient  During detoxification (detox), healthcare providers will slowly decrease your dose of the opioid medicine you are dependent on  They may use another opioid medicine such as methadone to decrease symptoms of withdrawal  You may need to take this or another medicine for some time  Your healthcare provider will also replace the opioid with another pain medicine that is less likely to cause dependence  He may also suggest that you receive counseling and social support during treatment     What are the risks of opioid dependence? There is a risk of overdose during early treatment with methadone  You may become dependent on the medicines used to treat opioid dependence  Without treatment, you may develop other health problems or become addicted to opioids  Your risk of abusing alcohol and other drugs increases  You may also develop risky behaviors that can lead to an overdose, violence, and suicidal thoughts  When should I contact my healthcare provider? · Your speech is slurred  · You have difficulty staying awake  · You have nausea and vomiting  · You are easily upset or cry easily  · You have poor balance  · You have questions or concerns about your condition or care  When should I seek immediate care or call 911? · You feel lightheaded or faint  · You have a fast, slow, or irregular heartbeat  · You have a seizure  CARE AGREEMENT:   You have the right to help plan your care  Learn about your health condition and how it may be treated  Discuss treatment options with your caregivers to decide what care you want to receive  You always have the right to refuse treatment  The above information is an  only  It is not intended as medical advice for individual conditions or treatments  Talk to your doctor, nurse or pharmacist before following any medical regimen to see if it is safe and effective for you  © 2017 [employer]  Information is for End User's use only and may not be sold, redistributed or otherwise used for commercial purposes  All illustrations and images included in CareNotes® are the copyrighted property of [employer]  or [employer]h Giraldo

## 2021-04-13 ENCOUNTER — TRANSCRIBE ORDERS (OUTPATIENT)
Dept: PAIN MEDICINE | Facility: CLINIC | Age: 71
End: 2021-04-13

## 2021-04-30 ENCOUNTER — TELEPHONE (OUTPATIENT)
Dept: PAIN MEDICINE | Facility: CLINIC | Age: 71
End: 2021-04-30

## 2021-04-30 NOTE — TELEPHONE ENCOUNTER
Lm on patients vm to call and reschedule appointment for [facility], as she is out of the office this day

## 2021-05-07 ENCOUNTER — OFFICE VISIT (OUTPATIENT)
Dept: PAIN MEDICINE | Facility: CLINIC | Age: 71
End: 2021-05-07
Payer: MEDICARE

## 2021-05-07 VITALS
BODY MASS INDEX: 38.37 KG/M2 | SYSTOLIC BLOOD PRESSURE: 111 MMHG | HEART RATE: 49 BPM | HEIGHT: 70 IN | RESPIRATION RATE: 16 BRPM | WEIGHT: 268 LBS | DIASTOLIC BLOOD PRESSURE: 68 MMHG

## 2021-05-07 DIAGNOSIS — G50.0 TRIGEMINAL NEURALGIA OF RIGHT SIDE OF FACE: ICD-10-CM

## 2021-05-07 DIAGNOSIS — R51.9 FACIAL PAIN: ICD-10-CM

## 2021-05-07 DIAGNOSIS — F11.20 OPIOID TYPE DEPENDENCE, CONTINUOUS (HCC): ICD-10-CM

## 2021-05-07 DIAGNOSIS — Z79.891 LONG-TERM CURRENT USE OF OPIATE ANALGESIC: ICD-10-CM

## 2021-05-07 DIAGNOSIS — G89.4 CHRONIC PAIN SYNDROME: Primary | ICD-10-CM

## 2021-05-07 DIAGNOSIS — G51.39 FACIAL SPASM: ICD-10-CM

## 2021-05-07 PROCEDURE — 80305 DRUG TEST PRSMV DIR OPT OBS: CPT | Performed by: NURSE PRACTITIONER

## 2021-05-07 PROCEDURE — 99214 OFFICE O/P EST MOD 30 MIN: CPT | Performed by: NURSE PRACTITIONER

## 2021-05-07 RX ORDER — OXYCODONE HYDROCHLORIDE AND ACETAMINOPHEN 5; 325 MG/1; MG/1
TABLET ORAL
Qty: 60 TABLET | Refills: 0 | Status: SHIPPED | OUTPATIENT
Start: 2021-05-07 | End: 2021-07-23

## 2021-05-07 RX ORDER — OXYCODONE HYDROCHLORIDE AND ACETAMINOPHEN 5; 325 MG/1; MG/1
TABLET ORAL
Qty: 60 TABLET | Refills: 0 | Status: SHIPPED | OUTPATIENT
Start: 2021-05-07 | End: 2021-05-07 | Stop reason: SDUPTHER

## 2021-05-07 RX ORDER — PREGABALIN 200 MG/1
CAPSULE ORAL
Qty: 450 CAPSULE | Refills: 1 | Status: SHIPPED | OUTPATIENT
Start: 2021-05-07 | End: 2021-06-04

## 2021-05-07 NOTE — PATIENT INSTRUCTIONS
You may  your prescriptions today and on 6/5/2021    Opioid Dependence   WHAT YOU NEED TO KNOW:   What is opioid dependence? Opioids are medicines, such as morphine and codeine, used to treat pain  Dependence happens after you have used opioids regularly for a long period of time  Dependence means that your body gets used to how much medicine you take  Dependence is not the same as addiction  Addiction means that a person uses opioids to get high instead of using them to control pain  What are the signs and symptoms of opioid dependence? · You need more of the opioid to get the same amount of pain relief as you did when you first started taking it  · You have tried to use less opioid medicine but are not able to  · You have withdrawal symptoms when you take less of the opioid  What are the signs and symptoms of opioid withdrawal?  You may have the following signs and symptoms if you suddenly stop taking opioids or if you decrease the amount you normally take:  · Yawning     · Runny nose     · Nausea or vomiting     · Diarrhea     · Chills or goosebumps    · Muscle aches or cramps     · Anxiety  How is opioid dependence diagnosed? Your healthcare provider will do a physical exam  He will ask you questions about your symptoms and your use of opioids  He will also ask about your current and past use of other drugs and any family history of drug abuse  How is opioid dependence treated? You may be treated in a hospital or you may be treated as an outpatient  During detoxification (detox), healthcare providers will slowly decrease your dose of the opioid medicine you are dependent on  They may use another opioid medicine such as methadone to decrease symptoms of withdrawal  You may need to take this or another medicine for some time  Your healthcare provider will also replace the opioid with another pain medicine that is less likely to cause dependence   He may also suggest that you receive counseling and social support during treatment  What are the risks of opioid dependence? There is a risk of overdose during early treatment with methadone  You may become dependent on the medicines used to treat opioid dependence  Without treatment, you may develop other health problems or become addicted to opioids  Your risk of abusing alcohol and other drugs increases  You may also develop risky behaviors that can lead to an overdose, violence, and suicidal thoughts  When should I contact my healthcare provider? · Your speech is slurred  · You have difficulty staying awake  · You have nausea and vomiting  · You are easily upset or cry easily  · You have poor balance  · You have questions or concerns about your condition or care  When should I seek immediate care or call 911? · You feel lightheaded or faint  · You have a fast, slow, or irregular heartbeat  · You have a seizure  CARE AGREEMENT:   You have the right to help plan your care  Learn about your health condition and how it may be treated  Discuss treatment options with your caregivers to decide what care you want to receive  You always have the right to refuse treatment  The above information is an  only  It is not intended as medical advice for individual conditions or treatments  Talk to your doctor, nurse or pharmacist before following any medical regimen to see if it is safe and effective for you  © 2017 [employer] Information is for End User's use only and may not be sold, redistributed or otherwise used for commercial purposes  All illustrations and images included in CareNotes® are the copyrighted property of [employer]  or Nitesh Giraldo

## 2021-05-07 NOTE — PROGRESS NOTES
Assessment:  1  Chronic pain syndrome    2  Facial pain    3  Facial spasm    4  Trigeminal neuralgia of right side of face    5  Opioid type dependence, continuous ([address] )    6  Long-term current use of opiate analgesic        Plan:  The patient is a 79 y o  male last seen on 4/9/2021 who presents for a follow up office visit in regards to chronic pain secondary to facial pain, and trigeminal neuralgia  The patient reports he continues with constant facial pain; however he has been able to find activities that he enjoys such as pain in time with his grand kid is that have helped to minimize the pain  The patient reports he generally weights until the pain is severe before taking his pain medication  I discussed with the patient  taking his pain medication when the pain is moderate for better pain control instead of waiting until the pain is unbearable  The patient was agreeable and verbalized understanding  The patient continues to use the Percocet 5/325 mg by mouth appropriately with #11 tabs remaining from his last prescription filled on 04/09/2021  Two months prescription for Percocet 5/325 mg by mouth, 2-3 tabs daily as needed were electronically sent to the patient's pharmacy on file  The patient may  his prescription today with a 2nd prescription with a do not fill date of 06/05/2021  A prescription for Lyrica was also electronically sent to the patient's pharmacy on file  [place] Prescription Drug Monitoring Program report was reviewed and was appropriate     A urine drug screen was collected at today's office visit as part of our medication management protocol  The point of care testing results were appropriate for what was being prescribed  The specimen will be sent for confirmatory testing   The drug screen is medically necessary because the patient is either dependent on opioid medication or is being considered for opioid medication therapy and the results could impact ongoing or future treatment  The drug screen is to evaluate for the presences or absence of prescribed, non-prescribed, and/or illicit drugs/substances  There are risks associated with opioid medications, including dependence, addiction and tolerance  The patient understands and agrees to use these medications only as prescribed  Potential side effects of the medications include, but are not limited to, constipation, drowsiness, addiction, impaired judgment and risk of fatal overdose if not taken as prescribed  The patient was warned against driving while taking sedation medications  Sharing medications is a felony  At this point in time, the patient is showing no signs of addiction, abuse, diversion or suicidal ideation  The patient will follow-up in 8 weeks for medication prescription refill and reevaluation  The patient was advised to contact the office should their symptoms worsen in the interim  The patient was agreeable and verbalized an understanding  History of Present Illness: The patient is a 79 y o  male last seen on 4/9/2021 who presents for a follow up office visit in regards to chronic pain secondary to facial pain, and trigeminal neuralgia  The patient currently reports ongoing facial pain that is unchanged since her last office visit  The patient describes his pain as constant, dull aching, sharp, throbbing, pressure-like, shooting pain with numbness and pins and needles  At this time, the patient continues to decline a referral to Neurosurgery for his trigeminal pain symptoms  The patient reports his pain is unchanged since her last visit; however, he has been able to enjoy time with his grandkids despite the pain  The patient currently rates his pain as 6/10 on numeric rating scale  Current pain medications include: Percocet 5/325 mg by mouth 2-3 tabs daily as needed, Lyrica 200 mg, 2 caps in the morning, 1 cap in afternoon, and 2 caps at bedtime    The patient reports this pain medication regimen provides 50-60% relief of his pain symptoms with no noted side effects  Pain Contract Signed: 1/6/21  Last Urine Drug Screen: 5/7/21  Last Dose: 5/6/21 @ 11:30 PM    I have personally reviewed and/or updated the patient's past medical history, past surgical history, family history, social history, current medications, allergies, and vital signs today  Review of Systems:    Review of Systems   Respiratory: Negative for shortness of breath  Cardiovascular: Negative for chest pain  Gastrointestinal: Negative for constipation, diarrhea, nausea and vomiting  Musculoskeletal: Positive for gait problem  Negative for arthralgias, joint swelling and myalgias  Facial Pain   Skin: Negative for rash  Neurological: Negative for dizziness, seizures and weakness  All other systems reviewed and are negative  Past Medical History:   Diagnosis Date    Arthritis     Right knee    Chronic pain disorder     Trigeminal neuralgia pain     Ulcer of bile duct        Past Surgical History:   Procedure Laterality Date    APPENDECTOMY  1962    BRAIN SURGERY  2005    Gamma Knife for Trigeminal Neuralgia    CATARACT EXTRACTION      ESOPHAGOGASTRODUODENOSCOPY      HERNIA REPAIR  1952    OTHER SURGICAL HISTORY      gamma knife RT in 2007 and 2010    [place] STEREO TREAT TRIGEMINAL NERVE Right 3/6/2018    Procedure: RHIZOTOMY TRIGEMINAL NERVES (V2 & V3);   Surgeon: Silvina Pedraza MD;  [facility];  Service: Neurosurgery    [place] TOTAL KNEE ARTHROPLASTY Right 10/22/2018    Procedure: ARTHROPLASTY KNEE TOTAL;  Surgeon: Fouzia Wills MD;  Location:  MAIN OR;  Service: Orthopedics    RHIZOTOMY W/ RADIOFREQUENCY ABLATION Right 08/08/2014    Stereotactic Ablation of Gasserian Ganglion Right sided trigeminal V2 radiofrequency rhizotomy x2       Family History   Problem Relation Age of Onset    Emphysema Mother     Colon cancer Father     Skin cancer Maternal Grandmother Social History     Occupational History    Occupation: Retired     Comment: 20 years [employer] &    Tobacco Use    Smoking status: Never Smoker    Smokeless tobacco: Never Used   Substance and Sexual Activity    Alcohol use: No    Drug use: No    Sexual activity: Yes         Current Outpatient Medications:     Acetaminophen (TYLENOL PO), Take 2 tablets by mouth as needed , Disp: , Rfl:     ascorbic acid (VITAMIN C) 500 MG tablet, Take 1 tablet (500 mg total) by mouth daily, Disp: 60 tablet, Rfl: 0    aspirin 325 mg tablet, Take 325 mg by mouth, Disp: , Rfl:     Calcium 250 MG CAPS, Take by mouth, Disp: , Rfl:     cyanocobalamin (VITAMIN B-12) 1,000 mcg tablet, Take 1 tablet by mouth daily , Disp: , Rfl:     Ketoprofen 10 % CREA, Apply 1 application topically as needed , Disp: , Rfl: 2    MELATONIN PO, Take 10 mg by mouth daily at bedtime , Disp: , Rfl:     oxyCODONE-acetaminophen (PERCOCET) 5-325 mg per tablet, Take 1 tablet by mouth 2-3 times daily as needed for pain  Do not fill until 6/5/2021 2nd month script, Disp: 60 tablet, Rfl: 0    pregabalin (Lyrica) 200 MG capsule, TAKE 2 CAPSULES IN THE MORNING, 1 CAPSULE IN THE AFTERNOON AND 2 CAPSULES AT BEDTIME, Disp: 450 capsule, Rfl: 1    pyridoxine (VITAMIN B6) 100 mg tablet, Take 1 tablet by mouth daily , Disp: , Rfl:     topiramate (TOPAMAX) 100 mg tablet, TAKE 1 TABLET AT BEDTIME, Disp: 90 tablet, Rfl: 3    TURMERIC CURCUMIN PO, Take 1,500 mg by mouth once as needed , Disp: , Rfl:     Allergies   Allergen Reactions    Gabapentin Delirium    Oxcarbazepine Dizziness    Lamictal [Lamotrigine] Confusion     Difficulty to think clearly     Tramadol        Physical Exam:    /68   Pulse (!) 49   Resp 16   Ht 5' 10" (1 778 m)   Wt 122 kg (268 lb)   BMI 38 45 kg/m²     Constitutional:normal, well developed, well nourished, alert, in no distress and non-toxic and no overt pain behavior   and overweight  Eyes:anicteric  HEENT:grossly intact  Neck:supple, symmetric, trachea midline and no masses   Pulmonary:even and unlabored  Cardiovascular:No edema or pitting edema present  Skin:Normal without rashes or lesions and well hydrated  Psychiatric:Mood and affect appropriate  Neurologic:Cranial Nerves II-XII grossly intact  Musculoskeletal:normal      Imaging  No orders to display         Orders Placed This Encounter   Procedures    MM ALL_Prescribed Meds and Special Instructions    MM DT_Alprazolam Definitive Test    MM DT_Amphetamine Definitive Test    MM DT_Buprenorphine Definitive Test    MM DT_Butalbital Definitive Test    MM DT_Clonazepam Definitive Test    MM DT_Cocaine Definitive Test    MM DT_Codeine Definitive Test    MM DT_Dextromethorphan Definitive Test    MM Diazepam Definitive Test    MM DT_Ethyl Glucuronide/Ethyl Sulfate Definitive Test    MM DT_Fentanyl Definitive Test    MM DT_Heroin Definitive Test    MM DT_Hydrocodone Definitive Test    MM DT_Hydromorphone Definitive Test    MM DT_Kratom Definitive Test    MM DT_Levorphanol Definitive Test    MM DT_MDMA Definitive Test    MM DT_Meperidine Definitive Test    MM DT_Methadone Definitive Test    MM DT_Methylphenidate Definitive Test    MM DT_Methamphetamine Definitive Test    MM DT_Morphine Definitive Test    MM Lorazepam Definitive Test    MM DT_Oxazepam Definitive Test    MM DT_Oxycodone Definitive Test    MM DT_Oxymorphone Definitive Test    MM DT_Phencyclidine Definitive Test    MM DT_Phenobarbital Definitive Test    MM DT_Phentermine Definitive Test    MM DT_Secobarbital Definitive Test    MM DT_Spice Definitive Test    MM DT_Tapentadol Definitive Test    MM DT_Temazapam Definitive Test    MM DT_THC Definitive Test    MM DT_Tramadol Definitive Test

## 2021-05-13 LAB

## 2021-05-17 ENCOUNTER — TELEPHONE (OUTPATIENT)
Dept: PAIN MEDICINE | Facility: CLINIC | Age: 71
End: 2021-05-17

## 2021-05-17 NOTE — TELEPHONE ENCOUNTER
Please let the patient know his UDS showed THC  Please ask the patient if he is using any CBD products that may contain THC  If so, please let the patient know he will need to discontinue the use while using opioid medications  Thanks!

## 2021-05-18 NOTE — TELEPHONE ENCOUNTER
Per communication consent, s/w pt's wife Lencho Rendon  Made aware of David's notation  Lencho Rendon states pt does use CBD oil for irritability secondary to pain, but she will advise him to discontinue  Asked if [facility] had any other recommendations for products he could use instead? Thank you

## 2021-05-18 NOTE — TELEPHONE ENCOUNTER
He may use OTC lidocaine cream or voltaren cream  He may also use CBD products that contain no THC  Please advise the patient that products that contain even 0 3% THC are detectible, so it must contain no CBD  Thanks!

## 2021-05-18 NOTE — TELEPHONE ENCOUNTER
Per communication consent, s/w pt's wife Marleni Part  Made aware of David's notation  verbalized understanding

## 2021-06-03 DIAGNOSIS — G50.0 TRIGEMINAL NEURALGIA OF RIGHT SIDE OF FACE: ICD-10-CM

## 2021-06-04 RX ORDER — PREGABALIN 200 MG/1
CAPSULE ORAL
Qty: 450 CAPSULE | Refills: 0 | Status: SHIPPED | OUTPATIENT
Start: 2021-06-04 | End: 2021-09-16

## 2021-06-12 ENCOUNTER — OFFICE VISIT (OUTPATIENT)
Dept: FAMILY MEDICINE CLINIC | Facility: CLINIC | Age: 71
End: 2021-06-12
Payer: MEDICARE

## 2021-06-12 ENCOUNTER — APPOINTMENT (OUTPATIENT)
Dept: LAB | Facility: CLINIC | Age: 71
End: 2021-06-12
Payer: MEDICARE

## 2021-06-12 VITALS
HEIGHT: 70 IN | BODY MASS INDEX: 36.76 KG/M2 | DIASTOLIC BLOOD PRESSURE: 70 MMHG | RESPIRATION RATE: 15 BRPM | SYSTOLIC BLOOD PRESSURE: 110 MMHG | TEMPERATURE: 97.6 F | OXYGEN SATURATION: 97 % | WEIGHT: 256.8 LBS | HEART RATE: 83 BPM

## 2021-06-12 DIAGNOSIS — L98.9 SKIN LESIONS: ICD-10-CM

## 2021-06-12 DIAGNOSIS — F39 MOOD DISORDER (HCC): ICD-10-CM

## 2021-06-12 DIAGNOSIS — G50.0 TRIGEMINAL NEURALGIA: ICD-10-CM

## 2021-06-12 DIAGNOSIS — R53.83 FATIGUE, UNSPECIFIED TYPE: ICD-10-CM

## 2021-06-12 DIAGNOSIS — R35.1 NOCTURIA: ICD-10-CM

## 2021-06-12 DIAGNOSIS — G50.0 TRIGEMINAL NEURALGIA: Primary | ICD-10-CM

## 2021-06-12 PROBLEM — R51.9 FACIAL PAIN: Status: RESOLVED | Noted: 2020-01-08 | Resolved: 2021-06-12

## 2021-06-12 PROBLEM — Z01.818 PRE-OP EXAMINATION: Status: RESOLVED | Noted: 2018-10-03 | Resolved: 2021-06-12

## 2021-06-12 PROBLEM — Z01.818 PREOPERATIVE EVALUATION OF A MEDICAL CONDITION TO RULE OUT SURGICAL CONTRAINDICATIONS (TAR REQUIRED): Status: RESOLVED | Noted: 2018-02-22 | Resolved: 2021-06-12

## 2021-06-12 LAB
ALBUMIN SERPL BCP-MCNC: 3.6 G/DL (ref 3.5–5)
ALP SERPL-CCNC: 71 U/L (ref 46–116)
ALT SERPL W P-5'-P-CCNC: 19 U/L (ref 12–78)
ANION GAP SERPL CALCULATED.3IONS-SCNC: 1 MMOL/L (ref 4–13)
AST SERPL W P-5'-P-CCNC: 10 U/L (ref 5–45)
BILIRUB SERPL-MCNC: 0.52 MG/DL (ref 0.2–1)
BUN SERPL-MCNC: 10 MG/DL (ref 5–25)
CALCIUM SERPL-MCNC: 10.1 MG/DL (ref 8.3–10.1)
CHLORIDE SERPL-SCNC: 114 MMOL/L (ref 100–108)
CO2 SERPL-SCNC: 27 MMOL/L (ref 21–32)
CREAT SERPL-MCNC: 1.07 MG/DL (ref 0.6–1.3)
ERYTHROCYTE [DISTWIDTH] IN BLOOD BY AUTOMATED COUNT: 14.1 % (ref 11.6–15.1)
GFR SERPL CREATININE-BSD FRML MDRD: 70 ML/MIN/1.73SQ M
GLUCOSE SERPL-MCNC: 92 MG/DL (ref 65–140)
HCT VFR BLD AUTO: 45.6 % (ref 36.5–49.3)
HGB BLD-MCNC: 14.5 G/DL (ref 12–17)
MCH RBC QN AUTO: 30.3 PG (ref 26.8–34.3)
MCHC RBC AUTO-ENTMCNC: 31.8 G/DL (ref 31.4–37.4)
MCV RBC AUTO: 95 FL (ref 82–98)
PLATELET # BLD AUTO: 191 THOUSANDS/UL (ref 149–390)
PMV BLD AUTO: 10.2 FL (ref 8.9–12.7)
POTASSIUM SERPL-SCNC: 4.1 MMOL/L (ref 3.5–5.3)
PROT SERPL-MCNC: 6.4 G/DL (ref 6.4–8.2)
RBC # BLD AUTO: 4.78 MILLION/UL (ref 3.88–5.62)
SODIUM SERPL-SCNC: 142 MMOL/L (ref 136–145)
TSH SERPL DL<=0.05 MIU/L-ACNC: 1.83 UIU/ML (ref 0.36–3.74)
WBC # BLD AUTO: 7.88 THOUSAND/UL (ref 4.31–10.16)

## 2021-06-12 PROCEDURE — G0439 PPPS, SUBSEQ VISIT: HCPCS | Performed by: FAMILY MEDICINE

## 2021-06-12 PROCEDURE — 85027 COMPLETE CBC AUTOMATED: CPT

## 2021-06-12 PROCEDURE — 80053 COMPREHEN METABOLIC PANEL: CPT

## 2021-06-12 PROCEDURE — 36415 COLL VENOUS BLD VENIPUNCTURE: CPT

## 2021-06-12 PROCEDURE — 1123F ACP DISCUSS/DSCN MKR DOCD: CPT | Performed by: FAMILY MEDICINE

## 2021-06-12 PROCEDURE — 99214 OFFICE O/P EST MOD 30 MIN: CPT | Performed by: FAMILY MEDICINE

## 2021-06-12 PROCEDURE — 84443 ASSAY THYROID STIM HORMONE: CPT

## 2021-06-12 NOTE — PROGRESS NOTES
Assessment and Plan:     Problem List Items Addressed This Visit     None           Preventive health issues were discussed with patient, and age appropriate screening tests were ordered as noted in patient's After Visit Summary  Personalized health advice and appropriate referrals for health education or preventive services given if needed, as noted in patient's After Visit Summary  History of Present Illness:     Patient presents for Medicare Annual Wellness visit    Patient Care Team:  Bennett Guillaume MD as PCP - General (Family Medicine)  MD Moshe Perea MD Keenan Alias, MD Kyle Chandler, MD Charlena Counter, MD (Ophthalmology)  Arianne Ashby DDS     Problem List:     Patient Active Problem List   Diagnosis    Trigeminal neuralgia    Facial spasm    Anemia    Bilateral patellofemoral syndrome    Diverticulosis of colon    Esophageal reflux    Intracranial meningioma ([address] )    Vertigo    Vitamin D deficiency    Need for pneumococcal vaccination    Preoperative evaluation of a medical condition to rule out surgical contraindications (TAR required)    Insomnia    Primary osteoarthritis of right knee    Pre-op examination    DNS (deviated nasal septum)    Facial pain    Chronic pain syndrome    Uncomplicated opioid dependence ([address] )    Long-term current use of opiate analgesic      Past Medical and Surgical History:     Past Medical History:   Diagnosis Date    Arthritis     Right knee    Chronic pain disorder     Trigeminal neuralgia pain     Ulcer of bile duct      Past Surgical History:   Procedure Laterality Date    APPENDECTOMY  1962    BRAIN SURGERY  2005    Gamma Knife for Trigeminal Neuralgia    CATARACT EXTRACTION      ESOPHAGOGASTRODUODENOSCOPY      HERNIA REPAIR  1952    OTHER SURGICAL HISTORY      gamma knife RT in 2007 and 2010    [place] STEREO TREAT TRIGEMINAL NERVE Right 3/6/2018    Procedure: RHIZOTOMY TRIGEMINAL NERVES (V2 & V3);   Surgeon: Lala Agrawal MD;  Location:  MAIN OR;  Service: Neurosurgery    [place] TOTAL KNEE ARTHROPLASTY Right 10/22/2018    Procedure: ARTHROPLASTY KNEE TOTAL;  Surgeon: Dian Holcomb MD;  [facility];  Service: Orthopedics    RHIZOTOMY W/ RADIOFREQUENCY ABLATION Right 08/08/2014    Stereotactic Ablation of Gasserian Ganglion Right sided trigeminal V2 radiofrequency rhizotomy x2      Family History:     Family History   Problem Relation Age of Onset    Emphysema Mother     Colon cancer Father     Skin cancer Maternal Grandmother       Social History:     E-Cigarette/Vaping    E-Cigarette Use Never User      E-Cigarette/Vaping Substances    Nicotine No     THC No     CBD No     Flavoring No      Social History     Socioeconomic History    Marital status: /Civil Union     Spouse name:  Sandi Schulz Number of children: 2    Years of education: BS Degree plus addl classes    Highest education level: None   Occupational History    Occupation: Retired     Comment: 20 years [employer]   Social Needs    Financial resource strain: None    Food insecurity     Worry: None     Inability: None    Transportation needs     Medical: None     Non-medical: None   Tobacco Use    Smoking status: Never Smoker    Smokeless tobacco: Never Used   Substance and Sexual Activity    Alcohol use: Yes     Comment: occ    Drug use: No    Sexual activity: Yes   Lifestyle    Physical activity     Days per week: None     Minutes per session: None    Stress: None   Relationships    Social connections     Talks on phone: None     Gets together: None     Attends [employer] service: None     Active member of club or organization: None     Attends meetings of clubs or organizations: None     Relationship status: None    Intimate partner violence     Fear of current or ex partner: None     Emotionally abused: None     Physically abused: None     Forced sexual activity: None   Other Topics Concern    None   Social History Narrative    Caffeine use: Daily caffeinated cola, drinks coffee    Lives at home with wife Fartun Ventura      Medications and Allergies:     Current Outpatient Medications   Medication Sig Dispense Refill    Acetaminophen (TYLENOL PO) Take 2 tablets by mouth as needed       ascorbic acid (VITAMIN C) 500 MG tablet Take 1 tablet (500 mg total) by mouth daily 60 tablet 0    aspirin 325 mg tablet Take 325 mg by mouth      Calcium 250 MG CAPS Take by mouth      cyanocobalamin (VITAMIN B-12) 1,000 mcg tablet Take 1 tablet by mouth daily       Ketoprofen 10 % CREA Apply 1 application topically as needed   2    MELATONIN PO Take 10 mg by mouth daily at bedtime       oxyCODONE-acetaminophen (PERCOCET) 5-325 mg per tablet Take 1 tablet by mouth 2-3 times daily as needed for pain  Do not fill until 6/5/2021 2nd month script 60 tablet 0    pregabalin (LYRICA) 200 MG capsule TAKE 2 CAPSULES IN THE MORNING, 1 CAPSULE IN THE AFTERNOON AND 2 CAPSULES AT BEDTIME 450 capsule 0    topiramate (TOPAMAX) 100 mg tablet TAKE 1 TABLET AT BEDTIME 90 tablet 3    TURMERIC CURCUMIN PO Take 1,500 mg by mouth once as needed       pyridoxine (VITAMIN B6) 100 mg tablet Take 1 tablet by mouth daily        No current facility-administered medications for this visit        Allergies   Allergen Reactions    Gabapentin Delirium    Oxcarbazepine Dizziness    Lamictal [Lamotrigine] Confusion     Difficulty to think clearly     Tramadol       Immunizations:     Immunization History   Administered Date(s) Administered    INFLUENZA 11/07/2018, 09/18/2020    Influenza Split High Dose Preservative Free IM 11/07/2018, 09/27/2019    Pneumococcal Conjugate 13-Valent 02/22/2018, 02/04/2021    Pneumococcal Polysaccharide PPV23 02/10/2017    SARS-CoV-2 / COVID-19 mRNA IM (Pfizer-BioNTech) 03/03/2021, 03/24/2021      Health Maintenance:         Topic Date Due    Hepatitis C Screening  Never done    Colorectal Cancer Screening  08/15/2027 Topic Date Due    DTaP,Tdap,and Td Vaccines (1 - Tdap) Never done      Medicare Health Risk Assessment:     /70   Pulse 83   Temp 97 6 °F (36 4 °C) (Temporal)   Resp 15   Ht 5' 10" (1 778 m)   Wt 116 kg (256 lb 12 8 oz)   SpO2 97%   BMI 36 85 kg/m²      Kaylyn Buitrago is here for his Subsequent Wellness visit  Health Risk Assessment:   Patient rates overall health as fair  Patient feels that their physical health rating is slightly worse  Patient is satisfied with their life  Eyesight was rated as same  Hearing was rated as slightly worse  Patient feels that their emotional and mental health rating is same  Patients states they are never, rarely angry  Patient states they are sometimes unusually tired/fatigued  Pain experienced in the last 7 days has been a lot  Patient's pain rating has been 10/10  Patient states that he has experienced no weight loss or gain in last 6 months  Depression Screening:   PHQ-2 Score: 1      Fall Risk Screening: In the past year, patient has experienced: no history of falling in past year      Home Safety:  Patient does not have trouble with stairs inside or outside of their home  Patient has working smoke alarms and has working carbon monoxide detector  Home safety hazards include: none  Nutrition:   Current diet is Regular  Medications:   Patient is currently taking over-the-counter supplements  OTC medications include: see medication list  Patient is able to manage medications  Activities of Daily Living (ADLs)/Instrumental Activities of Daily Living (IADLs):   Walk and transfer into and out of bed and chair?: Yes  Dress and groom yourself?: Yes    Bathe or shower yourself?: Yes    Feed yourself?  Yes  Do your laundry/housekeeping?: Yes  Manage your money, pay your bills and track your expenses?: Yes  Make your own meals?: Yes    Do your own shopping?: Yes    Previous Hospitalizations:   Any hospitalizations or ED visits within the last 12 months?: No Advance Care Planning:   Living will: Yes    Advanced directive: Yes      PREVENTIVE SCREENINGS      Cardiovascular Screening:    General: Screening Current      Diabetes Screening:     General: Screening Current      Colorectal Cancer Screening:     General: Screening Current      Prostate Cancer Screening:    General: Risks and Benefits Discussed    Due for: PSA      Abdominal Aortic Aneurysm (AAA) Screening:    Risk factors include: age between 73-69 yo        Lung Cancer Screening:     General: Screening Not Indicated    Screening, Brief Intervention, and Referral to Treatment (SBIRT)    Screening  Typical number of drinks in a day: 0    Single Item Drug Screening:  How often have you used an illegal drug (including marijuana) or a prescription medication for non-medical reasons in the past year? never    Single Item Drug Screen Score: 0  Interpretation: Negative screen for possible drug use disorder    Review of Current Opioid Use    Opioid Risk Tool (ORT) Interpretation: Complete Opioid Risk Tool (ORT)      Sergei Sheehan MD

## 2021-06-12 NOTE — PROGRESS NOTES
FAMILY PRACTICE OFFICE VISIT       NAME: Luis Cole  AGE: 79 y o  SEX: male       : 1950        MRN: [id number]    DATE: 2021  TIME: 1:58 PM    Assessment and Plan     Problem List Items Addressed This Visit        Nervous and Auditory    Trigeminal neuralgia - Primary       Trigeminal neuralgia  Patient continues to see pain management office for continued monitoring of this condition         Relevant Orders    CBC (Completed)    Comprehensive metabolic panel (Completed)    TSH, 3rd generation (Completed)       Musculoskeletal and Integument    Skin lesions      Skin lesions  Patient with various moles and seborrheic keratosis ease  He was given prescription referral to see Dr Elijah Scanlon for dermatology consultation  He was also recommended to use over-the-counter Eucerin cream twice daily to the affected area of arms were he experiences dryness  Other    Mood disorder (HCC)      Mood disorder  Patient was given referral to [address] behavioral health for consultation regarding his anhedonia related to chronic pain syndrome  Relevant Orders    Ambulatory referral to [facility] [address]      Other Visit Diagnoses     Fatigue, unspecified type        Relevant Orders    CBC (Completed)    Comprehensive metabolic panel (Completed)    TSH, 3rd generation (Completed)    Nocturia              BMI Counseling: Body mass index is 36 85 kg/m²  The BMI is above normal  Nutrition recommendations include decreasing portion sizes and moderation in carbohydrate intake  Exercise recommendations include exercising 3-5 times per week  Chief Complaint     Chief Complaint   Patient presents with    Medicare Wellness Visit     AWV and BMI       History of Present Illness       Patient in the office to review chronic medical condition  He is up-to-date with COVID vaccination  Patient describes having chronic nerve pain from trigeminal neuralgia    He does see pain management but feels depressed in irritable 2 to chronic pain level which becomes exacerbated at times  Becomes more agitated and withdrawn due to his symptoms  Patient is willing to meet with Behavioral Health Department to assist with his feelings  He does not obtain a regular form of exercise since most days he has a certain level of pain  Review of Systems   Review of Systems   Constitutional: Negative  HENT: Negative  Eyes: Negative  Respiratory: Negative  Cardiovascular: Negative  Gastrointestinal: Negative  Endocrine: Negative  Genitourinary: Negative  Musculoskeletal: Negative  Skin:        Patient with some questions regarding some new skin lesions on his forearm   Allergic/Immunologic: Negative  Neurological: Positive for headaches  As per HPI   Hematological: Negative  Psychiatric/Behavioral: Positive for agitation, decreased concentration, dysphoric mood and sleep disturbance  Negative for self-injury and suicidal ideas         Active Problem List     Patient Active Problem List   Diagnosis    Trigeminal neuralgia    Facial spasm    Anemia    Bilateral patellofemoral syndrome    Diverticulosis of colon    Esophageal reflux    Intracranial meningioma (HCC)    Vertigo    Vitamin D deficiency    Need for pneumococcal vaccination    Insomnia    Primary osteoarthritis of right knee    DNS (deviated nasal septum)    Chronic pain syndrome    Uncomplicated opioid dependence ([address] )    Long-term current use of opiate analgesic    Mood disorder ([address] )    Skin lesions       Past Medical History:  Past Medical History:   Diagnosis Date    Arthritis     Right knee    Chronic pain disorder     Trigeminal neuralgia pain     Ulcer of bile duct        Past Surgical History:  Past Surgical History:   Procedure Laterality Date    APPENDECTOMY  1962    BRAIN SURGERY  2005    Gamma Knife for Trigeminal Neuralgia    CATARACT EXTRACTION      ESOPHAGOGASTRODUODENOSCOPY [address]    OTHER SURGICAL HISTORY      gamma knife RT in 2007 and 2010    ME STEREO TREAT TRIGEMINAL NERVE Right 3/6/2018    Procedure: RHIZOTOMY TRIGEMINAL NERVES (V2 & V3); Surgeon: Quinn Hatchet, MD;  Location: [facility];  Service: Neurosurgery    ME TOTAL KNEE ARTHROPLASTY Right 10/22/2018    Procedure: ARTHROPLASTY KNEE TOTAL;  Surgeon: Yuridia Armstrong MD;  Location: [facility];  Service: Orthopedics    RHIZOTOMY W/ RADIOFREQUENCY ABLATION Right 08/08/2014    Stereotactic Ablation of Gasserian Ganglion Right sided trigeminal V2 radiofrequency rhizotomy x2       Family History:  Family History   Problem Relation Age of Onset    Emphysema Mother     Colon cancer Father     Skin cancer Maternal Grandmother        Social History:  Social History     Socioeconomic History    Marital status: /Civil Union     Spouse name: Yvette Ross Number of children: 2    Years of education: BS Degree plus addl classes    Highest education level: Not on file   Occupational History    Occupation: Retired     Comment: 20 years [address] Use    Smoking status: Never Smoker    Smokeless tobacco: Never Used   Vaping Use    Vaping Use: Never used   Substance and Sexual Activity    Alcohol use: Yes     Comment: occ    Drug use: No    Sexual activity: Yes   Other Topics Concern    Not on file   Social History Narrative    Caffeine use: Daily caffeinated cola, drinks coffee    Lives at home with wife [facility]     Social Determinants of Health     Financial Resource Strain:     Difficulty of Paying Living Expenses:    Food Insecurity:     Worried About Running Out of Food in the Last Year:     [address] in the Last Year:    Transportation Needs:     Lack of Transportation (Medical):      Lack of Transportation (Non-Medical):    Physical Activity:     Days of Exercise per Week:     Minutes of Exercise per Session:    Stress:     Feeling of Stress :    Social Connections:  Frequency of Communication with Friends and Family:     Frequency of Social Gatherings with Friends and Family:     Attends Church Services:     Active Member of Clubs or Organizations:     Attends Club or Organization Meetings:     Marital Status:    Intimate Partner Violence:     Fear of Current or Ex-Partner:     Emotionally Abused:     Physically Abused:     Sexually Abused:        Objective     Vitals:    06/12/21 0932   BP: 110/70   Pulse: 83   Resp: 15   Temp: 97 6 °F (36 4 °C)   SpO2: 97%     Wt Readings from Last 3 Encounters:   06/12/21 116 kg (256 lb 12 8 oz)   05/07/21 122 kg (268 lb)   01/06/21 120 kg (264 lb)       Physical Exam  Constitutional:       General: He is not in acute distress  Appearance: Normal appearance  He is not ill-appearing  HENT:      Head: Normocephalic and atraumatic  Right Ear: Tympanic membrane, ear canal and external ear normal  There is no impacted cerumen  Left Ear: Tympanic membrane, ear canal and external ear normal  There is no impacted cerumen  Eyes:      General:         Right eye: No discharge  Left eye: No discharge  Conjunctiva/sclera: Conjunctivae normal       Pupils: Pupils are equal, round, and reactive to light  Neck:      Vascular: No carotid bruit  Cardiovascular:      Rate and Rhythm: Normal rate and regular rhythm  Heart sounds: Normal heart sounds  No murmur  Pulmonary:      Effort: Pulmonary effort is normal       Breath sounds: Normal breath sounds  No wheezing, rhonchi or rales  Abdominal:      General: Abdomen is flat  Bowel sounds are normal  There is no distension  Palpations: Abdomen is soft  Tenderness: There is no abdominal tenderness  There is no guarding or rebound  Musculoskeletal:      Right lower leg: No edema  Left lower leg: No edema  Lymphadenopathy:      Cervical: No cervical adenopathy  Skin:     Findings: No rash        Comments: Patient with dry brownish papule on right elbow area the that appears to be seborrheic keratoses  He also has very is moles that are uniform brown color of varying sizes on forearms in abdomen area  He also has dry flaky skin on forearms   Neurological:      General: No focal deficit present  Mental Status: He is alert and oriented to person, place, and time  Cranial Nerves: No cranial nerve deficit  Psychiatric:         Mood and Affect: Mood normal          Behavior: Behavior normal          Thought Content:  Thought content normal          Judgment: Judgment normal          Pertinent Laboratory/Diagnostic Studies:  Lab Results   Component Value Date    GLUCOSE 78 12/19/2015    BUN 10 06/12/2021    CREATININE 1 07 06/12/2021    CALCIUM 10 1 06/12/2021     12/19/2015    K 4 1 06/12/2021    CO2 27 06/12/2021     (H) 06/12/2021     Lab Results   Component Value Date    ALT 19 06/12/2021    AST 10 06/12/2021    ALKPHOS 71 06/12/2021    BILITOT 0 45 06/24/2015       Lab Results   Component Value Date    WBC 7 88 06/12/2021    HGB 14 5 06/12/2021    HCT 45 6 06/12/2021    MCV 95 06/12/2021     06/12/2021       No results found for: TSH    Lab Results   Component Value Date    CHOL 185 06/24/2015     Lab Results   Component Value Date    TRIG 108 12/18/2019     Lab Results   Component Value Date    HDL 44 12/18/2019     Lab Results   Component Value Date    LDLCALC 106 (H) 12/18/2019     Lab Results   Component Value Date    HGBA1C 5 1 09/26/2018       Results for orders placed or performed in visit on 05/07/21   MM ALL_Prescribed Meds and Special Instructions   Result Value Ref Range    RESULT ALL_PRESC MEDS SP INSTRNS Not Applicable    MM DT_Alprazolam Definitive Test   Result Value Ref Range    Alpha-Hydroxyalprazolam Quantification UR negative 20 ng/mL   MM DT_Amphetamine Definitive Test   Result Value Ref Range    Amphetamine Quantification negative 100 ng/mL   MM DT_Buprenorphine Definitive Test   Result Value Ref Range    Buprenorphine Quantification negative 5 ng/mL    Norbuprenorphine Quantification negative 20 ng/mL   MM DT_Butalbital Definitive Test   Result Value Ref Range    Butalbital Quantification negative 200 ng/mL   MM DT_Clonazepam Definitive Test   Result Value Ref Range    7-Amino-Clonazepam Quantification UR negative 20 ng/mL   MM DT_Cocaine Definitive Test   Result Value Ref Range    Cocaine metabolite Quantification negative 50 ng/mL   MM DT_Codeine Definitive Test   Result Value Ref Range    Codeine Quantification negative 50 ng/mL    Morphine Quantification negative 50 ng/mL    Hydrocodone Quantification negative 50 ng/mL    Norhydrocodone Quantification negative 50 ng/mL    Hydromorphone Quantification negative 50 ng/mL   MM DT_Dextromethorphan Definitive Test   Result Value Ref Range    Dextromethorphan Quantification negative 50 ng/mL    Dextrorphan (Dextromethorphan metabolite) Quant negative 50 ng/mL   MM Diazepam Definitive Test   Result Value Ref Range    Nordiazepam Quantification negative 40 ng/mL    Temazepam Quantification negative 50 ng/mL    Oxazepam Quantification negative 40 ng/mL   MM DT_Ethyl Glucuronide/Ethyl Sulfate Definitive Test   Result Value Ref Range    Ethyl Glucuronide Quantification negative 500 ng/mL    Ethyl Sulfate Quantification negative 500 ng/mL   MM DT_Fentanyl Definitive Test   Result Value Ref Range    Fentanyl Quantification negative 1 ng/mL    Norfentanyl Quantification negative 8 ng/mL    3-methyl-fentanyl Quantification Fen Neg 2 ng/mL    4-ANPP Quantification Fen Neg 2 ng/mL    4-FiBF Quantification Fen Neg 2 ng/mL    Acetyl fentanyl Quantification Fen Neg 2 ng/mL    Acetyl norfentanyl Quantification Fen Neg 5 ng/mL    Acryl fentanyl Quantification Fen Neg 1 ng/mL    Butryl fentanyl Quantification Fen Neg 1 ng/mL    Carfentanil Quantification Fen Neg 2 ng/mL    Cyclopropyl fentanyl Quantification Fen Neg 1 ng/mL    Furanyl fentanyl Quantification Fen Neg 2 ng/mL    Methoxyacetyl fentanyl Quantification Fen Neg 2 ng/mL    [id number] Quantification Fen Neg 2 ng/mL    N-desmethyl [id number] Quantification Fen Neg 2 ng/mL   MM DT_Heroin Definitive Test   Result Value Ref Range    6-CRISTIAN (Heroin metabolite) Quantification UR negative 10 ng/mL   MM DT_Hydrocodone Definitive Test   Result Value Ref Range    Hydrocodone Quantification negative 50 ng/mL    Norhydrocodone Quantification negative 50 ng/mL    Hydromorphone Quantification negative 50 ng/mL   MM DT_Hydromorphone Definitive Test   Result Value Ref Range    Hydromorphone Quantification negative 50 ng/mL   MM DT_Kratom Definitive Test   Result Value Ref Range    Mitragynine (Kratom alkaloid) Quantification negative 1 ng/mL    7-LK-Vnheaozwsbu (Kratom alkaloid) Quantification negative 1 ng/mL   MM DT_Levorphanol Definitive Test   Result Value Ref Range    Dextrorphan (Dextromethorphan metabolite) Quant negative 50 ng/mL   MM DT_MDMA Definitive Test   Result Value Ref Range    MDMA Quantification negative 100 ng/mL   MM DT_Meperidine Definitive Test   Result Value Ref Range    Meperidine Quantification negative 50 ng/mL    Normeperidine Quantification negative 50 ng/mL   MM DT_Methadone Definitive Test   Result Value Ref Range    Methadone Quantification negative 100 ng/mL    EDDP (Methadone metabolite) Quantification negative 100 ng/mL   MM DT_Methylphenidate Definitive Test   Result Value Ref Range    Methylphenidate Quantification negative 50 ng/mL    RITALINIC ACID QUANTIFICATION negative 50 ng/mL   MM DT_Methamphetamine Definitive Test   Result Value Ref Range    Amphetamine Quantification negative 100 ng/mL    Methamphetamine Quantification negative 100 ng/mL   MM DT_Morphine Definitive Test   Result Value Ref Range    Morphine Quantification negative 50 ng/mL    Hydromorphone Quantification negative 50 ng/mL   MM Lorazepam Definitive Test   Result Value Ref Range    Lorazepam Quantification negative 40 ng/mL   MM DT_Oxazepam Definitive Test   Result Value Ref Range    Oxazepam Quantification negative 40 ng/mL   MM DT_Oxycodone Definitive Test   Result Value Ref Range    Oxycodone Quantification positive-353  613 50 ng/mL    Noroxycodone Quantification positive-900 960 50 ng/mL    Oxymorphone Quantification positive-1269 748 50 ng/mL   MM DT_Oxymorphone Definitive Test   Result Value Ref Range    Oxymorphone Quantification positive-1269 748 50 ng/mL   MM DT_Phencyclidine Definitive Test   Result Value Ref Range    Phencyclidine Quantification negative 10 ng/mL   MM DT_Phenobarbital Definitive Test   Result Value Ref Range    Phenobarbital Quantification negative 200 ng/mL   MM DT_Phentermine Definitive Test   Result Value Ref Range    PHENTERMINE QUANTIFICATION negative 50 ng/mL   MM DT_Secobarbital Definitive Test   Result Value Ref Range    Secobarbital Quantification negative 200 ng/mL   MM DT_Spice Definitive Test   Result Value Ref Range    5F-ADB-M7 negative 10 ng/mL    PN-DLPZBPTE-R0 METABOLITE QUANTIFICATION  negative 10 ng/mL    YDQA-[id number]-G7 METABOLITE QUANTIFICATION negative 10 ng/mL    NHP531 metabolite Quantification negative 10 ng/mL    CNR570 metabolite Quantification negative 10 ng/mL    RCS4 METABOLITE QUANTIFICATION negative 10 ng/mL    XLR11/ METABOLITE QUANTIFICATION negative 10 ng/mL   MM DT_Tapentadol Definitive Test   Result Value Ref Range    Tapentadol Quantification negative 50 ng/mL   MM DT_Temazapam Definitive Test   Result Value Ref Range    Temazepam Quantification negative 50 ng/mL    Oxazepam Quantification negative 40 ng/mL   MM DT_THC Definitive Test   Result Value Ref Range    cTHC (Marijuana metabolite) Quantification positive-23 970-I (A) 15 ng/mL   MM DT_Tramadol Definitive Test   Result Value Ref Range    Tramadol Quantification negative 100 ng/mL    O-desmethyl-tramadol Quantification negative 100 ng/mL    N-DESMETHYL-TRAMADOL QUANTIFICATION negative 100 ng/mL       Orders Placed This Encounter   Procedures    CBC    Comprehensive metabolic panel    TSH, 3rd generation    Ambulatory referral to [address]:  Allergies   Allergen Reactions    Gabapentin Delirium    Oxcarbazepine Dizziness    Lamictal [Lamotrigine] Confusion     Difficulty to think clearly     Tramadol        Current Medications     Current Outpatient Medications   Medication Sig Dispense Refill    Acetaminophen (TYLENOL PO) Take 2 tablets by mouth as needed       ascorbic acid (VITAMIN C) 500 MG tablet Take 1 tablet (500 mg total) by mouth daily 60 tablet 0    aspirin 325 mg tablet Take 325 mg by mouth      Calcium 250 MG CAPS Take by mouth      cyanocobalamin (VITAMIN B-12) 1,000 mcg tablet Take 1 tablet by mouth daily       Ketoprofen 10 % CREA Apply 1 application topically as needed   2    MELATONIN PO Take 10 mg by mouth daily at bedtime       oxyCODONE-acetaminophen (PERCOCET) 5-325 mg per tablet Take 1 tablet by mouth 2-3 times daily as needed for pain  Do not fill until 6/5/2021 2nd month script 60 tablet 0    pregabalin (LYRICA) 200 MG capsule TAKE 2 CAPSULES IN THE MORNING, 1 CAPSULE IN THE AFTERNOON AND 2 CAPSULES AT BEDTIME 450 capsule 0    topiramate (TOPAMAX) 100 mg tablet TAKE 1 TABLET AT BEDTIME 90 tablet 3    TURMERIC CURCUMIN PO Take 1,500 mg by mouth once as needed       pyridoxine (VITAMIN B6) 100 mg tablet Take 1 tablet by mouth daily        No current facility-administered medications for this visit           Health Maintenance     Health Maintenance   Topic Date Due    Hepatitis C Screening  Never done    SLP PLAN OF CARE  Never done    DTaP,Tdap,and Td Vaccines (1 - Tdap) Never done   [facility] Annual Wellness Visit (AWV)  11/21/2020    Fall Risk  06/12/2022    Depression Screening PHQ  06/12/2022    BMI: Followup Plan  06/12/2022    BMI: Adult  06/12/2022    Colorectal Cancer Screening  08/15/2027    Pneumococcal Vaccine: 65+ Years  Completed    Influenza Vaccine  Completed    COVID-19 Vaccine  Completed    HIB Vaccine  Aged Out    Hepatitis B Vaccine  Aged Out    IPV Vaccine  Aged Out    Hepatitis A Vaccine  Aged Out    Meningococcal ACWY Vaccine  Aged Out    HPV Vaccine  Aged Out     Immunization History   Administered Date(s) Administered    INFLUENZA 11/07/2018, 09/18/2020    Influenza Split High Dose Preservative Free IM 11/07/2018, 09/27/2019    Pneumococcal Conjugate 13-Valent 02/22/2018, 02/04/2021    Pneumococcal Polysaccharide PPV23 02/10/2017    SARS-CoV-2 / COVID-19 mRNA IM (Pfizer-BioNTech) 03/03/2021, 42/98/1062       Marleny Dye MD      I spent 25 minutes with this patient which greater than 50% was spent counseling

## 2021-06-12 NOTE — ASSESSMENT & PLAN NOTE
Mood disorder  Patient was given referral to [address] behavioral health for consultation regarding his anhedonia related to chronic pain syndrome

## 2021-06-12 NOTE — ASSESSMENT & PLAN NOTE
Trigeminal neuralgia    Patient continues to see pain management office for continued monitoring of this condition

## 2021-06-12 NOTE — PATIENT INSTRUCTIONS
Medicare Preventive Visit Patient Instructions  Thank you for completing your Welcome to Medicare Visit or Medicare Annual Wellness Visit today  Your next wellness visit will be due in one year (6/13/2022)  The screening/preventive services that you may require over the next 5-10 years are detailed below  Some tests may not apply to you based off risk factors and/or age  Screening tests ordered at today's visit but not completed yet may show as past due  Also, please note that scanned in results may not display below  Preventive Screenings:  Service Recommendations Previous Testing/Comments   Colorectal Cancer Screening  · Colonoscopy    · Fecal Occult Blood Test (FOBT)/Fecal Immunochemical Test (FIT)  · Fecal DNA/Cologuard Test  · Flexible Sigmoidoscopy Age: 54-65 years old   Colonoscopy: every 10 years (May be performed more frequently if at higher risk)  OR  FOBT/FIT: every 1 year  OR  Cologuard: every 3 years  OR  Sigmoidoscopy: every 5 years  Screening may be recommended earlier than age 48 if at higher risk for colorectal cancer  Also, an individualized decision between you and your healthcare provider will decide whether screening between the ages of 74-80 would be appropriate  Colonoscopy: 08/15/2017  FOBT/FIT: Not on file  Cologuard: Not on file  Sigmoidoscopy: Not on file    Screening Current     Prostate Cancer Screening Individualized decision between patient and health care provider in men between ages of 53-78   Medicare will cover every 12 months beginning on the day after your 50th birthday PSA: 0 9 ng/mL           Hepatitis C Screening Once for adults born between 1945 and 1965  More frequently in patients at high risk for Hepatitis C Hep C Antibody: Not on file        Diabetes Screening 1-2 times per year if you're at risk for diabetes or have pre-diabetes Fasting glucose: 100 mg/dL   A1C: 5 1 %        Cholesterol Screening Once every 5 years if you don't have a lipid disorder   May order more often based on risk factors  Lipid panel: 12/18/2019    Screening Current      Other Preventive Screenings Covered by Medicare:  1  Abdominal Aortic Aneurysm (AAA) Screening: covered once if your at risk  You're considered to be at risk if you have a family history of AAA or a male between the age of 73-68 who smoking at least 100 cigarettes in your lifetime  2  Lung Cancer Screening: covers low dose CT scan once per year if you meet all of the following conditions: (1) Age 50-69; (2) No signs or symptoms of lung cancer; (3) Current smoker or have quit smoking within the last 15 years; (4) You have a tobacco smoking history of at least 30 pack years (packs per day x number of years you smoked); (5) You get a written order from a healthcare provider  3  Glaucoma Screening: covered annually if you're considered high risk: (1) You have diabetes OR (2) Family history of glaucoma OR (3)  aged 48 and older OR (3)  American aged 72 and older  3  Osteoporosis Screening: covered every 2 years if you meet one of the following conditions: (1) Have a vertebral abnormality; (2) On glucocorticoid therapy for more than 3 months; (3) Have primary hyperparathyroidism; (4) On osteoporosis medications and need to assess response to drug therapy  5  HIV Screening: covered annually if you're between the age of 12-76  Also covered annually if you are younger than 13 and older than 72 with risk factors for HIV infection  For pregnant patients, it is covered up to 3 times per pregnancy      Immunizations:  Immunization Recommendations   Influenza Vaccine Annual influenza vaccination during flu season is recommended for all persons aged >= 6 months who do not have contraindications   Pneumococcal Vaccine (Prevnar and Pneumovax)  * Prevnar = PCV13  * Pneumovax = PPSV23 Adults 25-60 years old: 1-3 doses may be recommended based on certain risk factors  Adults 72 years old: Prevnar (PCV13) vaccine recommended followed by Pneumovax (PPSV23) vaccine  If already received PPSV23 since turning 65, then PCV13 recommended at least one year after PPSV23 dose  Hepatitis B Vaccine 3 dose series if at intermediate or high risk (ex: diabetes, end stage renal disease, liver disease)   Tetanus (Td) Vaccine - COST NOT COVERED BY MEDICARE PART B Following completion of primary series, a booster dose should be given every 10 years to maintain immunity against tetanus  Td may also be given as tetanus wound prophylaxis  Tdap Vaccine - COST NOT COVERED BY MEDICARE PART B Recommended at least once for all adults  For pregnant patients, recommended with each pregnancy  Shingles Vaccine (Shingrix) - COST NOT COVERED BY MEDICARE PART B  2 shot series recommended in those aged 48 and above     Health Maintenance Due:      Topic Date Due    Hepatitis C Screening  Never done    Colorectal Cancer Screening  08/15/2027     Immunizations Due:      Topic Date Due    DTaP,Tdap,and Td Vaccines (1 - Tdap) Never done     Advance Directives   What are advance directives? Advance directives are legal documents that state your wishes and plans for medical care  These plans are made ahead of time in case you lose your ability to make decisions for yourself  Advance directives can apply to any medical decision, such as the treatments you want, and if you want to donate organs  What are the types of advance directives? There are many types of advance directives, and each state has rules about how to use them  You may choose a combination of any of the following:  · Living will: This is a written record of the treatment you want  You can also choose which treatments you do not want, which to limit, and which to stop at a certain time  This includes surgery, medicine, IV fluid, and tube feedings  · Durable power of  for healthcare [facility]):   This is a written record that states who you want to make healthcare choices for you when you are unable to make them for yourself  This person, called a proxy, is usually a family member or a friend  You may choose more than 1 proxy  · Do not resuscitate (DNR) order:  A DNR order is used in case your heart stops beating or you stop breathing  It is a request not to have certain forms of treatment, such as CPR  A DNR order may be included in other types of advance directives  · Medical directive: This covers the care that you want if you are in a coma, near death, or unable to make decisions for yourself  You can list the treatments you want for each condition  Treatment may include pain medicine, surgery, blood transfusions, dialysis, IV or tube feedings, and a ventilator (breathing machine)  · Values history: This document has questions about your views, beliefs, and how you feel and think about life  This information can help others choose the care that you would choose  Why are advance directives important? An advance directive helps you control your care  Although spoken wishes may be used, it is better to have your wishes written down  Spoken wishes can be misunderstood, or not followed  Treatments may be given even if you do not want them  An advance directive may make it easier for your family to make difficult choices about your care  Weight Management   Why it is important to manage your weight:  Being overweight increases your risk of health conditions such as heart disease, high blood pressure, type 2 diabetes, and certain types of cancer  It can also increase your risk for osteoarthritis, sleep apnea, and other respiratory problems  Aim for a slow, steady weight loss  Even a small amount of weight loss can lower your risk of health problems  How to lose weight safely:  A safe and healthy way to lose weight is to eat fewer calories and get regular exercise  You can lose up about 1 pound a week by decreasing the number of calories you eat by 500 calories each day     Healthy meal plan for weight management:  A healthy meal plan includes a variety of foods, contains fewer calories, and helps you stay healthy  A healthy meal plan includes the following:  · Eat whole-grain foods more often  A healthy meal plan should contain fiber  Fiber is the part of grains, fruits, and vegetables that is not broken down by your body  Whole-grain foods are healthy and provide extra fiber in your diet  Some examples of whole-grain foods are whole-wheat breads and pastas, oatmeal, brown rice, and bulgur  · Eat a variety of vegetables every day  Include dark, leafy greens such as spinach, kale, val greens, and mustard greens  Eat yellow and orange vegetables such as carrots, sweet potatoes, and winter squash  · Eat a variety of fruits every day  Choose fresh or canned fruit (canned in its own juice or light syrup) instead of juice  Fruit juice has very little or no fiber  · Eat low-fat dairy foods  Drink fat-free (skim) milk or 1% milk  Eat fat-free yogurt and low-fat cottage cheese  Try low-fat cheeses such as mozzarella and other reduced-fat cheeses  · Choose meat and other protein foods that are low in fat  Choose beans or other legumes such as split peas or lentils  Choose fish, skinless poultry (chicken or turkey), or lean cuts of red meat (beef or pork)  Before you cook meat or poultry, cut off any visible fat  · Use less fat and oil  Try baking foods instead of frying them  Add less fat, such as margarine, sour cream, regular salad dressing and mayonnaise to foods  Eat fewer high-fat foods  Some examples of high-fat foods include french fries, doughnuts, ice cream, and cakes  · Eat fewer sweets  Limit foods and drinks that are high in sugar  This includes candy, cookies, regular soda, and sweetened drinks  Exercise:  Exercise at least 30 minutes per day on most days of the week  Some examples of exercise include walking, biking, dancing, and swimming   You can also fit in more physical activity by taking the stairs instead of the elevator or parking farther away from stores  Ask your healthcare provider about the best exercise plan for you  Narcotic (Opioid) Safety    Use narcotics safely:  · Take prescribed narcotics exactly as directed  · Do not give narcotics to others or take narcotics that belong to someone else  · Do not mix narcotics without medicines or alcohol  · Do not drive or operate heavy machinery after you take the narcotic  · Monitor for side effects and notify your healthcare provider if you experienced side effects such as nausea, sleepiness, itching, or trouble thinking clearly  Manage constipation:    Constipation is the most common side effect of narcotic medicine  Constipation is when you have hard, dry bowel movements, or you go longer than usual between bowel movements  Tell your healthcare provider about all changes in your bowel movements while you are taking narcotics  He or she may recommend laxative medicine to help you have a bowel movement  He or she may also change the kind of narcotic you are taking, or change when you take it  The following are more ways you can prevent or relieve constipation:    · Drink liquids as directed  You may need to drink extra liquids to help soften and move your bowels  Ask how much liquid to drink each day and which liquids are best for you  · Eat high-fiber foods  This may help decrease constipation by adding bulk to your bowel movements  High-fiber foods include fruits, vegetables, whole-grain breads and cereals, and beans  Your healthcare provider or dietitian can help you create a high-fiber meal plan  Your provider may also recommend a fiber supplement if you cannot get enough fiber from food  · Exercise regularly  Regular physical activity can help stimulate your intestines  Walking is a good exercise to prevent or relieve constipation  Ask which exercises are best for you  · Schedule a time each day to have a bowel movement  This may help train your body to have regular bowel movements  Bend forward while you are on the toilet to help move the bowel movement out  Sit on the toilet for at least 10 minutes, even if you do not have a bowel movement  Store narcotics safely:   · Store narcotics where others cannot easily get them  Keep them in a locked cabinet or secure area  Do not  keep them in a purse or other bag you carry with you  A person may be looking for something else and find the narcotics  · Make sure narcotics are stored out of the reach of children  A child can easily overdose on narcotics  Narcotics may look like candy to a small child  The best way to dispose of narcotics: The laws vary by country and area  In the [place], the best way is to return the narcotics through a take-back program  This program is offered by the [employer])  The following are options for using the program:  · Take the narcotics to a TANA collection site  The site is often a law enforcement center  Call your local law enforcement center for scheduled take-back days in your area  You will be given information on where to go if the collection site is in a different location  · Take the narcotics to an approved pharmacy or hospital   A pharmacy or hospital may be set up as a collection site  You will need to ask if it is a TANA collection site if you were not directed there  A pharmacy or doctor's office may not be able to take back narcotics unless it is a TANA site  · Use a mail-back system  This means you are given containers to put the narcotics into  You will then mail them in the containers  · Use a take-back drop box  This is a place to leave the narcotics at any time  People and animals will not be able to get into the box  Your local law enforcement agency can tell you where to find a drop box in your area      Other ways to manage pain:   · Ask your healthcare provider about non-narcotic medicines to control pain  Nonprescription medicines include NSAIDs (such as ibuprofen) and acetaminophen  Prescription medicines include muscle relaxers, antidepressants, and steroids  · Pain may be managed without any medicines  Some ways to relieve pain include massage, aromatherapy, or meditation  Physical or occupational therapy may also help  For more information:   · Drug Enforcement Administration  [facility] [address]  Phone: 2- 249 - 988-1188  Web Address: [facility]/drug_disposal/    · [address]  and Drug Administration  [place] [employer] [place] , [address] [facility]  Phone: 1- 658 - 517-9554  Web Address: http://[employer]/     © Copyright [employer] 2018 Information is for End User's use only and may not be sold, redistributed or otherwise used for commercial purposes   All illustrations and images included in CareNotes® are the copyrighted property of A D A M , Inc  or [address] [place] [address]

## 2021-06-12 NOTE — ASSESSMENT & PLAN NOTE
Skin lesions  Patient with various moles and seborrheic keratosis ease  He was given prescription referral to see Dr Jeyson Lockhart for dermatology consultation  He was also recommended to use over-the-counter Eucerin cream twice daily to the affected area of arms were he experiences dryness

## 2021-06-18 ENCOUNTER — TELEPHONE (OUTPATIENT)
Dept: PSYCHIATRY | Facility: CLINIC | Age: 71
End: 2021-06-18

## 2021-06-18 ENCOUNTER — TELEPHONE (OUTPATIENT)
Dept: PAIN MEDICINE | Facility: CLINIC | Age: 71
End: 2021-06-18

## 2021-06-18 NOTE — TELEPHONE ENCOUNTER
Patient called returning the nurses call  Pt was advised of the below message   Please be advise thank you

## 2021-06-18 NOTE — TELEPHONE ENCOUNTER
RN left vm for pt to c/b  C/B # and OH provided  *If pt c/b pls tell him his percocet script is at the pharmacy

## 2021-06-18 NOTE — TELEPHONE ENCOUNTER
Pt contacted Call Center requested refill of their medication  Medication Name:oxyCODONE-acetaminophen (PERCOCET        Dosage of Med: 5-325 mg       Frequency of Med:Take 1 tablet by mouth 2-3 times daily as needed for pain      Remaining Medication: 7       Pharmacy and Location:  [address] [place] [address], [facility] [place] [id number] [phone]      Pt  Preferred Callback Phone Number: [phone]      Thank you

## 2021-06-18 NOTE — TELEPHONE ENCOUNTER
RN confirmed with Abner Keenan at [facility] and they have the [facility] 6/5/21 percocet Rx on file and will get ready for pt  Left vm for pt to c/b  C/B # and OH provided

## 2021-07-01 ENCOUNTER — TELEPHONE (OUTPATIENT)
Dept: PAIN MEDICINE | Facility: CLINIC | Age: 71
End: 2021-07-01

## 2021-07-01 DIAGNOSIS — G89.4 CHRONIC PAIN SYNDROME: Primary | ICD-10-CM

## 2021-07-01 RX ORDER — OXYCODONE HYDROCHLORIDE AND ACETAMINOPHEN 5; 325 MG/1; MG/1
1 TABLET ORAL EVERY 8 HOURS PRN
Qty: 40 TABLET | Refills: 0 | Status: SHIPPED | OUTPATIENT
Start: 2021-07-10 | End: 2021-07-23

## 2021-07-01 NOTE — TELEPHONE ENCOUNTER
Pt is rescheduled for 7/23/21 however he will be out of his medication   Pt contacted Call Center requested refill of their medication          Medication Name: oxyCODONE-acetaminophen (PERCOCET          Dosage of Med: 5-325 mg       Frequency of Med: as needed       Remaining Medication: 28 maybe       [facility] [address] [place] [address]

## 2021-07-01 NOTE — TELEPHONE ENCOUNTER
Pt of Domo/Renato    Pt  was scheduled to see [facility] tomorrow however [facility] is out today and tomorrow  We R/S ov w/ [facility] for 7/23 but pt will need script now to hold until that appt  Pt's message says he has about 35 pills left

## 2021-07-01 NOTE — TELEPHONE ENCOUNTER
Please let the patient know that oxycodone /acetaminophen 5/325 mg were sent to the pharmacy on file for 40 pills enough to get him through until 07/23/2021 at 3 pills per day  Fill date is 07/10/2021

## 2021-07-20 ENCOUNTER — TELEPHONE (OUTPATIENT)
Dept: NEUROLOGY | Facility: CLINIC | Age: 71
End: 2021-07-20

## 2021-07-20 NOTE — TELEPHONE ENCOUNTER
Called patient and I left a message to call back offered sooner appointment for 07/26/21 at 09:45am in the  with Hector

## 2021-07-23 ENCOUNTER — OFFICE VISIT (OUTPATIENT)
Dept: PAIN MEDICINE | Facility: CLINIC | Age: 71
End: 2021-07-23
Payer: MEDICARE

## 2021-07-23 VITALS
DIASTOLIC BLOOD PRESSURE: 74 MMHG | BODY MASS INDEX: 36.73 KG/M2 | HEART RATE: 61 BPM | SYSTOLIC BLOOD PRESSURE: 118 MMHG | WEIGHT: 256 LBS

## 2021-07-23 DIAGNOSIS — F11.20 UNCOMPLICATED OPIOID DEPENDENCE (HCC): ICD-10-CM

## 2021-07-23 DIAGNOSIS — G89.4 CHRONIC PAIN SYNDROME: Primary | ICD-10-CM

## 2021-07-23 DIAGNOSIS — G50.0 TRIGEMINAL NEURALGIA: ICD-10-CM

## 2021-07-23 DIAGNOSIS — Z79.891 LONG-TERM CURRENT USE OF OPIATE ANALGESIC: ICD-10-CM

## 2021-07-23 PROCEDURE — 99214 OFFICE O/P EST MOD 30 MIN: CPT | Performed by: NURSE PRACTITIONER

## 2021-07-23 RX ORDER — OXYCODONE HYDROCHLORIDE AND ACETAMINOPHEN 5; 325 MG/1; MG/1
TABLET ORAL
Qty: 60 TABLET | Refills: 0 | Status: SHIPPED | OUTPATIENT
Start: 2021-07-23 | End: 2021-07-28

## 2021-07-23 NOTE — PROGRESS NOTES
Assessment:  1  Chronic pain syndrome    2  Trigeminal neuralgia    3  Uncomplicated opioid dependence ([address] )    4  Long-term current use of opiate analgesic        Plan:  The patient is a 70 y o  male last seen on 5/7/2021 who presents for a follow up office visit in regards to chronic pain secondary to facial pain, and trigeminal neuralgia  I reminded the patient to take the Percocet prior to the pain getting severe  The patient has a remaining prescription that was provided at the last office visit  A rescription for Percocet 5/325 mg 2-3 tabs PO daily as needed was electronically sent to the patient's pharmacy on file with a DNF date of 8/15/2021  The patient reports he does not need refills of lyrica at this time  The patient will call for refills of this medication if needed prior to the next OV  [place] Prescription Drug Monitoring Program report was reviewed and was appropriate     There are risks associated with opioid medications, including dependence, addiction and tolerance  The patient understands and agrees to use these medications only as prescribed  Potential side effects of the medications include, but are not limited to, constipation, drowsiness, addiction, impaired judgment and risk of fatal overdose if not taken as prescribed  The patient was warned against driving while taking sedation medications  Sharing medications is a felony  At this point in time, the patient is showing no signs of addiction, abuse, diversion or suicidal ideation  The patient will follow-up in 8 weeks for medication prescription refill and reevaluation  The patient was advised to contact the office should their symptoms worsen in the interim  The patient was agreeable and verbalized an understanding  History of Present Illness: The patient is a 70 y o  male last seen on 5/7/2021 who presents for a follow up office visit in regards to chronic pain secondary to facial pain, and trigeminal neuralgia  The patient currently reports  Ongoing facial pain that is unchanged since last office visit  The patient describes his pain as constant, dull aching, sharp, shooting pain with pins and needles  The patient is in good spirits at this visit and reports he has been spending time with his grandchildren  The patient reports his pain is less when he is active and increases with inactivity or when he is writing  The patient continues to use Percocet 5/325/mg PO 2-3 tabs daily sparingly and has a remaining prescription form the last OV  The patient denies weakness and reports no bowel or bladder dysfunction and is able to complete ADLs with minimal difficulty  The patient rates his pain as 4-9/10 on the numeric rating scale  Current pain medications includes:   Cassette 5/325 mg by mouth 2-3 tabs daily as needed, and Lyrica 200 mg by mouth 2 capsules the morning, 1 capsule in afternoon, 2 capsules at bedtime  The patient reports that this regimen is providing 64% pain relief  The patient is reporting no side effects from this pain medication regimen  Pain Contract Signed: 1/6/21  Last Urine Drug Screen: 5/7/21  Last dose of Oxycodone was 7/23/21 @12:00am    I have personally reviewed and/or updated the patient's past medical history, past surgical history, family history, social history, current medications, allergies, and vital signs today  Review of Systems:    Review of Systems   Respiratory: Negative for shortness of breath  Cardiovascular: Negative for chest pain  Gastrointestinal: Negative for constipation, diarrhea, nausea and vomiting  Musculoskeletal: Negative for arthralgias, gait problem, joint swelling and myalgias  Skin: Negative for rash  Neurological: Positive for dizziness, numbness and headaches  Negative for seizures and weakness  All other systems reviewed and are negative          Past Medical History:   Diagnosis Date    Arthritis     Right knee    Chronic pain disorder  Trigeminal neuralgia pain     Ulcer of bile duct        Past Surgical History:   Procedure Laterality Date    APPENDECTOMY  1962    BRAIN SURGERY  2005    Gamma Knife for Trigeminal Neuralgia    CATARACT EXTRACTION      ESOPHAGOGASTRODUODENOSCOPY      HERNIA REPAIR  1952    OTHER SURGICAL HISTORY      gamma knife RT in 2007 and 2010    MA STEREO TREAT TRIGEMINAL NERVE Right 3/6/2018    Procedure: RHIZOTOMY TRIGEMINAL NERVES (V2 & V3);   Surgeon: Jessenia Prince MD;  Location: [facility];  Service: Neurosurgery    MA TOTAL KNEE ARTHROPLASTY Right 10/22/2018    Procedure: ARTHROPLASTY KNEE TOTAL;  Surgeon: Deedee Damian MD;  Location: [facility];  Service: Orthopedics    RHIZOTOMY W/ RADIOFREQUENCY ABLATION Right 08/08/2014    Stereotactic Ablation of Gasserian Ganglion Right sided trigeminal V2 radiofrequency rhizotomy x2       Family History   Problem Relation Age of Onset    Emphysema Mother     Colon cancer Father     Skin cancer Maternal Grandmother        Social History     Occupational History    Occupation: Retired     Comment: 21 years [employer] &    Tobacco Use    Smoking status: Never Smoker    Smokeless tobacco: Never Used   Vaping Use    Vaping Use: Never used   Substance and Sexual Activity    Alcohol use: Yes     Comment: occ    Drug use: No    Sexual activity: Yes         Current Outpatient Medications:     Acetaminophen (TYLENOL PO), Take 2 tablets by mouth as needed , Disp: , Rfl:     ascorbic acid (VITAMIN C) 500 MG tablet, Take 1 tablet (500 mg total) by mouth daily, Disp: 60 tablet, Rfl: 0    aspirin 325 mg tablet, Take 325 mg by mouth, Disp: , Rfl:     Calcium 250 MG CAPS, Take by mouth, Disp: , Rfl:     cyanocobalamin (VITAMIN B-12) 1,000 mcg tablet, Take 1 tablet by mouth daily , Disp: , Rfl:     Ketoprofen 10 % CREA, Apply 1 application topically as needed , Disp: , Rfl: 2    MELATONIN PO, Take 10 mg by mouth daily at bedtime , Disp: , Rfl:    oxyCODONE-acetaminophen (PERCOCET) 5-325 mg per tablet, Take 1 tablet by mouth 2-3 times daily as needed for pain  Do not fill until 9/15/2021, Disp: 60 tablet, Rfl: 0    pregabalin (LYRICA) 200 MG capsule, TAKE 2 CAPSULES IN THE MORNING, 1 CAPSULE IN THE AFTERNOON AND 2 CAPSULES AT BEDTIME, Disp: 450 capsule, Rfl: 0    pyridoxine (VITAMIN B6) 100 mg tablet, Take 1 tablet by mouth daily , Disp: , Rfl:     topiramate (TOPAMAX) 100 mg tablet, TAKE 1 TABLET AT BEDTIME, Disp: 90 tablet, Rfl: 3    TURMERIC CURCUMIN PO, Take 1,500 mg by mouth once as needed , Disp: , Rfl:     Allergies   Allergen Reactions    Gabapentin Delirium    Oxcarbazepine Dizziness    Lamictal [Lamotrigine] Confusion     Difficulty to think clearly     Tramadol        Physical Exam:    /74   Pulse 61   Wt 116 kg (256 lb)   BMI 36 73 kg/m²     Constitutional:normal, well developed, well nourished, alert, in no distress and non-toxic and no overt pain behavior  and overweight  Eyes:anicteric  HEENT:grossly intact  Neck:supple, symmetric, trachea midline and no masses   Pulmonary:even and unlabored  Cardiovascular:No edema or pitting edema present  Skin:Normal without rashes or lesions and well hydrated  Psychiatric:Mood and affect appropriate  Neurologic:Cranial Nerves II-XII grossly intact  Musculoskeletal:normal      Imaging  No orders to display         No orders of the defined types were placed in this encounter

## 2021-07-23 NOTE — PATIENT INSTRUCTIONS
You may  your prescription on 8/15/2021    Opioid Dependence   WHAT YOU NEED TO KNOW:   What is opioid dependence? Opioids are medicines, such as morphine and codeine, used to treat pain  Dependence happens after you have used opioids regularly for a long period of time  Dependence means that your body gets used to how much medicine you take  Dependence is not the same as addiction  Addiction means that a person uses opioids to get high instead of using them to control pain  What are the signs and symptoms of opioid dependence? · You need more of the opioid to get the same amount of pain relief as you did when you first started taking it  · You have tried to use less opioid medicine but are not able to  · You have withdrawal symptoms when you take less of the opioid  What are the signs and symptoms of opioid withdrawal?  You may have the following signs and symptoms if you suddenly stop taking opioids or if you decrease the amount you normally take:  · Yawning     · Runny nose     · Nausea or vomiting     · Diarrhea     · Chills or goosebumps    · Muscle aches or cramps     · Anxiety  How is opioid dependence diagnosed? Your healthcare provider will do a physical exam  He will ask you questions about your symptoms and your use of opioids  He will also ask about your current and past use of other drugs and any family history of drug abuse  How is opioid dependence treated? You may be treated in a hospital or you may be treated as an outpatient  During detoxification (detox), healthcare providers will slowly decrease your dose of the opioid medicine you are dependent on  They may use another opioid medicine such as methadone to decrease symptoms of withdrawal  You may need to take this or another medicine for some time  Your healthcare provider will also replace the opioid with another pain medicine that is less likely to cause dependence   He may also suggest that you receive counseling and social support during treatment  What are the risks of opioid dependence? There is a risk of overdose during early treatment with methadone  You may become dependent on the medicines used to treat opioid dependence  Without treatment, you may develop other health problems or become addicted to opioids  Your risk of abusing alcohol and other drugs increases  You may also develop risky behaviors that can lead to an overdose, violence, and suicidal thoughts  When should I contact my healthcare provider? · Your speech is slurred  · You have difficulty staying awake  · You have nausea and vomiting  · You are easily upset or cry easily  · You have poor balance  · You have questions or concerns about your condition or care  When should I seek immediate care or call 911? · You feel lightheaded or faint  · You have a fast, slow, or irregular heartbeat  · You have a seizure  CARE AGREEMENT:   You have the right to help plan your care  Learn about your health condition and how it may be treated  Discuss treatment options with your caregivers to decide what care you want to receive  You always have the right to refuse treatment  The above information is an  only  It is not intended as medical advice for individual conditions or treatments  Talk to your doctor, nurse or pharmacist before following any medical regimen to see if it is safe and effective for you  © 2017 [employer] Information is for End User's use only and may not be sold, redistributed or otherwise used for commercial purposes  All illustrations and images included in CareNotes® are the copyrighted property of [employer]  or Nitesh Giraldo

## 2021-07-28 RX ORDER — OXYCODONE HYDROCHLORIDE AND ACETAMINOPHEN 5; 325 MG/1; MG/1
TABLET ORAL
Qty: 60 TABLET | Refills: 0 | Status: SHIPPED | OUTPATIENT
Start: 2021-07-28 | End: 2021-09-17 | Stop reason: SDUPTHER

## 2021-08-04 ENCOUNTER — OFFICE VISIT (OUTPATIENT)
Dept: NEUROLOGY | Facility: CLINIC | Age: 71
End: 2021-08-04
Payer: MEDICARE

## 2021-08-04 VITALS
DIASTOLIC BLOOD PRESSURE: 73 MMHG | HEIGHT: 70 IN | TEMPERATURE: 97.6 F | WEIGHT: 261.5 LBS | BODY MASS INDEX: 37.44 KG/M2 | SYSTOLIC BLOOD PRESSURE: 116 MMHG | HEART RATE: 55 BPM

## 2021-08-04 DIAGNOSIS — G89.4 CHRONIC PAIN SYNDROME: ICD-10-CM

## 2021-08-04 DIAGNOSIS — E55.9 VITAMIN D DEFICIENCY: ICD-10-CM

## 2021-08-04 DIAGNOSIS — F39 MOOD DISORDER (HCC): ICD-10-CM

## 2021-08-04 DIAGNOSIS — F11.20 UNCOMPLICATED OPIOID DEPENDENCE (HCC): ICD-10-CM

## 2021-08-04 DIAGNOSIS — G50.0 TRIGEMINAL NEURALGIA: Primary | ICD-10-CM

## 2021-08-04 PROCEDURE — 99215 OFFICE O/P EST HI 40 MIN: CPT | Performed by: PHYSICIAN ASSISTANT

## 2021-08-04 RX ORDER — BACLOFEN 10 MG/1
10 TABLET ORAL 3 TIMES DAILY
Qty: 45 TABLET | Refills: 3 | Status: SHIPPED | OUTPATIENT
Start: 2021-08-04 | End: 2021-08-30

## 2021-08-04 RX ORDER — DEXAMETHASONE 2 MG/1
2 TABLET ORAL
Qty: 5 TABLET | Refills: 0 | Status: SHIPPED | OUTPATIENT
Start: 2021-08-04 | End: 2021-08-30

## 2021-08-04 RX ORDER — TOPIRAMATE 100 MG/1
TABLET, FILM COATED ORAL
Qty: 90 TABLET | Refills: 3 | Status: SHIPPED | OUTPATIENT
Start: 2021-08-04

## 2021-08-04 NOTE — ASSESSMENT & PLAN NOTE
Follow up in 1 year   Topamax 100 mg bedtime  Try and not take Tylenol > 14 times a month       For 5 days  Decadron 2 mg in the am  Baclofen 10 mg 3 times a day  Then may continue baclofen 10 mg as needed if helps    Contact Dr Nick Fernández to discuss pain pump with referral to Dr Angela Hedrick

## 2021-08-04 NOTE — PROGRESS NOTES
Review of Systems   Constitutional: Negative  HENT: Negative  Eyes: Positive for pain  Respiratory: Negative  Cardiovascular: Negative  Gastrointestinal: Negative  Endocrine: Negative  Genitourinary: Negative  Musculoskeletal: Negative  Skin: Negative  Allergic/Immunologic: Negative  Neurological:        Facial Pain   Hematological: Negative  Psychiatric/Behavioral: Negative

## 2021-08-04 NOTE — PATIENT INSTRUCTIONS
Trigeminal neuralgia  71 y/o m with h/o meningioma in [address] s/p gamma knife presents as a follow up for facial pain d/t Trigeminal Neuralgia  Plan-   Follow up in 1 year   Topamax 100 mg bedtime  Try and not take Tylenol > 14 times a month       For 5 days  Decadron 2 mg in the am  Baclofen 10 mg 3 times a day  Then may continue baclofen 10 mg as needed if helps    Contact  [facility] to discuss pain pump with referral to Dr Bryon Maier

## 2021-08-04 NOTE — PROGRESS NOTES
[address] [facility]  PATIENT:  Shauna Barnes  MRN:  [id number]  :  1950  DATE OF SERVICE:  2021      Assessment/Plan:     Trigeminal neuralgia  Follow up in 1 year   Topamax 100 mg bedtime  Try and not take Tylenol > 14 times a month  For 5 days  Decadron 2 mg in the am  Baclofen 10 mg 3 times a day  Then may continue baclofen 10 mg as needed if helps    Contact Dr Nick Fernández to discuss pain pump with referral to Dr Angela Hedrick         Problem List Items Addressed This Visit        Nervous and Auditory    Trigeminal neuralgia - Primary     Follow up in 1 year   Topamax 100 mg bedtime  Try and not take Tylenol > 14 times a month  For 5 days  Decadron 2 mg in the am  Baclofen 10 mg 3 times a day  Then may continue baclofen 10 mg as needed if helps    Contact Dr Nick Fernández to discuss pain pump with referral to Dr Angela Hedrick         Relevant Medications    dexamethasone (DECADRON) 2 mg tablet    baclofen 10 mg tablet    topiramate (TOPAMAX) 100 mg tablet       Other    Vitamin D deficiency    Chronic pain syndrome    Uncomplicated opioid dependence (HCC)    Mood disorder (HCC)              History of Present Illness: We had the pleasure of evaluating Shauna Barnes in neurological follow up  today for headaches  As you know,  he is a 70 y o   right handed male  Patient retired in 2018  Danay Gordon is here today for f/u evaluation of his trigeminal neuralgia       To review, Pt's right sided facial pain started in   He was diagnosed with trigeminal neuralgia  He used to feel intense pain on rt side just under the eye  He had constant pain with episodes of severe pain       During my evaluation patient reports feeling mild worse in terms of right facial pain  He experiences it in Rt eye brow, Rt orbit and occasionally on Rt jaw last for 10 secs, 8/10 in severity  It worsens with sneezing, first bite of meal  Stopped CBD oil in 2021 because his urine testing should THC     Tinnitus: bilateral, right more so than left, began after nerve block in 09/2018  - 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 (some noise insensitivity attributed by pt to musical pursuits)  - recalled previous conversation regarding ENT visit and is still considering     Numbness: right V2 and V3 region post nerve block, feels like the numb before a tooth pulling     Dizzy: some lightheadness for past few monthsthat leads to an occasional misstep a few times a week, no syncopal episodes, no change in frequency  Recent fall in [address] [place] [address] per radiology report,no significant interval change in the size approximate 1 cm calcified lesion [facility] and thought to represent a calcified meningioma  S/p gamma knife last MRI 5/19/20       Trigeminal neuralgia  What medications do you take or have you taken for your headaches?      Preventive:   Lyrica (help the most), Topamax (once in morning-100 mg poqd helps with pain, seems to help with sleep too)     Tried in past    Trileptal (dizziness and confusion), Tegretol (confusion/goofy), Neurontin (confusion), Lamictal (confusion), Olanzapine (confusion/goofy/dizzy)  - Amitriptyline (stopped due to fatigue), Cymbalta (no improvement)  - Botox (did not help)     Abortive:   Tylenol- 2 tabs 5 days a week for severe pain  Percocet- 1 tab per day  4-5 days/week      Tried in past-   Baclofen (doesn't take anymore)  tramadol ([place] and ended up in the ER)  aspirin     What is your current pain level ?  7-8/10      How often does the pain occur?   constant pain: there all the time  Severe pain: Couple times a week on an average twice a day  Electric shock like pain-3-5 a day last for 10 secs       Are you ever pain free? No  Aura/warning and how long does it last - none     What time of the day does the pain start?    Constant: there all the time  Severe pain: varies; the episodes have been scattered throughout day  Electric shock like- first bite of meal, sneezing       How long do the pain  last?   Constant pain: there all the time  Severe pain: 15 min-12 hours   Electric shock pain- for 10-15 secs       Describe your usual pain:   Constant pain: aching   Severe pain- Feels like somebody is drilling, ice pick, numbness, tingling + new shock like pathway from lateral nasal fold to mid cheek bone usually get it with shock like pain       Where is your pain located? Constant Pain: right v2 under the eye on the superiors nasal region, currently at the "origins": below eye, lateral eyebrow, hair line above eyebrow, mandible directly inferior to corner of mouth, inferior mastoid region     Severe Pain:  v2 and at the medial nasal region, v3, lateral eyebrow and directly superior at face/hair line; v1, mandible below corner of mouth; inferior mastoid - these locations will be more painful and receive a shooting pain originating from below the eye     Electric Shock- Rt eye brow, Rt orbit then at times Rt jaw       What is the intensity of pain? Constant Pain: 4/10  Severe Pain: 8-10/10  Electric shock-8/10     Associated symptoms:   None     Have you used CBD or THC for your headaches and how often? Yes, but tested positive for THC at pain management so stopped  Have you ever had any Brain imaging? 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 correlating to the previously present calcified structure noted on the prior 2013 head CT, stable from the previous MRI, possibly   a treated or calcified meningioma   Cisternal segments of both 5th nerves are otherwise normal in signal without abnormal enhancement   No brainstem abnormality   No interval change from the previous study  2   No acute infarction, intracranial hemorrhage or mass effect        I personally reviewed these images      Reviewed old notes from physician seen in the past- see above HPI for summary of previous encounters  Past Medical History:   Diagnosis Date    Arthritis     Right knee    Chronic pain disorder     Trigeminal neuralgia pain     Ulcer of bile duct        Patient Active Problem List   Diagnosis    Trigeminal neuralgia    Facial spasm    Anemia    Bilateral patellofemoral syndrome    Diverticulosis of colon    Esophageal reflux    Intracranial meningioma (HCC)    Vertigo    Vitamin D deficiency    Need for pneumococcal vaccination    Insomnia    Primary osteoarthritis of right knee    DNS (deviated nasal septum)    Chronic pain syndrome    Uncomplicated opioid dependence ([address] )    Long-term current use of opiate analgesic    Mood disorder (HCC)    Skin lesions       Medications:      Current Outpatient Medications   Medication Sig Dispense Refill    Acetaminophen (TYLENOL PO) Take 2 tablets by mouth as needed       ascorbic acid (VITAMIN C) 500 MG tablet Take 1 tablet (500 mg total) by mouth daily 60 tablet 0    Calcium 250 MG CAPS Take 250 mg by mouth daily       cyanocobalamin (VITAMIN B-12) 1,000 mcg tablet Take 1 tablet by mouth daily       Ketoprofen 10 % CREA Apply 1 application topically as needed   2    MELATONIN PO Take 10 mg by mouth daily at bedtime       oxyCODONE-acetaminophen (PERCOCET) 5-325 mg per tablet Take 1 tablet by mouth 2-3 times daily as needed for pain   Do not fill until 8/15/2021 60 tablet 0    pregabalin (LYRICA) 200 MG capsule TAKE 2 CAPSULES IN THE MORNING, 1 CAPSULE IN THE AFTERNOON AND 2 CAPSULES AT BEDTIME 450 capsule 0    topiramate (TOPAMAX) 100 mg tablet TAKE 1 TABLET AT BEDTIME 90 tablet 3    TURMERIC CURCUMIN PO Take 1,500 mg by mouth once as needed       aspirin 325 mg tablet Take 325 mg by mouth (Patient not taking: Reported on 8/4/2021)      baclofen 10 mg tablet Take 1 tablet (10 mg total) by mouth 3 (three) times a day 45 tablet 3    dexamethasone (DECADRON) 2 mg tablet Take 1 tablet (2 mg total) by mouth daily with breakfast 5 tablet 0    pyridoxine (VITAMIN B6) 100 mg tablet Take 1 tablet by mouth daily  (Patient not taking: Reported on 8/4/2021)       No current facility-administered medications for this visit  Allergies: Allergies   Allergen Reactions    Gabapentin Delirium    Oxcarbazepine Dizziness    Lamictal [Lamotrigine] Confusion     Difficulty to think clearly     Tramadol        Family History:     Family History   Problem Relation Age of Onset    Emphysema Mother     Colon cancer Father     Skin cancer Maternal Grandmother        Social History:     Social History     Socioeconomic History    Marital status: /Civil Union     Spouse name: Rudy Brown Number of children: 2    Years of education: BS Degree plus addl classes    Highest education level: Not on file   Occupational History    Occupation: Retired     Comment: 20 years [address] Use    Smoking status: Never Smoker    Smokeless tobacco: Never Used   Vaping Use    Vaping Use: Never used   Substance and Sexual Activity    Alcohol use: Yes     Comment: Occasionally    Drug use: No    Sexual activity: Yes   Other Topics Concern    Not on file   Social History Narrative    Caffeine use: Daily caffeinated cola, drinks coffee    Lives at home with wife [facility]     Social Determinants of Health     Financial Resource Strain:     Difficulty of Paying Living Expenses:    Food Insecurity:     Worried About Running Out of Food in the Last Year:     [address] in the Last Year:    Transportation Needs:     Lack of Transportation (Medical):      Lack of Transportation (Non-Medical):    Physical Activity:     Days of Exercise per Week:     Minutes of Exercise per Session:    Stress:     Feeling of Stress :    Social Connections:     Frequency of Communication with Friends and Family:     Frequency of Social Gatherings with Friends and Family:     Attends [employer] Services:     Active Member of Clubs or Organizations:     Attends Club or Organization Meetings:     Marital Status:    Intimate Partner Violence:     Fear of Current or Ex-Partner:     Emotionally Abused:     Physically Abused:     Sexually Abused:       I have reviewed the patient's medical, social and surgical history as well as medications in detail and updated the computerized patient record  Objective:   Physical Exam:                                                                   Vitals:            /73 (BP Location: Right arm, Patient Position: Sitting, Cuff Size: Standard)   Pulse 55   Temp 97 6 °F (36 4 °C) (Temporal)   Ht 5' 10" (1 778 m)   Wt 119 kg (261 lb 8 oz)   BMI 37 52 kg/m²   BP Readings from Last 3 Encounters:   08/04/21 116/73   07/23/21 118/74   06/12/21 110/70     Pulse Readings from Last 3 Encounters:   08/04/21 55   07/23/21 61   06/12/21 83          CONSTITUTIONAL: Well developed, well nourished, well groomed  No dysmorphic features  Eyes:  EOM normal      Neck:  Normal ROM, neck supple  HEENT:  Normocephalic atraumatic  Chest:  Respirations regular and unlabored  Psychiatric:  Normal behavior and appropriate affect      MENTAL STATUS  Orientation: Alert and oriented x 3  Fund of knowledge: Intact  MOTOR (Upper and lower extremities)   Bulk/tone/abnormal movement: Normal muscle bulk and tone  COORDINATION   Station/Gait: slow wide based gait  Review of Systems:   Review of Systems  Constitutional: Negative  HENT: Negative  Eyes: Positive for pain  Respiratory: Negative  Cardiovascular: Negative  Gastrointestinal: Negative  Endocrine: Negative  Genitourinary: Negative  Musculoskeletal: Negative  Skin: Negative  Allergic/Immunologic: Negative  Neurological:        Facial Pain   Hematological: Negative      Psychiatric/Behavioral: Negative  I personally reviewed the ROS entered by the MA    I spent 30 minutes in face-to-face discussion regarding the pathophysiology of his current symptoms and further plan, as well as counseling, educating, and coordinating the patient's care including prognosis of diagnosis, diagnostic results, impression, and recommendations, risks and benefits of treatment, instructions for disease self management, treatment instructions and follow up requirements and spent 15 minutes non-face to face    Author:  Rosie Mahan PA-C 8/4/2021 12:05 PM

## 2021-08-13 ENCOUNTER — TELEPHONE (OUTPATIENT)
Dept: NEUROLOGY | Facility: CLINIC | Age: 71
End: 2021-08-13

## 2021-08-13 NOTE — TELEPHONE ENCOUNTER
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

## 2021-08-18 ENCOUNTER — TELEPHONE (OUTPATIENT)
Dept: NEUROSURGERY | Facility: CLINIC | Age: 71
End: 2021-08-18

## 2021-08-18 NOTE — TELEPHONE ENCOUNTER
Returned call to patient reports symptoms consistent with intractable trigeminal neuralgia     Status post RF rhizotomy with Dr Cedillo Later 3/6/1018 V2 V3  Has numbness in areas   3/19/2018 reported TN pain in V2 V1  Ophthalmic and maxillary distribution , numbness in V3 65 % efficacy from  sx procedure  He has a history of right sided facial pain since 2007 consistent with Trigeminal neuralgia  Has history of known meningioma in [address]  Previous gamma knife radiation x 2  He underwent right sided V2 and V3 rF rhizotomy in 2015      Now he reports right sided TN pain characterized as sharp, shooting located just below right eye, into right orbit and above right orbit into right forehead    Severity 7-8/10    H e met with Dr Sarah Ontiveros 5/22/2020 impression  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 rather than classic trigeminal neuralgia although he may have aspects of both  However given the atrophy of the nerve any ablative procedure is high risk for anesthesia dolorosa  I recommended either neuromodulation or a pain pump  I believe the best treatment with lowest risk is a pain pump  He would like to consider his options  I reported that if he is interested I can refer to [address] [place] [address] for a trial IT injection  [facility]  Informed him Dr Sarah Ontiveros is no longer with the practice , currently we are not surgically implanting intrathecal pumps for pain medication delivery or SCS high cervical placement of peripheral leads for TN RX  The only option currently is glycerol rhizotomy , he is interested in appointment to discuss      MA will contact patient and offer appointment maia holguin/ Dayna Mayfield

## 2021-08-30 ENCOUNTER — OFFICE VISIT (OUTPATIENT)
Dept: NEUROSURGERY | Facility: CLINIC | Age: 71
End: 2021-08-30
Payer: MEDICARE

## 2021-08-30 VITALS
DIASTOLIC BLOOD PRESSURE: 80 MMHG | TEMPERATURE: 96.8 F | HEIGHT: 70 IN | SYSTOLIC BLOOD PRESSURE: 130 MMHG | BODY MASS INDEX: 37.14 KG/M2 | WEIGHT: 259.4 LBS

## 2021-08-30 DIAGNOSIS — G50.0 TRIGEMINAL NEURALGIA: Primary | ICD-10-CM

## 2021-08-30 DIAGNOSIS — G89.4 CHRONIC PAIN SYNDROME: ICD-10-CM

## 2021-08-30 DIAGNOSIS — G51.8 NEURALGIC FACIAL PAIN: ICD-10-CM

## 2021-08-30 PROCEDURE — 99213 OFFICE O/P EST LOW 20 MIN: CPT | Performed by: NURSE PRACTITIONER

## 2021-08-30 NOTE — PROGRESS NOTES
Assessment/Plan:    Chronic pain syndrome  As addressed in HPI  As addressed in trigeminal neuralgia     Trigeminal neuralgia  · As addressed in HPI   · Wife reports he has been dealing with right sided facial pain for past 12 years  · Reports constant right sided facial pain of variable intensity,  , severity 10/10 , when mild is 3-4/10, daily usually 6/10  · Characterization of pain variable,  sharp, dull, aching, throbbing, stinging, shooting electric sensation  Right eye up into forehead  · Pain is relived by placing pressure on entire right side of face using hand  · Continued treating with neurology  Mfr medicinal management , current use of lyrica  · Continues care with pain management , reports recent baclofen use caused unfavorable and intolerable adverse effects, could not tolerate  · Right fce pain is not affected by hot or cold foor or beverages  Wearing dentures aggravates the pain , therefore is unable to chew hard food  · Dr Yesica Stephens met with patient and concurs with Dr Ladarius Villareal impressions  Of trigeminal nerve atrophy , further ablative procedures such as glycerol rhizotomy could risk the development of anesthesia dolorosa  · Dr Yesica Stephens explained and offered a nerve block (injection of anesthetic agent and steroid), patient woulkd like to think about this  · Inquire with products reps for name of pain management specialist or neurosurgeon who places high cervical perc electrode leads           PLAN  · Referral to Functional neurosurgeon [facility] , Dr Maninder Macario for assessment and consideration of neuromodulation with high cervical percutaneous electrode lead tip at C1-C2 , phone [phone] [facility]   · [facility] rep provided name for Dr Richard Lake @ [address] area  · RTO prn       Neuralgic facial pain  · As addressed in HPI  · As addressed in TN     Neuralgic facial pain  -     Ambulatory referral to Neurosurgery;  Future        Subjective: Patient ID: Jazmyn Long is a 70 y o  male     HPI   Longstanding history of trigeminal neuralgia  Right sided facial pain since 2007  Has a history of meningioma Meckel's cave which when assessed by Dr Renae Ramirez in May [id number] appeared stable on long term inagining  Previous gamma knife radiation x 2  Right sided  V2 and V3 RF rhizotomy in 2015    He consulted with Dr Renae Ramirez 5/22/20 impressions documented in note are as follows  trigeminal nerve atrophied , has persistent numbness  Felt current  pain etiology was   Trigeminal neuropathic pain rather than classic trigeminal neuralgia although he may have aspects of both  Given atrophy of trigeminal nerve any ablative procedures is high risk for anesthesis corina  Recommended surgical intervention is neuromodulation or pain pump  Patient wanted time to consider his options  Patient telephoned about 1 week ago inquiring about Intrathecal drug delivery system  Advised DR Renae Ramirez no longer with practice  Explained the barriers associated with IT pump, a major barrier finding a practitioner to complete trial then manage pump after insertion   This practice currently does not have neurosurgeon for insertion of IT pump  We have a neurosurgeon that does perform glycerol rhizotomy , in his case may not perform but could schedule appointment to discuss options  Dr Loyd Artis and I I have spent 40 minutes with patient today in which greater than 50% of this time was spent in assessment, examination, impressions, reviewing imagining and recommendations for care  All questions were answered to her/his satisfaction, and contact information provided in the event additional questions arise  Patient acknowledged an understanding and agreement with plan  REVIEW OF SYSTEMS    Review of Systems   Constitutional: Negative  HENT:        Buzzing pressure bilateral ears, worse on right   Eyes: Negative  Respiratory: Negative  Cardiovascular: Negative  Gastrointestinal: Negative  Endocrine: Negative  Genitourinary: Negative  Skin: Negative  Allergic/Immunologic: Negative  Neurological: Positive for numbness (numbness from rhizotomy ) and headaches (right forehead, under right eye )  Negative for light-headedness  Right facial pain just below right eye, top of right forehead and right below ear lobe is returning with intensity  Hematological: Negative  Psychiatric/Behavioral: Negative  Meds/Allergies     Current Outpatient Medications   Medication Sig Dispense Refill    Acetaminophen (TYLENOL PO) Take 2 tablets by mouth as needed       ascorbic acid (VITAMIN C) 500 MG tablet Take 1 tablet (500 mg total) by mouth daily 60 tablet 0    Calcium 250 MG CAPS Take 250 mg by mouth daily       cyanocobalamin (VITAMIN B-12) 1,000 mcg tablet Take 1 tablet by mouth daily       MELATONIN PO Take 10 mg by mouth daily at bedtime       oxyCODONE-acetaminophen (PERCOCET) 5-325 mg per tablet Take 1 tablet by mouth 2-3 times daily as needed for pain  Do not fill until 8/15/2021 60 tablet 0    pregabalin (LYRICA) 200 MG capsule TAKE 2 CAPSULES IN THE MORNING, 1 CAPSULE IN THE AFTERNOON AND 2 CAPSULES AT BEDTIME 450 capsule 0    topiramate (TOPAMAX) 100 mg tablet TAKE 1 TABLET AT BEDTIME 90 tablet 3    TURMERIC CURCUMIN PO Take 1,500 mg by mouth once as needed  (Patient not taking: Reported on 8/30/2021)       No current facility-administered medications for this visit         Allergies   Allergen Reactions    Gabapentin Delirium    Oxcarbazepine Dizziness    Lamictal [Lamotrigine] Confusion     Difficulty to think clearly     Tramadol        PAST HISTORY    Past Medical History:   Diagnosis Date    Arthritis     Right knee    Chronic pain disorder     Trigeminal neuralgia pain     Ulcer of bile duct        Past Surgical History:   Procedure Laterality Date    APPENDECTOMY  1962    BRAIN SURGERY  2005    Gamma Knife for Trigeminal Neuralgia    CATARACT EXTRACTION      ESOPHAGOGASTRODUODENOSCOPY      HERNIA REPAIR  1952    OTHER SURGICAL HISTORY      gamma knife RT in 2007 and 2010    VT STEREO TREAT TRIGEMINAL NERVE Right 3/6/2018    Procedure: RHIZOTOMY TRIGEMINAL NERVES (V2 & V3); Surgeon: Xi Kaur MD;  Location: [facility];  Service: Neurosurgery    VT TOTAL KNEE ARTHROPLASTY Right 10/22/2018    Procedure: ARTHROPLASTY KNEE TOTAL;  Surgeon: Bill Escalera MD;  [facility];  Service: Orthopedics    REPLACEMENT TOTAL KNEE Right 10/2018    RHIZOTOMY W/ RADIOFREQUENCY ABLATION Right 08/08/2014    Stereotactic Ablation of Gasserian Ganglion Right sided trigeminal V2 radiofrequency rhizotomy x2       Social History     Tobacco Use    Smoking status: Never Smoker    Smokeless tobacco: Never Used   Vaping Use    Vaping Use: Never used   Substance Use Topics    Alcohol use: Yes     Comment: Occasionally    Drug use: No       Family History   Problem Relation Age of Onset    Emphysema Mother     Colon cancer Father     Skin cancer Maternal Grandmother        The following portions of the patient's history were reviewed and updated as appropriate: allergies, current medications, past family history, past medical history, past social history, past surgical history and problem list       EXAM    Vitals:Blood pressure 130/80, temperature (!) 96 8 °F (36 °C), temperature source Temporal, height 5' 10" (1 778 m), weight 118 kg (259 lb 6 4 oz)  ,Body mass index is 37 22 kg/m²  Physical Exam  Vitals and nursing note reviewed  Exam conducted with a chaperone present (wife)  Constitutional:       General: He is not in acute distress  Appearance: Normal appearance  He is obese  He is not ill-appearing, toxic-appearing or diaphoretic  HENT:      Head: Normocephalic and atraumatic  Eyes:      General: No scleral icterus  Right eye: No discharge  Left eye: No discharge     Pulmonary:      Effort: Pulmonary effort is normal  No respiratory distress  Skin:     General: Skin is warm  Neurological:      General: No focal deficit present  Mental Status: He is alert and oriented to person, place, and time  Gait: Gait is intact  Psychiatric:         Mood and Affect: Mood normal          Behavior: Behavior normal          Neurologic Exam     Mental Status   Oriented to person, place, and time  Level of consciousness: alert    Sensory Exam   Numbness V3      Gait, Coordination, and Reflexes     Gait  Gait: normal      Imaging Studies  No results found

## 2021-08-30 NOTE — PROGRESS NOTES
Neurosurgery Office Note  Terri Rothman 70 y o  male MRN: [id number]      Assessment/Plan     No problem-specific Assessment & Plan notes found for this encounter  {Assess/PlanSmarAtl[facility]:07727}        CHIEF COMPLAINT    Chief Complaint   Patient presents with    Follow-up       HISTORY    History of Present Illness     70y o  year old male     HPI    See Discussion    REVIEW OF SYSTEMS    Review of Systems   Constitutional: Negative  HENT:        Buzzing pressure bilateral ears, worse on right   Eyes: Negative  Respiratory: Negative  Cardiovascular: Negative  Gastrointestinal: Negative  Endocrine: Negative  Genitourinary: Negative  Skin: Negative  Allergic/Immunologic: Negative  Neurological: Positive for numbness (numbness from rhizotomy ) and headaches (right forehead, under right eye )  Negative for light-headedness  Right facial pain just below right eye, top of right forehead and right below ear lobe is returning with intensity  Hematological: Negative  Psychiatric/Behavioral: Negative            Meds/Allergies     Current Outpatient Medications   Medication Sig Dispense Refill    Acetaminophen (TYLENOL PO) Take 2 tablets by mouth as needed       ascorbic acid (VITAMIN C) 500 MG tablet Take 1 tablet (500 mg total) by mouth daily 60 tablet 0    aspirin 325 mg tablet Take 325 mg by mouth (Patient not taking: Reported on 8/4/2021)      baclofen 10 mg tablet Take 1 tablet (10 mg total) by mouth 3 (three) times a day 45 tablet 3    Calcium 250 MG CAPS Take 250 mg by mouth daily       cyanocobalamin (VITAMIN B-12) 1,000 mcg tablet Take 1 tablet by mouth daily       dexamethasone (DECADRON) 2 mg tablet Take 1 tablet (2 mg total) by mouth daily with breakfast 5 tablet 0    Ketoprofen 10 % CREA Apply 1 application topically as needed   2    MELATONIN PO Take 10 mg by mouth daily at bedtime       oxyCODONE-acetaminophen (PERCOCET) 5-325 mg per tablet Take 1 tablet by mouth 2-3 times daily as needed for pain  Do not fill until 8/15/2021 60 tablet 0    pregabalin (LYRICA) 200 MG capsule TAKE 2 CAPSULES IN THE MORNING, 1 CAPSULE IN THE AFTERNOON AND 2 CAPSULES AT BEDTIME 450 capsule 0    pyridoxine (VITAMIN B6) 100 mg tablet Take 1 tablet by mouth daily  (Patient not taking: Reported on 8/4/2021)      topiramate (TOPAMAX) 100 mg tablet TAKE 1 TABLET AT BEDTIME 90 tablet 3    TURMERIC CURCUMIN PO Take 1,500 mg by mouth once as needed        No current facility-administered medications for this visit  Allergies   Allergen Reactions    Gabapentin Delirium    Oxcarbazepine Dizziness    Lamictal [Lamotrigine] Confusion     Difficulty to think clearly     Tramadol        PAST HISTORY    Past Medical History:   Diagnosis Date    Arthritis     Right knee    Chronic pain disorder     Trigeminal neuralgia pain     Ulcer of bile duct        Past Surgical History:   Procedure Laterality Date    APPENDECTOMY  1962    BRAIN SURGERY  2005    Gamma Knife for Trigeminal Neuralgia    CATARACT EXTRACTION      ESOPHAGOGASTRODUODENOSCOPY      HERNIA REPAIR  1952    OTHER SURGICAL HISTORY      gamma knife RT in 2007 and 2010    OR STEREO TREAT TRIGEMINAL NERVE Right 3/6/2018    Procedure: RHIZOTOMY TRIGEMINAL NERVES (V2 & V3);   Surgeon: Lakesha Shore MD;  [facility];  Service: Neurosurgery    OR TOTAL KNEE ARTHROPLASTY Right 10/22/2018    Procedure: ARTHROPLASTY KNEE TOTAL;  Surgeon: Jimbo Lynn MD;  [facility];  Service: Orthopedics    RHIZOTOMY W/ RADIOFREQUENCY ABLATION Right 08/08/2014    Stereotactic Ablation of Gasserian Ganglion Right sided trigeminal V2 radiofrequency rhizotomy x2       Social History     Tobacco Use    Smoking status: Never Smoker    Smokeless tobacco: Never Used   Vaping Use    Vaping Use: Never used   Substance Use Topics    Alcohol use: Yes     Comment: Occasionally    Drug use: No       Family History Problem Relation Age of Onset    Emphysema Mother     Colon cancer Father     Skin cancer Maternal Grandmother          Above history personally reviewed  EXAM    Vitals: There were no vitals taken for this visit  ,There is no height or weight on file to calculate BMI  Physical Exam    Neurologic Exam      MEDICAL DECISION MAKING    Imaging Studies:     No results found      {Results Review Statement:79402}

## 2021-08-30 NOTE — ASSESSMENT & PLAN NOTE
· As addressed in HPI   · Wife reports he has been dealing with right sided facial pain for past 12 years  · Reports constant right sided facial pain of variable intensity,  , severity 10/10 , when mild is 3-4/10, daily usually 6/10  · Characterization of pain variable,  sharp, dull, aching, throbbing, stinging, shooting electric sensation  Right eye up into forehead  · Pain is relived by placing pressure on entire right side of face using hand  · Continued treating with neurology  Mfr medicinal management , current use of lyrica  · Continues care with pain management , reports recent baclofen use caused unfavorable and intolerable adverse effects, could not tolerate  · Right fce pain is not affected by hot or cold foor or beverages  Wearing dentures aggravates the pain , therefore is unable to chew hard food  · Dr Domingo Hammonds met with patient and concurs with Dr Lida Palacios impressions  Of trigeminal nerve atrophy , further ablative procedures such as glycerol rhizotomy could risk the development of anesthesia dolorosa  · Dr Domingo aHmmonds explained and offered a nerve block (injection of anesthetic agent and steroid), patient woulkd like to think about this    · Inquire with products reps for name of pain management specialist or neurosurgeon who places high cervical perc electrode leads           PLAN  · Referral to Functional neurosurgeon [facility] , Dr Shaye Tellez for assessment and consideration of neuromodulation with high cervical percutaneous electrode lead tip at C1-C2 , phone [phone] [facility]   · [employer] rep provided name for Dr Jonah Blount @ [phone]  · RTO prn

## 2021-08-30 NOTE — PATIENT INSTRUCTIONS
· High cervical spinal cord stimulator Perc electrode lead implantation at C1-2-3-4  for facial pain  [facility] Dr David Mar neurosurgery   · Dr Henderson Socks down in [place]  I know he does quite a bit of these types of cases

## 2021-09-16 DIAGNOSIS — G50.0 TRIGEMINAL NEURALGIA OF RIGHT SIDE OF FACE: ICD-10-CM

## 2021-09-16 RX ORDER — PREGABALIN 200 MG/1
CAPSULE ORAL
Qty: 450 CAPSULE | Refills: 0 | Status: SHIPPED | OUTPATIENT
Start: 2021-09-16 | End: 2021-11-11

## 2021-09-17 ENCOUNTER — OFFICE VISIT (OUTPATIENT)
Dept: PAIN MEDICINE | Facility: CLINIC | Age: 71
End: 2021-09-17
Payer: MEDICARE

## 2021-09-17 VITALS
WEIGHT: 259 LBS | DIASTOLIC BLOOD PRESSURE: 73 MMHG | BODY MASS INDEX: 37.16 KG/M2 | SYSTOLIC BLOOD PRESSURE: 121 MMHG | HEART RATE: 52 BPM

## 2021-09-17 DIAGNOSIS — Z79.891 LONG-TERM CURRENT USE OF OPIATE ANALGESIC: ICD-10-CM

## 2021-09-17 DIAGNOSIS — F11.20 UNCOMPLICATED OPIOID DEPENDENCE (HCC): ICD-10-CM

## 2021-09-17 DIAGNOSIS — G51.8 NEURALGIC FACIAL PAIN: ICD-10-CM

## 2021-09-17 DIAGNOSIS — G50.0 TRIGEMINAL NEURALGIA: ICD-10-CM

## 2021-09-17 DIAGNOSIS — F11.20 OPIOID TYPE DEPENDENCE, CONTINUOUS (HCC): ICD-10-CM

## 2021-09-17 DIAGNOSIS — G89.4 CHRONIC PAIN SYNDROME: Primary | ICD-10-CM

## 2021-09-17 PROCEDURE — 99214 OFFICE O/P EST MOD 30 MIN: CPT | Performed by: NURSE PRACTITIONER

## 2021-09-17 PROCEDURE — 80305 DRUG TEST PRSMV DIR OPT OBS: CPT | Performed by: NURSE PRACTITIONER

## 2021-09-17 RX ORDER — OXYCODONE HYDROCHLORIDE AND ACETAMINOPHEN 5; 325 MG/1; MG/1
TABLET ORAL
Qty: 60 TABLET | Refills: 0 | Status: SHIPPED | OUTPATIENT
Start: 2021-09-17 | End: 2021-11-11 | Stop reason: SDUPTHER

## 2021-09-17 RX ORDER — OXYCODONE HYDROCHLORIDE AND ACETAMINOPHEN 5; 325 MG/1; MG/1
TABLET ORAL
Qty: 60 TABLET | Refills: 0 | Status: SHIPPED | OUTPATIENT
Start: 2021-09-17 | End: 2021-09-17 | Stop reason: SDUPTHER

## 2021-09-17 NOTE — PATIENT INSTRUCTIONS
You may  your prescriptions today and on 10/15/2021    Opioid Dependence   WHAT YOU NEED TO KNOW:   What is opioid dependence? Opioids are medicines, such as morphine and codeine, used to treat pain  Dependence happens after you have used opioids regularly for a long period of time  Dependence means that your body gets used to how much medicine you take  Dependence is not the same as addiction  Addiction means that a person uses opioids to get high instead of using them to control pain  What are the signs and symptoms of opioid dependence? · You need more of the opioid to get the same amount of pain relief as you did when you first started taking it  · You have tried to use less opioid medicine but are not able to  · You have withdrawal symptoms when you take less of the opioid  What are the signs and symptoms of opioid withdrawal?  You may have the following signs and symptoms if you suddenly stop taking opioids or if you decrease the amount you normally take:  · Yawning     · Runny nose     · Nausea or vomiting     · Diarrhea     · Chills or goosebumps    · Muscle aches or cramps     · Anxiety  How is opioid dependence diagnosed? Your healthcare provider will do a physical exam  He will ask you questions about your symptoms and your use of opioids  He will also ask about your current and past use of other drugs and any family history of drug abuse  How is opioid dependence treated? You may be treated in a hospital or you may be treated as an outpatient  During detoxification (detox), healthcare providers will slowly decrease your dose of the opioid medicine you are dependent on  They may use another opioid medicine such as methadone to decrease symptoms of withdrawal  You may need to take this or another medicine for some time  Your healthcare provider will also replace the opioid with another pain medicine that is less likely to cause dependence   He may also suggest that you receive counseling and social support during treatment  What are the risks of opioid dependence? There is a risk of overdose during early treatment with methadone  You may become dependent on the medicines used to treat opioid dependence  Without treatment, you may develop other health problems or become addicted to opioids  Your risk of abusing alcohol and other drugs increases  You may also develop risky behaviors that can lead to an overdose, violence, and suicidal thoughts  When should I contact my healthcare provider? · Your speech is slurred  · You have difficulty staying awake  · You have nausea and vomiting  · You are easily upset or cry easily  · You have poor balance  · You have questions or concerns about your condition or care  When should I seek immediate care or call 911? · You feel lightheaded or faint  · You have a fast, slow, or irregular heartbeat  · You have a seizure  CARE AGREEMENT:   You have the right to help plan your care  Learn about your health condition and how it may be treated  Discuss treatment options with your caregivers to decide what care you want to receive  You always have the right to refuse treatment  The above information is an  only  It is not intended as medical advice for individual conditions or treatments  Talk to your doctor, nurse or pharmacist before following any medical regimen to see if it is safe and effective for you  © 2017 [facility] Information is for End User's use only and may not be sold, redistributed or otherwise used for commercial purposes  All illustrations and images included in CareNotes® are the copyrighted property of A D A M , Inc  or Nitesh Giraldo

## 2021-09-17 NOTE — PROGRESS NOTES
Assessment:  1  Chronic pain syndrome    2  Neuralgic facial pain    3  Trigeminal neuralgia    4  Opioid type dependence, continuous ([address] )    5  Long-term current use of opiate analgesic        Plan:  The patient is a 70 y o  male last seen on 7/23/2021 who presents for a follow up office visit in regards to chronic pain secondary to facial pain, and trigeminal neuralgia  I discussed with the patient the use of his pain medications  The patient reports having #24 tabs remaining of the Percocet form his last prescription dated 8/15/2021  The patient is concerned about becoming dependent on the Percocet and will often forego a dose despite having pain  I discussed with the patient on days his pain is less severe his may take tylenol ES to help with his symptoms  On days when the pain is severe, he should take the Percocet  I discussed with the patient limiting the use of the Percocet to only days when the pain is severe decreases the risk for becoming physically dependent on the medication  The patient was advised to limit the use of tylenol to no greater than 3000 mg daily which includes he tylenol in his percocet  The patient was agreeable and verbalized understanding  Two months of prescription for Percocet 5/325 mg by mouth 2-3 tabs daily as needed for severe pain were electronically sent to the patient's pharmacy on file  The patient  his prescription today with a 2nd prescription with a do not fill date of 10/15/2021  A prescription for Lyrica was also electronically sent to the patient's pharmacy on file  [place] Prescription Drug Monitoring Program report was reviewed and was appropriate     A urine drug screen was collected at today's office visit as part of our medication management protocol  The point of care testing results were appropriate for what was being prescribed  The specimen will be sent for confirmatory testing   The drug screen is medically necessary because the patient is either dependent on opioid medication or is being considered for opioid medication therapy and the results could impact ongoing or future treatment  The drug screen is to evaluate for the presences or absence of prescribed, non-prescribed, and/or illicit drugs/substances  There are risks associated with opioid medications, including dependence, addiction and tolerance  The patient understands and agrees to use these medications only as prescribed  Potential side effects of the medications include, but are not limited to, constipation, drowsiness, addiction, impaired judgment and risk of fatal overdose if not taken as prescribed  The patient was warned against driving while taking sedation medications  Sharing medications is a felony  At this point in time, the patient is showing no signs of addiction, abuse, diversion or suicidal ideation  The patient will continue to follow with [facility] for possible cervical spinal cord stimulator  The patient will follow-up in 8 weeks for medication prescription refill and reevaluation  The patient was advised to contact the office should their symptoms worsen in the interim  The patient was agreeable and verbalized an understanding  History of Present Illness: The patient is a 70 y o  male last seen on 7/23/2021 who presents for a follow up office visit in regards to chronic pain secondary to facial pain, and trigeminal neuralgia  The patient currently reports   ongoing right-sided facial pain that is worse since last office visit  Since the last office visit, the patient was seen by neurology and the patient states a cervical spinal cord stimulator was recommended to possibly help with his pain  The patient reports he was referred to [facility] for the potential trial   At this time, the patient continues with pain that is constant, burning, dull aching, sharp, pressure-like, shooting pain to the right side of his face    The patient reports there are days when he is unable to move from his chair due to the pain, and days when the pain is minimal    At this time, the patient currently rates his pain as 7/10 on numeric rating scale  Current pain medications include:  Percocet 5/325 mg by mouth 2-3 tabs daily as needed, and Lyrica 200 mg, 2 capsules in the morning, 1 capsule afternoon, 2 capsules at bedtime  Pain Contract Signed: 1/6/21  Last Urine Drug Screen: 9/17/21   Last dose of Oxycodone was 9/17/21 @ 10:00am    I have personally reviewed and/or updated the patient's past medical history, past surgical history, family history, social history, current medications, allergies, and vital signs today  Review of Systems:    Review of Systems   Respiratory: Negative for shortness of breath  Cardiovascular: Negative for chest pain  Gastrointestinal: Negative for constipation, diarrhea, nausea and vomiting  Musculoskeletal: Negative for arthralgias, gait problem, joint swelling and myalgias  Skin: Negative for rash  Neurological: Positive for headaches  Negative for dizziness, seizures and weakness  All other systems reviewed and are negative  Past Medical History:   Diagnosis Date    Arthritis     Right knee    Chronic pain disorder     Trigeminal neuralgia pain     Ulcer of bile duct        Past Surgical History:   Procedure Laterality Date    APPENDECTOMY  1962    BRAIN SURGERY  2005    Gamma Knife for Trigeminal Neuralgia    CATARACT EXTRACTION      ESOPHAGOGASTRODUODENOSCOPY      HERNIA REPAIR  1952    OTHER SURGICAL HISTORY      gamma knife RT in 2007 and 2010    CT STEREO TREAT TRIGEMINAL NERVE Right 3/6/2018    Procedure: RHIZOTOMY TRIGEMINAL NERVES (V2 & V3);   Surgeon: Ciera Tucker MD;  [facility];  Service: Neurosurgery    CT TOTAL KNEE ARTHROPLASTY Right 10/22/2018    Procedure: ARTHROPLASTY KNEE TOTAL;  Surgeon: Remy Hodges MD;  [facility];  Service: Orthopedics    REPLACEMENT TOTAL KNEE Right 10/2018    RHIZOTOMY W/ RADIOFREQUENCY ABLATION Right 08/08/2014    Stereotactic Ablation of Gasserian Ganglion Right sided trigeminal V2 radiofrequency rhizotomy x2       Family History   Problem Relation Age of Onset    Emphysema Mother     Colon cancer Father     Skin cancer Maternal Grandmother        Social History     Occupational History    Occupation: Retired     Comment: 21 years [employer] &    Tobacco Use    Smoking status: Never Smoker    Smokeless tobacco: Never Used   Vaping Use    Vaping Use: Never used   Substance and Sexual Activity    Alcohol use: Yes     Comment: Occasionally    Drug use: No    Sexual activity: Yes         Current Outpatient Medications:     Acetaminophen (TYLENOL PO), Take 2 tablets by mouth as needed , Disp: , Rfl:     ascorbic acid (VITAMIN C) 500 MG tablet, Take 1 tablet (500 mg total) by mouth daily, Disp: 60 tablet, Rfl: 0    cyanocobalamin (VITAMIN B-12) 1,000 mcg tablet, Take 1 tablet by mouth daily , Disp: , Rfl:     MELATONIN PO, Take 10 mg by mouth daily at bedtime , Disp: , Rfl:     oxyCODONE-acetaminophen (PERCOCET) 5-325 mg per tablet, Take 1 tablet by mouth 2-3 times daily as needed for pain   Do not fill until 10/15/2021 2nd month script, Disp: 60 tablet, Rfl: 0    pregabalin (LYRICA) 200 MG capsule, TAKE 2 CAPSULES IN THE MORNING, 1 CAPSULE IN THE AFTERNOON AND 2 CAPSULES AT BEDTIME, Disp: 450 capsule, Rfl: 0    topiramate (TOPAMAX) 100 mg tablet, TAKE 1 TABLET AT BEDTIME, Disp: 90 tablet, Rfl: 3    Calcium 250 MG CAPS, Take 250 mg by mouth daily , Disp: , Rfl:     TURMERIC CURCUMIN PO, Take 1,500 mg by mouth once as needed  (Patient not taking: Reported on 8/30/2021), Disp: , Rfl:     Allergies   Allergen Reactions    Gabapentin Delirium    Oxcarbazepine Dizziness    Lamictal [Lamotrigine] Confusion     Difficulty to think clearly     Tramadol        Physical Exam:    /73   Pulse (!) 52   Wt 117 kg (259 lb)   BMI 37 16 kg/m²     Constitutional:normal, well developed, well nourished, alert, in no distress and non-toxic and no overt pain behavior   and overweight  Eyes:anicteric  HEENT:grossly intact  Neck:supple, symmetric, trachea midline and no masses   Pulmonary:even and unlabored  Cardiovascular:No edema or pitting edema present  Skin:Normal without rashes or lesions and well hydrated  Psychiatric:Mood and affect appropriate  Neurologic:Cranial Nerves II-XII grossly intact  Musculoskeletal:normal      Imaging  No orders to display         Orders Placed This Encounter   Procedures    MM ALL_Prescribed Meds and Special Instructions    MM DT_Alprazolam Definitive Test    MM DT_Amphetamine Definitive Test    MM DT_Buprenorphine Definitive Test    MM DT_Butalbital Definitive Test    MM DT_Clonazepam Definitive Test    MM DT_Cocaine Definitive Test    MM DT_Codeine Definitive Test    MM DT_Dextromethorphan Definitive Test    MM Diazepam Definitive Test    MM DT_Ethyl Glucuronide/Ethyl Sulfate Definitive Test    MM DT_Fentanyl Definitive Test    MM DT_Heroin Definitive Test    MM DT_Hydrocodone Definitive Test    MM DT_Hydromorphone Definitive Test    MM DT_Kratom Definitive Test    MM DT_Levorphanol Definitive Test    MM DT_MDMA Definitive Test    MM DT_Meperidine Definitive Test    MM DT_Methadone Definitive Test    MM DT_Methylphenidate Definitive Test    MM DT_Methamphetamine Definitive Test    MM DT_Morphine Definitive Test    MM Lorazepam Definitive Test    MM DT_Oxazepam Definitive Test    MM DT_Oxycodone Definitive Test    MM DT_Oxymorphone Definitive Test    MM DT_Phencyclidine Definitive Test    MM DT_Phenobarbital Definitive Test    MM DT_Phentermine Definitive Test    MM DT_Secobarbital Definitive Test    MM DT_Spice Definitive Test    MM DT_Tapentadol Definitive Test    MM DT_Temazapam Definitive Test    MM DT_THC Definitive Test    MM DT_Tramadol Definitive Test

## 2021-09-21 LAB

## 2021-10-28 ENCOUNTER — TELEPHONE (OUTPATIENT)
Dept: PAIN MEDICINE | Facility: CLINIC | Age: 71
End: 2021-10-28

## 2021-11-11 ENCOUNTER — OFFICE VISIT (OUTPATIENT)
Dept: PAIN MEDICINE | Facility: CLINIC | Age: 71
End: 2021-11-11
Payer: MEDICARE

## 2021-11-11 VITALS
HEART RATE: 58 BPM | DIASTOLIC BLOOD PRESSURE: 61 MMHG | WEIGHT: 260 LBS | SYSTOLIC BLOOD PRESSURE: 97 MMHG | RESPIRATION RATE: 16 BRPM | HEIGHT: 70 IN | BODY MASS INDEX: 37.22 KG/M2

## 2021-11-11 DIAGNOSIS — G89.4 CHRONIC PAIN SYNDROME: ICD-10-CM

## 2021-11-11 DIAGNOSIS — G50.0 TRIGEMINAL NEURALGIA: ICD-10-CM

## 2021-11-11 DIAGNOSIS — F11.20 UNCOMPLICATED OPIOID DEPENDENCE (HCC): ICD-10-CM

## 2021-11-11 DIAGNOSIS — Z79.891 LONG-TERM CURRENT USE OF OPIATE ANALGESIC: ICD-10-CM

## 2021-11-11 DIAGNOSIS — G50.0 TRIGEMINAL NEURALGIA OF RIGHT SIDE OF FACE: ICD-10-CM

## 2021-11-11 DIAGNOSIS — R51.9 FACIAL PAIN: ICD-10-CM

## 2021-11-11 PROCEDURE — 99214 OFFICE O/P EST MOD 30 MIN: CPT | Performed by: NURSE PRACTITIONER

## 2021-11-11 RX ORDER — PREGABALIN 200 MG/1
CAPSULE ORAL
Qty: 450 CAPSULE | Refills: 1 | Status: SHIPPED | OUTPATIENT
Start: 2021-11-11 | End: 2022-04-12 | Stop reason: SDUPTHER

## 2021-11-11 RX ORDER — OXYCODONE HYDROCHLORIDE AND ACETAMINOPHEN 5; 325 MG/1; MG/1
TABLET ORAL
Qty: 60 TABLET | Refills: 0 | Status: SHIPPED | OUTPATIENT
Start: 2021-11-11 | End: 2022-02-01 | Stop reason: ALTCHOICE

## 2021-11-11 RX ORDER — OXYCODONE HYDROCHLORIDE AND ACETAMINOPHEN 5; 325 MG/1; MG/1
TABLET ORAL
Qty: 60 TABLET | Refills: 0 | Status: SHIPPED | OUTPATIENT
Start: 2021-11-11 | End: 2021-11-11 | Stop reason: SDUPTHER

## 2022-01-27 NOTE — ASSESSMENT & PLAN NOTE
preventive therapy  -  Wean off Amitriptyline  Take 2 pills at bedtime for 10 days then 1 pill at bedtime for 10 days then stop  -  Continue the Dilantin to 100 mg 3 times a day  -  Lyrica 200 mg 2 tabs in the morning 1 in the afternoon and 2 at bedtime-  This has also helped him sleep better at night  - Start Melatonin 5 mg at 30 min before bedtime  May increase up to 10 mg    We will also put in a referral sleep medicine - Dr Adelina Redding None

## 2022-02-01 ENCOUNTER — OFFICE VISIT (OUTPATIENT)
Dept: PAIN MEDICINE | Facility: CLINIC | Age: 72
End: 2022-02-01
Payer: MEDICARE

## 2022-02-01 VITALS
SYSTOLIC BLOOD PRESSURE: 96 MMHG | WEIGHT: 261 LBS | HEART RATE: 72 BPM | BODY MASS INDEX: 37.37 KG/M2 | DIASTOLIC BLOOD PRESSURE: 65 MMHG | HEIGHT: 70 IN

## 2022-02-01 DIAGNOSIS — G89.4 CHRONIC PAIN SYNDROME: Primary | ICD-10-CM

## 2022-02-01 DIAGNOSIS — Z79.891 LONG-TERM CURRENT USE OF OPIATE ANALGESIC: ICD-10-CM

## 2022-02-01 DIAGNOSIS — F11.20 UNCOMPLICATED OPIOID DEPENDENCE (HCC): ICD-10-CM

## 2022-02-01 DIAGNOSIS — G50.0 TRIGEMINAL NEURALGIA: ICD-10-CM

## 2022-02-01 PROCEDURE — 99214 OFFICE O/P EST MOD 30 MIN: CPT | Performed by: NURSE PRACTITIONER

## 2022-02-01 PROCEDURE — 80305 DRUG TEST PRSMV DIR OPT OBS: CPT | Performed by: NURSE PRACTITIONER

## 2022-02-01 RX ORDER — OXYCODONE HYDROCHLORIDE AND ACETAMINOPHEN 5; 325 MG/1; MG/1
TABLET ORAL
Qty: 60 TABLET | Refills: 0 | Status: SHIPPED | OUTPATIENT
Start: 2022-02-01 | End: 2022-04-12 | Stop reason: SDUPTHER

## 2022-02-01 RX ORDER — OXYCODONE HYDROCHLORIDE AND ACETAMINOPHEN 5; 325 MG/1; MG/1
TABLET ORAL
Qty: 60 TABLET | Refills: 0 | Status: SHIPPED | OUTPATIENT
Start: 2022-03-03 | End: 2022-04-02

## 2022-02-01 NOTE — PROGRESS NOTES
Assessment:  1  Chronic pain syndrome    2  Trigeminal neuralgia        Plan:  The patient was seen in the office today for follow-up of his chronic pain secondary to trigeminal neuralgia  He continues to report overall reduced pain with improved level of functioning taking Percocet 5/325 mg up to twice a day as needed  He states that he has no side effects from this medication and that some days he only needs to take 1 dose  This medication helps his pain symptoms by 50%  Therefore, we will continue him at this dose and frequency and fill dates are 02/01/2022 and 03/03/2022  [place] Prescription Drug Monitoring Program report was reviewed and was appropriate     A urine drug screen was collected at today's office visit as part of our medication management protocol  The point of care testing results were appropriate for what was being prescribed  The specimen will be sent for confirmatory testing  The drug screen is medically necessary because the patient is either dependent on opioid medication or is being considered for opioid medication therapy and the results could impact ongoing or future treatment  The drug screen is to evaluate for the presences or absence of prescribed, non-prescribed, and/or illicit drugs/substances  There are risks associated with opioid medications, including dependence, addiction and tolerance  The patient understands and agrees to use these medications only as prescribed  Potential side effects of the medications include, but are not limited to, constipation, drowsiness, addiction, impaired judgment and risk of fatal overdose if not taken as prescribed  The patient was warned against driving while taking sedation medications  Sharing medications is a felony  At this point in time, the patient is showing no signs of addiction, abuse, diversion or suicidal ideation  The patient will follow-up in 8 weeks for medication prescription refill and reevaluation   The patient was advised to contact the office should their symptoms worsen in the interim  The patient was agreeable and verbalized an understanding  History of Present Illness: The patient is a 70 y o  male last seen on 11/11/2021 who presents for a follow up office visit in regards to chronic pain secondary to trigeminal neuralgia  The patient currently reports a pain score that he states is most of the time around 4-5/10 and sometimes can exacerbate to 9/10 and on occasion it can be excruciating at 10/10  His pain is constant to some degree with a quality that is dull aching, sharp, shooting with pins and needles and an electrical sensation on the right side of his face  The patient was enquiring about who he could be referred to for a cervical spinal cord stimulator  He tells me that there was a doctor within the [facility] that was going to do the procedure for him and he ended up moving to [place]  He tells me that he saw pain management at [facility] and they referred him to a doctor that supposedly did it and then when he saw that Dr  He was told that he did not do that particular procedure  I did reach out to Dr Janell Parra to see who he refers to for this procedure and will await his answer  Current pain medications includes:  Percocet 5/325 mg   The patient reports that this regimen is providing 50 % pain relief  The patient is reporting no side effects from this pain medication regimen  Pain Contract Signed: 02/01/2022  Last Urine Drug Screen: 02/01/2022  Last Dose:  02/01/2022 @10am    I have personally reviewed and/or updated the patient's past medical history, past surgical history, family history, social history, current medications, allergies, and vital signs today  Review of Systems:    Review of Systems   Gastrointestinal: Positive for constipation  Musculoskeletal: Positive for gait problem     Psychiatric/Behavioral:        Slight memory loss         Past Medical History:   Diagnosis Date    Arthritis     Right knee    Chronic pain disorder     Trigeminal neuralgia pain     Ulcer of bile duct        Past Surgical History:   Procedure Laterality Date    APPENDECTOMY  1962    BRAIN SURGERY  2005    Gamma Knife for Trigeminal Neuralgia    CATARACT EXTRACTION      ESOPHAGOGASTRODUODENOSCOPY      HERNIA REPAIR  1952    OTHER SURGICAL HISTORY      gamma knife RT in 2007 and 2010    ID STEREO TREAT TRIGEMINAL NERVE Right 3/6/2018    Procedure: RHIZOTOMY TRIGEMINAL NERVES (V2 & V3);   Surgeon: Sergio Hines MD;  Location: [facility];  Service: Neurosurgery    ID TOTAL KNEE ARTHROPLASTY Right 10/22/2018    Procedure: ARTHROPLASTY KNEE TOTAL;  Surgeon: Melvin Pathak MD;  [facility];  Service: Orthopedics    REPLACEMENT TOTAL KNEE Right 10/2018    RHIZOTOMY W/ RADIOFREQUENCY ABLATION Right 08/08/2014    Stereotactic Ablation of Gasserian Ganglion Right sided trigeminal V2 radiofrequency rhizotomy x2       Family History   Problem Relation Age of Onset    Emphysema Mother     Colon cancer Father     Skin cancer Maternal Grandmother        Social History     Occupational History    Occupation: Retired     Comment: 21 years [employer] &    Tobacco Use    Smoking status: Never Smoker    Smokeless tobacco: Never Used   Vaping Use    Vaping Use: Never used   Substance and Sexual Activity    Alcohol use: Yes     Comment: Occasionally    Drug use: No    Sexual activity: Yes         Current Outpatient Medications:     Acetaminophen (TYLENOL PO), Take 2 tablets by mouth as needed , Disp: , Rfl:     ascorbic acid (VITAMIN C) 500 MG tablet, Take 1 tablet (500 mg total) by mouth daily, Disp: 60 tablet, Rfl: 0    Calcium 250 MG CAPS, Take 250 mg by mouth daily , Disp: , Rfl:     cyanocobalamin (VITAMIN B-12) 1,000 mcg tablet, Take 1 tablet by mouth daily , Disp: , Rfl:     MELATONIN PO, Take 10 mg by mouth daily at bedtime , Disp: , Rfl:    pregabalin (LYRICA) 200 MG capsule, Take 2 caps PO in the morning, 1 cap in the afternoon, and 2 caps at night, Disp: 450 capsule, Rfl: 1    topiramate (TOPAMAX) 100 mg tablet, TAKE 1 TABLET AT BEDTIME, Disp: 90 tablet, Rfl: 3    TURMERIC CURCUMIN PO, Take 1,500 mg by mouth once as needed  , Disp: , Rfl:     oxyCODONE-acetaminophen (PERCOCET) 5-325 mg per tablet, Take one tablet PO BID PRN for pain, Disp: 60 tablet, Rfl: 0    [START ON 3/3/2022] oxyCODONE-acetaminophen (PERCOCET) 5-325 mg per tablet, Take one tablet PO BID PRN for pain, Disp: 60 tablet, Rfl: 0    Allergies   Allergen Reactions    Gabapentin Delirium    Oxcarbazepine Dizziness    Lamictal [Lamotrigine] Confusion     Difficulty to think clearly     Tramadol        Physical Exam:    BP 96/65 (BP Location: Left arm, Patient Position: Sitting, Cuff Size: Adult)   Pulse 72   Ht 5' 10" (1 778 m)   Wt 118 kg (261 lb)   BMI 37 45 kg/m²     Constitutional:obese  Eyes:anicteric  HEENT:grossly intact  Neck:supple, symmetric, trachea midline and no masses   Pulmonary:even and unlabored  Cardiovascular:No edema or pitting edema present  Skin:Normal without rashes or lesions and well hydrated  Psychiatric:Mood and affect appropriate  Neurologic:Cranial Nerves II-XII grossly intact  Musculoskeletal:antalgic      Imaging  No orders to display         No orders of the defined types were placed in this encounter

## 2022-02-01 NOTE — PATIENT INSTRUCTIONS
Opioid Safety   WHAT YOU NEED TO KNOW:   What do I need to know about opioid safety? Safety includes the correct use, storage, and disposal of opioids  Examples of opioid pain medicines are oxycodone, morphine, fentanyl, and codeine  How are opioids given? Opioids can be given as a pill, patch, or suppository  They can also be given as an injection into a vein, near a nerve, or into a joint  Your prescription may include one or both of the following:  · Short-acting opioids  work fast and relieve pain for about 3 to 6 hours  They are often used for acute or breakthrough pain  · Long-acting opioids  usually last at least 8 hours  You can take them less often and they may be used for chronic pain  How do I use opioids safely? · Take prescribed opioids exactly as directed  Opioids come with directions based on the kind and how it is given  Talk to your healthcare provider or a pharmacist if you have any questions  Do not take more than the recommended amount  Too much can cause a life-threatening overdose  Do not continue to take it after your pain stops  You may develop tolerance  This means you keep needing higher doses to get the same effect  You may also develop opioid use disorder  This means you are not able to control your opioid use  · Do not give opioids to others or take opioids that belong to someone else  The kind or amount one person takes may not be right for another  The person you share them with may also be taking medicines that do not mix with opioids  He or she may drink alcohol or use other drugs that can cause life-threatening problems when mixed with opioids  · Do not mix opioids with other medicines or alcohol  The combination can cause an overdose, or cause you to stop breathing  Alcohol, sleeping pills, and medicines such as antihistamines can make you sleepy  A combination with opioids can lead to a coma      · Do not drive or operate heavy machinery after you use an opioid  You may feel drowsy or have trouble concentrating  You can injure yourself or others if you drive or use heavy machinery when you are not alert  Your provider or pharmacist can tell you how long to wait after a dose before you do these activities  · Talk to your healthcare provider if you have any side effects  Side effects include nausea, sleepiness, itching, and trouble thinking clearly  Your provider may need to make changes to the kind or amount of opioid you are taking  He or she can also help you find ways to prevent or relieve side effects  What can I do to manage constipation? Constipation is the most common side effect of opioid medicine  Constipation is when you have hard, dry bowel movements, or you go longer than usual between bowel movements  Tell your healthcare provider about all changes in your bowel movements while you are taking opioids  He or she may recommend laxative medicine to help you have a bowel movement  He or she may also change the kind of opioid you are taking, or change when you take it  The following are more ways you can prevent or relieve constipation:  · Drink liquids as directed  You may need to drink extra liquids to help soften and move your bowels  Ask how much liquid to drink each day and which liquids are best for you  · Eat high-fiber foods  This may help decrease constipation by adding bulk to your bowel movements  High-fiber foods include fruits, vegetables, whole-grain breads and cereals, and beans  Your healthcare provider or dietitian can help you create a high-fiber meal plan  Your provider may also recommend a fiber supplement if you cannot get enough fiber from food  · Exercise regularly  Regular physical activity can help stimulate your intestines  Walking is a good exercise to prevent or relieve constipation  Ask which exercises are best for you  · Schedule a time each day to have a bowel movement    This may help train your body to have regular bowel movements  Bend forward while you are on the toilet to help move the bowel movement out  Sit on the toilet for at least 10 minutes, even if you do not have a bowel movement  How do I store opioids safely? · Store opioids where others cannot easily get them  Keep them in a locked cabinet or secure area  Do not  keep them in a purse or other bag you carry with you  A person may be looking for something else and find the opioids  · Make sure opioids are stored out of the reach of children  A child can easily overdose on opioids  Opioids may look like candy to a small child  What is the best way to dispose of opioids? The laws vary by country and area  In the [place], the best way is to return the opioids through a take-back program  This program is offered by the [employer] ([employer])  The following are options for using the program:  · Take the opioids to a TANA collection site  The site is often a law enforcement center  Call your local law enforcement center for scheduled take-back days in your area  You will be given information on where to go if the collection site is in a different location  · Take the opioids to an approved pharmacy or hospital   A pharmacy or hospital may be set up as a collection site  You will need to ask if it is a TANA collection site if you were not directed there  A pharmacy or doctor's office may not be able to take back opioids unless it is a TANA site  · Use a mail-back system  This means you are given containers to put the opioids into  You will then mail them in the containers  · Use a take-back drop box  This is a place to leave the opioids at any time  People and animals will not be able to get into the box  Your local law enforcement agency can tell you where to find a drop box in your area  What are some other safe ways to dispose of opioids? The medicine may come with disposal instructions   The instructions may vary depending on the brand of medicine you are using  Instructions may come in a Medication Guide, but not every medicine has one  You may instead get instructions from your pharmacy or doctor  Follow instructions carefully  The following are general guidelines to follow:  · Find out if you can flush the opioid  Some opioids can be flushed down the toilet or poured into the sink  You will need to contact authorities in your area to see if this is an option for you  The FDA also offers a list of medicines that are safe to flush down the toilet  You can check the list if you cannot get the information for your local area  · Ask your waste management company about rules for putting opioids in the trash  The company will be able to give you specific directions  Scratch out personal information on the original medicine label so it cannot be read  Then put it in the trash  Do not label the trash or put any information on it about the opioids  It should look like regular household trash so no one is tempted to look for the opioids  Keep the trash out of the reach of children and animals  Always make sure trash is secure  · Talk to officials if you live in a facility  If you live in a nursing home or assisted living center, talk to an official  The person will know the rules for your area  What are some other ways to manage pain? · Ask your healthcare provider about non-opioid medicines to control pain  Some medicines may even work better than opioids, depending on the cause of your pain  Nonprescription medicines include NSAIDs (such as ibuprofen) and acetaminophen  Prescription medicines include muscle relaxers, antidepressants, and steroids  · Pain may be managed without any medicines  Some ways to relieve pain include massage, aromatherapy, or meditation  Physical or occupational therapy may also help  Where can I find more information?    · Drug Enforcement Administration  [address]  Phone: 1- 191 - 058-7440  Web Address: [facility] gov/drug_disposal/    · [address]  and Drug Administration  [place] , [address]  Phone: 5- 824 - [phone]  Web Address: http://[employer]/    Call your local emergency number (911 in the [address]), or have someone else call if:   · You have a seizure  · You cannot be woken  · You have trouble staying awake and your breathing is slow or shallow  · Your speech is slurred, or you are confused  · You are dizzy or stumble when you walk  When should I or someone close to me call my doctor? · You are extremely drowsy, or you have trouble staying awake or speaking  · You have pale or clammy skin  · You have blue fingernails or lips  · Your heartbeat is slower than normal     · You cannot stop vomiting  · You have questions or concerns about your condition or care  CARE AGREEMENT:   You have the right to help plan your care  Learn about your health condition and how it may be treated  Discuss treatment options with your healthcare providers to decide what care you want to receive  You always have the right to refuse treatment  The above information is an  only  It is not intended as medical advice for individual conditions or treatments  Talk to your doctor, nurse or pharmacist before following any medical regimen to see if it is safe and effective for you  © Copyright [employer] 2021 Information is for End User's use only and may not be sold, redistributed or otherwise used for commercial purposes   All illustrations and images included in CareNotes® are the copyrighted property of A D A M , Inc  or [facility] [employer]

## 2022-02-03 LAB
6MAM UR QL CFM: NEGATIVE NG/ML
7AMINOCLONAZEPAM UR QL CFM: NEGATIVE NG/ML
A-OH ALPRAZ UR QL CFM: NEGATIVE NG/ML
AMPHET UR QL CFM: NEGATIVE NG/ML
AMPHET UR QL CFM: NEGATIVE NG/ML
BUPRENORPHINE UR QL CFM: NEGATIVE NG/ML
BUTALBITAL UR QL CFM: NEGATIVE NG/ML
BZE UR QL CFM: NEGATIVE NG/ML
EDDP UR QL CFM: NEGATIVE NG/ML
ETHYL GLUCURONIDE UR QL CFM: NEGATIVE NG/ML
ETHYL SULFATE UR QL SCN: NEGATIVE NG/ML
FENTANYL UR QL CFM: NEGATIVE NG/ML
GLIADIN IGG SER IA-ACNC: NEGATIVE NG/ML
HYDROCODONE UR QL CFM: NEGATIVE NG/ML
HYDROMORPHONE UR QL CFM: NEGATIVE NG/ML
LORAZEPAM UR QL CFM: NEGATIVE NG/ML
MDMA UR QL CFM: NEGATIVE NG/ML
ME-PHENIDATE UR QL CFM: NEGATIVE NG/ML
MEPERIDINE UR QL CFM: NEGATIVE NG/ML
METHADONE UR QL CFM: NEGATIVE NG/ML
METHAMPHET UR QL CFM: NEGATIVE NG/ML
MORPHINE UR QL CFM: NEGATIVE NG/ML
NORBUPRENORPHINE UR QL CFM: NEGATIVE NG/ML
NORDIAZEPAM UR QL CFM: NEGATIVE NG/ML
NORFENTANYL UR QL CFM: NEGATIVE NG/ML
NORHYDROCODONE UR QL CFM: NEGATIVE NG/ML
NORMEPERIDINE UR QL CFM: NEGATIVE NG/ML
NOROXYCODONE UR QL CFM: NORMAL NG/ML
OXAZEPAM UR QL CFM: NEGATIVE NG/ML
OXAZEPAM UR QL CFM: NEGATIVE NG/ML
OXYCODONE UR QL CFM: NORMAL NG/ML
OXYMORPHONE UR QL CFM: NORMAL NG/ML
OXYMORPHONE UR QL CFM: NORMAL NG/ML
PCP UR QL CFM: NEGATIVE NG/ML
PHENOBARB UR QL CFM: NEGATIVE NG/ML
RESULT ALL_PRESCRIBED MEDS AND SPECIAL INSTRUCTIONS: NORMAL
SECOBARBITAL UR QL CFM: NEGATIVE NG/ML
SL AMB 3-METHYL-FENTANYL QUANTIFICATION: NORMAL NG/ML
SL AMB 4-ANPP QUANTIFICATION: NORMAL NG/ML
SL AMB 4-FIBF QUANTIFICATION: NORMAL NG/ML
SL AMB 5F-ADB-M7 METABOLITE QUANTIFICATION: NEGATIVE NG/ML
SL AMB 7-OH-MITRAGYNINE (KRATOM ALKALOID) QUANTIFICATION: NEGATIVE NG/ML
SL AMB AB-FUBINACA-M3 METABOLITE QUANTIFICATION: NEGATIVE NG/ML
SL AMB ACETYL FENTANYL QUANTIFICATION: NORMAL NG/ML
SL AMB ACETYL NORFENTANYL QUANTIFICATION: NORMAL NG/ML
SL AMB ACRYL FENTANYL QUANTIFICATION: NORMAL NG/ML
SL AMB BUTRYL FENTANYL QUANTIFICATION: NORMAL NG/ML
SL AMB CARFENTANIL QUANTIFICATION: NORMAL NG/ML
SL AMB CTHC (MARIJUANA METABOLITE) QUANTIFICATION: NEGATIVE NG/ML
SL AMB CYCLOPROPYL FENTANYL QUANTIFICATION: NORMAL NG/ML
SL AMB DEXTROMETHORPHAN QUANTIFICATION: NEGATIVE NG/ML
SL AMB DEXTRORPHAN (DEXTROMETHORPHAN METABOLITE) QUANT: NEGATIVE NG/ML
SL AMB DEXTRORPHAN (DEXTROMETHORPHAN METABOLITE) QUANT: NEGATIVE NG/ML
SL AMB FURANYL FENTANYL QUANTIFICATION: NORMAL NG/ML
SL AMB JWH018 METABOLITE QUANTIFICATION: NEGATIVE NG/ML
SL AMB JWH073 METABOLITE QUANTIFICATION: NEGATIVE NG/ML
SL AMB MDMB-FUBINACA-M1 METABOLITE QUANTIFICATION: NEGATIVE NG/ML
SL AMB METHOXYACETYL FENTANYL QUANTIFICATION: NORMAL NG/ML
SL AMB N-DESMETHYL U-47700 QUANTIFICATION: NORMAL NG/ML
SL AMB N-DESMETHYL-TRAMADOL QUANTIFICATION: NEGATIVE NG/ML
SL AMB NORQUETIAPINE QUANTIFICATION: NEGATIVE NG/ML
SL AMB PHENTERMINE QUANTIFICATION: NEGATIVE NG/ML
SL AMB QUETIAPINE QUANTIFICATION: NEGATIVE NG/ML
SL AMB RCS4 METABOLITE QUANTIFICATION: NEGATIVE NG/ML
SL AMB RITALINIC ACID QUANTIFICATION: NEGATIVE NG/ML
SL AMB U-47700 QUANTIFICATION: NORMAL NG/ML
SPECIMEN DRAWN SERPL: NEGATIVE NG/ML
TAPENTADOL UR QL CFM: NEGATIVE NG/ML
TEMAZEPAM UR QL CFM: NEGATIVE NG/ML
TRAMADOL UR QL CFM: NEGATIVE NG/ML
URATE/CREAT 24H UR: NEGATIVE NG/ML

## 2022-03-14 ENCOUNTER — TELEPHONE (OUTPATIENT)
Dept: PAIN MEDICINE | Facility: CLINIC | Age: 72
End: 2022-03-14

## 2022-03-14 NOTE — TELEPHONE ENCOUNTER
Med refill   Name of medication:oxyCODONE-acetaminophen (PERCOCET) 5-325 mg per tablet         Frequency:Take one tablet PO BID PRN for pain    How many tablets left:7    Pharmacy: [address] [place] [address] [id number], [address] [place] [id number]   Phone:  [phone]  Fax:  [phone]     Best call back # [phone]

## 2022-03-14 NOTE — TELEPHONE ENCOUNTER
S/w pt's spouse, informed her that Percocet is filled at pharmacy per pharmacist at [address]

## 2022-04-12 ENCOUNTER — OFFICE VISIT (OUTPATIENT)
Dept: PAIN MEDICINE | Facility: CLINIC | Age: 72
End: 2022-04-12
Payer: MEDICARE

## 2022-04-12 ENCOUNTER — TRANSCRIBE ORDERS (OUTPATIENT)
Dept: PAIN MEDICINE | Facility: CLINIC | Age: 72
End: 2022-04-12

## 2022-04-12 VITALS
DIASTOLIC BLOOD PRESSURE: 68 MMHG | BODY MASS INDEX: 36.65 KG/M2 | WEIGHT: 256 LBS | HEIGHT: 70 IN | SYSTOLIC BLOOD PRESSURE: 118 MMHG

## 2022-04-12 DIAGNOSIS — G50.0 TRIGEMINAL NEURALGIA: Primary | ICD-10-CM

## 2022-04-12 DIAGNOSIS — G89.4 CHRONIC PAIN SYNDROME: Primary | ICD-10-CM

## 2022-04-12 DIAGNOSIS — G50.0 TRIGEMINAL NEURALGIA: ICD-10-CM

## 2022-04-12 PROCEDURE — 99214 OFFICE O/P EST MOD 30 MIN: CPT

## 2022-04-12 RX ORDER — OXYCODONE HYDROCHLORIDE AND ACETAMINOPHEN 5; 325 MG/1; MG/1
TABLET ORAL
Qty: 60 TABLET | Refills: 0 | Status: SHIPPED | OUTPATIENT
Start: 2022-04-12 | End: 2022-06-08 | Stop reason: SDUPTHER

## 2022-04-12 RX ORDER — OXYCODONE HYDROCHLORIDE AND ACETAMINOPHEN 5; 325 MG/1; MG/1
TABLET ORAL
Qty: 60 TABLET | Refills: 0 | Status: SHIPPED | OUTPATIENT
Start: 2022-05-10 | End: 2022-06-08 | Stop reason: SDUPTHER

## 2022-04-12 RX ORDER — PREGABALIN 200 MG/1
CAPSULE ORAL
Qty: 450 CAPSULE | Refills: 1 | Status: SHIPPED | OUTPATIENT
Start: 2022-04-12

## 2022-04-12 NOTE — PATIENT INSTRUCTIONS
Opioid Safety   AMBULATORY CARE:   Opioid safety  includes the correct use, storage, and disposal of opioids  Examples of opioid medicines to treat pain include oxycodone, morphine, fentanyl, and codeine  Call your local emergency number (911 in the [address]), or have someone else call if:   · You have a seizure  · You cannot be woken  · You have trouble staying awake and your breathing is slow or shallow  · Your speech is slurred, or you are confused  · You are dizzy or stumble when you walk  Call your doctor, or have someone close to you call if:   · You are extremely drowsy, or you have trouble staying awake or speaking  · You have pale or clammy skin  · You have blue fingernails or lips  · Your heartbeat is slower than normal     · You cannot stop vomiting  · You have questions or concerns about your condition or care  Use opioids safely:   · Take prescribed opioids exactly as directed  Opioids come with directions based on the kind of opioid and how it is given  Do not take more than the recommended amount, or for longer than needed  · Do not give opioids to others or take opioids that belong to someone else  Misuse of opioids can lead to an addiction or overdose  · Do not mix opioids with other medicines or alcohol  The combination can cause an overdose, or lead to a coma  · Do not drive or operate heavy machinery after you take the opioid  Your provider or pharmacist can tell you how long to wait after a dose before you do these activities  · Talk to your healthcare provider if you have any side effects  He or she can help you prevent or relieve side effects  Side effects include nausea, sleepiness, itching, and trouble thinking clearly  Manage constipation:  Constipation is the most common side effect of opioid medicine  Constipation is when you have hard, dry bowel movements, or you go longer than usual between bowel movements   Tell your healthcare provider about all changes in your bowel movements while you are taking opioids  He or she may recommend laxative medicine to help you have a bowel movement  He or she may also change the kind of opioid you are taking, or change when you take it  The following are more ways you can prevent or relieve constipation:  · Drink liquids as directed  You may need to drink extra liquids to help soften and move your bowels  Ask how much liquid to drink each day and which liquids are best for you  · Eat high-fiber foods  This may help decrease constipation by adding bulk to your bowel movements  High-fiber foods include fruits, vegetables, whole-grain breads and cereals, and beans  Your healthcare provider or dietitian can help you create a high-fiber meal plan  Your provider may also recommend a fiber supplement if you cannot get enough fiber from food  · Exercise regularly  Regular physical activity can help stimulate your intestines  Walking is a good exercise to prevent or relieve constipation  Ask which exercises are best for you  · Schedule a time each day to have a bowel movement  This may help train your body to have regular bowel movements  Bend forward while you are on the toilet to help move the bowel movement out  Sit on the toilet for at least 10 minutes, even if you do not have a bowel movement  Store opioids safely:   · Store opioids where others cannot easily get them  Keep them in a locked cabinet or secure area  Do not  keep them in a purse or other bag you carry with you  A person may be looking for something else and find the opioids  · Make sure opioids are stored out of the reach of children  A child can easily overdose on opioids  Opioids may look like candy to a small child  The best way to dispose of opioids: The laws vary by country and area   In the [place], the best way is to return the opioids through a take-back program  This program is offered by the [place] [employer] Agency ([address])  The following are options for using the program:  · Take the opioids to a TANA collection site  The site is often a law enforcement center  Call your local law enforcement center for scheduled take-back days in your area  You will be given information on where to go if the collection site is in a different location  · Take the opioids to an approved pharmacy or hospital   A pharmacy or hospital may be set up as a collection site  You will need to ask if it is a TANA collection site if you were not directed there  A pharmacy or doctor's office may not be able to take back opioids unless it is a TANA site  · Use a mail-back system  This means you are given containers to put the opioids into  You will then mail them in the containers  · Use a take-back drop box  This is a place to leave the opioids at any time  People and animals will not be able to get into the box  Your local law enforcement agency can tell you where to find a drop box in your area  Other safe ways to dispose of opioids: The medicine may come with disposal instructions  The instructions may vary depending on the brand of medicine you are using  Instructions may come in a Medication Guide, but not every medicine has one  You may instead get instructions from your pharmacy or doctor  Follow instructions carefully  The following are general guidelines to follow:  · Find out if you can flush the opioid  Some opioids can be flushed down the toilet or poured into the sink  You will need to contact authorities in your area to see if this is an option for you  The FDA also offers a list of medicines that are safe to flush down the toilet  You can check the list if you cannot get the information for your local area  · Ask your waste management company about rules for putting opioids in the trash  The company will be able to give you specific directions   Scratch out personal information on the original medicine label so it cannot be read  Then put it in the trash  Do not label the trash or put any information on it about the opioids  It should look like regular household trash so no one is tempted to look for the opioids  Keep the trash out of the reach of children and animals  Always make sure trash is secure  · Talk to officials if you live in a facility  If you live in a nursing home or assisted living center, talk to an official  The person will know the rules for your area  Other ways to manage pain:   · Ask your healthcare provider about non-opioid medicines to control pain  Nonprescription medicines include NSAIDs (such as ibuprofen) and acetaminophen  Prescription medicines include muscle relaxers, antidepressants, and steroids  · Pain may be managed without any medicines  Some ways to relieve pain include massage, aromatherapy, or meditation  Physical or occupational therapy may also help  For more information:   · Drug Enforcement Administration  [facility] [address]  Phone: 9- 969 - 084-2300  Web Address: [facility]/drug_disposal/    · [address]  and Drug Administration  [facility]  [place] , [address] [place] [address]  Phone: 2- 302 - [phone]  Web Address: http://[employer]/    Follow up with your doctor or pain specialist as directed: You may need to have your dose adjusted  Your doctor or pain specialist can also help you find ways to manage pain without opioids  Write down your questions so you remember to ask them during your visits  © Copyright [employer] 2022 Information is for End User's use only and may not be sold, redistributed or otherwise used for commercial purposes  All illustrations and images included in CareNotes® are the copyrighted property of [employer] A M [employer] Inc  or [employer]   The above information is an  only  It is not intended as medical advice for individual conditions or treatments   Talk to your doctor, nurse or pharmacist before following any medical regimen to see if it is safe and effective for you

## 2022-04-12 NOTE — PROGRESS NOTES
Assessment:  1  Chronic pain syndrome    2  Trigeminal neuralgia        Plan:  The patient is a 79-year-old male with a history of chronic pain secondary to trigeminal neuralgia  He states his pain is worse on the right side of his face but is managed with Percocet and Lyrica which is giving him 50% relief of his pain symptoms therefore I will continue him on this medication regimen  Two month scripts for Percocet 5/325 mg were electronically sent to the patient's pharmacy with do not fill dates of today 04/12/2022 and 05/10/2022  Refills for Lyrica at 200 mg [facility]  Instructed the patient to take the Percocet 5/325mg 1 tablet twice a day on a more consistent basis to provide him more relief of his pain symptoms and we will re-evaluate and 8 weeks at his next office visit  Instructed patient that he is on the maximum amount of Lyrica and we are unable to increase that medication  Patient will follow-up with Neurosurgery in regards to information about cervical stimulator  There are risks associated with opioid medications, including dependence, addiction and tolerance  The patient understands and agrees to use these medications only as prescribed  Potential side effects of the medications include, but are not limited to, constipation, drowsiness, addiction, impaired judgment and risk of fatal overdose if not taken as prescribed  The patient was warned against driving while taking sedation medications  Sharing medications is a felony  At this point in time, the patient is showing no signs of addiction, abuse, diversion or suicidal ideation  [place] Prescription Drug Monitoring Program report was reviewed and was appropriate     My impressions and treatment recommendations were discussed in detail with the patient who verbalized understanding and had no further questions  Discharge instructions were provided   I personally saw and examined the patient and I agree with the above discussed plan of care  No orders of the defined types were placed in this encounter  New Medications Ordered This Visit   Medications    oxyCODONE-acetaminophen (PERCOCET) 5-325 mg per tablet     Sig: Take one tablet PO BID PRN for pain     Dispense:  60 tablet     Refill:  0    oxyCODONE-acetaminophen (PERCOCET) 5-325 mg per tablet     Sig: Take one tablet PO BID PRN for pain     Dispense:  60 tablet     Refill:  0    pregabalin (LYRICA) 200 MG capsule     Sig: Take 2 caps PO in the morning, 1 cap in the afternoon, and 2 caps at night     Dispense:  450 capsule     Refill:  1       History of Present Illness:  Isabel Cabrera is a 70 y o  male with a history of chronic pain secondary to trigeminal neuralgia  He was last seen on 02/01/2022 where he was continued on Percocet 5/325 mg 1 tablet twice a day  as needed for pain and Lyrica 200 mg 2 capsules in the morning 1 capsule in the afternoon and 2 capsules at night  He states some days he does not take the Percocet twice a day, states he takes it as needed  He states the pain is on the right side of his face and worse since the last office visit and constant  He rates the quality of his pain as dull/aching, pressure-like, shooting and pins/needles and is currently rating it an 8/10 on a numeric scale  Current medications include Percocet 5/325 mg 1 tablet twice a day and Lyrica at 200 mg 2 capsules in the morning, 1 capsule in the afternoon and 2 capsules at night  He states this medication regimen is providing him 50% relief of his pain symptoms and the only side effect he is complaining of his constipation which is managed by diet  He states he spoke with a neurosurgery provider last year about the possibility of a cervical stimulator  Patient is unsure whether he would like to proceed      Pain Contract Signed: 02/01/2022  Last Urine Drug Screen: 02/01/2022  Last Dose:  4/12/22 at 10am    I have personally reviewed and/or updated the patient's past medical history, past surgical history, family history, social history, current medications, allergies, and vital signs today  Review of Systems   Constitutional: Negative  Eyes: Positive for pain  Respiratory: Negative  Cardiovascular: Negative  Gastrointestinal: Positive for constipation  Endocrine: Negative  Genitourinary: Negative  Musculoskeletal: Negative  Skin: Negative  Allergic/Immunologic: Negative  Neurological: Positive for numbness and headaches  Hematological: Negative  Psychiatric/Behavioral: Negative  Patient Active Problem List   Diagnosis    Trigeminal neuralgia    Facial spasm    Anemia    Bilateral patellofemoral syndrome    Diverticulosis of colon    Esophageal reflux    Intracranial meningioma ([address]    Vertigo    Vitamin D deficiency    Need for pneumococcal vaccination    Insomnia    Primary osteoarthritis of right knee    DNS (deviated nasal septum)    Chronic pain syndrome    Uncomplicated opioid dependence ([address] )    Long-term current use of opiate analgesic    Mood disorder ([address] )    Skin lesions    Neuralgic facial pain       Past Medical History:   Diagnosis Date    Arthritis     Right knee    Chronic pain disorder     Trigeminal neuralgia pain     Ulcer of bile duct        Past Surgical History:   Procedure Laterality Date    APPENDECTOMY  1962    BRAIN SURGERY  2005    Gamma Knife for Trigeminal Neuralgia    CATARACT EXTRACTION      ESOPHAGOGASTRODUODENOSCOPY      HERNIA REPAIR  1952    OTHER SURGICAL HISTORY      gamma knife RT in 2007 and 2010    MN STEREO TREAT TRIGEMINAL NERVE Right 3/6/2018    Procedure: RHIZOTOMY TRIGEMINAL NERVES (V2 & V3);   Surgeon: Corry Lew MD;  Location:  MAIN OR;  Service: Neurosurgery    MN TOTAL KNEE ARTHROPLASTY Right 10/22/2018    Procedure: ARTHROPLASTY KNEE TOTAL;  Surgeon: Louisa Hutchinson MD;  [facility]: [facility]; Service: Orthopedics    REPLACEMENT TOTAL KNEE Right 10/2018    RHIZOTOMY W/ RADIOFREQUENCY ABLATION Right 08/08/2014    Stereotactic Ablation of Gasserian Ganglion Right sided trigeminal V2 radiofrequency rhizotomy x2       Family History   Problem Relation Age of Onset    Emphysema Mother     Colon cancer Father     Skin cancer Maternal Grandmother        Social History     Occupational History    Occupation: Retired     Comment: 21 years [employer] &    Tobacco Use    Smoking status: Never Smoker    Smokeless tobacco: Never Used   Vaping Use    Vaping Use: Never used   Substance and Sexual Activity    Alcohol use: Yes     Comment: Occasionally    Drug use: No    Sexual activity: Yes       Current Outpatient Medications on File Prior to Visit   Medication Sig    Acetaminophen (TYLENOL PO) Take 2 tablets by mouth as needed     ascorbic acid (VITAMIN C) 500 MG tablet Take 1 tablet (500 mg total) by mouth daily    Calcium 250 MG CAPS Take 250 mg by mouth daily     cyanocobalamin (VITAMIN B-12) 1,000 mcg tablet Take 1 tablet by mouth daily     MELATONIN PO Take 10 mg by mouth daily at bedtime     topiramate (TOPAMAX) 100 mg tablet TAKE 1 TABLET AT BEDTIME    TURMERIC CURCUMIN PO Take 1,500 mg by mouth once as needed      [DISCONTINUED] oxyCODONE-acetaminophen (PERCOCET) 5-325 mg per tablet Take one tablet PO BID PRN for pain    [DISCONTINUED] pregabalin (LYRICA) 200 MG capsule Take 2 caps PO in the morning, 1 cap in the afternoon, and 2 caps at night     No current facility-administered medications on file prior to visit         Allergies   Allergen Reactions    Gabapentin Delirium    Oxcarbazepine Dizziness    Lamictal [Lamotrigine] Confusion     Difficulty to think clearly     Tramadol        Physical Exam:    /68   Ht 5' 10" (1 778 m)   Wt 116 kg (256 lb)   BMI 36 73 kg/m²     Constitutional:normal, well developed, well nourished, alert, in no distress and non-toxic and no overt pain behavior    Eyes:anicteric  HEENT:grossly intact  Neck:supple, symmetric, trachea midline and no masses   Pulmonary:even and unlabored  Cardiovascular:No edema or pitting edema present  Skin:Normal without rashes or lesions and well hydrated  Psychiatric:Mood and affect appropriate  Neurologic:Cranial Nerves II-XII grossly intact  Musculoskeletal:normal    Imaging

## 2022-05-09 ENCOUNTER — APPOINTMENT (OUTPATIENT)
Dept: LAB | Facility: CLINIC | Age: 72
End: 2022-05-09
Payer: MEDICARE

## 2022-05-09 DIAGNOSIS — G50.0 TRIGEMINAL NEURALGIA: ICD-10-CM

## 2022-05-09 LAB
ALBUMIN SERPL BCP-MCNC: 3.5 G/DL (ref 3.5–5)
ALP SERPL-CCNC: 74 U/L (ref 46–116)
ALT SERPL W P-5'-P-CCNC: 23 U/L (ref 12–78)
ANION GAP SERPL CALCULATED.3IONS-SCNC: 5 MMOL/L (ref 4–13)
AST SERPL W P-5'-P-CCNC: 17 U/L (ref 5–45)
BILIRUB SERPL-MCNC: 0.52 MG/DL (ref 0.2–1)
BUN SERPL-MCNC: 12 MG/DL (ref 5–25)
CALCIUM SERPL-MCNC: 10 MG/DL (ref 8.3–10.1)
CHLORIDE SERPL-SCNC: 115 MMOL/L (ref 100–108)
CO2 SERPL-SCNC: 24 MMOL/L (ref 21–32)
CREAT SERPL-MCNC: 1.25 MG/DL (ref 0.6–1.3)
GFR SERPL CREATININE-BSD FRML MDRD: 57 ML/MIN/1.73SQ M
GLUCOSE P FAST SERPL-MCNC: 77 MG/DL (ref 65–99)
POTASSIUM SERPL-SCNC: 4.4 MMOL/L (ref 3.5–5.3)
PROT SERPL-MCNC: 6.5 G/DL (ref 6.4–8.2)
SODIUM SERPL-SCNC: 144 MMOL/L (ref 136–145)

## 2022-05-09 PROCEDURE — 80053 COMPREHEN METABOLIC PANEL: CPT

## 2022-05-09 PROCEDURE — 36415 COLL VENOUS BLD VENIPUNCTURE: CPT

## 2022-05-26 ENCOUNTER — OFFICE VISIT (OUTPATIENT)
Dept: FAMILY MEDICINE CLINIC | Facility: CLINIC | Age: 72
End: 2022-05-26
Payer: MEDICARE

## 2022-05-26 VITALS
HEART RATE: 60 BPM | BODY MASS INDEX: 36.25 KG/M2 | WEIGHT: 253.25 LBS | RESPIRATION RATE: 16 BRPM | SYSTOLIC BLOOD PRESSURE: 100 MMHG | DIASTOLIC BLOOD PRESSURE: 60 MMHG | HEIGHT: 70 IN | OXYGEN SATURATION: 97 % | TEMPERATURE: 97.5 F

## 2022-05-26 DIAGNOSIS — Z01.818 PREOPERATIVE EVALUATION OF A MEDICAL CONDITION TO RULE OUT SURGICAL CONTRAINDICATIONS (TAR REQUIRED): Primary | ICD-10-CM

## 2022-05-26 PROCEDURE — 99214 OFFICE O/P EST MOD 30 MIN: CPT | Performed by: FAMILY MEDICINE

## 2022-05-26 NOTE — PROGRESS NOTES
FAMILY PRACTICE OFFICE VISIT       NAME: Rima Thao  AGE: 70 y o  SEX: male       : 1950        MRN: [id number]    DATE: 2022  TIME: 3:25 PM    Assessment and Plan     Problem List Items Addressed This Visit        Other    Preoperative evaluation of a medical condition to rule out surgical contraindications (TAR required) - Primary     Preoperative consultation  Overall the patient appears to be in stable health and is medically cleared for microvascular decompression procedure be performed in 2022 as described                     Chief Complaint     Chief Complaint   Patient presents with    Pre-op Exam             History of Present Illness     Patient in the office for preoperative consultation  He is scheduled to have microvascular decompression procedure in 2022 to be performed by [facility] neurosurgeon Dr Hoff  Patient is being treated for chronic symptoms related to trigeminal neuralgia  Patient denies any recent illness  His COVID vaccination is up-to-date  He has tried multiple other medications and has failed to receive any significant alleviation of his chronic neuralgia pain which is primarily right-sided  Review of Systems   Review of Systems   Constitutional: Negative  HENT: Negative  Eyes: Negative  Respiratory: Negative  Cardiovascular: Negative  Gastrointestinal: Negative  Genitourinary: Negative  Musculoskeletal: Negative  Skin: Negative  Neurological: Positive for headaches  Psychiatric/Behavioral: Negative          Active Problem List     Patient Active Problem List   Diagnosis    Trigeminal neuralgia    Facial spasm    Anemia    Bilateral patellofemoral syndrome    Diverticulosis of colon    Esophageal reflux    Intracranial meningioma (HCC)    Vertigo    Vitamin D deficiency    Need for pneumococcal vaccination    Insomnia    Primary osteoarthritis of right knee    DNS (deviated nasal septum)    Chronic pain syndrome    Uncomplicated opioid dependence ([facility] [address] )    Long-term current use of opiate analgesic    Mood disorder (HCC)    Skin lesions    Neuralgic facial pain    Preoperative evaluation of a medical condition to rule out surgical contraindications (TAR required)       Past Medical History:  Past Medical History:   Diagnosis Date    Arthritis     Right knee    Chronic pain disorder     Trigeminal neuralgia pain     Ulcer of bile duct        Past Surgical History:  Past Surgical History:   Procedure Laterality Date    APPENDECTOMY  1962    BRAIN SURGERY  2005    Gamma Knife for Trigeminal Neuralgia    CATARACT EXTRACTION      ESOPHAGOGASTRODUODENOSCOPY      HERNIA REPAIR  1952    OTHER SURGICAL HISTORY      gamma knife RT in 2007 and 2010    NM STEREO TREAT TRIGEMINAL NERVE Right 3/6/2018    Procedure: RHIZOTOMY TRIGEMINAL NERVES (V2 & V3); Surgeon: Alondra Rogesr MD;  [facility];  Service: Neurosurgery    NM TOTAL KNEE ARTHROPLASTY Right 10/22/2018    Procedure: ARTHROPLASTY KNEE TOTAL;  Surgeon: Yudy Antonio MD;  [facility];  Service: Orthopedics    REPLACEMENT TOTAL KNEE Right 10/2018    RHIZOTOMY W/ RADIOFREQUENCY ABLATION Right 08/08/2014    Stereotactic Ablation of Gasserian Ganglion Right sided trigeminal V2 radiofrequency rhizotomy x2       Family History:  Family History   Problem Relation Age of Onset    Emphysema Mother     Colon cancer Father     Skin cancer Maternal Grandmother        Social History:  Social History     Socioeconomic History    Marital status: /Civil Union     Spouse name:  Enrique Lawrence Number of children: 2    Years of education: BS Degree plus addl classes    Highest education level: Not on file   Occupational History    Occupation: Retired     Comment: 20 years [address] Use    Smoking status: Never Smoker    Smokeless tobacco: Never Used   Vaping Use    Vaping Use: Never used Substance and Sexual Activity    Alcohol use: Yes     Comment: Occasionally    Drug use: No    Sexual activity: Yes   Other Topics Concern    Not on file   Social History Narrative    Caffeine use: Daily caffeinated cola, drinks coffee    Lives at home with wife Beto     Social Determinants of Health     Financial Resource Strain: Not on file   Food Insecurity: Not on file   Transportation Needs: Not on file   Physical Activity: Not on file   Stress: Not on file   Social Connections: Not on file   Intimate Partner Violence: Not on file   Housing Stability: Not on file       Objective     Vitals:    05/26/22 1137   BP: 100/60   Pulse: 60   Resp: 16   Temp: 97 5 °F (36 4 °C)   SpO2: 97%     Wt Readings from Last 3 Encounters:   05/26/22 115 kg (253 lb 4 oz)   04/12/22 116 kg (256 lb)   02/01/22 118 kg (261 lb)       Physical Exam  Constitutional:       General: He is not in acute distress  Appearance: Normal appearance  He is not ill-appearing  HENT:      Head: Normocephalic and atraumatic  Eyes:      General:         Right eye: No discharge  Left eye: No discharge  Extraocular Movements: Extraocular movements intact  Conjunctiva/sclera: Conjunctivae normal       Pupils: Pupils are equal, round, and reactive to light  Neck:      Vascular: No carotid bruit  Cardiovascular:      Rate and Rhythm: Normal rate and regular rhythm  Heart sounds: Normal heart sounds  No murmur heard  Pulmonary:      Effort: Pulmonary effort is normal       Breath sounds: Normal breath sounds  No wheezing, rhonchi or rales  Abdominal:      General: Abdomen is flat  Bowel sounds are normal  There is no distension  Palpations: Abdomen is soft  Tenderness: There is no abdominal tenderness  There is no guarding or rebound  Musculoskeletal:      Right lower leg: No edema  Left lower leg: No edema  Lymphadenopathy:      Cervical: No cervical adenopathy  Skin:     Findings: No rash  Neurological:      General: No focal deficit present  Mental Status: He is alert and oriented to person, place, and time  Cranial Nerves: No cranial nerve deficit  Psychiatric:         Mood and Affect: Mood normal          Behavior: Behavior normal          Thought Content:  Thought content normal          Judgment: Judgment normal      EKG showed sinus rhythm a first-degree AV block, incomplete right bundle branch block, negative acute ischemic changes    Pertinent Laboratory/Diagnostic Studies:  Lab Results   Component Value Date    GLUCOSE 78 12/19/2015    BUN 12 05/09/2022    CREATININE 1 25 05/09/2022    CALCIUM 10 0 05/09/2022     12/19/2015    K 4 4 05/09/2022    CO2 24 05/09/2022     (H) 05/09/2022     Lab Results   Component Value Date    ALT 23 05/09/2022    AST 17 05/09/2022    ALKPHOS 74 05/09/2022    BILITOT 0 45 06/24/2015       Lab Results   Component Value Date    WBC 7 88 06/12/2021    HGB 14 5 06/12/2021    HCT 45 6 06/12/2021    MCV 95 06/12/2021     06/12/2021       No results found for: TSH    Lab Results   Component Value Date    CHOL 185 06/24/2015     Lab Results   Component Value Date    TRIG 108 12/18/2019     Lab Results   Component Value Date    HDL 44 12/18/2019     Lab Results   Component Value Date    LDLCALC 106 (H) 12/18/2019     Lab Results   Component Value Date    HGBA1C 5 1 09/26/2018       Results for orders placed or performed in visit on 05/09/22   Comprehensive metabolic panel   Result Value Ref Range    Sodium 144 136 - 145 mmol/L    Potassium 4 4 3 5 - 5 3 mmol/L    Chloride 115 (H) 100 - 108 mmol/L    CO2 24 21 - 32 mmol/L    ANION GAP 5 4 - 13 mmol/L    BUN 12 5 - 25 mg/dL    Creatinine 1 25 0 60 - 1 30 mg/dL    Glucose, Fasting 77 65 - 99 mg/dL    Calcium 10 0 8 3 - 10 1 mg/dL    AST 17 5 - 45 U/L    ALT 23 12 - 78 U/L    Alkaline Phosphatase 74 46 - 116 U/L    Total Protein 6 5 6 4 - 8 2 g/dL    Albumin 3 5 3 5 - 5 0 g/dL    Total Bilirubin 0 52 0 20 - 1 00 mg/dL    eGFR 57 ml/min/1 73sq m       No orders of the defined types were placed in this encounter  ALLERGIES:  Allergies   Allergen Reactions    Gabapentin Delirium    Oxcarbazepine Dizziness    Lamictal [Lamotrigine] Confusion     Difficulty to think clearly     Tramadol        Current Medications     Current Outpatient Medications   Medication Sig Dispense Refill    Acetaminophen (TYLENOL PO) Take 2 tablets by mouth as needed       ascorbic acid (VITAMIN C) 500 MG tablet Take 1 tablet (500 mg total) by mouth daily 60 tablet 0    Calcium 250 MG CAPS Take 250 mg by mouth daily       cyanocobalamin (VITAMIN B-12) 1,000 mcg tablet Take 1 tablet by mouth daily       MELATONIN PO Take 10 mg by mouth daily at bedtime       oxyCODONE-acetaminophen (PERCOCET) 5-325 mg per tablet Take one tablet PO BID PRN for pain 60 tablet 0    pregabalin (LYRICA) 200 MG capsule Take 2 caps PO in the morning, 1 cap in the afternoon, and 2 caps at night 450 capsule 1    topiramate (TOPAMAX) 100 mg tablet TAKE 1 TABLET AT BEDTIME 90 tablet 3    oxyCODONE-acetaminophen (PERCOCET) 5-325 mg per tablet Take one tablet PO BID PRN for pain (Patient not taking: Reported on 5/26/2022) 60 tablet 0    TURMERIC CURCUMIN PO Take 1,500 mg by mouth once as needed   (Patient not taking: Reported on 5/26/2022)       No current facility-administered medications for this visit           Health Maintenance     Health Maintenance   Topic Date Due    Hepatitis C Screening  Never done    SLP PLAN OF CARE  Never done    DTaP,Tdap,and Td Vaccines (1 - Tdap) Never done   [facility] Annual Wellness Visit (AWV)  06/12/2022    BMI: Followup Plan  06/12/2022    Fall Risk  05/26/2023    Depression Screening  05/26/2023    BMI: Adult  05/26/2023    Colorectal Cancer Screening  08/15/2027    Pneumococcal Vaccine: 65+ Years  Completed    Influenza Vaccine  Completed    COVID-19 Vaccine  Completed    HIB Vaccine Aged Out    Hepatitis B Vaccine  Aged Out    IPV Vaccine  Aged Out    Hepatitis A Vaccine  Aged Out    Meningococcal ACWY Vaccine  Aged Out    HPV Vaccine  Aged Out     Immunization History   Administered Date(s) Administered    COVID-19 PFIZER VACCINE 0 3 ML IM 03/03/2021, 03/24/2021, 10/06/2021, 04/20/2022    COVID-19 Pfizer vac (Fox-sucrose, gray cap) 12 yr+ IM 04/20/2022    INFLUENZA 11/07/2018, 09/18/2020, 10/06/2021    Influenza Split High Dose Preservative Free IM 11/07/2018, 09/27/2019    Pneumococcal Conjugate 13-Valent 02/22/2018, 02/04/2021    Pneumococcal Polysaccharide PPV23 58/21/5499       Dayana Gonzalez MD

## 2022-05-26 NOTE — ASSESSMENT & PLAN NOTE
Preoperative consultation    Overall the patient appears to be in stable health and is medically cleared for microvascular decompression procedure be performed in June 2022 as described

## 2022-06-08 ENCOUNTER — OFFICE VISIT (OUTPATIENT)
Dept: PAIN MEDICINE | Facility: CLINIC | Age: 72
End: 2022-06-08
Payer: MEDICARE

## 2022-06-08 VITALS
BODY MASS INDEX: 35.93 KG/M2 | HEART RATE: 68 BPM | WEIGHT: 251 LBS | RESPIRATION RATE: 16 BRPM | HEIGHT: 70 IN | SYSTOLIC BLOOD PRESSURE: 105 MMHG | DIASTOLIC BLOOD PRESSURE: 61 MMHG

## 2022-06-08 DIAGNOSIS — Z79.891 LONG-TERM CURRENT USE OF OPIATE ANALGESIC: ICD-10-CM

## 2022-06-08 DIAGNOSIS — F11.20 UNCOMPLICATED OPIOID DEPENDENCE (HCC): ICD-10-CM

## 2022-06-08 DIAGNOSIS — G89.4 CHRONIC PAIN SYNDROME: Primary | ICD-10-CM

## 2022-06-08 DIAGNOSIS — G50.0 TRIGEMINAL NEURALGIA: ICD-10-CM

## 2022-06-08 PROCEDURE — 80305 DRUG TEST PRSMV DIR OPT OBS: CPT

## 2022-06-08 PROCEDURE — 99214 OFFICE O/P EST MOD 30 MIN: CPT

## 2022-06-08 RX ORDER — OXYCODONE HYDROCHLORIDE AND ACETAMINOPHEN 5; 325 MG/1; MG/1
TABLET ORAL
Qty: 65 TABLET | Refills: 0 | Status: SHIPPED | OUTPATIENT
Start: 2022-07-15

## 2022-06-08 RX ORDER — OXYCODONE HYDROCHLORIDE AND ACETAMINOPHEN 5; 325 MG/1; MG/1
TABLET ORAL
Qty: 65 TABLET | Refills: 0 | Status: SHIPPED | OUTPATIENT
Start: 2022-06-17

## 2022-06-08 NOTE — PROGRESS NOTES
Assessment:  1  Chronic pain syndrome    2  Trigeminal neuralgia    3  Long-term current use of opiate analgesic    4  Uncomplicated opioid dependence ([facility] [address] )        Plan:  The patient is a 19-year-old male with a history of chronic pain secondary to facial pain and trigeminal neuralgia who presents to the office with worsening right-sided facial pain that has worsened since his last office visit  At this time since he received moderate relief of his pain symptoms taking Percocet, I will continue him on this medication as prescribed however I will add an additional 5 tablets each month so he can take an additional tablet on severe pain days without running out of medication at the end of the month  Two month scripts for Percocet 5/325 mg were electronically sent to the patient's pharmacy with do not fill dates of 06/17/2022 and 07/15/2022  Follow-up with Neurosurgery at [facility] on 06/21/2022  Instructed patient to call our office if he is scheduled for a surgery date  [place] Prescription Drug Monitoring Program report was reviewed and was appropriate     A urine drug screen was collected at today's office visit as part of our medication management protocol  The point of care testing results were appropriate for what was being prescribed  The specimen will be sent for confirmatory testing  The drug screen is medically necessary because the patient is either dependent on opioid medication or is being considered for opioid medication therapy and the results could impact ongoing or future treatment  The drug screen is to evaluate for the presences or absence of prescribed, non-prescribed, and/or illicit drugs/substances  There are risks associated with opioid medications, including dependence, addiction and tolerance  The patient understands and agrees to use these medications only as prescribed   Potential side effects of the medications include, but are not limited to, constipation, drowsiness, addiction, impaired judgment and risk of fatal overdose if not taken as prescribed  The patient was warned against driving while taking sedation medications  Sharing medications is a felony  At this point in time, the patient is showing no signs of addiction, abuse, diversion or suicidal ideation  The patient will follow-up in 8 weeks for medication prescription refill and reevaluation  The patient was advised to contact the office should their symptoms worsen in the interim  The patient was agreeable and verbalized an understanding  History of Present Illness: The patient is a 70 y o  male with a history of chronic pain secondary to facial pain and trigeminal neuralgia  He was last seen on 04/12/2022 where he was continued on Percocet 5/325 mg 1 tablet twice a day  He presents to the office with worsening right-sided facial pain  He states his pain is worse since the last office visit and constant but worse in the morning  He rates the quality of his pain as dull/aching, sharp, pressure-like, shooting, numbness and pins/needles and is currently rating it a 7/10 on a numeric scale  Current pain medications include Percocet 5/325 mg 1 tablet twice a day as needed for pain  He states there has been 1-2 occasions where he took a 3rd tablet due to severe pain  He states this medication regimen provides him 40-60% relief of his pain symptoms and denies any side effects at this time  He states since his last office visit he has seen Dr Brynn Soto with Neurosurgery at [address] [place] [address] due to a recent finding of a calcified meningioma on a CT of his head that was done on 05/12/2022  He will follow up with Neurosurgery on 06/21/2022 for possible surgical intervention      Pain Contract Signed: 2/1/22  Last Urine Drug Screen: 2/1/22  Last Dose: 6/8/22 @ 11:00 AM    I have personally reviewed and/or updated the patient's past medical history, past surgical history, family history, social history, current medications, allergies, and vital signs today  Review of Systems:    Review of Systems   Respiratory: Negative for shortness of breath  Cardiovascular: Negative for chest pain  Gastrointestinal: Negative for constipation, diarrhea, nausea and vomiting  Musculoskeletal: Negative for arthralgias, gait problem, joint swelling and myalgias  Skin: Negative for rash  Facial and Head Pain   Neurological: Negative for dizziness, seizures and weakness  All other systems reviewed and are negative  Past Medical History:   Diagnosis Date    Arthritis     Right knee    Chronic pain disorder     Trigeminal neuralgia pain     Ulcer of bile duct        Past Surgical History:   Procedure Laterality Date    APPENDECTOMY  1962    BRAIN SURGERY  2005    Gamma Knife for Trigeminal Neuralgia    CATARACT EXTRACTION      ESOPHAGOGASTRODUODENOSCOPY      HERNIA REPAIR  1952    OTHER SURGICAL HISTORY      gamma knife RT in 2007 and 2010    [place] STEREO TREAT TRIGEMINAL NERVE Right 3/6/2018    Procedure: RHIZOTOMY TRIGEMINAL NERVES (V2 & V3);   Surgeon: Jorgito Scanlon MD;  [facility];  Service: Neurosurgery    IL TOTAL KNEE ARTHROPLASTY Right 10/22/2018    Procedure: ARTHROPLASTY KNEE TOTAL;  Surgeon: Lake Corona MD;  [facility];  Service: Orthopedics    REPLACEMENT TOTAL KNEE Right 10/2018    RHIZOTOMY W/ RADIOFREQUENCY ABLATION Right 08/08/2014    Stereotactic Ablation of Gasserian Ganglion Right sided trigeminal V2 radiofrequency rhizotomy x2       Family History   Problem Relation Age of Onset    Emphysema Mother     Colon cancer Father     Skin cancer Maternal Grandmother        Social History     Occupational History    Occupation: Retired     Comment: 20 years [employer] &    Tobacco Use    Smoking status: Never Smoker    Smokeless tobacco: Never Used   Vaping Use    Vaping Use: Never used   Substance and Sexual Activity    Alcohol use: Yes     Comment: Occasionally    Drug use: No    Sexual activity: Yes         Current Outpatient Medications:     Acetaminophen (TYLENOL PO), Take 2 tablets by mouth as needed , Disp: , Rfl:     ascorbic acid (VITAMIN C) 500 MG tablet, Take 1 tablet (500 mg total) by mouth daily, Disp: 60 tablet, Rfl: 0    Calcium 250 MG CAPS, Take 250 mg by mouth daily , Disp: , Rfl:     cyanocobalamin (VITAMIN B-12) 1,000 mcg tablet, Take 1 tablet by mouth daily , Disp: , Rfl:     MELATONIN PO, Take 10 mg by mouth daily at bedtime , Disp: , Rfl:     [START ON 7/15/2022] oxyCODONE-acetaminophen (PERCOCET) 5-325 mg per tablet, Take one tablet PO BID PRN for pain, Disp: 65 tablet, Rfl: 0    [START ON 6/17/2022] oxyCODONE-acetaminophen (PERCOCET) 5-325 mg per tablet, Take one tablet PO BID PRN for pain, Disp: 65 tablet, Rfl: 0    pregabalin (LYRICA) 200 MG capsule, Take 2 caps PO in the morning, 1 cap in the afternoon, and 2 caps at night, Disp: 450 capsule, Rfl: 1    topiramate (TOPAMAX) 100 mg tablet, TAKE 1 TABLET AT BEDTIME, Disp: 90 tablet, Rfl: 3    TURMERIC CURCUMIN PO, Take 1,500 mg by mouth once as needed   (Patient not taking: No sig reported), Disp: , Rfl:     Allergies   Allergen Reactions    Gabapentin Delirium    Oxcarbazepine Dizziness    Lamictal [Lamotrigine] Confusion     Difficulty to think clearly     Tramadol        Physical Exam:    /61   Pulse 68   Resp 16   Ht 5' 10" (1 778 m)   Wt 114 kg (251 lb)   BMI 36 01 kg/m²     Constitutional:normal, well developed, well nourished, alert, in no distress and non-toxic and no overt pain behavior    Eyes:anicteric  HEENT:grossly intact  Neck:supple, symmetric, trachea midline and no masses   Pulmonary:even and unlabored  Cardiovascular:No edema or pitting edema present  Skin:Normal without rashes or lesions and well hydrated  Psychiatric:Mood and affect appropriate  Neurologic:Cranial Nerves II-XII grossly intact  Musculoskeletal:normal      Imaging  No orders to display         Orders Placed This Encounter   Procedures    MM DT_Alprazolam Definitive Test    MM DT_Amphetamine Definitive Test    MM DT_Buprenorphine Definitive Test    MM DT_Butalbital Definitive Test    MM DT_Clonazepam Definitive Test    MM DT_Cocaine Definitive Test    MM DT_Codeine Definitive Test    MM DT_Dextromethorphan Definitive Test    MM Diazepam Definitive Test    MM DT_Ethyl Glucuronide/Ethyl Sulfate Definitive Test    MM DT_Fentanyl Definitive Test    MM DT_Heroin Definitive Test    MM DT_Hydrocodone Definitive Test    MM DT_Hydromorphone Definitive Test    MM DT_Kratom Definitive Test    MM DT_Levorphanol Definitive Test    MM DT_MDMA Definitive Test    MM DT_Meperidine Definitive Test    MM DT_Methadone Definitive Test    MM DT_Methamphetamine Definitive Test    MM DT_Methylphenidate Definitive Test    MM DT_Morphine Definitive Test    MM Lorazepam Definitive Test    MM DT_Oxazepam Definitive Test    MM DT_Oxycodone Definitive Test    MM DT_Oxymorphone Definitive Test    MM DT_Phencyclidine Definitive Test    MM DT_Phenobarbital Definitive Test    MM DT_Phentermine Definitive Test    MM DT_Secobarbital Definitive Test    MM DT_Spice Definitive Test    MM DT_Tapentadol Definitive Test    MM DT_Temazapam Definitive Test    MM DT_THC Definitive Test    MM DT_Tramadol Definitive Test

## 2022-06-08 NOTE — PATIENT INSTRUCTIONS
Call Spine and Pain office when you have a surgery date    Opioid Safety   AMBULATORY CARE:   Opioid safety  includes the correct use, storage, and disposal of opioids  Examples of opioid medicines to treat pain include oxycodone, morphine, fentanyl, and codeine  Call your local emergency number (911 in the [address]), or have someone else call if:   You have a seizure  You cannot be woken  You have trouble staying awake and your breathing is slow or shallow  Your speech is slurred, or you are confused  You are dizzy or stumble when you walk  Call your doctor, or have someone close to you call if:   You are extremely drowsy, or you have trouble staying awake or speaking  You have pale or clammy skin  You have blue fingernails or lips  Your heartbeat is slower than normal     You cannot stop vomiting  You have questions or concerns about your condition or care  Use opioids safely:   Take prescribed opioids exactly as directed  Opioids come with directions based on the kind of opioid and how it is given  Do not take more than the recommended amount, or for longer than needed  Do not give opioids to others or take opioids that belong to someone else  Misuse of opioids can lead to an addiction or overdose  Do not mix opioids with other medicines or alcohol  The combination can cause an overdose, or lead to a coma  Do not drive or operate heavy machinery after you take the opioid  Your provider or pharmacist can tell you how long to wait after a dose before you do these activities  Talk to your healthcare provider if you have any side effects  He or she can help you prevent or relieve side effects  Side effects include nausea, sleepiness, itching, and trouble thinking clearly  Manage constipation:  Constipation is the most common side effect of opioid medicine  Constipation is when you have hard, dry bowel movements, or you go longer than usual between bowel movements   Tell your healthcare provider about all changes in your bowel movements while you are taking opioids  He or she may recommend laxative medicine to help you have a bowel movement  He or she may also change the kind of opioid you are taking, or change when you take it  The following are more ways you can prevent or relieve constipation:  Drink liquids as directed  You may need to drink extra liquids to help soften and move your bowels  Ask how much liquid to drink each day and which liquids are best for you  Eat high-fiber foods  This may help decrease constipation by adding bulk to your bowel movements  High-fiber foods include fruits, vegetables, whole-grain breads and cereals, and beans  Your healthcare provider or dietitian can help you create a high-fiber meal plan  Your provider may also recommend a fiber supplement if you cannot get enough fiber from food  Exercise regularly  Regular physical activity can help stimulate your intestines  Walking is a good exercise to prevent or relieve constipation  Ask which exercises are best for you  Schedule a time each day to have a bowel movement  This may help train your body to have regular bowel movements  Bend forward while you are on the toilet to help move the bowel movement out  Sit on the toilet for at least 10 minutes, even if you do not have a bowel movement  Store opioids safely:   Store opioids where others cannot easily get them  Keep them in a locked cabinet or secure area  Do not  keep them in a purse or other bag you carry with you  A person may be looking for something else and find the opioids  Make sure opioids are stored out of the reach of children  A child can easily overdose on opioids  Opioids may look like candy to a small child  The best way to dispose of opioids: The laws vary by country and area   In the [place], the best way is to return the opioids through a take-back program  This program is offered by the [employer]  The following are options for using the program:  Take the opioids to a TANA collection site  The site is often a law enforcement center  Call your local law enforcement center for scheduled take-back days in your area  You will be given information on where to go if the collection site is in a different location  Take the opioids to an approved pharmacy or hospital   A pharmacy or hospital may be set up as a collection site  You will need to ask if it is a TANA collection site if you were not directed there  A pharmacy or doctor's office may not be able to take back opioids unless it is a TANA site  Use a mail-back system  This means you are given containers to put the opioids into  You will then mail them in the containers  Use a take-back drop box  This is a place to leave the opioids at any time  People and animals will not be able to get into the box  Your local law enforcement agency can tell you where to find a drop box in your area  Other safe ways to dispose of opioids: The medicine may come with disposal instructions  The instructions may vary depending on the brand of medicine you are using  Instructions may come in a Medication Guide, but not every medicine has one  You may instead get instructions from your pharmacy or doctor  Follow instructions carefully  The following are general guidelines to follow:  Find out if you can flush the opioid  Some opioids can be flushed down the toilet or poured into the sink  You will need to contact authorities in your area to see if this is an option for you  The FDA also offers a list of medicines that are safe to flush down the toilet  You can check the list if you cannot get the information for your local area  Ask your waste management company about rules for putting opioids in the trash  The company will be able to give you specific directions   Scratch out personal information on the original medicine label so it cannot be read  Then put it in the trash  Do not label the trash or put any information on it about the opioids  It should look like regular household trash so no one is tempted to look for the opioids  Keep the trash out of the reach of children and animals  Always make sure trash is secure  Talk to officials if you live in a facility  If you live in a nursing home or assisted living center, talk to an official  The person will know the rules for your area  Other ways to manage pain:   Ask your healthcare provider about non-opioid medicines to control pain  Nonprescription medicines include NSAIDs (such as ibuprofen) and acetaminophen  Prescription medicines include muscle relaxers, antidepressants, and steroids  Pain may be managed without any medicines  Some ways to relieve pain include massage, aromatherapy, or meditation  Physical or occupational therapy may also help  For more information:   Drug Enforcement Administration  [address] [place] [address] [address]  Phone: 8- [phone] - [phone]  Web Address: [facility]/drug_disposal/    [address]  and Drug Administration  [place]  [place] , [address] [place] [address]  Phone: 9- [phone] - [phone]  Web Address: http://[employer]/    Follow up with your doctor or pain specialist as directed: You may need to have your dose adjusted  Your doctor or pain specialist can also help you find ways to manage pain without opioids  Write down your questions so you remember to ask them during your visits  © Copyright [employer] 2022 Information is for End User's use only and may not be sold, redistributed or otherwise used for commercial purposes  All illustrations and images included in CareNotes® are the copyrighted property of [employer]  or Fernando Tello   The above information is an  only  It is not intended as medical advice for individual conditions or treatments   Talk to your doctor, nurse or pharmacist before following any medical regimen to see if it is safe and effective for you

## 2022-06-10 LAB

## 2022-08-02 ENCOUNTER — TELEPHONE (OUTPATIENT)
Dept: PAIN MEDICINE | Facility: CLINIC | Age: 72
End: 2022-08-02

## 2022-08-02 NOTE — TELEPHONE ENCOUNTER
Patient called with update he had brain surgery had tumor removed and he no longer has pain, cancelled 8/3 appt     Thank you

## 2022-08-08 ENCOUNTER — OFFICE VISIT (OUTPATIENT)
Dept: NEUROLOGY | Facility: CLINIC | Age: 72
End: 2022-08-08
Payer: MEDICARE

## 2022-08-08 VITALS
SYSTOLIC BLOOD PRESSURE: 116 MMHG | BODY MASS INDEX: 35.07 KG/M2 | HEIGHT: 70 IN | HEART RATE: 55 BPM | DIASTOLIC BLOOD PRESSURE: 57 MMHG | TEMPERATURE: 97.6 F | WEIGHT: 245 LBS

## 2022-08-08 DIAGNOSIS — G50.0 TRIGEMINAL NEURALGIA: Primary | ICD-10-CM

## 2022-08-08 PROCEDURE — 99215 OFFICE O/P EST HI 40 MIN: CPT | Performed by: PHYSICIAN ASSISTANT

## 2022-08-08 RX ORDER — NAPROXEN SODIUM 220 MG
2 TABLET ORAL
COMMUNITY
Start: 2022-07-26 | End: 2023-08-11 | Stop reason: ALTCHOICE

## 2022-08-08 RX ORDER — LEVETIRACETAM 500 MG/1
500 TABLET ORAL 2 TIMES DAILY
COMMUNITY
Start: 2022-07-26 | End: 2023-08-11 | Stop reason: ALTCHOICE

## 2022-08-08 RX ORDER — LEVETIRACETAM 500 MG/1
1 TABLET ORAL 2 TIMES DAILY
COMMUNITY
Start: 2022-07-26 | End: 2023-08-09 | Stop reason: SDUPTHER

## 2022-08-08 RX ORDER — TOPIRAMATE 25 MG/1
TABLET ORAL
Qty: 30 TABLET | Refills: 0 | Status: SHIPPED | OUTPATIENT
Start: 2022-08-08

## 2022-08-08 NOTE — ASSESSMENT & PLAN NOTE
Take 75 mg (3 tabs) for 5 nights, 50 mg (2 tabs) for 5 nights, 25 mg (1 tab) for 5 nights then stop  If you develop any type of pain, discomfort etc go back to the previous dose  After 2 weeks of finishing the topamax taper and doing well, we will decrease the lyrica  Message or call on how to taper this    Continue to follow up with neurosurgery    We will wean slowly off of the Lyrica if he tolerates the stopping of Topamax  We discussed that this would be slowly    He is to call us immediately for any breakthrough pain if it begins

## 2022-08-08 NOTE — PATIENT INSTRUCTIONS
Trigeminal neuralgia  66 y/o m with h/o meningioma in Rt [place]ia s/p gamma knife presents as a follow up for facial pain d/t Trigeminal Neuralgia  Plan-   Take 75 mg (3 tabs) for 5 nights, 50 mg (2 tabs) for 5 nights, 25 mg (1 tab) for 5 nights then stop  If you develop any type of pain, discomfort etc go back to the previous dose  After 2 weeks of finishing the topamax taper and doing well, we will decrease the lyrica    Message or call on how to taper this    Continue to follow up with neurosurgery

## 2022-08-08 NOTE — PROGRESS NOTES
[address] [facility]  PATIENT:  Nima Vargas  MRN:  [id number]  :  1950  DATE OF SERVICE:  2022      Assessment/Plan:     Trigeminal neuralgia  Take 75 mg (3 tabs) for 5 nights, 50 mg (2 tabs) for 5 nights, 25 mg (1 tab) for 5 nights then stop  If you develop any type of pain, discomfort etc go back to the previous dose  After 2 weeks of finishing the topamax taper and doing well, we will decrease the lyrica  Message or call on how to taper this    Continue to follow up with neurosurgery    We will wean slowly off of the Lyrica if he tolerates the stopping of Topamax  We discussed that this would be slowly  He is to call us immediately for any breakthrough pain if it begins         Problem List Items Addressed This Visit        Nervous and Auditory    Trigeminal neuralgia - Primary     Take 75 mg (3 tabs) for 5 nights, 50 mg (2 tabs) for 5 nights, 25 mg (1 tab) for 5 nights then stop  If you develop any type of pain, discomfort etc go back to the previous dose  After 2 weeks of finishing the topamax taper and doing well, we will decrease the lyrica  Message or call on how to taper this    Continue to follow up with neurosurgery    We will wean slowly off of the Lyrica if he tolerates the stopping of Topamax  We discussed that this would be slowly  He is to call us immediately for any breakthrough pain if it begins         Relevant Medications    topiramate (Topamax) 25 mg tablet              History of Present Illness: We had the pleasure of evaluating Nima Vargas in neurological follow up  today for headaches  As you know,  he is a 67 y o   right handed male  Patient retired in 2018  Koffi Ramirez is here today for f/u evaluation of his trigeminal neuralgia       To review, Pt's right sided facial pain started in   He was diagnosed with trigeminal neuralgia  He used to feel intense pain on rt side just under the eye   He had constant pain with episodes of severe pain       He experiences it in Rt eye brow, Rt orbit and occasionally on Rt jaw last for 10 secs, 8/10 in severity  It worsens with sneezing, first bite of meal  Stopped CBD oil in June 2021 because his urine testing should THC  Underwent Right Temporal craniotomy, Subtemporal approach for meckel cave lesion, placement of lumbar drain on 7/20/2022 at [facility]  Since that time has not has any pain  Does have numbness in his right cheek and above jaw line  Does have his numbness over his eyebrow as well       Tinnitus: bilateral, right more so than left, began after nerve block in 09/2018  - 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 (some noise insensitivity attributed by pt to musical pursuits)  - recalled previous conversation regarding ENT visit and is still considering     Numbness: right V2 and V3 region post nerve block, feels like the numb before a tooth pulling     Dizzy: some lightheadness for past few monthsthat leads to an occasional misstep a few times a week, no syncopal episodes, no change in frequency  No recent falls     Meningoma:   As per radiology report,no significant interval change in the size approximate 1 cm calcified [facility] and thought to represent a calcified meningioma  S/p gamma knife last MRI 5/19/20  He had this removed 7/20/2022 at [facility] and is doing well  Has staples currently      Trigeminal neuralgia  What medications do you take or have you taken for your headaches?      Preventive:   Lyrica (help the most), Topamax (once in morning-100 mg poqd helps with pain, seems to help with sleep too)     Tried in past    Trileptal (dizziness and confusion), Tegretol (confusion/goofy), Neurontin (confusion), Lamictal (confusion), Olanzapine (confusion/goofy/dizzy)  - Amitriptyline (stopped due to fatigue), Cymbalta (no improvement)  - Botox (did not help)     Abortive:   Tylenol- 2 tabs 5 days a week for severe pain  Percocet- 1 tab per day   4-5 days/week      Tried in past-   Baclofen (doesn't take anymore)  tramadol ([place] and ended up in the ER)  aspirin     What is your current pain level ? 0/10      How often does the pain occur?   constant pain: this has been broken since his surgery 7/20/2022 prior to this there all the time  Severe pain: since surgery not severe episodes of pain; prior was Couple times a week on an average twice a day  Electric shock have not happened since surgery, like pain-3-5 a day last for 10 secs       Are you ever pain free? YES COMPLETELY since surgery  Aura/warning and how long does it last - none     What time of the day does the pain start? Not happening at all since surgery  Constant:prior was there all the time  Severe pain: varies; the episodes have been scattered throughout day  Electric shock like- first bite of meal, sneezing       How long do the pain  last?   Constant pain: prior was there all the time  Severe pain: 15 min-12 hours   Electric shock pain- for 10-15 secs       Describe your usual pain:   Constant pain: aching   Severe pain- Feels like somebody is drilling, ice pick, numbness, tingling + new shock like pathway from lateral nasal fold to mid cheek bone usually get it with shock like pain       Where is your pain located? Constant Pain: right v2 under the eye on the superiors nasal region, currently at the "origins": below eye, lateral eyebrow, hair line above eyebrow, mandible directly inferior to corner of mouth, inferior mastoid region     Severe Pain:  v2 and at the medial nasal region, v3, lateral eyebrow and directly superior at face/hair line; v1, mandible below corner of mouth; inferior mastoid - these locations will be more painful and receive a shooting pain originating from below the eye     Electric Shock- Rt eye brow, Rt orbit then at times Rt jaw       What is the intensity of pain?   Constant Pain: 4/10 (has broken since surgery) no current pain  Severe Pain: 8-10/10  Electric shock-8/10     Associated symptoms:   None     Have you used CBD or THC for your headaches and how often? Yes, but tested positive for THC at pain management so stopped  Have you ever had any Brain imaging? yes       2015 MRI head:   IMPRESSION-    1  Stable nonenhancing rounded T2 hypointense structure in right Meckel's cave correlating to the previously present calcified structure noted on the prior 2013 head CT, stable from the previous MRI, possibly   a treated or calcified meningioma   Cisternal segments of both 5th nerves are otherwise normal in signal without abnormal enhancement   No brainstem abnormality   No interval change from the previous study     2   No acute infarction, intracranial hemorrhage or mass effect         I personally reviewed these images      Reviewed old notes from physician seen in the past- see above HPI for summary of previous encounters           Past Medical History:   Diagnosis Date    Arthritis     Right knee    Chronic pain disorder     Trigeminal neuralgia pain     Ulcer of bile duct        Patient Active Problem List   Diagnosis    Trigeminal neuralgia    Facial spasm    Anemia    Bilateral patellofemoral syndrome    Diverticulosis of colon    Esophageal reflux    Intracranial meningioma ([address] )    Vertigo    Vitamin D deficiency    Need for pneumococcal vaccination    Insomnia    Primary osteoarthritis of right knee    DNS (deviated nasal septum)    Chronic pain syndrome    Uncomplicated opioid dependence ([address] )    Long-term current use of opiate analgesic    Mood disorder ([address] )    Skin lesions    Neuralgic facial pain    Preoperative evaluation of a medical condition to rule out surgical contraindications (TAR required)       Medications:      Current Outpatient Medications   Medication Sig Dispense Refill    Acetaminophen (TYLENOL PO) Take 2 tablets by mouth as needed       Apoaequorin (Prevagen) 10 MG CAPS Take 10 mg by mouth every morning      Calcium 250 MG CAPS Take 250 mg by mouth daily       cyanocobalamin (VITAMIN B-12) 1,000 mcg tablet Take 1 tablet by mouth daily       levETIRAcetam (KEPPRA) 500 mg tablet Take 1 tablet by mouth 2 (two) times a day      levETIRAcetam (KEPPRA) 500 mg tablet Take 500 mg by mouth 2 (two) times a day      pregabalin (LYRICA) 200 MG capsule Take 2 caps PO in the morning, 1 cap in the afternoon, and 2 caps at night 450 capsule 1    RA Senna 8 6 MG tablet Take 2 tablets by mouth daily at bedtime      topiramate (TOPAMAX) 100 mg tablet TAKE 1 TABLET AT BEDTIME 90 tablet 3    topiramate (Topamax) 25 mg tablet Take 75 mg (3 tabs) for 5 nights, 50 mg (2 tabs) for 5 nights, 25 mg (1 tab) for 5 nights then stop 30 tablet 0    ascorbic acid (VITAMIN C) 500 MG tablet Take 1 tablet (500 mg total) by mouth daily (Patient not taking: Reported on 8/8/2022) 60 tablet 0    MELATONIN PO Take 10 mg by mouth daily at bedtime  (Patient not taking: Reported on 8/8/2022)      oxyCODONE-acetaminophen (PERCOCET) 5-325 mg per tablet Take one tablet PO BID PRN for pain (Patient not taking: Reported on 8/8/2022) 65 tablet 0    oxyCODONE-acetaminophen (PERCOCET) 5-325 mg per tablet Take one tablet PO BID PRN for pain (Patient not taking: Reported on 8/8/2022) 65 tablet 0    TURMERIC CURCUMIN PO Take 1,500 mg by mouth once as needed   (Patient not taking: No sig reported)       No current facility-administered medications for this visit  Allergies:       Allergies   Allergen Reactions    Gabapentin Delirium    Oxcarbazepine Dizziness    Baclofen Other (See Comments)     "unable to think and form sentences"    Carbamazepine Other (See Comments)     "unable to think"    Lamictal [Lamotrigine] Confusion     Difficulty to think clearly     Tramadol Other (See Comments)     "forgetful"       Family History:     Family History   Problem Relation Age of Onset    Emphysema Mother     Colon cancer Father     Skin cancer Maternal Grandmother        Social History:     Social History     Socioeconomic History    Marital status: /Civil Union     Spouse name: Geovany Eisenberg Number of children: 2    Years of education: BS Degree plus addl classes    Highest education level: Not on file   Occupational History    Occupation: Retired     Comment: 21 years [address] Use    Smoking status: Never Smoker    Smokeless tobacco: Never Used   Vaping Use    Vaping Use: Never used   Substance and Sexual Activity    Alcohol use: Yes     Comment: Occasionally    Drug use: No    Sexual activity: Yes   Other Topics Concern    Not on file   Social History Narrative    Caffeine use: Daily caffeinated cola, drinks coffee    Lives at home with wife Violet Langford     Social Determinants of Health     Financial Resource Strain: Not on file   Food Insecurity: Not on file   Transportation Needs: Not on file   Physical Activity: Not on file   Stress: Not on file   Social Connections: Not on file   Intimate Partner Violence: Not on file   Housing Stability: Not on file      I have reviewed the patient's medical, social and surgical history as well as medications in detail and updated the computerized patient record  Objective:   Physical Exam:                                                                   Vitals:            /57 (BP Location: Right arm, Patient Position: Sitting, Cuff Size: Standard)   Pulse 55   Temp 97 6 °F (36 4 °C) (Tympanic)   Ht 5' 10" (1 778 m)   Wt 111 kg (245 lb)   BMI 35 15 kg/m²   BP Readings from Last 3 Encounters:   08/08/22 116/57   06/08/22 105/61   05/26/22 100/60     Pulse Readings from Last 3 Encounters:   08/08/22 55   06/08/22 68   05/26/22 60            CONSTITUTIONAL: Well developed, well nourished, well groomed  No dysmorphic features  Eyes:  EOM normal      Neck:  Normal ROM, neck supple  HEENT:  Normocephalic atraumatic  Chest:  Respirations regular and unlabored      Psychiatric: Normal behavior and appropriate affect      MENTAL STATUS  Orientation: Alert and oriented x 3  Fund of knowledge: Intact  COORDINATION   Station/Gait: slow widebased gait with a cane  Review of Systems:   Review of Systems  Constitutional: Negative for appetite change and fever  HENT: Negative  Negative for hearing loss, tinnitus, trouble swallowing and voice change  Eyes: Negative  Negative for photophobia and pain  Respiratory: Negative  Negative for shortness of breath  Cardiovascular: Negative  Negative for palpitations  Gastrointestinal: Negative  Negative for nausea and vomiting  Endocrine: Negative  Negative for cold intolerance  Genitourinary: Negative  Negative for dysuria, frequency and urgency  Musculoskeletal: Negative  Negative for myalgias and neck pain  Skin: Negative  Negative for rash  Neurological: Negative  Negative for dizziness, tremors, seizures, syncope, facial asymmetry, speech difficulty, weakness, light-headedness, numbness and headaches  Hematological: Negative  Does not bruise/bleed easily  Psychiatric/Behavioral: Negative  Negative for confusion, hallucinations and sleep disturbance     All other systems reviewed and are negative      I personally reviewed the ROS entered by the MA    I spent 43 minutes in total time for this visit    Author:  Jyoti Decker PA-C 8/8/2022 2:01 PM

## 2022-09-13 ENCOUNTER — TELEPHONE (OUTPATIENT)
Dept: NEUROLOGY | Facility: CLINIC | Age: 72
End: 2022-09-13

## 2022-09-13 NOTE — TELEPHONE ENCOUNTER
Pt left a vm and stated that he has been weaning off of topamax  Denies SE  He is suppose to decrease lyrica  Pt is asking how to taper off lyrica? Cb [phone]    Called pt and states that he weaned off of topamax without any issue  His last dose was 2 days ago  Pt is asking how to taper off of lyirica?    Cb [phone], ok to leave a detailed message

## 2022-09-13 NOTE — TELEPHONE ENCOUNTER
May start taper off lyrica in 2 weeks  Take 1 cap am and noon and 2 caps bedtime for 7 days  Take 1 cap TID for 7 days  Take 1 cap am and 1 cap bedtime for 7 days  Take 1 cap bedtime for 7 days then stop  If pain returns, return to previous dose and hold for 7 more days then try to taper agai

## 2022-10-14 ENCOUNTER — TELEPHONE (OUTPATIENT)
Dept: NEUROLOGY | Facility: CLINIC | Age: 72
End: 2022-10-14

## 2022-10-14 NOTE — TELEPHONE ENCOUNTER
It could be related to stopping the lyrica as this is used for peripheral neuropathy    Does he see a podiatrist?  If not he should and then can see if anything needs to be restarted or an alternative medication

## 2022-10-14 NOTE — TELEPHONE ENCOUNTER
Patient called and left  regarding his Lyrica:     "Yes, hi  My name is Thee Santos  That's N A U [facility]  My birthday is June 18th 1950  That's [birth year]  And I've been working with Hector on reducing my lyrica  And I have a question on what's happening with me  I've been getting terrible burning, itching sensations in my feet  And I don't even know if it's related but, but it started just a couple of days when I went off the last pill  You can call me back at the number that I left there  Is [phone]   Thank you "

## 2022-10-17 NOTE — TELEPHONE ENCOUNTER
Called [phone] and advised pt of the below  He verbalized understanding  Pt states that he see podiatrist regularly for a different issue  He took his last lyrica this past Wed  Then after that the burning and itching started   Pt is agreeable to discuss his symptoms w/ his podiatrist

## 2023-01-04 ENCOUNTER — OFFICE VISIT (OUTPATIENT)
Dept: FAMILY MEDICINE CLINIC | Facility: CLINIC | Age: 73
End: 2023-01-04

## 2023-01-04 VITALS
DIASTOLIC BLOOD PRESSURE: 80 MMHG | SYSTOLIC BLOOD PRESSURE: 122 MMHG | TEMPERATURE: 97.2 F | HEART RATE: 62 BPM | OXYGEN SATURATION: 98 % | WEIGHT: 238.25 LBS | BODY MASS INDEX: 34.11 KG/M2 | RESPIRATION RATE: 18 BRPM | HEIGHT: 70 IN

## 2023-01-04 DIAGNOSIS — F39 MOOD DISORDER (HCC): ICD-10-CM

## 2023-01-04 DIAGNOSIS — L29.9 PRURITUS: Primary | ICD-10-CM

## 2023-01-04 DIAGNOSIS — G50.0 TRIGEMINAL NEURALGIA: ICD-10-CM

## 2023-01-04 RX ORDER — PREGABALIN 50 MG/1
50 CAPSULE ORAL 3 TIMES DAILY
Qty: 270 CAPSULE | Refills: 1 | Status: SHIPPED | OUTPATIENT
Start: 2023-01-04

## 2023-01-04 NOTE — PATIENT INSTRUCTIONS
Medicare Preventive Visit Patient Instructions  Thank you for completing your Welcome to Medicare Visit or Medicare Annual Wellness Visit today  Your next wellness visit will be due in one year (1/5/2024)  The screening/preventive services that you may require over the next 5-10 years are detailed below  Some tests may not apply to you based off risk factors and/or age  Screening tests ordered at today's visit but not completed yet may show as past due  Also, please note that scanned in results may not display below  Preventive Screenings:  Service Recommendations Previous Testing/Comments   Colorectal Cancer Screening  · Colonoscopy    · Fecal Occult Blood Test (FOBT)/Fecal Immunochemical Test (FIT)  · Fecal DNA/Cologuard Test  · Flexible Sigmoidoscopy Age: 39-70 years old   Colonoscopy: every 10 years (May be performed more frequently if at higher risk)  OR  FOBT/FIT: every 1 year  OR  Cologuard: every 3 years  OR  Sigmoidoscopy: every 5 years  Screening may be recommended earlier than age 39 if at higher risk for colorectal cancer  Also, an individualized decision between you and your healthcare provider will decide whether screening between the ages of 74-80 would be appropriate   Colonoscopy: 08/15/2017  FOBT/FIT: Not on file  Cologuard: Not on file  Sigmoidoscopy: Not on file    Screening Current     Prostate Cancer Screening Individualized decision between patient and health care provider in men between ages of 53-78   Medicare will cover every 12 months beginning on the day after your 50th birthday PSA: 0 9 ng/mL           Hepatitis C Screening Once for adults born between 1945 and 1965  More frequently in patients at high risk for Hepatitis C Hep C Antibody: Not on file        Diabetes Screening 1-2 times per year if you're at risk for diabetes or have pre-diabetes Fasting glucose: 77 mg/dL (5/9/2022)  A1C: 5 1 % (9/26/2018)  Screening Current   Cholesterol Screening Once every 5 years if you don't have a lipid disorder  May order more often based on risk factors  Lipid panel: 12/18/2019  Screening Current      Other Preventive Screenings Covered by Medicare:  1  Abdominal Aortic Aneurysm (AAA) Screening: covered once if your at risk  You're considered to be at risk if you have a family history of AAA or a male between the age of 73-68 who smoking at least 100 cigarettes in your lifetime  2  Lung Cancer Screening: covers low dose CT scan once per year if you meet all of the following conditions: (1) Age 50-69; (2) No signs or symptoms of lung cancer; (3) Current smoker or have quit smoking within the last 15 years; (4) You have a tobacco smoking history of at least 20 pack years (packs per day x number of years you smoked); (5) You get a written order from a healthcare provider  3  Glaucoma Screening: covered annually if you're considered high risk: (1) You have diabetes OR (2) Family history of glaucoma OR (3)  aged 48 and older OR (3)  American aged 72 and older  3  Osteoporosis Screening: covered every 2 years if you meet one of the following conditions: (1) Have a vertebral abnormality; (2) On glucocorticoid therapy for more than 3 months; (3) Have primary hyperparathyroidism; (4) On osteoporosis medications and need to assess response to drug therapy  5  HIV Screening: covered annually if you're between the age of 12-76  Also covered annually if you are younger than 13 and older than 72 with risk factors for HIV infection  For pregnant patients, it is covered up to 3 times per pregnancy      Immunizations:  Immunization Recommendations   Influenza Vaccine Annual influenza vaccination during flu season is recommended for all persons aged >= 6 months who do not have contraindications   Pneumococcal Vaccine   * Pneumococcal conjugate vaccine = PCV13 (Prevnar 13), PCV15 (Vaxneuvance), PCV20 (Prevnar 20)  * Pneumococcal polysaccharide vaccine = PPSV23 (Pneumovax) Adults 25-60 years old: 1-3 doses may be recommended based on certain risk factors  Adults 72 years old: 1-2 doses may be recommended based off what pneumonia vaccine you previously received   Hepatitis B Vaccine 3 dose series if at intermediate or high risk (ex: diabetes, end stage renal disease, liver disease)   Tetanus (Td) Vaccine - COST NOT COVERED BY MEDICARE PART B Following completion of primary series, a booster dose should be given every 10 years to maintain immunity against tetanus  Td may also be given as tetanus wound prophylaxis  Tdap Vaccine - COST NOT COVERED BY MEDICARE PART B Recommended at least once for all adults  For pregnant patients, recommended with each pregnancy  Shingles Vaccine (Shingrix) - COST NOT COVERED BY MEDICARE PART B  2 shot series recommended in those aged 48 and above     Health Maintenance Due:      Topic Date Due   • Hepatitis C Screening  Never done   • Colorectal Cancer Screening  08/15/2027     Immunizations Due:      Topic Date Due   • Hepatitis B Vaccine (1 of 3 - 3-dose series) Never done   • COVID-19 Vaccine (5 - Booster for Lambert Peter series) 06/15/2022     Advance Directives   What are advance directives? Advance directives are legal documents that state your wishes and plans for medical care  These plans are made ahead of time in case you lose your ability to make decisions for yourself  Advance directives can apply to any medical decision, such as the treatments you want, and if you want to donate organs  What are the types of advance directives? There are many types of advance directives, and each state has rules about how to use them  You may choose a combination of any of the following:  · Living will: This is a written record of the treatment you want  You can also choose which treatments you do not want, which to limit, and which to stop at a certain time  This includes surgery, medicine, IV fluid, and tube feedings  · Durable power of  for healthcare [facility]):   This is a written record that states who you want to make healthcare choices for you when you are unable to make them for yourself  This person, called a proxy, is usually a family member or a friend  You may choose more than 1 proxy  · Do not resuscitate (DNR) order:  A DNR order is used in case your heart stops beating or you stop breathing  It is a request not to have certain forms of treatment, such as CPR  A DNR order may be included in other types of advance directives  · Medical directive: This covers the care that you want if you are in a coma, near death, or unable to make decisions for yourself  You can list the treatments you want for each condition  Treatment may include pain medicine, surgery, blood transfusions, dialysis, IV or tube feedings, and a ventilator (breathing machine)  · Values history: This document has questions about your views, beliefs, and how you feel and think about life  This information can help others choose the care that you would choose  Why are advance directives important? An advance directive helps you control your care  Although spoken wishes may be used, it is better to have your wishes written down  Spoken wishes can be misunderstood, or not followed  Treatments may be given even if you do not want them  An advance directive may make it easier for your family to make difficult choices about your care  Weight Management   Why it is important to manage your weight:  Being overweight increases your risk of health conditions such as heart disease, high blood pressure, type 2 diabetes, and certain types of cancer  It can also increase your risk for osteoarthritis, sleep apnea, and other respiratory problems  Aim for a slow, steady weight loss  Even a small amount of weight loss can lower your risk of health problems  How to lose weight safely:  A safe and healthy way to lose weight is to eat fewer calories and get regular exercise   You can lose up about 1 pound a week by decreasing the number of calories you eat by 500 calories each day  Healthy meal plan for weight management:  A healthy meal plan includes a variety of foods, contains fewer calories, and helps you stay healthy  A healthy meal plan includes the following:  · Eat whole-grain foods more often  A healthy meal plan should contain fiber  Fiber is the part of grains, fruits, and vegetables that is not broken down by your body  Whole-grain foods are healthy and provide extra fiber in your diet  Some examples of whole-grain foods are whole-wheat breads and pastas, oatmeal, brown rice, and bulgur  · Eat a variety of vegetables every day  Include dark, leafy greens such as spinach, kale, val greens, and mustard greens  Eat yellow and orange vegetables such as carrots, sweet potatoes, and winter squash  · Eat a variety of fruits every day  Choose fresh or canned fruit (canned in its own juice or light syrup) instead of juice  Fruit juice has very little or no fiber  · Eat low-fat dairy foods  Drink fat-free (skim) milk or 1% milk  Eat fat-free yogurt and low-fat cottage cheese  Try low-fat cheeses such as mozzarella and other reduced-fat cheeses  · Choose meat and other protein foods that are low in fat  Choose beans or other legumes such as split peas or lentils  Choose fish, skinless poultry (chicken or turkey), or lean cuts of red meat (beef or pork)  Before you cook meat or poultry, cut off any visible fat  · Use less fat and oil  Try baking foods instead of frying them  Add less fat, such as margarine, sour cream, regular salad dressing and mayonnaise to foods  Eat fewer high-fat foods  Some examples of high-fat foods include french fries, doughnuts, ice cream, and cakes  · Eat fewer sweets  Limit foods and drinks that are high in sugar  This includes candy, cookies, regular soda, and sweetened drinks  Exercise:  Exercise at least 30 minutes per day on most days of the week   Some examples of exercise include walking, biking, dancing, and swimming  You can also fit in more physical activity by taking the stairs instead of the elevator or parking farther away from stores  Ask your healthcare provider about the best exercise plan for you  Narcotic (Opioid) Safety    Use narcotics safely:  · Take prescribed narcotics exactly as directed  · Do not give narcotics to others or take narcotics that belong to someone else  · Do not mix narcotics without medicines or alcohol  · Do not drive or operate heavy machinery after you take the narcotic  · Monitor for side effects and notify your healthcare provider if you experienced side effects such as nausea, sleepiness, itching, or trouble thinking clearly  Manage constipation:    Constipation is the most common side effect of narcotic medicine  Constipation is when you have hard, dry bowel movements, or you go longer than usual between bowel movements  Tell your healthcare provider about all changes in your bowel movements while you are taking narcotics  He or she may recommend laxative medicine to help you have a bowel movement  He or she may also change the kind of narcotic you are taking, or change when you take it  The following are more ways you can prevent or relieve constipation:    · Drink liquids as directed  You may need to drink extra liquids to help soften and move your bowels  Ask how much liquid to drink each day and which liquids are best for you  · Eat high-fiber foods  This may help decrease constipation by adding bulk to your bowel movements  High-fiber foods include fruits, vegetables, whole-grain breads and cereals, and beans  Your healthcare provider or dietitian can help you create a high-fiber meal plan  Your provider may also recommend a fiber supplement if you cannot get enough fiber from food  · Exercise regularly  Regular physical activity can help stimulate your intestines  Walking is a good exercise to prevent or relieve constipation  Ask which exercises are best for you  · Schedule a time each day to have a bowel movement  This may help train your body to have regular bowel movements  Bend forward while you are on the toilet to help move the bowel movement out  Sit on the toilet for at least 10 minutes, even if you do not have a bowel movement  Store narcotics safely:   · Store narcotics where others cannot easily get them  Keep them in a locked cabinet or secure area  Do not  keep them in a purse or other bag you carry with you  A person may be looking for something else and find the narcotics  · Make sure narcotics are stored out of the reach of children  A child can easily overdose on narcotics  Narcotics may look like candy to a small child  The best way to dispose of narcotics: The laws vary by country and area  In the [place], the best way is to return the narcotics through a take-back program  This program is offered by the [employer]  The following are options for using the program:  · Take the narcotics to a TANA collection site  The site is often a law enforcement center  Call your local law enforcement center for scheduled take-back days in your area  You will be given information on where to go if the collection site is in a different location  · Take the narcotics to an approved pharmacy or hospital   A pharmacy or hospital may be set up as a collection site  You will need to ask if it is a TANA collection site if you were not directed there  A pharmacy or doctor's office may not be able to take back narcotics unless it is a TANA site  · Use a mail-back system  This means you are given containers to put the narcotics into  You will then mail them in the containers  · Use a take-back drop box  This is a place to leave the narcotics at any time  People and animals will not be able to get into the box   Your local law enforcement agency can tell you where to find a drop box in your area     Other ways to manage pain:   · Ask your healthcare provider about non-narcotic medicines to control pain  Nonprescription medicines include NSAIDs (such as ibuprofen) and acetaminophen  Prescription medicines include muscle relaxers, antidepressants, and steroids  · Pain may be managed without any medicines  Some ways to relieve pain include massage, aromatherapy, or meditation  Physical or occupational therapy may also help  For more information:   · Drug Enforcement Administration  [facility] , [address]  Phone: 1- 676 - 326-9525  Web Address: [facility]/drug_disposal/    · [address] and Drug Administration  [facility] , [address] [facility]  Phone: 2- 450 - 382-4799  Web Address: http://[employer]/     © Copyright [employer] 2018 Information is for End User's use only and may not be sold, redistributed or otherwise used for commercial purposes   All illustrations and images included in CareNotes® are the copyrighted property of A D A M , Inc  or [address] [place] [address]

## 2023-01-04 NOTE — PROGRESS NOTES
Assessment and Plan:     Problem List Items Addressed This Visit        Nervous and Auditory    Trigeminal neuralgia     Trigeminal neuralgia  Patient symptoms have greatly improved status post neurosurgery  Musculoskeletal and Integument    Pruritus - Primary     Pruritus  Patient was given prescription to initiate Lyrica 50 mg once at bedtime and may titrate every 3 to 4 days to twice daily dosing or up to 3 times daily dosing as needed         Relevant Medications    pregabalin (LYRICA) 50 mg capsule       Other    Mood disorder (HCC)     Mood disorder  Patient with fluctuating symptoms of depression however he did not wish to pursue any treatment at this time             Preventive health issues were discussed with patient, and age appropriate screening tests were ordered as noted in patient's After Visit Summary  Personalized health advice and appropriate referrals for health education or preventive services given if needed, as noted in patient's After Visit Summary  History of Present Illness:     Patient presents for a Medicare Wellness Visit    Patient in office to review chronic medical condition  Patient had neurosurgery July 2022 for his chronic trigeminal neuralgia symptoms  Symptoms have greatly improved however he had secondary complications related to corneal abrasion of his right eye which has left him with decreased visual acuity  He is still on Medicare of ophthalmologist   He also has chronic numbness of right-sided face  He denies any recent illness  Patient does la with some episodic depression symptoms but did not wish to pursue any treatment at this time    Patient states he has noticed some significant pruritus that began after weaning off his Lyrica medication  He does not have a specific rash but symptoms are worse on his back and make it difficult to sleep at night       Patient Care Team:  April Roberts MD as PCP - General (Family Medicine)  Natan Irwin MD Siva Juares MD Merla Honer, MD Cornelious Arab, MD (Ophthalmology)  Lynne Velásquez DDS     Review of Systems:     Review of Systems   Constitutional: Negative  HENT: Negative  Eyes: Negative  Respiratory: Negative  Cardiovascular: Negative  Gastrointestinal: Negative  Genitourinary: Negative  Musculoskeletal: Negative  Skin: Negative  Neurological:        As per HPI   Hematological: Negative  Psychiatric/Behavioral: Positive for agitation, decreased concentration, dysphoric mood and sleep disturbance          Problem List:     Patient Active Problem List   Diagnosis   • Trigeminal neuralgia   • Facial spasm   • Anemia   • Bilateral patellofemoral syndrome   • Diverticulosis of colon   • Esophageal reflux   • Intracranial meningioma ([address] )   • Vertigo   • Vitamin D deficiency   • Need for pneumococcal vaccination   • Insomnia   • Primary osteoarthritis of right knee   • DNS (deviated nasal septum)   • Chronic pain syndrome   • Uncomplicated opioid dependence ([address] )   • Long-term current use of opiate analgesic   • Mood disorder ([address] )   • Skin lesions   • Neuralgic facial pain   • Preoperative evaluation of a medical condition to rule out surgical contraindications (TAR required)   • Pruritus      Past Medical and Surgical History:     Past Medical History:   Diagnosis Date   • Arthritis     Right knee   • Chronic pain disorder    • Trigeminal neuralgia pain    • Ulcer of bile duct      Past Surgical History:   Procedure Laterality Date   • APPENDECTOMY  1962   • BRAIN SURGERY  2005    Gamma Knife for Trigeminal Neuralgia   • CATARACT EXTRACTION     • CRANIOTOMY FOR TUMOR  07/20/2022   • ESOPHAGOGASTRODUODENOSCOPY     • HERNIA REPAIR  1952   • OTHER SURGICAL HISTORY      gamma knife RT in 2007 and 2010   • UT ARTHRP KNE CONDYLE&PLATU MEDIAL&LAT COMPARTMENTS Right 10/22/2018    Procedure: ARTHROPLASTY KNEE TOTAL;  Surgeon: Annita Peace MD;  Location: [facility];  Service: Orthopedics   • OR CREATE LESION STRTCTC PRQ NEUROLYTIC GASSERIAN Right 03/06/2018    Procedure: RHIZOTOMY TRIGEMINAL NERVES (V2 & V3); Surgeon: Home Stone MD;  Location: [facility];  Service: Neurosurgery   • REPLACEMENT TOTAL KNEE Right 10/2018   • RHIZOTOMY W/ RADIOFREQUENCY ABLATION Right 08/08/2014    Stereotactic Ablation of Gasserian Ganglion Right sided trigeminal V2 radiofrequency rhizotomy x2      Family History:     Family History   Problem Relation Age of Onset   • Emphysema Mother    • Colon cancer Father    • Skin cancer Maternal Grandmother       Social History:     Social History     Socioeconomic History   • Marital status: /Civil Union     Spouse name: Gina   • Number of children: 2   • Years of education: BS Degree plus addl classes   • Highest education level: None   Occupational History   • Occupation: Retired     Comment: 21 years [employer] &    Tobacco Use   • Smoking status: Never   • Smokeless tobacco: Never   Vaping Use   • Vaping Use: Never used   Substance and Sexual Activity   • Alcohol use: Yes     Comment: Occasionally   • Drug use: No   • Sexual activity: Yes   Other Topics Concern   • None   Social History Narrative    Caffeine use: Daily caffeinated cola, drinks coffee    Lives at home with wife Elisabeth Donaldson     Social Determinants of Health     Financial Resource Strain: Low Risk    • Difficulty of Paying Living Expenses: Not very hard   Food Insecurity: Not on file   Transportation Needs: No Transportation Needs   • Lack of Transportation (Medical): No   • Lack of Transportation (Non-Medical):  No   Physical Activity: Not on file   Stress: Not on file   Social Connections: Not on file   Intimate Partner Violence: Not on file   Housing Stability: Not on file      Medications and Allergies:     Current Outpatient Medications   Medication Sig Dispense Refill   • Acetaminophen (TYLENOL PO) Take 2 tablets by mouth as needed      • Apoaequorin (Prevagen) 10 MG CAPS Take 10 mg by mouth every morning     • ascorbic acid (VITAMIN C) 500 MG tablet Take 1 tablet (500 mg total) by mouth daily 60 tablet 0   • Calcium 250 MG CAPS Take 250 mg by mouth daily      • ciprofloxacin (Ciloxan) 0 3 % ophthalmic ointment      • cyanocobalamin (VITAMIN B-12) 1,000 mcg tablet Take 1 tablet by mouth daily      • MELATONIN PO Take 10 mg by mouth daily at bedtime     • pregabalin (LYRICA) 50 mg capsule Take 1 capsule (50 mg total) by mouth 3 (three) times a day 270 capsule 1   • levETIRAcetam (KEPPRA) 500 mg tablet Take 1 tablet by mouth 2 (two) times a day (Patient not taking: Reported on 1/4/2023)     • levETIRAcetam (KEPPRA) 500 mg tablet Take 500 mg by mouth 2 (two) times a day (Patient not taking: Reported on 1/4/2023)     • oxyCODONE-acetaminophen (PERCOCET) 5-325 mg per tablet Take one tablet PO BID PRN for pain (Patient not taking: Reported on 8/8/2022) 65 tablet 0   • oxyCODONE-acetaminophen (PERCOCET) 5-325 mg per tablet Take one tablet PO BID PRN for pain (Patient not taking: Reported on 8/8/2022) 65 tablet 0   • pregabalin (LYRICA) 200 MG capsule Take 2 caps PO in the morning, 1 cap in the afternoon, and 2 caps at night (Patient not taking: Reported on 1/4/2023) 450 capsule 1   • RA Senna 8 6 MG tablet Take 2 tablets by mouth daily at bedtime (Patient not taking: Reported on 1/4/2023)     • topiramate (TOPAMAX) 100 mg tablet TAKE 1 TABLET AT BEDTIME (Patient not taking: Reported on 1/4/2023) 90 tablet 3   • topiramate (Topamax) 25 mg tablet Take 75 mg (3 tabs) for 5 nights, 50 mg (2 tabs) for 5 nights, 25 mg (1 tab) for 5 nights then stop (Patient not taking: Reported on 1/4/2023) 30 tablet 0   • TURMERIC CURCUMIN PO Take 1,500 mg by mouth once as needed   (Patient not taking: Reported on 5/26/2022)       No current facility-administered medications for this visit       Allergies   Allergen Reactions   • Gabapentin Delirium   • Oxcarbazepine Dizziness   • Baclofen Other (See Comments)     "unable to think and form sentences"   • Carbamazepine Other (See Comments)     "unable to think"   • Lamictal [Lamotrigine] Confusion     Difficulty to think clearly    • Tramadol Other (See Comments)     "forgetful"      Immunizations:     Immunization History   Administered Date(s) Administered   • COVID-19 PFIZER VACCINE 0 3 ML IM 03/03/2021, 03/24/2021, 10/06/2021, 04/20/2022   • COVID-19 Pfizer vac (Fox-sucrose, gray cap) 12 yr+ IM 04/20/2022   • INFLUENZA 11/07/2018, 09/18/2020, 10/06/2021   • Influenza Split High Dose Preservative Free IM 11/07/2018, 09/27/2019   • Pneumococcal Conjugate 13-Valent 02/22/2018, 02/04/2021   • Pneumococcal Polysaccharide PPV23 02/10/2017      Health Maintenance:         Topic Date Due   • Hepatitis C Screening  Never done   • Colorectal Cancer Screening  08/15/2027         Topic Date Due   • Hepatitis B Vaccine (1 of 3 - 3-dose series) Never done   • COVID-19 Vaccine (5 - Booster for Pfizer series) 06/15/2022      Medicare Screening Tests and Risk Assessments:         Health Risk Assessment:   Patient rates overall health as good  Patient feels that their physical health rating is slightly worse  Patient is satisfied with their life  Eyesight was rated as much worse  Hearing was rated as slightly worse  Patient feels that their emotional and mental health rating is same  Patients states they are never, rarely angry  Patient states they are never, rarely unusually tired/fatigued  Pain experienced in the last 7 days has been none  Patient states that he has experienced no weight loss or gain in last 6 months  Fall Risk Screening: In the past year, patient has experienced: no history of falling in past year      Home Safety:  Patient has trouble with stairs inside or outside of their home  Patient has working smoke alarms and has working carbon monoxide detector  Home safety hazards include: none  Nutrition:   Current diet is Regular       Medications: Patient is currently taking over-the-counter supplements  OTC medications include: see medication list  Patient is able to manage medications  Activities of Daily Living (ADLs)/Instrumental Activities of Daily Living (IADLs):   Walk and transfer into and out of bed and chair?: Yes  Dress and groom yourself?: Yes    Bathe or shower yourself?: Yes    Feed yourself? Yes  Do your laundry/housekeeping?: Yes  Manage your money, pay your bills and track your expenses?: Yes  Make your own meals?: Yes    Do your own shopping?: Yes    Previous Hospitalizations:   Any hospitalizations or ED visits within the last 12 months?: Yes    How many hospitalizations have you had in the last year?: 1-2    Advance Care Planning:   Living will: Yes    Advanced directive: Yes      PREVENTIVE SCREENINGS      Cardiovascular Screening:    General: Screening Current      Diabetes Screening:     General: Screening Current      Colorectal Cancer Screening:     General: Screening Current      Abdominal Aortic Aneurysm (AAA) Screening:    Risk factors include: age between 73-67 yo        Lung Cancer Screening:     General: Screening Not Indicated    Screening, Brief Intervention, and Referral to Treatment (SBIRT)    Screening    Typical number of drinks in a week: 0    Single Item Drug Screening:  How often have you used an illegal drug (including marijuana) or a prescription medication for non-medical reasons in the past year? never    Single Item Drug Screen Score: 0  Interpretation: Negative screen for possible drug use disorder    Review of Current Opioid Use    Opioid Risk Tool (ORT) Interpretation: Complete Opioid Risk Tool (ORT)    No results found  Physical Exam:     /80   Pulse 62   Temp (!) 97 2 °F (36 2 °C)   Resp 18   Ht 5' 10" (1 778 m)   Wt 108 kg (238 lb 4 oz)   SpO2 98%   BMI 34 19 kg/m²     Physical Exam  Vitals and nursing note reviewed  Constitutional:       General: He is not in acute distress  Appearance: Normal appearance  He is well-developed  He is not ill-appearing  HENT:      Head: Normocephalic and atraumatic  Eyes:      General:         Right eye: No discharge  Left eye: No discharge  Extraocular Movements: Extraocular movements intact  Conjunctiva/sclera: Conjunctivae normal       Pupils: Pupils are equal, round, and reactive to light  Comments: Patient's right eyelid has been partially sewn closed to prevent drying from right corneal abrasion   Neck:      Vascular: No carotid bruit  Cardiovascular:      Rate and Rhythm: Normal rate and regular rhythm  Heart sounds: Normal heart sounds  No murmur heard  Pulmonary:      Effort: Pulmonary effort is normal  No respiratory distress  Breath sounds: Normal breath sounds  No wheezing or rhonchi  Abdominal:      General: Abdomen is flat  Bowel sounds are normal       Palpations: Abdomen is soft  Tenderness: There is no abdominal tenderness  There is no guarding or rebound  Musculoskeletal:      Right lower leg: No edema  Left lower leg: No edema  Lymphadenopathy:      Cervical: No cervical adenopathy  Skin:     General: Skin is warm and dry  Capillary Refill: Capillary refill takes less than 2 seconds  Findings: No rash  Neurological:      Mental Status: He is alert  Mental status is at baseline  Psychiatric:         Mood and Affect: Mood normal          Behavior: Behavior normal          Thought Content:  Thought content normal          Judgment: Judgment normal      I spent 30 minutes with this patient which greater 50% was spent counseling or reviewing chart    Siva Regan MD

## 2023-01-05 PROBLEM — L29.9 PRURITUS: Status: ACTIVE | Noted: 2023-01-05

## 2023-01-05 NOTE — ASSESSMENT & PLAN NOTE
Pruritus    Patient was given prescription to initiate Lyrica 50 mg once at bedtime and may titrate every 3 to 4 days to twice daily dosing or up to 3 times daily dosing as needed

## 2023-01-05 NOTE — ASSESSMENT & PLAN NOTE
Mood disorder    Patient with fluctuating symptoms of depression however he did not wish to pursue any treatment at this time

## 2023-02-01 ENCOUNTER — LAB REQUISITION (OUTPATIENT)
Dept: LAB | Facility: HOSPITAL | Age: 73
End: 2023-02-01

## 2023-02-01 DIAGNOSIS — K57.30 DIVERTICULOSIS OF LARGE INTESTINE WITHOUT PERFORATION OR ABSCESS WITHOUT BLEEDING: ICD-10-CM

## 2023-02-01 DIAGNOSIS — K63.5 POLYP OF COLON: ICD-10-CM

## 2023-02-01 DIAGNOSIS — Z86.010 PERSONAL HISTORY OF COLONIC POLYPS: ICD-10-CM

## 2023-05-05 ENCOUNTER — TELEPHONE (OUTPATIENT)
Dept: FAMILY MEDICINE CLINIC | Facility: CLINIC | Age: 73
End: 2023-05-05

## 2023-05-05 NOTE — TELEPHONE ENCOUNTER
FATOU: Patient came in and stated that he is taking the pregabalin 50 mg twice a day  Once in am and once in PM   He said he was supposed to let you know and then forgot so he came in today  He said he feels great on this and his neuropathy is good

## 2023-06-12 DIAGNOSIS — L29.9 PRURITUS: ICD-10-CM

## 2023-06-12 RX ORDER — PREGABALIN 50 MG/1
50 CAPSULE ORAL 3 TIMES DAILY
Qty: 270 CAPSULE | Refills: 1 | Status: SHIPPED | OUTPATIENT
Start: 2023-06-12

## 2023-08-09 ENCOUNTER — TELEPHONE (OUTPATIENT)
Dept: NEUROLOGY | Facility: CLINIC | Age: 73
End: 2023-08-09

## 2023-08-09 NOTE — TELEPHONE ENCOUNTER
received vm from 8:06am-Yes, my name is Antelmo Appiah and I believe I just got a phone call from your office and I don't know what that's about, but I do have an appointment for 10:15 today. So I imagine it's something about that. So if you could call me back, please do so.  Thank you.  ------------------------------------------------  Looks like genevieve spoke to pt today at 8:52 and today appt was rescheduled till 8/11

## 2023-08-11 ENCOUNTER — OFFICE VISIT (OUTPATIENT)
Dept: NEUROLOGY | Facility: CLINIC | Age: 73
End: 2023-08-11

## 2023-08-11 VITALS
DIASTOLIC BLOOD PRESSURE: 59 MMHG | SYSTOLIC BLOOD PRESSURE: 115 MMHG | WEIGHT: 238.2 LBS | TEMPERATURE: 98.5 F | HEART RATE: 63 BPM | HEIGHT: 70 IN | BODY MASS INDEX: 34.1 KG/M2

## 2023-08-11 DIAGNOSIS — G50.0 TRIGEMINAL NEURALGIA: Primary | ICD-10-CM

## 2023-08-11 NOTE — PROGRESS NOTES
[facility]  PATIENT:  Fredy Ken  MRN:  [id number]  :  1950  DATE OF SERVICE:  2023      Assessment/Plan:     Trigeminal neuralgia  66 y/o m with h/o meningioma in Rt Meckles s/p gamma knife presents as a follow up for facial pain d/t Trigeminal Neuralgia. Call us immediately with any worsening so we can try to break a cycle ASAP    Follow up in 2 years         Problem List Items Addressed This Visit        Nervous and Auditory    Trigeminal neuralgia - Primary     66 y/o m with h/o meningioma in Rt Meckles s/p gamma knife presents as a follow up for facial pain d/t Trigeminal Neuralgia. Call us immediately with any worsening so we can try to break a cycle ASAP    Follow up in 2 years                History of Present Illness: We had the pleasure of evaluating Fredy Ken in neurological follow up  today for headaches. As you know,  he is a 68 y.o.  right handed male. Patient retired in 2018. Atif Abisai is here today for f/u evaluation of his trigeminal neuralgia.      To review, Pt's right sided facial pain started in . He was diagnosed with trigeminal neuralgia. He used to feel intense pain on rt side just under the eye. He had constant pain with episodes of severe pain.      He experiences it in Rt eye brow, Rt orbit and occasionally on Rt jaw last for 10 secs, 8/10 in severity. It worsens with sneezing, first bite of meal. Stopped CBD oil in 2021 because his urine testing should THC.       Underwent Right Temporal craniotomy, Subtemporal approach for meckel cave lesion, placement of lumbar drain on 2022 at [facility].  Since that time has not has any pain.  Does have numbness in his right cheek and above jaw line.  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 No recent falls     Meningoma:   As per radiology report,no significant interval change in the size approximate 1 cm calcified lesion epicentered within Meckel's cave and thought to represent a calcified meningioma. S/p gamma knife last MRI 5/19/20. He had this removed 7/20/2022 at [facility] and is doing well.  Has staples currently.     Trigeminal neuralgia  What medications do you take or have you taken for your headaches?      Preventive:   Lyrica (help the most)Calcium, vitamin B12, melatonin, senna, turmeric      Tried in past    Trileptal (dizziness and confusion), Tegretol (confusion/goofy), Neurontin (confusion), Lamictal (confusion), Olanzapine (confusion/goofy/dizzy), Topamax (once in morning-100 mg poqd helps with pain, seems to help with sleep too), Keppra    - Amitriptyline (stopped due to fatigue), Cymbalta (no improvement)  - Botox (did not help)     Abortive:   Tylenol- 2 tabs 5 days a week for severe pain. Percocet- 1 tab per day. 4-5 days/week      Tried in past-   Baclofen (doesn't take anymore)  tramadol ([place]offy and ended up in the ER)  aspirin     What is your current pain level ? 0/10      How often does the pain occur?   constant pain: this has been broken since his surgery 7/20/2022 prior to this there all the time  Severe pain: since surgery not severe episodes of pain; prior was Couple times a week on an average twice a day.    Electric shock have not happened since surgery, like pain-3-5 a day last for 10 secs.      Are you ever pain free? YES COMPLETELY since surgery  Aura/warning and how long does it last - none     What time of the day does the pain start? Not happening at all since surgery  Constant:prior was there all the time  Severe pain: varies; the episodes have been scattered throughout day  Electric shock like- first bite of meal, sneezing.      How long do the pain  last?   Constant pain: prior was there all the time  Severe pain: 15 min-12 hours   Electric shock pain- for 10-15 secs.      Describe your usual pain: no pain since surgery 7/20/2022  Constant pain: aching   Severe pain- Feels like somebody is drilling, ice pick, numbness, tingling + new shock like pathway from lateral nasal fold to mid cheek bone usually get it with shock like pain.      Where is your pain located? Constant Pain: right v2 under the eye on the superiors nasal region, currently at the "origins": below eye, lateral eyebrow, hair line above eyebrow, mandible directly inferior to corner of mouth, inferior mastoid region     Severe Pain:  v2 and at the medial nasal region, v3, lateral eyebrow and directly superior at face/hair line; v1, mandible below corner of mouth; inferior mastoid - these locations will be more painful and receive a shooting pain originating from below the eye     Electric Shock- Rt eye brow, Rt orbit then at times Rt jaw.      What is the intensity of pain? No current pain  Constant Pain: 4/10 (has broken since surgery) no current pain  Severe Pain: 8-10/10  Electric shock-8/10     Associated symptoms:   None     Have you used CBD or THC for your headaches and how often? Yes, but tested positive for THC at pain management so stopped  Have you ever had any Brain imaging? yes       2015 MRI head:   IMPRESSION-    1.  Stable nonenhancing rounded T2 hypointense structure in right Meckel's cave correlating to the previously present calcified structure noted on the prior 2013 head CT, stable from the previous MRI, possibly   a treated or calcified meningioma.  Cisternal segments of both 5th nerves are otherwise normal in signal without abnormal enhancement.  No brainstem abnormality.  No interval change from the previous study. 2.  No acute infarction, intracranial hemorrhage or mass effect         I personally reviewed these images.     Reviewed old notes from physician seen in the past- see above HPI for summary of previous encounters.          Past Medical History:   Diagnosis Date   • Arthritis     Right knee   • Chronic pain disorder    • Difficulty walking Unknown    Unsteady on feet; pain in knees. • Trigeminal neuralgia pain    • Ulcer of bile duct    • Vision loss 07/27/22    Following brain surgery on 07/20/22. Scratched cornea.        Patient Active Problem List   Diagnosis   • Trigeminal neuralgia   • Facial spasm   • Anemia   • Bilateral patellofemoral syndrome   • Diverticulosis of colon   • Esophageal reflux   • Intracranial meningioma (HCC)   • Vertigo   • Vitamin D deficiency   • Need for pneumococcal vaccination   • Insomnia   • Primary osteoarthritis of right knee   • DNS (deviated nasal septum)   • Chronic pain syndrome   • Uncomplicated opioid dependence ([address])   • Long-term current use of opiate analgesic   • Mood disorder ([address])   • Skin lesions   • Neuralgic facial pain   • Preoperative evaluation of a medical condition to rule out surgical contraindications (TAR required)   • Pruritus       Medications:      Current Outpatient Medications   Medication Sig Dispense Refill   • Acetaminophen (TYLENOL PO) Take 2 tablets by mouth as needed      • Apoaequorin (Prevagen) 10 MG CAPS Take 10 mg by mouth every morning     • ascorbic acid (VITAMIN C) 500 MG tablet Take 1 tablet (500 mg total) by mouth daily 60 tablet 0   • Calcium 250 MG CAPS Take 250 mg by mouth daily      • cyanocobalamin (VITAMIN B-12) 1,000 mcg tablet Take 1 tablet by mouth daily      • MELATONIN PO Take 10 mg by mouth daily at bedtime     • NON FORMULARY Take 2 tablets by mouth in the morning Magnilife     • pregabalin (LYRICA) 50 mg capsule Take 1 capsule (50 mg total) by mouth 3 (three) times a day (Patient taking differently: Take 50 mg by mouth 2 (two) times a day) 270 capsule 1   • Probiotic Product (PROBIOTIC PO) Take 1 tablet by mouth in the morning     • ciprofloxacin (Ciloxan) 0.3 % ophthalmic ointment  (Patient not taking: Reported on 2023)       No current facility-administered medications for this visit. Allergies: Allergies   Allergen Reactions   • Gabapentin Delirium   • Oxcarbazepine Dizziness   • Baclofen Other (See Comments)     "unable to think and form sentences"   • Carbamazepine Other (See Comments)     "unable to think"   • Lamictal [Lamotrigine] Confusion     Difficulty to think clearly    • Tramadol Other (See Comments)     "forgetful"       Family History:     Family History   Problem Relation Age of Onset   • Emphysema Mother    • Colon cancer Father    • Skin cancer Maternal Grandmother        Social History:     Social History     Socioeconomic History   • Marital status: /Civil Union     Spouse name: Gina   • Number of children: 2   • Years of education: BS Degree plus addl classes   • Highest education level: Not on file   Occupational History   • Occupation: Retired     Comment: 20 years [employer] &    Tobacco Use   • Smoking status: Never   • Smokeless tobacco: Never   • Tobacco comments: Both my parents smoked -- dad (2 packs a day); mom (4 packs a day),  in 80 at 79,  emphysema. Vaping Use   • Vaping Use: Never used   Substance and Sexual Activity   • Alcohol use:  Yes     Alcohol/week: 5.0 standard drinks of alcohol     Types: 2 Cans of beer, 3 Standard drinks or equivalent per week     Comment: Rum and vanilla Coke   • Drug use: No   • Sexual activity: Yes     Partners: Female     Birth control/protection: Post-menopausal   Other Topics Concern   • Not on file   Social History Narrative    Caffeine use: Daily caffeinated cola, drinks coffee    Lives at home with wife Ruben Ackerman     Social Determinants of Health Financial Resource Strain: Low Risk  (1/4/2023)    Overall Financial Resource Strain (CARDIA)    • Difficulty of Paying Living Expenses: Not very hard   Food Insecurity: Not on file   Transportation Needs: No Transportation Needs (1/4/2023)    PRAPARE - Transportation    • Lack of Transportation (Medical): No    • Lack of Transportation (Non-Medical): No   Physical Activity: Not on file   Stress: Not on file   Social Connections: Not on file   Intimate Partner Violence: Not on file   Housing Stability: Not on file      I have reviewed the patient's medical, social and surgical history as well as medications in detail and updated the computerized patient record. Objective:   Physical Exam:                                                                   Vitals:            /59 (BP Location: Left arm, Patient Position: Sitting, Cuff Size: Standard)   Pulse 63   Temp 98.5 °F (36.9 °C) (Temporal)   Ht 5' 10" (1.778 m)   Wt 108 kg (238 lb 3.2 oz)   BMI 34.18 kg/m²   BP Readings from Last 3 Encounters:   08/11/23 115/59   01/04/23 122/80   08/08/22 116/57     Pulse Readings from Last 3 Encounters:   08/11/23 63   01/04/23 62   08/08/22 55          CONSTITUTIONAL: Well developed, well nourished, well groomed. No dysmorphic features. Eyes:  EOM normal      Neck:  Normal ROM, neck supple. HEENT:  Normocephalic atraumatic. Chest:  Respirations regular and unlabored. Psychiatric:  Normal behavior and appropriate affect      MENTAL STATUS  Orientation: Alert and oriented x 3  Fund of knowledge: Intact. MOTOR (Upper and lower extremities)   Bulk/tone/abnormal movement: Normal muscle bulk and tone. COORDINATION   Station/Gait: Normal baseline gait. Review of Systems:   Review of Systems  Constitutional: Negative. HENT: Negative. Eyes: Negative. Respiratory: Negative. Cardiovascular: Negative. Gastrointestinal: Negative. Endocrine: Negative.     Genitourinary: Negative. Musculoskeletal: Negative. Skin: Negative. Allergic/Immunologic: Negative. Neurological: Negative. Hematological: Negative. Psychiatric/Behavioral: Negative. I personally reviewed the ROS entered by the MA      I have spent a total time of 30 minutes on 08/11/23 in caring for this patient including Prognosis, Risks and benefits of tx options, Instructions for management, Patient and family education, Risk factor reductions, Impressions, Counseling / Coordination of care, Documenting in the medical record, Reviewing / ordering tests, medicine, procedures   and Obtaining or reviewing history  .       Author:  Lázaro Jolly PA-C 8/11/2023 1:18 PM

## 2023-08-11 NOTE — ASSESSMENT & PLAN NOTE
68 y/o m with h/o meningioma in Rt Alicia s/p gamma knife presents as a follow up for facial pain d/t Trigeminal Neuralgia.      Call us immediately with any worsening so we can try to break a cycle ASAP    Follow up in 2 years

## 2023-08-11 NOTE — PATIENT INSTRUCTIONS
Trigeminal neuralgia  68 y/o m with h/o meningioma in Rt Alicia s/p gamma knife presents as a follow up for facial pain d/t Trigeminal Neuralgia.      Call us immediately with any worsening so we can try to break a cycle ASAP    Follow up in 2 years

## 2023-08-12 ENCOUNTER — APPOINTMENT (OUTPATIENT)
Dept: RADIOLOGY | Facility: CLINIC | Age: 73
End: 2023-08-12
Payer: MEDICARE

## 2023-08-12 ENCOUNTER — OFFICE VISIT (OUTPATIENT)
Dept: URGENT CARE | Facility: CLINIC | Age: 73
End: 2023-08-12
Payer: MEDICARE

## 2023-08-12 VITALS
SYSTOLIC BLOOD PRESSURE: 143 MMHG | BODY MASS INDEX: 34.04 KG/M2 | RESPIRATION RATE: 16 BRPM | WEIGHT: 237.8 LBS | DIASTOLIC BLOOD PRESSURE: 70 MMHG | HEIGHT: 70 IN | TEMPERATURE: 98.2 F | HEART RATE: 63 BPM | OXYGEN SATURATION: 97 %

## 2023-08-12 DIAGNOSIS — S22.31XA CLOSED FRACTURE OF ONE RIB OF RIGHT SIDE, INITIAL ENCOUNTER: Primary | ICD-10-CM

## 2023-08-12 DIAGNOSIS — S20.211A RIB CONTUSION, RIGHT, INITIAL ENCOUNTER: ICD-10-CM

## 2023-08-12 DIAGNOSIS — T14.90XA INJURY: ICD-10-CM

## 2023-08-12 PROCEDURE — 71101 X-RAY EXAM UNILAT RIBS/CHEST: CPT

## 2023-08-12 PROCEDURE — G0463 HOSPITAL OUTPT CLINIC VISIT: HCPCS | Performed by: FAMILY MEDICINE

## 2023-08-12 PROCEDURE — 99213 OFFICE O/P EST LOW 20 MIN: CPT | Performed by: FAMILY MEDICINE

## 2023-08-12 RX ORDER — MELOXICAM 7.5 MG/1
7.5 TABLET ORAL DAILY
Qty: 60 TABLET | Refills: 0 | Status: SHIPPED | OUTPATIENT
Start: 2023-08-12

## 2023-08-12 NOTE — PROGRESS NOTES
[facility]        NAME: Bilyl Alex is a 68 y.o. male  : 1950    MRN: [id number]  DATE: 2023  TIME: 3:33 PM    Assessment and Plan   Closed fracture of one rib of right side, initial encounter [S22.31XA]  1. Closed fracture of one rib of right side, initial encounter  meloxicam (MOBIC) 7.5 mg tablet      2. Rib contusion, right, initial encounter              Patient Instructions       Follow up with PCP in 3-5 days. Proceed to  ER if symptoms worsen. Chief Complaint     Chief Complaint   Patient presents with   • Rib Pain     Thursday went to look over a fence and feet slipped and hit his side on the top of fence and cracking sound. Took tylenol for the pain. History of Present Illness       61-year-old male presenting for evaluation of rib pain. He reports 2 days ago, leaning over and falling onto his fence and landing on the right side of his ribs. He is now experiencing 7/10 pain in his right rib cage reproduced with any twisting or turning motions. He states taking a deep breath also causes significant reproduction of pain. Denies any shortness of breath. Review of Systems   Review of Systems   Constitutional: Negative. HENT: Negative. Eyes: Negative. Respiratory: Negative. Cardiovascular: Negative. Gastrointestinal: Negative. Genitourinary: Negative. Musculoskeletal: Positive for arthralgias and myalgias. Skin: Negative. Allergic/Immunologic: Negative. Neurological: Negative. Hematological: Negative. Psychiatric/Behavioral: Negative.           Current Medications       Current Outpatient Medications:   •  meloxicam (MOBIC) 7.5 mg tablet, Take 1 tablet (7.5 mg total) by mouth daily, Disp: 60 tablet, Rfl: 0  •  Acetaminophen (TYLENOL PO), Take 2 tablets by mouth as needed , Disp: , Rfl:   •  Apoaequorin (Prevagen) 10 MG CAPS, Take 10 mg by mouth every morning, Disp: , Rfl:   •  ascorbic acid (VITAMIN C) 500 MG tablet, Take 1 tablet (500 mg total) by mouth daily, Disp: 60 tablet, Rfl: 0  •  Calcium 250 MG CAPS, Take 250 mg by mouth daily , Disp: , Rfl:   •  ciprofloxacin (Ciloxan) 0.3 % ophthalmic ointment, , Disp: , Rfl:   •  cyanocobalamin (VITAMIN B-12) 1,000 mcg tablet, Take 1 tablet by mouth daily , Disp: , Rfl:   •  MELATONIN PO, Take 10 mg by mouth daily at bedtime, Disp: , Rfl:   •  NON FORMULARY, Take 2 tablets by mouth in the morning Magnilife, Disp: , Rfl:   •  pregabalin (LYRICA) 50 mg capsule, Take 1 capsule (50 mg total) by mouth 3 (three) times a day (Patient taking differently: Take 50 mg by mouth 2 (two) times a day), Disp: 270 capsule, Rfl: 1  •  Probiotic Product (PROBIOTIC PO), Take 1 tablet by mouth in the morning, Disp: , Rfl:     Current Allergies     Allergies as of 08/12/2023 - Reviewed 08/12/2023   Allergen Reaction Noted   • Gabapentin Delirium 02/12/2018   • Oxcarbazepine Dizziness 02/12/2018   • Baclofen Other (See Comments) 07/12/2022   • Carbamazepine Other (See Comments) 07/12/2022   • Lamictal [lamotrigine] Confusion 03/29/2019   • Tramadol Other (See Comments) 11/06/2018            The following portions of the patient's history were reviewed and updated as appropriate: allergies, current medications, past family history, past medical history, past social history, past surgical history and problem list.     Past Medical History:   Diagnosis Date   • Arthritis     Right knee   • Chronic pain disorder    • Difficulty walking Unknown    Unsteady on feet; pain in knees. • Trigeminal neuralgia pain    • Ulcer of bile duct    • Vision loss 07/27/22    Following brain surgery on 07/20/22. Scratched cornea.        Past Surgical History:   Procedure Laterality Date   • APPENDECTOMY  1962   • BRAIN SURGERY  2005    Gamma Knife for Trigeminal Neuralgia   • CATARACT EXTRACTION     • CRANIOTOMY FOR TUMOR  07/20/2022   • ESOPHAGOGASTRODUODENOSCOPY     • HERNIA REPAIR  1952   • OTHER SURGICAL HISTORY      gamma knife RT in 2007 and 2010   • AZ ARTHRP KNE CONDYLE&PLATU MEDIAL&LAT COMPARTMENTS Right 10/22/2018    Procedure: ARTHROPLASTY KNEE TOTAL;  Surgeon: Mustapha Norman MD;  Location: [facility];  Service: Orthopedics   • AZ CREATE LESION STRTCTC PRQ NEUROLYTIC GASSERIAN Right 03/06/2018    Procedure: RHIZOTOMY TRIGEMINAL NERVES (V2 & V3); Surgeon: Danilo Yoo MD;  Location: [facility];  Service: Neurosurgery   • REPLACEMENT TOTAL KNEE Right 10/2018   • RHIZOTOMY W/ RADIOFREQUENCY ABLATION Right 08/08/2014    Stereotactic Ablation of Gasserian Ganglion Right sided trigeminal V2 radiofrequency rhizotomy x2       Family History   Problem Relation Age of Onset   • Emphysema Mother    • Colon cancer Father    • Skin cancer Maternal Grandmother          Medications have been verified. Objective   /70   Pulse 63   Temp 98.2 °F (36.8 °C)   Resp 16   Ht 5' 10" (1.778 m)   Wt 108 kg (237 lb 12.8 oz)   SpO2 97%   BMI 34.12 kg/m²   No LMP for male patient. Physical Exam     Physical Exam  Constitutional:       Appearance: He is well-developed. HENT:      Head: Normocephalic. Eyes:      Pupils: Pupils are equal, round, and reactive to light. Cardiovascular:      Rate and Rhythm: Normal rate and regular rhythm. Pulmonary:      Effort: Pulmonary effort is normal.   Chest:   Breasts:     Right: Tenderness present. Musculoskeletal:         General: Tenderness and signs of injury present. Normal range of motion. Arms:       Cervical back: Normal range of motion. Skin:     General: Skin is warm. Neurological:      Mental Status: He is alert and oriented to person, place, and time.

## 2023-09-29 DIAGNOSIS — L29.9 PRURITUS: ICD-10-CM

## 2023-09-29 RX ORDER — PREGABALIN 50 MG/1
50 CAPSULE ORAL 3 TIMES DAILY
Qty: 270 CAPSULE | Refills: 0 | Status: SHIPPED | OUTPATIENT
Start: 2023-09-29

## 2023-09-29 NOTE — TELEPHONE ENCOUNTER
Patient stopped in the office and is requesting the prescription be sent to [facility] as it is cheaper.  He will not be refilling it at [facility].

## 2024-02-10 DIAGNOSIS — L29.9 PRURITUS: ICD-10-CM

## 2024-02-12 RX ORDER — PREGABALIN 50 MG/1
50 CAPSULE ORAL 3 TIMES DAILY
Qty: 270 CAPSULE | Refills: 0 | Status: SHIPPED | OUTPATIENT
Start: 2024-02-12

## 2024-06-11 ENCOUNTER — VBI (OUTPATIENT)
Dept: FAMILY MEDICINE CLINIC | Facility: CLINIC | Age: 74
End: 2024-06-11

## 2024-06-11 NOTE — TELEPHONE ENCOUNTER
Called patient and leave a detailed message for patient to call back the office to schedule Medicare Annual Wellness Visit with Dr. Orourke

## 2024-06-13 ENCOUNTER — OFFICE VISIT (OUTPATIENT)
Dept: AUDIOLOGY | Age: 74
End: 2024-06-13

## 2024-06-13 ENCOUNTER — OFFICE VISIT (OUTPATIENT)
Dept: AUDIOLOGY | Age: 74
End: 2024-06-13
Payer: MEDICARE

## 2024-06-13 ENCOUNTER — TELEPHONE (OUTPATIENT)
Age: 74
End: 2024-06-13

## 2024-06-13 DIAGNOSIS — H90.3 SENSORY HEARING LOSS, BILATERAL: Primary | ICD-10-CM

## 2024-06-13 PROCEDURE — 92567 TYMPANOMETRY: CPT | Performed by: AUDIOLOGIST

## 2024-06-13 NOTE — PROGRESS NOTES
Progress Note    Name:  Miguel Angel Ramos  :  1950  Age:  73 y.o.  MRN:  [id number]  Date of Evaluation: 24     Patient scheduled for HE/HAE. Left ear occluded with cerumen - could not test. Provided hearing aid price range and advised patient to explore possible eligibility for [facility] services.       Elzbieta Jara.  Clinical Audiologist

## 2024-06-13 NOTE — PROGRESS NOTES
Diagnostic Hearing Evaluation    Name:  Miguel Angel Ramos  :  1950  Age:  73 y.o.   MRN:  [id number]  Date of Evaluation: 24     HISTORY:     Reason for visit: Difficulty Understanding    Miguel Angel Ramos is being seen today at the request of Dr. Orourke for an initial  evaluation of hearing. Patient  has a history of right temporal craniotomy and trigeminal neuralgia. He reports bilateral tinnitus (right more than left). A history of  service is reported.     EVALUATION:    Otoscopic Evaluation:   Right Ear: Unremarkable, canal clear   Left Ear: Occluding cerumen, could not visual TM    Tympanometry:   Right Ear: Type A; normal middle ear pressure and static compliance    Left Ear:  could  not obtain proper fit - occluded    Could not perform puretone or speech audiometry today due to cerumen occlusion.    *see attached audiogram    RECOMMENDATIONS:  Return to [facility]. for F/U, Ear Cleaning, and hearing evaluation following ear cleaning.  Patient was advised to contact [facility] regarding eligibility for hearing aids.    PATIENT EDUCATION:   The results of today's results and recommendations were reviewed with the patient and his hearing thresholds were explained at length.  Questions were addressed and the patient was encouraged to contact our department should concerns arise.      Ronda Jara  Clinical Audiologist  [facility]  [address] [id number]

## 2024-06-27 DIAGNOSIS — L29.9 PRURITUS: ICD-10-CM

## 2024-06-27 RX ORDER — PREGABALIN 50 MG/1
50 CAPSULE ORAL 3 TIMES DAILY
Qty: 270 CAPSULE | Refills: 1 | Status: SHIPPED | OUTPATIENT
Start: 2024-06-27

## 2024-06-27 NOTE — TELEPHONE ENCOUNTER
Patient has enough til Wednesday 7/3. But was calling earlier because of the upcoming holiday and it being a mail order service.     Can this please be refilled ASAP so the patient can potentially receive before running out?

## 2024-07-09 ENCOUNTER — APPOINTMENT (OUTPATIENT)
Dept: LAB | Facility: CLINIC | Age: 74
End: 2024-07-09
Payer: MEDICARE

## 2024-07-09 ENCOUNTER — OFFICE VISIT (OUTPATIENT)
Dept: FAMILY MEDICINE CLINIC | Facility: CLINIC | Age: 74
End: 2024-07-09
Payer: MEDICARE

## 2024-07-09 VITALS
HEART RATE: 61 BPM | TEMPERATURE: 97.6 F | OXYGEN SATURATION: 97 % | SYSTOLIC BLOOD PRESSURE: 110 MMHG | DIASTOLIC BLOOD PRESSURE: 70 MMHG | RESPIRATION RATE: 18 BRPM | BODY MASS INDEX: 34.27 KG/M2 | HEIGHT: 70 IN | WEIGHT: 239.38 LBS

## 2024-07-09 DIAGNOSIS — G89.29 CHRONIC MIDLINE LOW BACK PAIN WITHOUT SCIATICA: ICD-10-CM

## 2024-07-09 DIAGNOSIS — Z12.5 ENCOUNTER FOR SCREENING FOR MALIGNANT NEOPLASM OF PROSTATE: ICD-10-CM

## 2024-07-09 DIAGNOSIS — R35.1 NOCTURIA: ICD-10-CM

## 2024-07-09 DIAGNOSIS — H91.92 HEARING LOSS OF LEFT EAR, UNSPECIFIED HEARING LOSS TYPE: ICD-10-CM

## 2024-07-09 DIAGNOSIS — L29.9 PRURITUS: ICD-10-CM

## 2024-07-09 DIAGNOSIS — D32.0 INTRACRANIAL MENINGIOMA (HCC): ICD-10-CM

## 2024-07-09 DIAGNOSIS — Z00.00 MEDICARE ANNUAL WELLNESS VISIT, SUBSEQUENT: Primary | ICD-10-CM

## 2024-07-09 DIAGNOSIS — E78.5 HYPERLIPIDEMIA, UNSPECIFIED HYPERLIPIDEMIA TYPE: ICD-10-CM

## 2024-07-09 DIAGNOSIS — G51.8 NEURALGIC FACIAL PAIN: ICD-10-CM

## 2024-07-09 DIAGNOSIS — M54.50 CHRONIC MIDLINE LOW BACK PAIN WITHOUT SCIATICA: ICD-10-CM

## 2024-07-09 LAB
ALBUMIN SERPL BCG-MCNC: 3.8 G/DL (ref 3.5–5)
ALP SERPL-CCNC: 62 U/L (ref 34–104)
ALT SERPL W P-5'-P-CCNC: 16 U/L (ref 7–52)
ANION GAP SERPL CALCULATED.3IONS-SCNC: 7 MMOL/L (ref 4–13)
AST SERPL W P-5'-P-CCNC: 16 U/L (ref 13–39)
BILIRUB SERPL-MCNC: 0.57 MG/DL (ref 0.2–1)
BUN SERPL-MCNC: 11 MG/DL (ref 5–25)
CALCIUM SERPL-MCNC: 10.4 MG/DL (ref 8.4–10.2)
CHLORIDE SERPL-SCNC: 106 MMOL/L (ref 96–108)
CHOLEST SERPL-MCNC: 161 MG/DL
CO2 SERPL-SCNC: 29 MMOL/L (ref 21–32)
CREAT SERPL-MCNC: 0.95 MG/DL (ref 0.6–1.3)
GFR SERPL CREATININE-BSD FRML MDRD: 78 ML/MIN/1.73SQ M
GLUCOSE P FAST SERPL-MCNC: 94 MG/DL (ref 65–99)
HDLC SERPL-MCNC: 54 MG/DL
LDLC SERPL CALC-MCNC: 95 MG/DL (ref 0–100)
NONHDLC SERPL-MCNC: 107 MG/DL
POTASSIUM SERPL-SCNC: 4.9 MMOL/L (ref 3.5–5.3)
PROT SERPL-MCNC: 6.2 G/DL (ref 6.4–8.4)
PSA SERPL-MCNC: 1.92 NG/ML (ref 0–4)
SODIUM SERPL-SCNC: 142 MMOL/L (ref 135–147)
TRIGL SERPL-MCNC: 62 MG/DL
TSH SERPL DL<=0.05 MIU/L-ACNC: 2.15 UIU/ML (ref 0.45–4.5)

## 2024-07-09 PROCEDURE — 36415 COLL VENOUS BLD VENIPUNCTURE: CPT

## 2024-07-09 PROCEDURE — 80061 LIPID PANEL: CPT

## 2024-07-09 PROCEDURE — 80053 COMPREHEN METABOLIC PANEL: CPT

## 2024-07-09 PROCEDURE — 84443 ASSAY THYROID STIM HORMONE: CPT

## 2024-07-09 PROCEDURE — G0103 PSA SCREENING: HCPCS

## 2024-07-09 PROCEDURE — G0439 PPPS, SUBSEQ VISIT: HCPCS | Performed by: FAMILY MEDICINE

## 2024-07-09 PROCEDURE — 99214 OFFICE O/P EST MOD 30 MIN: CPT | Performed by: FAMILY MEDICINE

## 2024-07-09 NOTE — PROGRESS NOTES
Ambulatory Visit  Name: Miguel Angel Ramos      : 1950      MRN: [id number]  Encounter Provider: Joey Orourke MD  Encounter Date: 2024   Encounter department: [facility]    Assessment & Plan   1. Medicare annual wellness visit, subsequent  2. Hearing loss of left ear, unspecified hearing loss type  Assessment & Plan:  Hearing loss.  Patient with a history of hearing loss however due to inability to remove all wax impaction he was referred to [facility] ENT office for further evaluation  Orders:  -     Ambulatory Referral to Otolaryngology; Future  3. Chronic midline low back pain without sciatica  Assessment & Plan:  Low back pain.  Patient will obtain x-ray of lumbar spine for further evaluation.  He was given referral to see [facility] orthopedist for back consultation  Orders:  -     XR spine lumbar minimum 4 views non injury; Future; Expected date: 2024  -     Ambulatory Referral to Orthopedic Surgery; Future  -     Comprehensive metabolic panel; Future  -     Lipid panel; Future  -     TSH, 3rd generation; Future  -     PSA, Total Screen; Future     Patient will obtain blood work as ordered for further evaluation.  We will make further recommendations pending results of test.      Preventive health issues were discussed with patient, and age appropriate screening tests were ordered as noted in patient's After Visit Summary. Personalized health advice and appropriate referrals for health education or preventive services given if needed, as noted in patient's After Visit Summary.    History of Present Illness     Patient in the office with complaint of tenderness and decreased hearing of his left ear.  Patient has been using over-the-counter Debrox for cerumen impaction.    Patient also reports unrelated chronic history of low back pain which has become more symptomatic over the past several weeks.  He denies any acute injuries to his back.       Patient Care Team:  Joey  MD Haven as PCP - General (Family Medicine)  MD Rhina Keys MD Ariel Alicea, MD Bushra I Malik, MD Alan Listhaus, MD (Ophthalmology)  Mayra Barajas DDS    Review of Systems   Constitutional: Negative.    HENT:  Positive for hearing loss and tinnitus.    Respiratory: Negative.     Cardiovascular: Negative.    Gastrointestinal: Negative.    Musculoskeletal:  Positive for arthralgias and back pain.     Medical History Reviewed by provider this encounter:  Meds       Annual Wellness Visit Questionnaire       Health Risk Assessment:   Patient rates overall health as fair. Patient feels that their physical health rating is slightly worse. Patient is satisfied with their life. Eyesight was rated as much worse. Hearing was rated as much worse. Patient feels that their emotional and mental health rating is same. Patients states they are never, rarely angry. Patient states they are sometimes unusually tired/fatigued. Pain experienced in the last 7 days has been a lot. Patient's pain rating has been 7/10. Patient states that he has experienced no weight loss or gain in last 6 months. My main concerns are my back pain, loss of vision in right eye, and loss of hearing in left ear.    Depression Screening:   PHQ-2 Score: 2      Fall Risk Screening:   In the past year, patient has experienced: history of falling in past year      Home Safety:  Patient has trouble with stairs inside or outside of their home. Patient has working smoke alarms and has working carbon monoxide detector. Home safety hazards include: none. Although I've become more unsteady on my feet, it's not the fault of the house.    Nutrition:   Current diet is Regular and Other (please comment). I love dark chocolate, but I also eat a lot of fruit, not enough veggies.    Medications:   Patient is currently taking over-the-counter supplements. OTC medications include: see medication list. Patient is able to manage medications.      Activities of Daily Living (ADLs)/Instrumental Activities of Daily Living (IADLs):   Walk and transfer into and out of bed and chair?: Yes  Dress and groom yourself?: Yes    Bathe or shower yourself?: Yes    Feed yourself? Yes  Do your laundry/housekeeping?: Yes  Manage your money, pay your bills and track your expenses?: Yes  Make your own meals?: Yes    Do your own shopping?: Yes    ADL comments: It's just that everything has become a little harder to do.    Previous Hospitalizations:   Any hospitalizations or ED visits within the last 12 months?: Yes    How many hospitalizations have you had in the last year?: 1-2    Hospitalization Comments: I fell and broke two ribs in     Advance Care Planning:   Living will: Yes    Durable POA for healthcare: No    Advanced directive: Yes      PREVENTIVE SCREENINGS      Cardiovascular Screening:    General: Screening Current      Diabetes Screening:     General: Screening Not Indicated      Colorectal Cancer Screening:     General: Screening Current      Prostate Cancer Screening:    General: Screening Not Indicated      Abdominal Aortic Aneurysm (AAA) Screening:    Risk factors include: age between 65-76 yo        Lung Cancer Screening:     General: Screening Not Indicated    Screening, Brief Intervention, and Referral to Treatment (SBIRT)    Screening  Typical number of drinks in a day: 1  Typical number of drinks in a week: 4  Interpretation: Low risk drinking behavior.    AUDIT-C Screenin) How often did you have a drink containing alcohol in the past year? 2 to 3 times a week  2) How many drinks did you have on a typical day when you were drinking in the past year? 1 to 2  3) How often did you have 6 or more drinks on one occasion in the past year? never    AUDIT-C Score: 3  Interpretation: Score 0-3 (male): Negative screen for alcohol misuse    Single Item Drug Screening:  How often have you used an illegal drug (including marijuana) or a  "prescription medication for non-medical reasons in the past year? never    Single Item Drug Screen Score: 0  Interpretation: Negative screen for possible drug use disorder    Social Determinants of Health     Financial Resource Strain: Low Risk  (1/4/2023)    Overall Financial Resource Strain (CARDIA)     Difficulty of Paying Living Expenses: Not very hard   Food Insecurity: No Food Insecurity (7/8/2024)    Hunger Vital Sign     Worried About Running Out of Food in the Last Year: Never true     Ran Out of Food in the Last Year: Never true   Transportation Needs: No Transportation Needs (7/8/2024)    PRAPARE - Transportation     Lack of Transportation (Medical): No     Lack of Transportation (Non-Medical): No   Housing Stability: Low Risk  (7/8/2024)    Housing Stability Vital Sign     Unable to Pay for Housing in the Last Year: No     Number of Times Moved in the Last Year: 0     Homeless in the Last Year: No   Utilities: Not At Risk (7/8/2024)    [facility] Utilities     Threatened with loss of utilities: No     No results found.    Objective     /70   Pulse 61   Temp 97.6 °F (36.4 °C)   Resp 18   Ht 5' 10\" (1.778 m)   Wt 109 kg (239 lb 6 oz)   SpO2 97%   BMI 34.35 kg/m²     Physical Exam  Vitals and nursing note reviewed.   Constitutional:       General: He is not in acute distress.     Appearance: Normal appearance. He is well-developed. He is not ill-appearing.   HENT:      Head: Normocephalic and atraumatic.      Right Ear: Tympanic membrane, ear canal and external ear normal. There is no impacted cerumen.      Left Ear: Tympanic membrane, ear canal and external ear normal. There is impacted cerumen.   Eyes:      General:         Right eye: No discharge.         Left eye: No discharge.      Extraocular Movements: Extraocular movements intact.      Conjunctiva/sclera: Conjunctivae normal.      Pupils: Pupils are equal, round, and reactive to light.   Neck:      Vascular: No carotid bruit.   Cardiovascular: "      Rate and Rhythm: Normal rate and regular rhythm.      Heart sounds: Normal heart sounds. No murmur heard.  Pulmonary:      Effort: Pulmonary effort is normal. No respiratory distress.      Breath sounds: Normal breath sounds. No wheezing or rhonchi.   Abdominal:      General: Abdomen is flat. Bowel sounds are normal.      Palpations: Abdomen is soft.      Tenderness: There is no abdominal tenderness. There is no guarding or rebound.   Musculoskeletal:         General: Tenderness present.      Right lower leg: No edema.      Left lower leg: No edema.      Comments: Minimal tenderness to palpation of lower lumbar spine.  Muscle strength +5/5 lower extremities.  Negative straight leg raising bilaterally   Lymphadenopathy:      Cervical: No cervical adenopathy.   Skin:     General: Skin is warm and dry.      Capillary Refill: Capillary refill takes less than 2 seconds.   Neurological:      Mental Status: He is alert. Mental status is at baseline.   Psychiatric:         Mood and Affect: Mood normal.         Behavior: Behavior normal.         Thought Content: Thought content normal.         Judgment: Judgment normal.       I spent 30 minutes with this patient of which greater than 50% was spent counseling or reviewing chart

## 2024-07-09 NOTE — PATIENT INSTRUCTIONS
Medicare Preventive Visit Patient Instructions  Thank you for completing your Welcome to Medicare Visit or Medicare Annual Wellness Visit today. Your next wellness visit will be due in one year (7/10/2025).  The screening/preventive services that you may require over the next 5-10 years are detailed below. Some tests may not apply to you based off risk factors and/or age. Screening tests ordered at today's visit but not completed yet may show as past due. Also, please note that scanned in results may not display below.  Preventive Screenings:  Service Recommendations Previous Testing/Comments   Colorectal Cancer Screening  Colonoscopy    Fecal Occult Blood Test (FOBT)/Fecal Immunochemical Test (FIT)  Fecal DNA/Cologuard Test  Flexible Sigmoidoscopy Age: 45-75 years old   Colonoscopy: every 10 years (May be performed more frequently if at higher risk)  OR  FOBT/FIT: every 1 year  OR  Cologuard: every 3 years  OR  Sigmoidoscopy: every 5 years  Screening may be recommended earlier than age 45 if at higher risk for colorectal cancer. Also, an individualized decision between you and your healthcare provider will decide whether screening between the ages of 76-85 would be appropriate. Colonoscopy: 08/15/2017  FOBT/FIT: Not on file  Cologuard: Not on file  Sigmoidoscopy: Not on file    Screening Current     Prostate Cancer Screening Individualized decision between patient and health care provider in men between ages of 55-69   Medicare will cover every 12 months beginning on the day after your 50th birthday PSA: 0.9 ng/mL           Hepatitis C Screening Once for adults born between 1945 and 1965  More frequently in patients at high risk for Hepatitis C Hep C Antibody: Not on file        Diabetes Screening 1-2 times per year if you're at risk for diabetes or have pre-diabetes Fasting glucose: 77 mg/dL (5/9/2022)  A1C: No results in last 5 years (No results in last 5 years)      Cholesterol Screening Once every 5 years if  you don't have a lipid disorder. May order more often based on risk factors. Lipid panel: 12/18/2019  Screening Current      Other Preventive Screenings Covered by Medicare:  Abdominal Aortic Aneurysm (AAA) Screening: covered once if your at risk. You're considered to be at risk if you have a family history of AAA or a male between the age of 65-75 who smoking at least 100 cigarettes in your lifetime.  Lung Cancer Screening: covers low dose CT scan once per year if you meet all of the following conditions: (1) Age 55-77; (2) No signs or symptoms of lung cancer; (3) Current smoker or have quit smoking within the last 15 years; (4) You have a tobacco smoking history of at least 20 pack years (packs per day x number of years you smoked); (5) You get a written order from a healthcare provider.  Glaucoma Screening: covered annually if you're considered high risk: (1) You have diabetes OR (2) Family history of glaucoma OR (3)  aged 50 and older OR (4)  American aged 65 and older  Osteoporosis Screening: covered every 2 years if you meet one of the following conditions: (1) Have a vertebral abnormality; (2) On glucocorticoid therapy for more than 3 months; (3) Have primary hyperparathyroidism; (4) On osteoporosis medications and need to assess response to drug therapy.  HIV Screening: covered annually if you're between the age of 15-65. Also covered annually if you are younger than 15 and older than 65 with risk factors for HIV infection. For pregnant patients, it is covered up to 3 times per pregnancy.    Immunizations:  Immunization Recommendations   Influenza Vaccine Annual influenza vaccination during flu season is recommended for all persons aged >= 6 months who do not have contraindications   Pneumococcal Vaccine   * Pneumococcal conjugate vaccine = PCV13 (Prevnar 13), PCV15 (Vaxneuvance), PCV20 (Prevnar 20)  * Pneumococcal polysaccharide vaccine = PPSV23 (Pneumovax) Adults 19-63 yo with  certain risk factors or if 65+ yo  If never received any pneumonia vaccine: recommend Prevnar 20 (PCV20)  Give PCV20 if previously received 1 dose of PCV13 or PPSV23   Hepatitis B Vaccine 3 dose series if at intermediate or high risk (ex: diabetes, end stage renal disease, liver disease)   Respiratory syncytial virus (RSV) Vaccine - COVERED BY MEDICARE PART D  * RSVPreF3 (Arexvy) CDC recommends that adults 60 years of age and older may receive a single dose of RSV vaccine using shared clinical decision-making (SCDM)   Tetanus (Td) Vaccine - COST NOT COVERED BY MEDICARE PART B Following completion of primary series, a booster dose should be given every 10 years to maintain immunity against tetanus. Td may also be given as tetanus wound prophylaxis.   Tdap Vaccine - COST NOT COVERED BY MEDICARE PART B Recommended at least once for all adults. For pregnant patients, recommended with each pregnancy.   Shingles Vaccine (Shingrix) - COST NOT COVERED BY MEDICARE PART B  2 shot series recommended in those 19 years and older who have or will have weakened immune systems or those 50 years and older     Health Maintenance Due:      Topic Date Due   • Hepatitis C Screening  Never done   • Colorectal Cancer Screening  08/15/2027     Immunizations Due:      Topic Date Due   • Influenza Vaccine (1) 09/01/2024     Advance Directives   What are advance directives?  Advance directives are legal documents that state your wishes and plans for medical care. These plans are made ahead of time in case you lose your ability to make decisions for yourself. Advance directives can apply to any medical decision, such as the treatments you want, and if you want to donate organs.   What are the types of advance directives?  There are many types of advance directives, and each state has rules about how to use them. You may choose a combination of any of the following:  Living will:  This is a written record of the treatment you want. You can also  choose which treatments you do not want, which to limit, and which to stop at a certain time. This includes surgery, medicine, IV fluid, and tube feedings.   Durable power of  for healthcare (DPAHC):  This is a written record that states who you want to make healthcare choices for you when you are unable to make them for yourself. This person, called a proxy, is usually a family member or a friend. You may choose more than 1 proxy.  Do not resuscitate (DNR) order:  A DNR order is used in case your heart stops beating or you stop breathing. It is a request not to have certain forms of treatment, such as CPR. A DNR order may be included in other types of advance directives.  Medical directive:  This covers the care that you want if you are in a coma, near death, or unable to make decisions for yourself. You can list the treatments you want for each condition. Treatment may include pain medicine, surgery, blood transfusions, dialysis, IV or tube feedings, and a ventilator (breathing machine).  Values history:  This document has questions about your views, beliefs, and how you feel and think about life. This information can help others choose the care that you would choose.  Why are advance directives important?  An advance directive helps you control your care. Although spoken wishes may be used, it is better to have your wishes written down. Spoken wishes can be misunderstood, or not followed. Treatments may be given even if you do not want them. An advance directive may make it easier for your family to make difficult choices about your care.   Fall Prevention    Fall prevention  includes ways to make your home and other areas safer. It also includes ways you can move more carefully to prevent a fall. Health conditions that cause changes in your blood pressure, vision, or muscle strength and coordination may increase your risk for falls. Medicines may also increase your risk for falls if they make you dizzy,  weak, or sleepy.   Fall prevention tips:   Stand or sit up slowly.    Use assistive devices as directed.    Wear shoes that fit well and have soles that .    Wear a personal alarm.    Stay active.    Manage your medical conditions.    Home Safety Tips:  Add items to prevent falls in the bathroom.    Keep paths clear.    Install bright lights in your home.    Keep items you use often on shelves within reach.    Paint or place reflective tape on the edges of your stairs.    Weight Management   Why it is important to manage your weight:  Being overweight increases your risk of health conditions such as heart disease, high blood pressure, type 2 diabetes, and certain types of cancer. It can also increase your risk for osteoarthritis, sleep apnea, and other respiratory problems. Aim for a slow, steady weight loss. Even a small amount of weight loss can lower your risk of health problems.  How to lose weight safely:  A safe and healthy way to lose weight is to eat fewer calories and get regular exercise. You can lose up about 1 pound a week by decreasing the number of calories you eat by 500 calories each day.   Healthy meal plan for weight management:  A healthy meal plan includes a variety of foods, contains fewer calories, and helps you stay healthy. A healthy meal plan includes the following:  Eat whole-grain foods more often.  A healthy meal plan should contain fiber. Fiber is the part of grains, fruits, and vegetables that is not broken down by your body. Whole-grain foods are healthy and provide extra fiber in your diet. Some examples of whole-grain foods are whole-wheat breads and pastas, oatmeal, brown rice, and bulgur.  Eat a variety of vegetables every day.  Include dark, leafy greens such as spinach, kale, val greens, and mustard greens. Eat yellow and orange vegetables such as carrots, sweet potatoes, and winter squash.   Eat a variety of fruits every day.  Choose fresh or canned fruit (canned in its  own juice or light syrup) instead of juice. Fruit juice has very little or no fiber.  Eat low-fat dairy foods.  Drink fat-free (skim) milk or 1% milk. Eat fat-free yogurt and low-fat cottage cheese. Try low-fat cheeses such as mozzarella and other reduced-fat cheeses.  Choose meat and other protein foods that are low in fat.  Choose beans or other legumes such as split peas or lentils. Choose fish, skinless poultry (chicken or turkey), or lean cuts of red meat (beef or pork). Before you cook meat or poultry, cut off any visible fat.   Use less fat and oil.  Try baking foods instead of frying them. Add less fat, such as margarine, sour cream, regular salad dressing and mayonnaise to foods. Eat fewer high-fat foods. Some examples of high-fat foods include french fries, doughnuts, ice cream, and cakes.  Eat fewer sweets.  Limit foods and drinks that are high in sugar. This includes candy, cookies, regular soda, and sweetened drinks.  Exercise:  Exercise at least 30 minutes per day on most days of the week. Some examples of exercise include walking, biking, dancing, and swimming. You can also fit in more physical activity by taking the stairs instead of the elevator or parking farther away from stores. Ask your healthcare provider about the best exercise plan for you.      © Copyright [employer] 2018 Information is for End User's use only and may not be sold, redistributed or otherwise used for commercial purposes. All illustrations and images included in CareNotes® are the copyrighted property of [employer]M., Inc. or [employer]

## 2024-07-09 NOTE — ASSESSMENT & PLAN NOTE
Hearing loss.  Patient with a history of hearing loss however due to inability to remove all wax impaction he was referred to [facility]'s ENT office for further evaluation

## 2024-07-09 NOTE — ASSESSMENT & PLAN NOTE
Low back pain.  Patient will obtain x-ray of lumbar spine for further evaluation.  He was given referral to see [facility] orthopedist for back consultation

## 2024-07-16 ENCOUNTER — APPOINTMENT (OUTPATIENT)
Dept: RADIOLOGY | Facility: CLINIC | Age: 74
End: 2024-07-16
Payer: MEDICARE

## 2024-07-16 DIAGNOSIS — G89.29 CHRONIC MIDLINE LOW BACK PAIN WITHOUT SCIATICA: ICD-10-CM

## 2024-07-16 DIAGNOSIS — M54.50 CHRONIC MIDLINE LOW BACK PAIN WITHOUT SCIATICA: ICD-10-CM

## 2024-07-16 PROCEDURE — 72110 X-RAY EXAM L-2 SPINE 4/>VWS: CPT

## 2024-07-31 ENCOUNTER — OFFICE VISIT (OUTPATIENT)
Dept: OBGYN CLINIC | Facility: HOSPITAL | Age: 74
End: 2024-07-31
Payer: MEDICARE

## 2024-07-31 ENCOUNTER — HOSPITAL ENCOUNTER (OUTPATIENT)
Dept: RADIOLOGY | Facility: HOSPITAL | Age: 74
Discharge: HOME | End: 2024-07-31
Attending: ORTHOPAEDIC SURGERY
Payer: MEDICARE

## 2024-07-31 VITALS
SYSTOLIC BLOOD PRESSURE: 90 MMHG | BODY MASS INDEX: 34.4 KG/M2 | DIASTOLIC BLOOD PRESSURE: 47 MMHG | HEART RATE: 80 BPM | HEIGHT: 70 IN | WEIGHT: 240.3 LBS

## 2024-07-31 DIAGNOSIS — M54.50 CHRONIC MIDLINE LOW BACK PAIN WITHOUT SCIATICA: ICD-10-CM

## 2024-07-31 DIAGNOSIS — G89.29 CHRONIC MIDLINE LOW BACK PAIN WITHOUT SCIATICA: ICD-10-CM

## 2024-07-31 DIAGNOSIS — R52 PAIN: ICD-10-CM

## 2024-07-31 DIAGNOSIS — R52 PAIN: Primary | ICD-10-CM

## 2024-07-31 PROCEDURE — 72100 X-RAY EXAM L-S SPINE 2/3 VWS: CPT

## 2024-07-31 PROCEDURE — 99214 OFFICE O/P EST MOD 30 MIN: CPT | Performed by: ORTHOPAEDIC SURGERY

## 2024-07-31 RX ORDER — MELOXICAM 7.5 MG/1
7.5 TABLET ORAL DAILY
Qty: 14 TABLET | Refills: 0 | Status: SHIPPED | OUTPATIENT
Start: 2024-07-31

## 2024-07-31 NOTE — PROGRESS NOTES
Assessment & Plan/Medical Decision Makin y.o. male with Back Pain and imaging findings most notable for lumbar spondylosis           The clinical, physical and imaging findings were reviewed with the patient.  Miguel Angel  has a constellation of findings consistent with Lumbar Myofascial Pain in the setting of lumbar degenerative disease.      Fortunately patient remains neurologically intact and functional.   We discussed the treatment options including physical therapy, at home exercises, activity modifications, chiropractic medicine, oral medications, interventional spine procedures.  At this time recommend continued conservative treatments.    Referral placed for physical therapy to work on core strengthening, lumbar ROM, strengthening, and stretching exercises.   Meloxicam rx sent to patient's pharmacy. Discussed reasoning for prescribing medication, proper usage, and potential side effects.   MRI lumbar spine to further evaluate patient's symptoms. Will review MRI in detail with patient at follow-up visit and discuss further treatment options at that time.      Patient instructed to return to office/ER sooner if symptoms are not improving, getting worse, or new worrisome/neurologic symptoms arise.  Patient will follow up in approx. 3 months for re-evaluation after further conservative treatments.       Subjective:      Chief Complaint: Back Pain    HPI:  Miguel Angel Ramos is a 74 y.o. male presenting for initial visit with chief complaint of back pain.  Pain began several months ago.  Notes constant pain across his back.  Wakes him up from sleep.  Difficulty ambulating due to the pain.  Has some subjective weakness.    Denies any raquel trauma. Denies fever or chills, no night sweats. Denies any bladder or bowel changes.      Conservative therapy includes the following:   Medications: Lyrica (used for neuropathy), Tylenol  Injections: No        Physical Therapy: No  Chiropractic Medicine: has not  attempted  Accupunture/Massage Therapy: has not attempted   These therapeutic modalities were ineffective at providing sustained pain relief/functional improvement.     Nicotine dependent: No  Occupation: Retired    Living situation: Lives with family   ADLs: patient is able to perform     Objective:     Family History   Problem Relation Age of Onset    Emphysema Mother     Colon cancer Father     Skin cancer Maternal Grandmother        Past Medical History:   Diagnosis Date    Arthritis     Right knee    Chronic pain disorder     Difficulty walking Unknown    Unsteady on feet; pain in knees.    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       Current Outpatient Medications   Medication Sig Dispense Refill    Acetaminophen (TYLENOL PO) Take 2 tablets by mouth as needed       ascorbic acid (VITAMIN C) 500 MG tablet Take 1 tablet (500 mg total) by mouth daily 60 tablet 0    Calcium 250 MG CAPS Take 250 mg by mouth daily       cyanocobalamin (VITAMIN B-12) 1,000 mcg tablet Take 1 tablet by mouth daily       MELATONIN PO Take 10 mg by mouth daily at bedtime      pregabalin (LYRICA) 50 mg capsule Take 1 capsule (50 mg total) by mouth 3 (three) times a day 270 capsule 1    Probiotic Product (PROBIOTIC PO) Take 1 tablet by mouth in the morning      ciprofloxacin (Ciloxan) 0.3 % ophthalmic ointment  (Patient not taking: Reported on 8/11/2023)       No current facility-administered medications for this visit.       Past Surgical History:   Procedure Laterality Date    APPENDECTOMY  1962    BRAIN SURGERY  2005    Gamma Knife for Trigeminal Neuralgia    CATARACT EXTRACTION      CRANIOTOMY FOR TUMOR  07/20/2022    ESOPHAGOGASTRODUODENOSCOPY      HERNIA REPAIR  1952    OTHER SURGICAL HISTORY      gamma knife RT in 2007 and 2010    NH ARTHRP KNE CONDYLE&PLATU MEDIAL&LAT  COMPARTMENTS Right 10/22/2018    Procedure: ARTHROPLASTY KNEE TOTAL;  Surgeon: Rhina Pratt MD;  Location: [facility];  Service: Orthopedics    SC CREATE LESION STRTCTC PRQ NEUROLYTIC GASSERIAN Right 2018    Procedure: RHIZOTOMY TRIGEMINAL NERVES (V2 & V3);  Surgeon: Jam Winter MD;  Location: [facility];  Service: Neurosurgery    REPLACEMENT TOTAL KNEE Right 10/2018    RHIZOTOMY W/ RADIOFREQUENCY ABLATION Right 2014    Stereotactic Ablation of Gasserian Ganglion Right sided trigeminal V2 radiofrequency rhizotomy x2       Social History     Socioeconomic History    Marital status: /Civil Union     Spouse name: Gina    Number of children: 2    Years of education: BS Degree plus addl classes    Highest education level: Not on file   Occupational History    Occupation: Retired     Comment: 20 years [employer] &    Tobacco Use    Smoking status: Never    Smokeless tobacco: Never    Tobacco comments:     Both my parents smoked -- dad (2 packs a day); mom (4 packs a day),  in  at 67,  emphysema.   Vaping Use    Vaping status: Never Used   Substance and Sexual Activity    Alcohol use: Yes     Alcohol/week: 5.0 standard drinks of alcohol     Types: 2 Cans of beer, 3 Standard drinks or equivalent per week     Comment: Rum and vanilla Coke    Drug use: No    Sexual activity: Yes     Partners: Female     Birth control/protection: Post-menopausal   Other Topics Concern    Not on file   Social History Narrative    Caffeine use: Daily caffeinated cola, drinks coffee    Lives at home with wife Gina     Social Determinants of Health     Financial Resource Strain: Low Risk  (2023)    Overall Financial Resource Strain (CARDIA)     Difficulty of Paying Living Expenses: Not very hard   Food Insecurity: No Food Insecurity (2024)    Hunger Vital Sign     Worried About Running Out of Food in the Last Year: Never true     Ran Out of Food in the Last Year: Never true   Transportation Needs:  "No Transportation Needs (7/8/2024)    PRAPARE - Transportation     Lack of Transportation (Medical): No     Lack of Transportation (Non-Medical): No   Physical Activity: Not on file   Stress: Not on file   Social Connections: Not on file   Intimate Partner Violence: Not on file   Housing Stability: Low Risk  (7/8/2024)    Housing Stability Vital Sign     Unable to Pay for Housing in the Last Year: No     Number of Times Moved in the Last Year: 0     Homeless in the Last Year: No       Allergies   Allergen Reactions    Gabapentin Delirium    Oxcarbazepine Dizziness    Baclofen Other (See Comments)     \"unable to think and form sentences\"    Carbamazepine Other (See Comments)     \"unable to think\"    Lamictal [Lamotrigine] Confusion     Difficulty to think clearly     Tramadol Other (See Comments)     \"forgetful\"       Review of Systems  General- denies fever/chills  HEENT- denies hearing loss or sore throat  Eyes- denies eye pain or visual disturbances, denies red eyes  Respiratory- denies cough or SOB  Cardio- denies chest pain or palpitations  GI- denies abdominal pain  Endocrine- denies urinary frequency  Urinary- denies pain with urination  Musculoskeletal- Negative except noted above  Skin- denies rashes or wounds  Neurological- denies dizziness or headache  Psychiatric- denies anxiety or difficulty concentrating    Physical Exam  BP (!) 90/47   Pulse 80   Ht 5' 10\" (1.778 m)   Wt 109 kg (240 lb 4.8 oz)   BMI 34.48 kg/m²     General/Constitutional: No apparent distress: well-nourished and well developed.  Lymphatic: No appreciable lymphadenopathy  Respiratory: Non-labored breathing  Vascular: No edema, swelling or tenderness, except as noted in detailed exam.  Integumentary: No impressive skin lesions present, except as noted in detailed exam.  Psych: Normal mood and affect, oriented to person, place and time.  MSK: normal other than stated in HPI and exam  Gait & balance: no evidence of myelopathic gait, " "ambulates Independently     Lumbar spine range of motion:  -Forward flexion to 90  -Extension to neutral  -Lateral bend 25 right, 25 left  -Rotation 25 right, 25 left  There tenderness with palpation along lumbar paraspinal musculature, no midline tenderness     Neurologic:    Lower Extremity Motor Function    Right  Left    Iliopsoas  5/5  5/5    Quadriceps 5/5 5/5   Tibialis anterior  5/5  5/5    EHL  5/5  5/5    Gastroc. muscle  5/5  5/5    Heel rise  5/5  5/5    Toe rise  5/5  5/5      Sensory: light touch is intact to bilateral upper and lower extremities     Reflexes:    Right Left   Patellar 1+ 1+   Achilles 1+ 1+     Other tests:  Straight Leg Raise: negative  Carla SI: negative  ROBERT SI: negative  Greater troch: no tenderness   Internal/external hip ROM: intact, no pain   Flexion/extension knee ROM: intact, no pain   Vascular: WWP extremities, 2+DP bilateral      Diagnostic Tests   IMAGING: I have personally reviewed the images and these are my findings:  Lumbar Spine X-rays from 7/16/24 and 7/31/24: multi level lumbar spondylosis with loss of disc height, osteophyte formation and facet hypertrophy, no apparent spondylolisthesis, no appreciated lytic/blastic lesions, no obvious instability    Electronic Medical Records were reviewed including n/a    Procedures, if performed today     None performed       Portions of the record may have been created with voice recognition software.  Occasional wrong word or \"sound a like\" substitutions may have occurred due to the inherent limitations of voice recognition software.  Read the chart carefully and recognize, using context, where substitutions have occurred.    "

## 2024-08-07 ENCOUNTER — EVALUATION (OUTPATIENT)
Dept: PHYSICAL THERAPY | Facility: REHABILITATION | Age: 74
End: 2024-08-07
Payer: MEDICARE

## 2024-08-07 DIAGNOSIS — M54.50 CHRONIC MIDLINE LOW BACK PAIN WITHOUT SCIATICA: ICD-10-CM

## 2024-08-07 DIAGNOSIS — G89.29 CHRONIC MIDLINE LOW BACK PAIN WITHOUT SCIATICA: ICD-10-CM

## 2024-08-07 PROCEDURE — 97161 PT EVAL LOW COMPLEX 20 MIN: CPT | Performed by: PHYSICAL THERAPIST

## 2024-08-07 PROCEDURE — 97112 NEUROMUSCULAR REEDUCATION: CPT | Performed by: PHYSICAL THERAPIST

## 2024-08-07 NOTE — PROGRESS NOTES
PT Evaluation     Today's date: 2024  Patient name: Miguel Angel Ramos  : 1950  MRN: [id number]  Referring provider: Sundeep Stone MD  Dx:   Encounter Diagnosis     ICD-10-CM    1. Chronic midline low back pain without sciatica  M54.50 Ambulatory Referral to Physical Therapy    G89.29           Start Time: 1450  Stop Time: 1528  Total time in clinic (min): 38 minutes    Assessment  Impairments: abnormal muscle tone, abnormal or restricted ROM, activity intolerance, impaired physical strength, lacks appropriate home exercise program and pain with function  Symptom irritability: moderate    Assessment details: Miguel Angel Ramos is a 74 y.o. male who presents to therapy for chronic low back pain without radiculopathy.  Pt presents with decreased TrA and hip abductor strength, painful lumbar ROM, and pain that increases with prolonged static positions and improves with position change. These impairments are limiting the pt's ability to perform normal ADLs, household chores, and household and community ambulation. Pt will benefit from skilled outpatient PT to address the below stated impairments, to address therapy goals, to reduce pain, and improve function.       Therapist explained to pt: findings of IE, rehab diagnosis, and POC. Pt-centered goals reviewed and confirmed by pt. Instruction also given for HEP. Pt expressed verbal agreement and understanding and verified understanding via teach back method. Pt also expressed satisfaction that their current concerns were addressed at the end of the session.    Barriers to therapy: None   Understanding of Dx/Px/POC: good     Prognosis: good    Goals  Short term goals: to be met in 3-4 weeks  Pt independent with initial HEP, rationale, technique and frequency, for ROM and pain control.  Pt will report at least a 25% reduction in subjective pain complaints/symptoms to better manage ADLs and household chores.   Improve max pain level by 2 points on the numeric  pain rating scale indicating a clinically significant reduction in pain level.  Pt will achieve an improvement in FOTO score indicating an improvement in pain and function.        Long term goals: to be met in 6-8 weeks  Pt will have full and pain free lumbar ROM to better manage ADLs and household chores.  Pt will report a 75% or > reduction in subjective pain complaints/symptoms to better manage ADLs and household chores.  Improve B hip abduction MMT to a 4/5 or greater for improved lumbopelvic stability.  Pt will improve FOTO score to better then expected outcome indicating an overall improvement in pain and function   Pt will be able to sleep through the night (6-8 hours) without waking secondary to pain.  Pt will be able to perform ADLs, iADLS, and household duties without pain or restriction indicating return to PLOF.  Pt independent with rationale, technique and plan for performance of advanced HEP to ensure independent self-management of symptoms upon discharge.  Achieve pts therapy goal: to be able to walk longer distances and sit for longer periods to write       Plan  Patient would benefit from: skilled physical therapy  Planned modality interventions: TENS, thermotherapy: hydrocollator packs, traction, ultrasound, cryotherapy and low level laser therapy    Planned therapy interventions: body mechanics training, therapeutic training, therapeutic exercise, therapeutic activities, stretching, strengthening, neuromuscular re-education, patient education, home exercise program, functional ROM exercises, flexibility, manual therapy, Sharma taping, joint mobilization and balance    Frequency: 1-2x/week.  Duration in weeks: 12  Treatment plan discussed with: patient        Subjective Evaluation    History of Present Illness  Mechanism of injury: Pt presents with chronic low back pain. States he has had trouble with his low back for years. States the pain would come and go. States he was told he has a  congential defect in the past. Reports about a year ago he felt and landed on his R side, landed on a fence and broke two ribs. Has had constant back pain since then. Pt states his wife said he should go the therapy. Pt states he has pain with laying down and trying to sleep. Denies LE pain but does get pain in the R buttock but nothing below. Denies n/t. Pain is worse with prolonged sitting. Pain is best with being up and moving around. Static positions are painful.    XR results: Mild scoliotic deformity. Degenerative retrolisthesis L1-2 and L2-3. Advanced multilevel disc height loss with severe multilevel lower lumbar facet arthropathy.    Patient Goals  Patient goals for therapy: decreased pain, increased motion, increased strength and independence with ADLs/IADLs  Patient goal: to be able to walk longer distances and sit for longer periods to write  Pain  Current pain ratin  At best pain ratin  At worst pain ratin  Location: R low back and buttock and center  Quality: dull ache and sharp  Progression: no change    Social Support  Stairs in house: yes   Lives in: multiple-level home  Lives with: spouse    Employment status: not working  Treatments  Previous treatment: chiropractic (7-8 years ago)      Objective    Palpation/Tenderness: TTP and increased tone lumbar paraspinals.     Active Range of Motion   LE ROM  Hamstrings: WNL  Hip flexor: mildly limited ROM     Lower quarter screen  Myotomes - normal BLE    Sensation  LE dull discrimination: normal BLE    Reflexes  Patellar tendon: absent BLE  Achilles: Absent BLE  Clonus: absent BLE    Lumbar   Flexion:  WNL, end range pain, maintains lumbar lordosis  Extension:   50% painful  Left lateral flexion: 75% end range pain  Right lateral flexion:  75% end range pain  Left rotation:  Wnl no pain  Right rotation:  Wnl no pain     Joint Play   Hypermobile: L5-5       Strength/Myotome Testing      Left Hip   Planes of Motion   Extension: 5  Abduction:  "3-     Right Hip   Planes of Motion   Extension: 5  Abduction: 3-      Muscle Activation   TrA: fair       Tests   Lumbar     Left   neg crossed SLR and passive SLR.   neg slump test     Right   neg crossed SLR and passive SLR.   neg slump test     Left Hip   neg ROBERT  neg FADIR     Right Hip   neg ROBERT  neg FADIR           Precautions: fall risk      FOTO  8/7 = 56/61    POC exp =  10/30/24    Access Code: [email or web address]  URL: [email or web address]  Date: 08/07/2024  Prepared by: Court Sousa    Exercises  - Supine Transversus Abdominis Bracing - Hands on Stomach  - 5 x daily - 7 x weekly - 3 sets - 10 reps  - Hooklying Clamshell with Resistance  - 1 x daily - 7 x weekly - 3 sets - 10 reps - 5 hold  - Seated Transversus Abdominis Bracing with PLB  - 5 x daily - 7 x weekly - 3 sets - 10 reps    Manuals 8/7                                                                Neuro Re-Ed             TA iso 5\"x15 HEP            TA with hooklying clamshell MTB 5\"x20 HEP            S/l clamshells             Pallof press                                                    Ther Ex             Vg for endur and strength             Resisted side step                                                                                           Ther Activity             [place] carry             Suitcase carry                                       Gait Training                                       Modalities                                            "

## 2024-08-08 PROBLEM — Z00.00 MEDICARE ANNUAL WELLNESS VISIT, SUBSEQUENT: Status: RESOLVED | Noted: 2024-07-09 | Resolved: 2024-08-08

## 2024-08-08 PROBLEM — Z12.5 ENCOUNTER FOR SCREENING FOR MALIGNANT NEOPLASM OF PROSTATE: Status: RESOLVED | Noted: 2024-07-09 | Resolved: 2024-08-08

## 2024-08-08 NOTE — PROGRESS NOTES
"Daily Note     Today's date: 2024  Patient name: Miguel Angel Ramos  : 1950  MRN: [id number]  Referring provider: Sundeep Stone MD  Dx:   Encounter Diagnosis     ICD-10-CM    1. Chronic midline low back pain without sciatica  M54.50     G89.29           Start Time: 0920  Stop Time: 1000  Total time in clinic (min): 40 minutes    Subjective: Reports he has been doing his HEP and states he slept pretty good last night.      Objective: See treatment diary below      Assessment: Pt presents for first follow up after eval. Reviewed HEP. Pt responded well to the exercises, noting fatigue but no pain.  Pt will benefit from continued skilled outpatient PT to improve strength, to address therapy goals, to reduce pain, and improve function.          Plan: Continue per plan of care.  Progress treatment as tolerated.       Precautions: fall risk      FOTO   = 56/61    POC exp =  10/30/24    Access Code: [email or web address]  URL: [email or web address]  Date: 2024  Prepared by: Court Sousa    Exercises  - Supine Transversus Abdominis Bracing - Hands on Stomach  - 5 x daily - 7 x weekly - 3 sets - 10 reps  - Hooklying Clamshell with Resistance  - 1 x daily - 7 x weekly - 3 sets - 10 reps - 5 hold  - Seated Transversus Abdominis Bracing with PLB  - 5 x daily - 7 x weekly - 3 sets - 10 reps    Manuals                                                                Neuro Re-Ed             TA iso 5\"x15 HEP            TA with hooklying clamshell MTB 5\"x20 HEP YHB x30           S/l clamshells  YHB x15 ea           Pallof press  GTB 2x10 ea           Standing TA with pball press down  5\" 3x10                                     Ther Ex             Vg for endur and strength  L7 10'           Resisted side step                                                                                           Ther Activity             [place] carry             Suitcase carry                                     "   Gait Training                                       Modalities

## 2024-08-09 ENCOUNTER — OFFICE VISIT (OUTPATIENT)
Dept: PHYSICAL THERAPY | Facility: REHABILITATION | Age: 74
End: 2024-08-09
Payer: MEDICARE

## 2024-08-09 DIAGNOSIS — M54.50 CHRONIC MIDLINE LOW BACK PAIN WITHOUT SCIATICA: Primary | ICD-10-CM

## 2024-08-09 DIAGNOSIS — G89.29 CHRONIC MIDLINE LOW BACK PAIN WITHOUT SCIATICA: Primary | ICD-10-CM

## 2024-08-09 PROCEDURE — 97110 THERAPEUTIC EXERCISES: CPT | Performed by: PHYSICAL THERAPIST

## 2024-08-09 PROCEDURE — 97112 NEUROMUSCULAR REEDUCATION: CPT | Performed by: PHYSICAL THERAPIST

## 2024-08-13 ENCOUNTER — OFFICE VISIT (OUTPATIENT)
Dept: PHYSICAL THERAPY | Facility: REHABILITATION | Age: 74
End: 2024-08-13
Payer: MEDICARE

## 2024-08-13 DIAGNOSIS — G89.29 CHRONIC MIDLINE LOW BACK PAIN WITHOUT SCIATICA: Primary | ICD-10-CM

## 2024-08-13 DIAGNOSIS — M54.50 CHRONIC MIDLINE LOW BACK PAIN WITHOUT SCIATICA: Primary | ICD-10-CM

## 2024-08-13 PROCEDURE — 97110 THERAPEUTIC EXERCISES: CPT

## 2024-08-13 PROCEDURE — 97530 THERAPEUTIC ACTIVITIES: CPT

## 2024-08-13 NOTE — PROGRESS NOTES
"Daily Note     Today's date: 2024  Patient name: Miguel Angel Ramos  : 1950  MRN: [id number]  Referring provider: Sundeep Stone MD  Dx:   Encounter Diagnosis     ICD-10-CM    1. Chronic midline low back pain without sciatica  M54.50     G89.29                      Subjective:  Patient states he is having a good day today. He notes that he is having less pain when sleeping and is overall making improvements.      Objective: See treatment diary below      Assessment: Tolerated treatment well. Added in resisted side stepping with good tolerance. Continue to progress as able. Patient demonstrated fatigue post treatment, exhibited good technique with therapeutic exercises, and would benefit from continued PT      Plan: Continue per plan of care.      Precautions: fall risk      FOTO   = 56/61    POC exp =  10/30/24    Access Code: [email or web address]  URL: [email or web address]  Date: 2024  Prepared by: Court Sousa    Exercises  - Supine Transversus Abdominis Bracing - Hands on Stomach  - 5 x daily - 7 x weekly - 3 sets - 10 reps  - Hooklying Clamshell with Resistance  - 1 x daily - 7 x weekly - 3 sets - 10 reps - 5 hold  - Seated Transversus Abdominis Bracing with PLB  - 5 x daily - 7 x weekly - 3 sets - 10 reps    Manuals                                                               Neuro Re-Ed             TA iso 5\"x15 HEP            TA with hooklying clamshell MTB 5\"x20 HEP YHB x30 YHB x30          S/l clamshells  YHB x15 ea YHB x15 ea          Pallof press  GTB 2x10 ea GTB 2x10 ea          Standing TA with pball press down  5\" 3x10 5\" 3x10                                     Ther Ex             Vg for endur and strength  L7 10' L7 10'           Resisted side step   GTB 3 laps                                                                                         Ther Activity             [place] carry             Suitcase carry                                       Gait " Training                                       Modalities

## 2024-08-14 ENCOUNTER — APPOINTMENT (OUTPATIENT)
Dept: PHYSICAL THERAPY | Facility: REHABILITATION | Age: 74
End: 2024-08-14
Payer: MEDICARE

## 2024-08-16 ENCOUNTER — OFFICE VISIT (OUTPATIENT)
Dept: PHYSICAL THERAPY | Facility: REHABILITATION | Age: 74
End: 2024-08-16
Payer: MEDICARE

## 2024-08-16 DIAGNOSIS — G89.29 CHRONIC MIDLINE LOW BACK PAIN WITHOUT SCIATICA: Primary | ICD-10-CM

## 2024-08-16 DIAGNOSIS — M54.50 CHRONIC MIDLINE LOW BACK PAIN WITHOUT SCIATICA: Primary | ICD-10-CM

## 2024-08-16 PROCEDURE — 97112 NEUROMUSCULAR REEDUCATION: CPT | Performed by: PHYSICAL THERAPIST

## 2024-08-16 PROCEDURE — 97110 THERAPEUTIC EXERCISES: CPT | Performed by: PHYSICAL THERAPIST

## 2024-08-16 NOTE — PROGRESS NOTES
"Daily Note     Today's date: 2024  Patient name: Miguel Angel Ramos  : 1950  MRN: [id number]  Referring provider: Sundeep Stone MD  Dx:   Encounter Diagnosis     ICD-10-CM    1. Chronic midline low back pain without sciatica  M54.50     G89.29           Start Time: 0900  Stop Time: 0950  Total time in clinic (min): 50 minutes    Subjective: Patient reports feeling sore today. Reports being compliant with HEP and sleeping better.      Objective: See treatment diary below      Assessment: Completed treatment well with no increase in symptoms. Progressed paloff press and responded well. Verbal cue used to engage core during paloff. Patient exhibited good technique with therapeutic exercises and would benefit from continued PT      Plan: Continue per plan of care.  Progress treatment as tolerated.      I have directly supervised and participated in the care of this patient with the therapy student, Saundra Santos, SPT. I agree with the details as outlined above as well as during today's session. Court Sousa, PT, DPT        Precautions: fall risk      FOTO   = 56/61    POC exp =  10/30/24    Access Code: [email or web address]  URL: [email or web address]  Date: 2024  Prepared by: Court Sousa    Exercises  - Supine Transversus Abdominis Bracing - Hands on Stomach  - 5 x daily - 7 x weekly - 3 sets - 10 reps  - Hooklying Clamshell with Resistance  - 1 x daily - 7 x weekly - 3 sets - 10 reps - 5 hold  - Seated Transversus Abdominis Bracing with PLB  - 5 x daily - 7 x weekly - 3 sets - 10 reps    Manuals                                                              Neuro Re-Ed             TA iso 5\"x15 HEP            TA with hooklying clamshell MTB 5\"x20 HEP YHB x30 YHB x30 YHB x30         S/l clamshells  YHB x15 ea YHB x15 ea YHB x15 ea         Pallof press  GTB 2x10 ea GTB 2x10 ea BTB 2x10 ea         Standing TA with pball press down  5\" 3x10 5\" 3x10  5\" 3x10          Bridges "             STS             [employer]             Ther Ex             Vg for endur and strength  L7 10' L7 10'  L7 10'          Resisted side step   GTB 3 laps  GTB side of table                                                                                       Ther Activity             [place] carry             Suitcase carry                                       Gait Training                                       Modalities

## 2024-08-19 ENCOUNTER — HOSPITAL ENCOUNTER (OUTPATIENT)
Dept: MRI IMAGING | Facility: HOSPITAL | Age: 74
Discharge: HOME | End: 2024-08-19
Attending: ORTHOPAEDIC SURGERY
Payer: MEDICARE

## 2024-08-19 DIAGNOSIS — G89.29 CHRONIC MIDLINE LOW BACK PAIN WITHOUT SCIATICA: ICD-10-CM

## 2024-08-19 DIAGNOSIS — M54.50 CHRONIC MIDLINE LOW BACK PAIN WITHOUT SCIATICA: ICD-10-CM

## 2024-08-19 PROCEDURE — 72148 MRI LUMBAR SPINE W/O DYE: CPT

## 2024-08-21 ENCOUNTER — APPOINTMENT (OUTPATIENT)
Dept: PHYSICAL THERAPY | Facility: REHABILITATION | Age: 74
End: 2024-08-21
Payer: MEDICARE

## 2024-08-21 ENCOUNTER — OFFICE VISIT (OUTPATIENT)
Dept: PHYSICAL THERAPY | Facility: REHABILITATION | Age: 74
End: 2024-08-21
Payer: MEDICARE

## 2024-08-21 DIAGNOSIS — G89.29 CHRONIC MIDLINE LOW BACK PAIN WITHOUT SCIATICA: Primary | ICD-10-CM

## 2024-08-21 DIAGNOSIS — M54.50 CHRONIC MIDLINE LOW BACK PAIN WITHOUT SCIATICA: Primary | ICD-10-CM

## 2024-08-21 PROCEDURE — 97110 THERAPEUTIC EXERCISES: CPT

## 2024-08-21 PROCEDURE — 97112 NEUROMUSCULAR REEDUCATION: CPT

## 2024-08-21 NOTE — PROGRESS NOTES
"Daily Note     Today's date: 2024  Patient name: Miguel Angel Ramos  : 1950  MRN: [id number]  Referring provider: Sundeep tSone MD  Dx:   Encounter Diagnosis     ICD-10-CM    1. Chronic midline low back pain without sciatica  M54.50     G89.29                      Subjective: Patient reports feeling sore yesterday, he has been cleaning out his basement and was lifting old 5 gallon paint buckets and boxes. Is feeling pretty good today.       Objective: See treatment diary below      Assessment: Completed treatment well with no increase in symptoms. Progressed program to include box lifts and box carry. Pt educated in proper lifting techniques; required verbal cues 25% of time for form during box exercises. Verbal cue used to engage core during hip abduction and paloff press which improved back pain w/ tasks. Patient exhibited good technique with therapeutic exercises and would benefit from continued PT       Plan: Continue per plan of care.  Progress treatment as tolerated.           Precautions: fall risk      FOTO   = 56/61    POC exp =  10/30/24    Access Code: [email or web address]  URL: [email or web address]  Date: 2024  Prepared by: Court Sousa    Exercises  - Supine Transversus Abdominis Bracing - Hands on Stomach  - 5 x daily - 7 x weekly - 3 sets - 10 reps  - Hooklying Clamshell with Resistance  - 1 x daily - 7 x weekly - 3 sets - 10 reps - 5 hold  - Seated Transversus Abdominis Bracing with PLB  - 5 x daily - 7 x weekly - 3 sets - 10 reps    Manuals                                                             Neuro Re-Ed             TA iso 5\"x15 HEP            TA with hooklying clamshell MTB 5\"x20 HEP YHB x30 YHB x30 YHB x30 YHB x30        S/l clamshells  YHB x15 ea YHB x15 ea YHB x15 ea YHB  X20 ea         S/l hip abd w/ TA draw     X15 b/l        Pallof press  GTB 2x10 ea GTB 2x10 ea BTB 2x10 ea BTB 2x10 ea         Standing TA with pball press down  5\" " "3x10 5\" 3x10  5\" 3x10  5\" 3x10        Bridges             STS             SandDune             Ther Ex             Vg for endur and strength  L7 10' L7 10'  L7 10'  L7 10'        Resisted side step   GTB 3 laps  GTB side of table GTB side of table x5 laps                                                            Pt education     Lifting mechanics                      Ther Activity             [place] carry             Suitcase carry             Box lift      x10        Box carry     Chair <> chair x6 laps                     Gait Training                                       Modalities                                                "

## 2024-08-23 ENCOUNTER — OFFICE VISIT (OUTPATIENT)
Dept: PHYSICAL THERAPY | Facility: REHABILITATION | Age: 74
End: 2024-08-23
Payer: MEDICARE

## 2024-08-23 DIAGNOSIS — G89.29 CHRONIC MIDLINE LOW BACK PAIN WITHOUT SCIATICA: Primary | ICD-10-CM

## 2024-08-23 DIAGNOSIS — M54.50 CHRONIC MIDLINE LOW BACK PAIN WITHOUT SCIATICA: Primary | ICD-10-CM

## 2024-08-23 PROCEDURE — 97110 THERAPEUTIC EXERCISES: CPT | Performed by: PHYSICAL THERAPIST

## 2024-08-23 PROCEDURE — 97112 NEUROMUSCULAR REEDUCATION: CPT | Performed by: PHYSICAL THERAPIST

## 2024-08-23 NOTE — PROGRESS NOTES
"Daily Note     Today's date: 2024  Patient name: Miguel Angel Ramos  : 1950  MRN: [id number]  Referring provider: Sundeep Stone MD  Dx:   Encounter Diagnosis     ICD-10-CM    1. Chronic midline low back pain without sciatica  M54.50     G89.29           Start Time: 0915  Stop Time: 1000  Total time in clinic (min): 45 minutes    Subjective: Patient reports feeling good today.       Objective: See treatment diary below      Assessment: Tolerated progressions and new exercises well. Minor low back discomfort during suitcase carry. Cues used for bridges for proper form and suitcase carry to avoid lateral flexion. Patient demonstrated fatigue post treatment, exhibited good technique with therapeutic exercises, and would benefit from continued PT      Plan: Continue per plan of care.  Progress treatment as tolerated.      I have directly supervised and participated in the care of this patient with the therapy student, Saundra Santos, SPT. I agree with the details as outlined above as well as during today's session. Court Sousa, PT, DPT        Precautions: fall risk      FOTO   = 56/61    POC exp =  10/30/24    Access Code: [email or web address]  URL: [email or web address]  Date: 2024  Prepared by: Court Sousa    Exercises  - Supine Transversus Abdominis Bracing - Hands on Stomach  - 5 x daily - 7 x weekly - 3 sets - 10 reps  - Hooklying Clamshell with Resistance  - 1 x daily - 7 x weekly - 3 sets - 10 reps - 5 hold  - Seated Transversus Abdominis Bracing with PLB  - 5 x daily - 7 x weekly - 3 sets - 10 reps    Manuals                                                            Neuro Re-Ed             TA iso 5\"x15 HEP            TA with hooklying clamshell MTB 5\"x20 HEP YHB x30 YHB x30 YHB x30 YHB x30 YHB x30       S/l clamshells  YHB x15 ea YHB x15 ea YHB x15 ea YHB  X20 ea  YHB  X20 ea        S/l hip abd w/ TA draw     X15 b/l        Pallof press  GTB 2x10 ea " "GTB 2x10 ea BTB 2x10 ea BTB 2x10 ea         Standing TA with pball press down  5\" 3x10 5\" 3x10  5\" 3x10  5\" 3x10 5\" 3x10       Bridges      3x8       STS             SandDune march 4'       Ther Ex             Vg for endur and strength  L7 10' L7 10'  L7 10'  L7 10' L7 10'       Resisted side step   GTB 3 laps  GTB side of table GTB side of table x5 laps                                                            Pt education     Lifting mechanics                      Ther Activity             [place] carry             Suitcase carry      8#  ft       Box lift      x10        Box carry     Chair <> chair x6 laps Chair <> chair x6 laps                    Gait Training                                       Modalities                                                  "

## 2024-08-27 ENCOUNTER — TELEPHONE (OUTPATIENT)
Dept: OBGYN CLINIC | Facility: MEDICAL CENTER | Age: 74
End: 2024-08-27

## 2024-08-27 ENCOUNTER — OFFICE VISIT (OUTPATIENT)
Dept: PHYSICAL THERAPY | Facility: REHABILITATION | Age: 74
End: 2024-08-27
Payer: MEDICARE

## 2024-08-27 DIAGNOSIS — G89.29 CHRONIC MIDLINE LOW BACK PAIN WITHOUT SCIATICA: Primary | ICD-10-CM

## 2024-08-27 DIAGNOSIS — M54.50 CHRONIC MIDLINE LOW BACK PAIN WITHOUT SCIATICA: Primary | ICD-10-CM

## 2024-08-27 PROCEDURE — 97110 THERAPEUTIC EXERCISES: CPT | Performed by: PHYSICAL THERAPIST

## 2024-08-27 PROCEDURE — 97530 THERAPEUTIC ACTIVITIES: CPT | Performed by: PHYSICAL THERAPIST

## 2024-08-27 PROCEDURE — 97112 NEUROMUSCULAR REEDUCATION: CPT | Performed by: PHYSICAL THERAPIST

## 2024-08-27 NOTE — PROGRESS NOTES
"Daily Note     Today's date: 2024  Patient name: Miguel Angel Ramos  : 1950  MRN: [id number]  Referring provider: Sundeep Stone MD  Dx:   Encounter Diagnosis     ICD-10-CM    1. Chronic midline low back pain without sciatica  M54.50     G89.29           Start Time: 1205  Stop Time: 1245  Total time in clinic (min): 40 minutes    Subjective: Patient states he was in some pain in the low back over the weekend after doing a lot of lifting, but feeling better today.       Objective: See treatment diary below      Assessment: Tolerated treatment well. Patient felt a challenge during the lifting and carrying of the box. Breaks taken as needed. Cues to prevent pelvic rotation during clams, but then started to independently correct himself. Patient demonstrated fatigue post treatment, exhibited good technique with therapeutic exercises, and would benefit from continued PT      Plan: Continue per plan of care.  Progress treament per protocol.     I have directly supervised and participated in the care of this patient with the therapy student, Saundra Santos, SPT. I agree with the details as outlined above as well as during today's session. Court Sousa, PT, DPT        Precautions: fall risk      FOTO   = 56/61    POC exp =  10/30/24    Access Code: [email or web address]  URL: [email or web address]  Date: 2024  Prepared by: Court Sousa    Exercises  - Supine Transversus Abdominis Bracing - Hands on Stomach  - 5 x daily - 7 x weekly - 3 sets - 10 reps  - Hooklying Clamshell with Resistance  - 1 x daily - 7 x weekly - 3 sets - 10 reps - 5 hold  - Seated Transversus Abdominis Bracing with PLB  - 5 x daily - 7 x weekly - 3 sets - 10 reps    Manuals                                                           Neuro Re-Ed             TA iso 5\"x15 HEP            TA with hooklying clamshell MTB 5\"x20 HEP YHB x30 YHB x30 YHB x30 YHB x30 YHB x30 YHB x30      S/l clamshells  " "YHB x15 ea YHB x15 ea YHB x15 ea YHB  X20 ea  YHB  X20 ea  YHB  X20 ea       S/l hip abd w/ TA draw     X15 b/l        Pallof press  GTB 2x10 ea GTB 2x10 ea BTB 2x10 ea BTB 2x10 ea         Standing TA with pball press down  5\" 3x10 5\" 3x10  5\" 3x10  5\" 3x10 5\" 3x10 5\" 2x15      Bridges      3x8 YHB 2x8      STS             SandDune march 4' 4'      Ther Ex             Vg for endur and strength  L7 10' L7 10'  L7 10'  L7 10' L7 10' L7 10'      Resisted side step   GTB 3 laps  GTB side of table GTB side of table x5 laps        LTR                                                    Pt education     Lifting mechanics                      Ther Activity             [place] carry             Suitcase carry      8#  ft       Box lift      x10        Box carry     Chair <> chair x6 laps Chair <> chair x6 laps floor <> windowsill x2 with 3# AW                   Gait Training                                       Modalities                                                    "

## 2024-08-27 NOTE — TELEPHONE ENCOUNTER
Caller: Ryan     Doctor: Dr Stone     Reason for call: The patient is calling due to having an MRI done 8/19. May he please have a call back to see if he needs a sooner appointment. He is now scheduled for 11/1.  Thank you     Call back#: [phone]

## 2024-08-27 NOTE — TELEPHONE ENCOUNTER
Called and lm for pt to call back and reschedule to sooner appointment. When pt calls back please schedule him in next available follow up spot. There are appointments open sooner then November.

## 2024-08-28 ENCOUNTER — APPOINTMENT (OUTPATIENT)
Dept: PHYSICAL THERAPY | Facility: REHABILITATION | Age: 74
End: 2024-08-28
Payer: MEDICARE

## 2024-08-30 ENCOUNTER — OFFICE VISIT (OUTPATIENT)
Dept: PHYSICAL THERAPY | Facility: REHABILITATION | Age: 74
End: 2024-08-30
Payer: MEDICARE

## 2024-08-30 DIAGNOSIS — M54.50 CHRONIC MIDLINE LOW BACK PAIN WITHOUT SCIATICA: Primary | ICD-10-CM

## 2024-08-30 DIAGNOSIS — G89.29 CHRONIC MIDLINE LOW BACK PAIN WITHOUT SCIATICA: Primary | ICD-10-CM

## 2024-08-30 PROCEDURE — 97110 THERAPEUTIC EXERCISES: CPT | Performed by: PHYSICAL THERAPIST

## 2024-08-30 PROCEDURE — 97530 THERAPEUTIC ACTIVITIES: CPT | Performed by: PHYSICAL THERAPIST

## 2024-08-30 PROCEDURE — 97112 NEUROMUSCULAR REEDUCATION: CPT | Performed by: PHYSICAL THERAPIST

## 2024-08-30 NOTE — PROGRESS NOTES
"Daily Note     Today's date: 2024  Patient name: Miguel Angel Ramos  : 1950  MRN: [id number]  Referring provider: Sundeep Stone MD  Dx:   Encounter Diagnosis     ICD-10-CM    1. Chronic midline low back pain without sciatica  M54.50     G89.29           Start Time: 0950  Stop Time: 1030  Total time in clinic (min): 40 minutes    Subjective: Patient states he did not sleep well due to the back pain last night. He is feeling better this morning.       Objective: See treatment diary below      Assessment: Tolerated treatment and new exercises well. Took rest breaks as needed. Cueing for proper form during lifting. Supervision guarding during balance exercises for safety. Patient demonstrated fatigue post treatment, exhibited good technique with therapeutic exercises, and would benefit from continued PT      Plan: Continue per plan of care.  Progress treatment as tolerated.      I have directly supervised and participated in the care of this patient with the therapy student, DOT Isabel. I agree with the details as outlined above as well as during today's session. Court Sousa, PT, DPT        Precautions: fall risk      FOTO   = 56/61   = 53/61    POC exp =  10/30/24    Access Code: [email or web address]  URL: [email or web address]  Date: 2024  Prepared by: Court Sousa    Exercises  - Supine Transversus Abdominis Bracing - Hands on Stomach  - 5 x daily - 7 x weekly - 3 sets - 10 reps  - Hooklying Clamshell with Resistance  - 1 x daily - 7 x weekly - 3 sets - 10 reps - 5 hold  - Seated Transversus Abdominis Bracing with PLB  - 5 x daily - 7 x weekly - 3 sets - 10 reps    Manuals                                                          Neuro Re-Ed             TA iso 5\"x15 HEP            TA with hooklying clamshell MTB 5\"x20 HEP YHB x30 YHB x30 YHB x30 YHB x30 YHB x30 YHB x30 YHB x30     S/l clamshells  YHB x15 ea YHB x15 ea YHB x15 ea " "YHB  X20 ea  YHB  X20 ea  YHB  X20 ea       S/l hip abd w/ TA draw     X15 b/l        Pallof press  GTB 2x10 ea GTB 2x10 ea BTB 2x10 ea BTB 2x10 ea         Standing TA with pball press down  5\" 3x10 5\" 3x10  5\" 3x10  5\" 3x10 5\" 3x10 5\" 2x15 5\" 2x15     Bridges      3x8 YHB 2x8 YHB 2x12     Rectangle  BAPS        X15 Df and PF     STS             SandDune march      4' 4' Step up and hip flex BLE  X12 each side     Ther Ex             Vg for endur and strength  L7 10' L7 10'  L7 10'  L7 10' L7 10' L7 10' L7 10'     Resisted side step   GTB 3 laps  GTB side of table GTB side of table x5 laps        LTR                                                    Pt education     Lifting mechanics                      Ther Activity             [place] carry             Suitcase carry      8#  ft       Box lift      x10        Box carry     Chair <> chair x6 laps Chair <> chair x6 laps floor <> windowsill x2 with 3# AW floor <> windowsill x1 with 3# AW                  Gait Training                                       Modalities                                                      "

## 2024-09-03 ENCOUNTER — OFFICE VISIT (OUTPATIENT)
Dept: OBGYN CLINIC | Facility: HOSPITAL | Age: 74
End: 2024-09-03
Payer: MEDICARE

## 2024-09-03 VITALS
WEIGHT: 240.3 LBS | BODY MASS INDEX: 34.4 KG/M2 | HEIGHT: 70 IN | DIASTOLIC BLOOD PRESSURE: 77 MMHG | SYSTOLIC BLOOD PRESSURE: 123 MMHG | HEART RATE: 67 BPM

## 2024-09-03 DIAGNOSIS — M54.50 CHRONIC MIDLINE LOW BACK PAIN WITHOUT SCIATICA: ICD-10-CM

## 2024-09-03 DIAGNOSIS — G89.29 CHRONIC MIDLINE LOW BACK PAIN WITHOUT SCIATICA: ICD-10-CM

## 2024-09-03 PROCEDURE — 99214 OFFICE O/P EST MOD 30 MIN: CPT | Performed by: ORTHOPAEDIC SURGERY

## 2024-09-03 RX ORDER — MELOXICAM 7.5 MG/1
7.5 TABLET ORAL DAILY
Qty: 14 TABLET | Refills: 0 | Status: SHIPPED | OUTPATIENT
Start: 2024-09-03

## 2024-09-03 NOTE — PROGRESS NOTES
Assessment & Plan/Medical Decision Makin y.o. male with Back Pain and imaging findings most notable for lumbar spondylosis             The clinical, physical and imaging findings were reviewed with the patient.  Miguel Angel  has a constellation of findings consistent with Lumbar Myofascial Pain in the setting of lumbar degenerative disease.   We reviewed his lumbar MRI in detail which does not show any significant neural compression and no instability.  He has had improvement with physical therapy.   Ryan remains neurologically intact and functional.   We discussed the treatment options including physical therapy, at home exercises, activity modifications, chiropractic medicine, oral medications, interventional spine procedures.  At this time recommend continued conservative treatments.  We discussed strategies to increase home exercise program compliance.    Continue with physical therapy to work on core strengthening, lumbar ROM, strengthening, and stretching exercises.   Meloxicam rx refill sent to patient's pharmacy. Discussed reasoning for prescribing medication, proper usage, and potential side effects.   Referral to pain management for evaluation and treatment. Discussed potential role of steroid injection at or near the source of pain to provide targeted relief.    Patient instructed to return to office/ER sooner if symptoms are not improving, getting worse, or new worrisome/neurologic symptoms arise.  Patient will follow up as needed.      Subjective:      Chief Complaint: Back Pain    HPI:  Miguel Angel Ramos is a 74 y.o. male presenting for initial visit with chief complaint of back pain.  Pain began several months ago.  Notes constant pain across his back.  Wakes him up from sleep.  Difficulty ambulating due to the pain.  Has some subjective weakness.    Denies any raquel trauma. Denies fever or chills, no night sweats. Denies any bladder or bowel changes.      Conservative therapy includes the  following:   Medications: Lyrica (used for neuropathy), Tylenol  Injections: No        Physical Therapy: No  Chiropractic Medicine: has not attempted  Accupunture/Massage Therapy: has not attempted   These therapeutic modalities were ineffective at providing sustained pain relief/functional improvement.     Nicotine dependent: No  Occupation: Retired    Living situation: Lives with family   ADLs: patient is able to perform     9/3/2024 update  Patient is here for follow-up.  He did obtain lumbar MRI in the interim.  He continues with back pain.    Objective:     Family History   Problem Relation Age of Onset    Emphysema Mother     Colon cancer Father     Skin cancer Maternal Grandmother        Past Medical History:   Diagnosis Date    Arthritis     Right knee    Chronic pain disorder     Difficulty walking Unknown    Unsteady on feet; pain in knees.    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       Current Outpatient Medications   Medication Sig Dispense Refill    Acetaminophen (TYLENOL PO) Take 2 tablets by mouth as needed       ascorbic acid (VITAMIN C) 500 MG tablet Take 1 tablet (500 mg total) by mouth daily 60 tablet 0    Calcium 250 MG CAPS Take 250 mg by mouth daily       ciprofloxacin (Ciloxan) 0.3 % ophthalmic ointment  (Patient not taking: Reported on 8/11/2023)      cyanocobalamin (VITAMIN B-12) 1,000 mcg tablet Take 1 tablet by mouth daily       MELATONIN PO Take 10 mg by mouth daily at bedtime      meloxicam (Mobic) 7.5 mg tablet Take 1 tablet (7.5 mg total) by mouth daily 14 tablet 0    pregabalin (LYRICA) 50 mg capsule Take 1 capsule (50 mg total) by mouth 3 (three) times a day 270 capsule 1    Probiotic Product (PROBIOTIC PO) Take 1 tablet by mouth in the morning       No current facility-administered medications for this visit.       Past Surgical  History:   Procedure Laterality Date    APPENDECTOMY      BRAIN SURGERY      Gamma Knife for Trigeminal Neuralgia    CATARACT EXTRACTION      CRANIOTOMY FOR TUMOR  2022    ESOPHAGOGASTRODUODENOSCOPY      HERNIA REPAIR      OTHER SURGICAL HISTORY      gamma knife RT in  and     NH ARTHRP KNE CONDYLE&PLATU MEDIAL&LAT COMPARTMENTS Right 10/22/2018    Procedure: ARTHROPLASTY KNEE TOTAL;  Surgeon: Rhina Pratt MD;  Location: [facility];  Service: Orthopedics    NH CREATE LESION STRTCTC PRQ NEUROLYTIC GASSERIAN Right 2018    Procedure: RHIZOTOMY TRIGEMINAL NERVES (V2 & V3);  Surgeon: Jam Winter MD;  Location: [facility];  Service: Neurosurgery    REPLACEMENT TOTAL KNEE Right 10/2018    RHIZOTOMY W/ RADIOFREQUENCY ABLATION Right 2014    Stereotactic Ablation of Gasserian Ganglion Right sided trigeminal V2 radiofrequency rhizotomy x2       Social History     Socioeconomic History    Marital status: /Civil Union     Spouse name: Gina    Number of children: 2    Years of education: BS Degree plus addl classes    Highest education level: Not on file   Occupational History    Occupation: Retired     Comment: 20 years [employer] &    Tobacco Use    Smoking status: Never    Smokeless tobacco: Never    Tobacco comments:     Both my parents smoked -- dad (2 packs a day); mom (4 packs a day),  in  at 67,  emphysema.   Vaping Use    Vaping status: Never Used   Substance and Sexual Activity    Alcohol use: Yes     Alcohol/week: 5.0 standard drinks of alcohol     Types: 2 Cans of beer, 3 Standard drinks or equivalent per week     Comment: Rum and vanilla Coke    Drug use: No    Sexual activity: Yes     Partners: Female     Birth control/protection: Post-menopausal   Other Topics Concern    Not on file   Social History Narrative    Caffeine use: Daily caffeinated cola, drinks coffee    Lives at home with wife Gina     Social Determinants of Health     Financial  "Resource Strain: Low Risk  (1/4/2023)    Overall Financial Resource Strain (CARDIA)     Difficulty of Paying Living Expenses: Not very hard   Food Insecurity: No Food Insecurity (7/8/2024)    Hunger Vital Sign     Worried About Running Out of Food in the Last Year: Never true     Ran Out of Food in the Last Year: Never true   Transportation Needs: No Transportation Needs (7/8/2024)    PRAPARE - Transportation     Lack of Transportation (Medical): No     Lack of Transportation (Non-Medical): No   Physical Activity: Not on file   Stress: Not on file   Social Connections: Not on file   Intimate Partner Violence: Not on file   Housing Stability: Low Risk  (7/8/2024)    Housing Stability Vital Sign     Unable to Pay for Housing in the Last Year: No     Number of Times Moved in the Last Year: 0     Homeless in the Last Year: No       Allergies   Allergen Reactions    Gabapentin Delirium    Oxcarbazepine Dizziness    Baclofen Other (See Comments)     \"unable to think and form sentences\"    Carbamazepine Other (See Comments)     \"unable to think\"    Lamictal [Lamotrigine] Confusion     Difficulty to think clearly     Tramadol Other (See Comments)     \"forgetful\"       Review of Systems  General- denies fever/chills  HEENT- denies hearing loss or sore throat  Eyes- denies eye pain or visual disturbances, denies red eyes  Respiratory- denies cough or SOB  Cardio- denies chest pain or palpitations  GI- denies abdominal pain  Endocrine- denies urinary frequency  Urinary- denies pain with urination  Musculoskeletal- Negative except noted above  Skin- denies rashes or wounds  Neurological- denies dizziness or headache  Psychiatric- denies anxiety or difficulty concentrating    Physical Exam  There were no vitals taken for this visit.    General/Constitutional: No apparent distress: well-nourished and well developed.  Lymphatic: No appreciable lymphadenopathy  Respiratory: Non-labored breathing  Vascular: No edema, swelling or " "tenderness, except as noted in detailed exam.  Integumentary: No impressive skin lesions present, except as noted in detailed exam.  Psych: Normal mood and affect, oriented to person, place and time.  MSK: normal other than stated in HPI and exam  Gait & balance: no evidence of myelopathic gait, ambulates Independently     Lumbar spine range of motion:  -Forward flexion to 90  -Extension to neutral  -Lateral bend 25 right, 25 left  -Rotation 25 right, 25 left  There tenderness with palpation along lumbar paraspinal musculature, no midline tenderness     Neurologic:    Lower Extremity Motor Function    Right  Left    Iliopsoas  5/5  5/5    Quadriceps 5/5 5/5   Tibialis anterior  5/5  5/5    EHL  5/5  5/5    Gastroc. muscle  5/5  5/5    Heel rise  5/5  5/5    Toe rise  5/5  5/5      Sensory: light touch is intact to bilateral upper and lower extremities     Reflexes:    Right Left   Patellar 1+ 1+   Achilles 1+ 1+     Other tests:  Straight Leg Raise: negative  Carla SI: negative  ROBERT SI: negative  Greater troch: no tenderness   Internal/external hip ROM: intact, no pain   Flexion/extension knee ROM: intact, no pain   Vascular: WWP extremities, 2+DP bilateral      Diagnostic Tests   IMAGING: I have personally reviewed the images and these are my findings:  Lumbar Spine X-rays from 7/16/24 and 7/31/24: multi level lumbar spondylosis with loss of disc height, osteophyte formation and facet hypertrophy, no apparent spondylolisthesis, no appreciated lytic/blastic lesions, no obvious instability    Lumbar MRI from 8/19/2024: Multilevel lumbar disc degeneration, no significant central or foraminal stenosis, there is a left-sided L5-S1 disc protrusion with contact of the left S1 nerve root    Electronic Medical Records were reviewed including n/a    Procedures, if performed today     None performed       Portions of the record may have been created with voice recognition software.  Occasional wrong word or \"sound a like\" " substitutions may have occurred due to the inherent limitations of voice recognition software.  Read the chart carefully and recognize, using context, where substitutions have occurred.

## 2024-09-04 ENCOUNTER — APPOINTMENT (OUTPATIENT)
Dept: PHYSICAL THERAPY | Facility: REHABILITATION | Age: 74
End: 2024-09-04
Payer: MEDICARE

## 2024-09-04 ENCOUNTER — OFFICE VISIT (OUTPATIENT)
Dept: PHYSICAL THERAPY | Facility: REHABILITATION | Age: 74
End: 2024-09-04
Payer: MEDICARE

## 2024-09-04 DIAGNOSIS — M54.50 CHRONIC MIDLINE LOW BACK PAIN WITHOUT SCIATICA: Primary | ICD-10-CM

## 2024-09-04 DIAGNOSIS — G89.29 CHRONIC MIDLINE LOW BACK PAIN WITHOUT SCIATICA: Primary | ICD-10-CM

## 2024-09-04 PROCEDURE — 97530 THERAPEUTIC ACTIVITIES: CPT | Performed by: PHYSICAL THERAPIST

## 2024-09-04 PROCEDURE — 97112 NEUROMUSCULAR REEDUCATION: CPT | Performed by: PHYSICAL THERAPIST

## 2024-09-04 PROCEDURE — 97110 THERAPEUTIC EXERCISES: CPT | Performed by: PHYSICAL THERAPIST

## 2024-09-06 ENCOUNTER — EVALUATION (OUTPATIENT)
Dept: PHYSICAL THERAPY | Facility: REHABILITATION | Age: 74
End: 2024-09-06
Payer: MEDICARE

## 2024-09-06 DIAGNOSIS — G89.29 CHRONIC MIDLINE LOW BACK PAIN WITHOUT SCIATICA: Primary | ICD-10-CM

## 2024-09-06 DIAGNOSIS — M54.50 CHRONIC MIDLINE LOW BACK PAIN WITHOUT SCIATICA: Primary | ICD-10-CM

## 2024-09-06 PROCEDURE — 97140 MANUAL THERAPY 1/> REGIONS: CPT | Performed by: PHYSICAL THERAPIST

## 2024-09-06 PROCEDURE — 97112 NEUROMUSCULAR REEDUCATION: CPT | Performed by: PHYSICAL THERAPIST

## 2024-09-06 PROCEDURE — 97110 THERAPEUTIC EXERCISES: CPT | Performed by: PHYSICAL THERAPIST

## 2024-09-06 NOTE — PROGRESS NOTES
Daily Note/re-eval     Today's date: 2024  Patient name: Miguel Angel Ramos  : 1950  MRN: [id number]  Referring provider: Sundeep Stone MD  Dx:   Encounter Diagnosis     ICD-10-CM    1. Chronic midline low back pain without sciatica  M54.50     G89.29           Start Time: 0700  Stop Time: 0745  Total time in clinic (min): 45 minutes    Subjective: States he overall feels more confident doing his recreational activities and ADLs, but continues to have pain. Sometimes it is spontaneous without a PARISH. He states there is a constant pain when sitting for longer periods. Walking for longer distances (around grocery store) is the least painful. Reports that the pain during sleeping is the most annoying as it wakes him up.     Pain:  4/10 when sitting for long times  2/10 when walking for longer distances  Worst pain is 8/10      Objective: See treatment diary below    Lumbar   Flexion:  WNL, end range pain, maintains lumbar lordosis, painful 1/10  Extension:   50% painful 7/10  Left lateral flexion: lateral knee  Right lateral flexion:  lateral knee  Left rotation:  Wnl no pain  Right rotation:  Wnl no pain      Strength/Myotome Testing    Left Hip   Planes of Motion   Extension: 3+  Abduction: 4+     Right Hip   Planes of Motion   Extension: 3+  Abduction: 4+    5x STS: No UE 14 seconds     Assessment: Tolerated treatment and re-evaluation today well. Hip abduction strength has increased overall, but hip extension strength did not. Although patient notes an overall improvement, there is still lingering pain. Sometimes there is a motion that reproduces the pain and other times the PARISH is unknown. Responded well to exercises performed today with good posture and form. Educated on HEP and the importance of walking more at home to increase endurance and strength. Patient demonstrated fatigue post treatment, exhibited good technique with therapeutic exercises, and would benefit from continued PT to improve QOL  and increase functional independence.       Plan: Continue per plan of care.  Progress treatment as tolerated.      Short term goals: to be met in 3-4 weeks  Pt independent with initial HEP, rationale, technique and frequency, for ROM and pain control. MET  Pt will report at least a 25% reduction in subjective pain complaints/symptoms to better manage ADLs and household chores. MET  Improve max pain level by 2 points on the numeric pain rating scale indicating a clinically significant reduction in pain level. NOT MET  Pt will achieve an improvement in FOTO score indicating an improvement in pain and function. NOT MET           Long term goals: to be met in 6-8 weeks  Pt will have full and pain free lumbar ROM to better manage ADLs and household chores. NOT MET  Pt will report a 75% or > reduction in subjective pain complaints/symptoms to better manage ADLs and household chores. NOT MET  Improve B hip abduction MMT to a 4/5 or greater for improved lumbopelvic stability. MET  Pt will improve FOTO score to better then expected outcome indicating an overall improvement in pain and function NOT MET  Pt will be able to sleep through the night (6-8 hours) without waking secondary to pain. NOT MET  Pt will be able to perform ADLs, iADLS, and household duties without pain or restriction indicating return to PLOF. NOT MET  Pt independent with rationale, technique and plan for performance of advanced HEP to ensure independent self-management of symptoms upon discharge. NOT MET  Achieve pts therapy goal: to be able to walk longer distances and sit for longer periods to write PARTIALLY MET      I have directly supervised and participated in the care of this patient with the therapy student, Saundra Santos, SPT. I agree with the details as outlined above as well as during today's session. Court Sousa, PT, DPT        Precautions: fall risk      FOTO  8/7 = 56/61  8/30 = 53/61    POC exp =  10/30/24    Access Code:  [email or web address]  URL: [email or web address]  Date: 08/07/2024  Prepared by: Court Sousa    Exercises  - Supine Transversus Abdominis Bracing - Hands on Stomach  - 5 x daily - 7 x weekly - 3 sets - 10 reps  - Hooklying Clamshell with Resistance  - 1 x daily - 7 x weekly - 3 sets - 10 reps - 5 hold  - Seated Transversus Abdominis Bracing with PLB  - 5 x daily - 7 x weekly - 3 sets - 10 reps    Manuals 8/7 8/9 8/13 8/16 8/21 8/23 8/27 8/30 9/4 9/6             RE - SW                                          Neuro Re-Ed             TA iso 5\"x15 HEP            TA with hooklying clamshell MTB 5\"x20 HEP YHB x30 YHB x30 YHB x30 YHB x30 YHB x30 YHB x30 YHB x30 YHB x30    S/l clamshells  YHB x15 ea YHB x15 ea YHB x15 ea YHB  X20 ea  YHB  X20 ea  YHB  X20 ea       S/l hip abd w/ TA draw     X15 b/l        Pallof press  GTB 2x10 ea GTB 2x10 ea BTB 2x10 ea BTB 2x10 ea         Standing TA with pball press down  5\" 3x10 5\" 3x10  5\" 3x10  5\" 3x10 5\" 3x10 5\" 2x15 5\" 2x15 5\" 2x15    Bridges      3x8 YHB 2x8 YHB 2x12 YHB 2x12 2x12   Rectangle  BAPS        X15 Df and PF X25 4 ways X25 4 ways                             SandDune march      4' 4' Step up and hip flex BLE  X12 each side Step up and hip flex BLE  X12 each side Step up and hip flex BLE  X12 each side   Ther Ex             Vg for endur and strength  L7 10' L7 10'  L7 10'  L7 10' L7 10' L7 10' L7 10' L7 10' L7 10'   Resisted side step   GTB 3 laps  GTB side of table GTB side of table x5 laps        LTR                                                    Pt education     Lifting mechanics                      Ther Activity             [place] carry             Suitcase carry      8#  ft   8# DB 450ft/2 laps    Box lift      x10        Box carry     Chair <> chair x6 laps Chair <> chair x6 laps floor <> windowsill x2 with 3# AW floor <> windowsill x1 with 3# AW     STS          3x5 No UE   Bike for endurance          7'    HEP and POC education         5'    Gait " Training                                       Modalities

## 2024-09-11 ENCOUNTER — OFFICE VISIT (OUTPATIENT)
Dept: PHYSICAL THERAPY | Facility: REHABILITATION | Age: 74
End: 2024-09-11
Payer: MEDICARE

## 2024-09-11 DIAGNOSIS — G89.29 CHRONIC MIDLINE LOW BACK PAIN WITHOUT SCIATICA: Primary | ICD-10-CM

## 2024-09-11 DIAGNOSIS — M54.50 CHRONIC MIDLINE LOW BACK PAIN WITHOUT SCIATICA: Primary | ICD-10-CM

## 2024-09-11 PROCEDURE — 97110 THERAPEUTIC EXERCISES: CPT | Performed by: PHYSICAL THERAPIST

## 2024-09-11 PROCEDURE — 97112 NEUROMUSCULAR REEDUCATION: CPT | Performed by: PHYSICAL THERAPIST

## 2024-09-11 PROCEDURE — 97530 THERAPEUTIC ACTIVITIES: CPT | Performed by: PHYSICAL THERAPIST

## 2024-09-11 NOTE — PROGRESS NOTES
Daily Note     Today's date: 2024  Patient name: Miguel Angel Ramos  : 1950  MRN: [id number]  Referring provider: Sundeep Stone MD  Dx:   Encounter Diagnosis     ICD-10-CM    1. Chronic midline low back pain without sciatica  M54.50     G89.29           Start Time: 0850          Subjective: Patient states he is feeling better today then he was feeling last week. Reports after walking around the block that his balance worsens and his endurance is decreased.       Objective: See treatment diary below      Assessment: Tolerated treatment well. Cues used during seated deadlift to flex at the hips and not the back. Rest breaks taken as needed due to decreased endurance and muscle fatigue. Educated the patient on proper lifting techniques and safety while performing exercises at home. Tactile cue during supine pelvic tilt to engage core. Patient demonstrated fatigue post treatment, exhibited good technique with therapeutic exercises, and would benefit from continued PT      Plan: Continue per plan of care.  Progress treatment as tolerated.       Precautions: fall risk      FOTO   = 56/61   = 53/61    POC exp =  10/30/24    Access Code: [email or web address]  URL: [email or web address]  Date: 2024  Prepared by: Court Sousa    Exercises  - Supine Transversus Abdominis Bracing - Hands on Stomach  - 5 x daily - 7 x weekly - 3 sets - 10 reps  - Hooklying Clamshell with Resistance  - 1 x daily - 7 x weekly - 3 sets - 10 reps - 5 hold  - Seated Transversus Abdominis Bracing with PLB  - 5 x daily - 7 x weekly - 3 sets - 10 reps    Manuals              RE - SW                                          Neuro Re-Ed             TA iso             TA with hooklying clamshell  YHB x30 YHB x30 YHB x30 YHB x30 YHB x30 YHB x30 YHB x30 YHB x30    S/l clamshells  YHB x15 ea YHB x15 ea YHB x15 ea YHB  X20 ea  YHB  X20 ea  YHB  X20 ea       S/l hip abd w/ TA draw     " X15 b/l        Pallof press  GTB 2x10 ea GTB 2x10 ea BTB 2x10 ea BTB 2x10 ea         Standing TA with pball press down  5\" 3x10 5\" 3x10  5\" 3x10  5\" 3x10 5\" 3x10 5\" 2x15 5\" 2x15 5\" 2x15    Bridges      3x8 YHB 2x8 YHB 2x12 YHB 2x12 2x12   Rectangle  BAPS X25 each way       X15 Df and PF X25 4 ways X25 4 ways   Supine pelvic tilt 2x5                         SandDune march Step up and hip flex BLE  X12 each side     4' 4' Step up and hip flex BLE  X12 each side Step up and hip flex BLE  X12 each side Step up and hip flex BLE  X12 each side   Ther Ex             Vg for endur and strength L7 10' L7 10' L7 10'  L7 10'  L7 10' L7 10' L7 10' L7 10' L7 10' L7 10'   Resisted side step   GTB 3 laps  GTB side of table GTB side of table x5 laps        LTR             Standing hip ext. On airex  2x5                                      Pt education     Lifting mechanics                      Ther Activity             [place] carry             Suitcase carry      8#  ft   8# DB 450ft/2 laps    Box lift      x10        Box carry     Chair <> chair x6 laps Chair <> chair x6 laps floor <> windowsill x2 with 3# AW floor <> windowsill x1 with 3# AW     Seated deadlift 5kg MB chair with airex 2x5            STS 2x4 10# KB chair with airex          3x5 No UE   Bike for endurance          7'    HEP and lifting technique education SW        5'    Gait Training                                       Modalities                                                            "

## 2024-09-13 ENCOUNTER — OFFICE VISIT (OUTPATIENT)
Dept: PHYSICAL THERAPY | Facility: REHABILITATION | Age: 74
End: 2024-09-13
Payer: MEDICARE

## 2024-09-13 DIAGNOSIS — M54.50 CHRONIC MIDLINE LOW BACK PAIN WITHOUT SCIATICA: Primary | ICD-10-CM

## 2024-09-13 DIAGNOSIS — G89.29 CHRONIC MIDLINE LOW BACK PAIN WITHOUT SCIATICA: Primary | ICD-10-CM

## 2024-09-13 PROCEDURE — 97140 MANUAL THERAPY 1/> REGIONS: CPT

## 2024-09-13 PROCEDURE — 97112 NEUROMUSCULAR REEDUCATION: CPT

## 2024-09-13 PROCEDURE — 97110 THERAPEUTIC EXERCISES: CPT

## 2024-09-18 ENCOUNTER — APPOINTMENT (OUTPATIENT)
Dept: PHYSICAL THERAPY | Facility: REHABILITATION | Age: 74
End: 2024-09-18
Payer: MEDICARE

## 2024-09-18 ENCOUNTER — OFFICE VISIT (OUTPATIENT)
Dept: PHYSICAL THERAPY | Facility: REHABILITATION | Age: 74
End: 2024-09-18
Payer: MEDICARE

## 2024-09-18 DIAGNOSIS — G89.29 CHRONIC MIDLINE LOW BACK PAIN WITHOUT SCIATICA: Primary | ICD-10-CM

## 2024-09-18 DIAGNOSIS — M54.50 CHRONIC MIDLINE LOW BACK PAIN WITHOUT SCIATICA: Primary | ICD-10-CM

## 2024-09-18 PROCEDURE — 97112 NEUROMUSCULAR REEDUCATION: CPT

## 2024-09-18 PROCEDURE — 97110 THERAPEUTIC EXERCISES: CPT

## 2024-09-18 NOTE — PROGRESS NOTES
Daily Note     Today's date: 2024  Patient name: Miguel Angel Ramos  : 1950  MRN: [id number]  Referring provider: Sundeep Stone MD  Dx:   Encounter Diagnosis     ICD-10-CM    1. Chronic midline low back pain without sciatica  M54.50     G89.29           Start Time: 1530  Stop Time: 1613  Total time in clinic (min): 43 minutes    Subjective: Pt reports his LB is feeling better today compared to LV, but not quite as good as it had been feeling.         Objective: See treatment diary below      Assessment: Tolerated treatment well. Continued with program as outlined below.  Able to resume more dynamic activity this visit.  Slight discomfort across LB with most assigned exercise, but within tolerable limits today.  Resumed pallof press to further encourage activation of anti-rotation musculature, in effort to provide more support and reduce stress to lumbar spine.  Patient would benefit from continued PT to further improve strength, decrease pain, and maximize overall function.        Plan: Continue per plan of care.      Precautions: fall risk      FOTO   = 56/61   = 53/61    POC exp =  10/30/24    Access Code: [email or web address]  URL: [email or web address]  Date: 2024  Prepared by: Court Sousa    Exercises  - Supine Transversus Abdominis Bracing - Hands on Stomach  - 5 x daily - 7 x weekly - 3 sets - 10 reps  - Hooklying Clamshell with Resistance  - 1 x daily - 7 x weekly - 3 sets - 10 reps - 5 hold  - Seated Transversus Abdominis Bracing with PLB  - 5 x daily - 7 x weekly - 3 sets - 10 reps    Manuals      Lumbar paraspinal STM AFB np       RE - SW                                          Neuro Re-Ed             TA iso             TA with hooklying clamshell  YHB x30 YHB x30  YHB x30 YHB x30 YHB x30 YHB x30 YHB x30 YHB x30   S/l clamshells     YHB  X20 ea  YHB  X20 ea  YHB  X20 ea       S/l hip abd w/ TA draw     X15 b/l        Pallof  "press   DBL GTB  BTB 2x10 ea         Standing TA with pball press down  5\" 3x10 5\" 3x10  5\" 3x10 5\" 3x10 5\" 2x15 5\" 2x15 5\" 2x15    Bridges  nv 2x8   3x8 YHB 2x8 YHB 2x12 YHB 2x12 2x12   Rectangle  BAPS X25 each way nv X25 each way     X15 Df and PF X25 4 ways X25 4 ways   Supine pelvic tilt 2x5 4x5x3\" 4x5x3\"                       SandDune march Step up and hip flex BLE  X12 each side nv    4' 4' Step up and hip flex BLE  X12 each side Step up and hip flex BLE  X12 each side Step up and hip flex BLE  X12 each side   Ther Ex             Vg for endur and strength L7 10' L7 10' L7 10'  L7 10' L7 10' L7 10' L7 10' L7 10' L7 10'   Resisted side step     GTB side of table x5 laps        LTR  5\"x10 ea np          Standing hip ext. On airex  2x5 nv nv          Pball roll out   5\"x10                       Pt education     Lifting mechanics                      Ther Activity             [place] carry             Suitcase carry      8#  ft   8# DB 450ft/2 laps    Box lift      x10        Box carry     Chair <> chair x6 laps Chair <> chair x6 laps floor <> windowsill x2 with 3# AW floor <> windowsill x1 with 3# AW     Seated deadlift 5kg MB chair with airex 2x5 nv 5kg MB chair with airex 2x5          STS 2x4 10# KB chair with airex  nv 2x4 10# KB chair with airex        3x5 No UE   Bike for endurance          7'    HEP and lifting technique education [facility] on breaks from sitting at desk  AFB       5'    Gait Training                                       Modalities                                                                "

## 2024-09-20 ENCOUNTER — OFFICE VISIT (OUTPATIENT)
Dept: PHYSICAL THERAPY | Facility: REHABILITATION | Age: 74
End: 2024-09-20
Payer: MEDICARE

## 2024-09-20 DIAGNOSIS — G89.29 CHRONIC MIDLINE LOW BACK PAIN WITHOUT SCIATICA: Primary | ICD-10-CM

## 2024-09-20 DIAGNOSIS — M54.50 CHRONIC MIDLINE LOW BACK PAIN WITHOUT SCIATICA: Primary | ICD-10-CM

## 2024-09-20 PROCEDURE — 97112 NEUROMUSCULAR REEDUCATION: CPT

## 2024-09-20 PROCEDURE — 97110 THERAPEUTIC EXERCISES: CPT

## 2024-09-20 NOTE — PROGRESS NOTES
Daily Note     Today's date: 2024  Patient name: Miguel Angel Ramos  : 1950  MRN: [id number]  Referring provider: Sundeep Stone MD  Dx:   Encounter Diagnosis     ICD-10-CM    1. Chronic midline low back pain without sciatica  M54.50     G89.29             Start Time: 817  Stop Time: 905  Total time in clinic (min): 48 minutes  Patient 1 on 1 with PT from 830-900.    Subjective: Pt reports his LB is feeling better than it did earlier this week.  States he has been walking about 1/2 mile at home every day.        Objective: See treatment diary below      Assessment: Tolerated treatment well. Continued with program as outlined below. Did not tolerate standing extension at table as sx in low back increased. Good technique during exercises. Educated on walking program at home and taking breaks as needed. Patient would benefit from continued PT to further improve strength, decrease pain, and maximize overall function.        Plan: Continue per plan of care.  Progress as tolerated.      Precautions: fall risk      FOTO   = 56/61   = 53/61    POC exp =  10/30/24    Access Code: [email or web address]  URL: [email or web address]  Date: 2024  Prepared by: Court Sousa    Exercises  - Supine Transversus Abdominis Bracing - Hands on Stomach  - 5 x daily - 7 x weekly - 3 sets - 10 reps  - Hooklying Clamshell with Resistance  - 1 x daily - 7 x weekly - 3 sets - 10 reps - 5 hold  - Seated Transversus Abdominis Bracing with PLB  - 5 x daily - 7 x weekly - 3 sets - 10 reps    Manuals      Lumbar paraspinal STM AFB np       RE - SW                                          Neuro Re-Ed             TA iso             TA with hooklying clamshell  YHB x30 YHB x30  YHB x30 YHB x30 YHB x30 YHB x30 YHB x30 YHB x30   S/l clamshells     YHB  X20 ea  YHB  X20 ea  YHB  X20 ea       S/l hip abd w/ TA draw     X15 b/l        Pallof press   DBL GTB  BTB 2x10 ea        "  Standing TA with pball press down  5\" 3x10 5\" 3x10  5\" 3x10 5\" 3x10 5\" 2x15 5\" 2x15 5\" 2x15    Bridges  nv 2x8 2x8  3x8 YHB 2x8 YHB 2x12 YHB 2x12 2x12   Rectangle  BAPS X25 each way nv X25 each way X25 each way    X15 Df and PF X25 4 ways X25 4 ways   Supine pelvic tilt 2x5 4x5x3\" 4x5x3\" 3x5 3\" holds                      SandDune march Step up and hip flex BLE  X12 each side nv  Step up and hip flex BLE  X12 each side  4' 4' Step up and hip flex BLE  X12 each side Step up and hip flex BLE  X12 each side Step up and hip flex BLE  X12 each side   Ther Ex             Vg for endur and strength L7 10' L7 10' L7 10' L7 10' L7 10' L7 10' L7 10' L7 10' L7 10' L7 10'   Resisted side step     GTB side of table x5 laps        LTR  5\"x10 ea np          Standing hip ext. On airex  2x5 nv nv          Pball roll out   5\"x10 5\" x10         Standing ext at table    2x5, hold due to P!          Pt education     Lifting mechanics                      Ther Activity             [place] carry             Suitcase carry    15# KB with YJB 250ft  8#  ft   8# DB 450ft/2 laps    Box lift      x10        Box carry     Chair <> chair x6 laps Chair <> chair x6 laps floor <> windowsill x2 with 3# AW floor <> windowsill x1 with 3# AW     Seated deadlift 5kg MB chair with airex 2x5 nv 5kg MB chair with airex 2x5          STS 2x4 10# KB chair with airex  nv 2x4 10# KB chair with airex  Red chair, 10# DB, 3x7      3x5 No UE   Bike for endurance          7'    HEP and lifting technique education [facility] on breaks from sitting at desk  AFB  SW, walking program     5'    Gait Training                                       Modalities                                                                "

## 2024-09-25 ENCOUNTER — OFFICE VISIT (OUTPATIENT)
Dept: PHYSICAL THERAPY | Facility: REHABILITATION | Age: 74
End: 2024-09-25
Payer: MEDICARE

## 2024-09-25 DIAGNOSIS — M54.50 CHRONIC MIDLINE LOW BACK PAIN WITHOUT SCIATICA: Primary | ICD-10-CM

## 2024-09-25 DIAGNOSIS — G89.29 CHRONIC MIDLINE LOW BACK PAIN WITHOUT SCIATICA: Primary | ICD-10-CM

## 2024-09-25 PROCEDURE — 97110 THERAPEUTIC EXERCISES: CPT | Performed by: PHYSICAL THERAPIST

## 2024-09-25 PROCEDURE — 97530 THERAPEUTIC ACTIVITIES: CPT | Performed by: PHYSICAL THERAPIST

## 2024-09-25 PROCEDURE — 97112 NEUROMUSCULAR REEDUCATION: CPT | Performed by: PHYSICAL THERAPIST

## 2024-09-25 NOTE — PROGRESS NOTES
Daily Note     Today's date: 2024  Patient name: Miguel Angel Ramos  : 1950  MRN: [id number]  Referring provider: Sundeep Stone MD  Dx:   Encounter Diagnosis     ICD-10-CM    1. Chronic midline low back pain without sciatica  M54.50     G89.29           Start Time: 0845  Stop Time: 09  Total time in clinic (min): 48 minutes    Subjective: Patient reports feeling good today. States he did some lifting and twisting on  which caused a few hours of low back pain.       Objective: See treatment diary below      Assessment: Tolerated treatment fair. Attempted to lay prone and HAKEEM, but symptoms increased. Responds better to supine and functional strengthening exercises. Encouraged to continue with HEP and walking at home. Patient demonstrated fatigue post treatment, exhibited good technique with therapeutic exercises, and would benefit from continued PT      Plan: Continue per plan of care.  Progress treatment as tolerated.      I have directly supervised and participated in the care of this patient with the therapy student, Saundra Santos, SPT. I agree with the details as outlined above as well as during today's session. Court Sousa, PT, DPT        Precautions: fall risk      FOTO   = 56/61   = 53/61    POC exp =  10/30/24    Access Code: [email or web address]  URL: [email or web address]  Date: 2024  Prepared by: Court Sousa    Exercises  - Supine Transversus Abdominis Bracing - Hands on Stomach  - 5 x daily - 7 x weekly - 3 sets - 10 reps  - Hooklying Clamshell with Resistance  - 1 x daily - 7 x weekly - 3 sets - 10 reps - 5 hold  - Seated Transversus Abdominis Bracing with PLB  - 5 x daily - 7 x weekly - 3 sets - 10 reps    Manuals      Lumbar paraspinal STM AFB np       RE - SW                                          Neuro Re-Ed             TA iso             TA with hooklying clamshell  YHB x30 YHB x30   YHB x30 YHB x30 YHB  "x30 YHB x30 YHB x30   S/l clamshells      YHB  X20 ea  YHB  X20 ea       S/l hip abd w/ TA draw             Pallof press   DBL GTB          Standing TA with pball press down  5\" 3x10 5\" 3x10  5\" 3x10 5\" 3x10 5\" 2x15 5\" 2x15 5\" 2x15    Bridges  nv 2x8 2x8 2x10 3x8 YHB 2x8 YHB 2x12 YHB 2x12 2x12   Rectangle  BAPS X25 each way nv X25 each way X25 each way    X15 Df and PF X25 4 ways X25 4 ways   Supine pelvic tilt 2x5 4x5x3\" 4x5x3\" 3x5 3\" holds                      SandDune march Step up and hip flex BLE  X12 each side nv  Step up and hip flex BLE  X12 each side Step up and hip flex BLE  X12 each side 4' 4' Step up and hip flex BLE  X12 each side Step up and hip flex BLE  X12 each side Step up and hip flex BLE  X12 each side   Ther Ex             Vg for endur and strength L7 10' L7 10' L7 10' L7 10' L7 10' L7 10' L7 10' L7 10' L7 10' L7 10'   Resisted side step             LTR  5\"x10 ea np          Standing hip ext. On airex  2x5 nv nv          Pball roll out   5\"x10 5\" x10         Standing ext at table    2x5, hold due to P!          Pball DKTC     Supine x5, P! Hold         Prone/HAKEEM     P!, hold        Ther Activity             [place] carry             Suitcase carry    15# KB with YJB 250ft 15# KB with YJB [id number] 8#  ft   8# DB 450ft/2 laps    Box lift              Box carry      Chair <> chair x6 laps floor <> windowsill x2 with 3# AW floor <> windowsill x1 with 3# AW     Seated deadlift 5kg MB chair with airex 2x5 nv 5kg MB chair with airex 2x5          STS 2x4 10# KB chair with airex  nv 2x4 10# KB chair with airex  Red chair, 10# DB, 3x7 Red chair, 10# DB, 3x7     3x5 No UE   Bike for endurance          7'    HEP and lifting technique education [facility] on breaks from sitting at desk  AFB  SW, walking program     5'    Gait Training                                       Modalities                                                                  "

## 2024-09-27 ENCOUNTER — OFFICE VISIT (OUTPATIENT)
Dept: PHYSICAL THERAPY | Facility: REHABILITATION | Age: 74
End: 2024-09-27
Payer: MEDICARE

## 2024-09-27 DIAGNOSIS — G89.29 CHRONIC MIDLINE LOW BACK PAIN WITHOUT SCIATICA: Primary | ICD-10-CM

## 2024-09-27 DIAGNOSIS — M54.50 CHRONIC MIDLINE LOW BACK PAIN WITHOUT SCIATICA: Primary | ICD-10-CM

## 2024-09-27 PROCEDURE — 97110 THERAPEUTIC EXERCISES: CPT | Performed by: PHYSICAL THERAPIST

## 2024-09-27 PROCEDURE — 97112 NEUROMUSCULAR REEDUCATION: CPT | Performed by: PHYSICAL THERAPIST

## 2024-09-27 PROCEDURE — 97530 THERAPEUTIC ACTIVITIES: CPT | Performed by: PHYSICAL THERAPIST

## 2024-09-27 NOTE — PROGRESS NOTES
Daily Note     Today's date: 2024  Patient name: Miguel Angel Ramos  : 1950  MRN: [id number]  Referring provider: Sundeep Stone MD  Dx:   Encounter Diagnosis     ICD-10-CM    1. Chronic midline low back pain without sciatica  M54.50     G89.29           Start Time: 0815  Stop Time: 0900  Total time in clinic (min): 45 minutes    Subjective: States the back felt good after LV, but had some back pain yesterday. States he had some quad soreness after LV.       Objective: See treatment diary below      Assessment: Tolerated treatment well. No noted major increase in back pain during the session. Continues to respond well to lumbar-pelvic strengthening.  Patient exhibited good technique with therapeutic exercises and would benefit from continued PT      Plan: Continue per plan of care.     I have directly supervised and participated in the care of this patient with the therapy student, Saundra Santos, SPT. I agree with the details as outlined above as well as during today's session. Court Sousa, PT, DPT        Precautions: fall risk      FOTO   = 56/61   = 53/61    POC exp =  10/30/24    Access Code: [email or web address]  URL: [email or web address]  Date: 2024  Prepared by: Court Sousa    Exercises  - Supine Transversus Abdominis Bracing - Hands on Stomach  - 5 x daily - 7 x weekly - 3 sets - 10 reps  - Hooklying Clamshell with Resistance  - 1 x daily - 7 x weekly - 3 sets - 10 reps - 5 hold  - Seated Transversus Abdominis Bracing with PLB  - 5 x daily - 7 x weekly - 3 sets - 10 reps    Manuals      Lumbar paraspinal STM AFB np       RE - SW                                          Neuro Re-Ed             TA iso             TA with hooklying clamshell  YHB x30 YHB x30   YHB 2x10 YHB x30 YHB x30 YHB x30 YHB x30   S/l clamshells       YHB  X20 ea       S/l hip abd w/ TA draw             Pallof press   DBL GTB   OJB 2x10       Standing TA  "with pball press down  5\" 3x10 5\" 3x10  5\" 3x10  5\" 2x15 5\" 2x15 5\" 2x15    Bridges  nv 2x8 2x8 2x10 2x10 YHB 2x8 YHB 2x12 YHB 2x12 2x12   Rectangle  BAPS X25 each way nv X25 each way X25 each way    X15 Df and PF X25 4 ways X25 4 ways   Supine pelvic tilt 2x5 4x5x3\" 4x5x3\" 3x5 3\" holds                      SandDune march Step up and hip flex BLE  X12 each side nv  Step up and hip flex BLE  X12 each side Step up and hip flex BLE  X12 each side Step up and hip flex BLE  X15 each side 4' Step up and hip flex BLE  X12 each side Step up and hip flex BLE  X12 each side Step up and hip flex BLE  X12 each side   Ther Ex             Vg for endur and strength L7 10' L7 10' L7 10' L7 10' L7 10' L7 10' L7 10' L7 10' L7 10' L7 10'   Resisted side step             LTR  5\"x10 ea np          Standing hip ext. On airex  2x5 nv nv          Pball roll out   5\"x10 5\" x10         Standing ext at table    2x5, hold due to P!          Pball DKTC     Supine x5, P! Hold  Supine 2x10       Leg ext.       20# 2x8       Prone/HAKEEM     P!, hold        Ther Activity             [place] carry             Suitcase carry    15# KB with YJB 250ft 15# KB with YJB [id number] 15# KB with YJB [id number]   8# DB 450ft/2 laps    Box lift              Box carry       floor <> windowsill x2 with 3# AW floor <> windowsill x1 with 3# AW     Seated deadlift 5kg MB chair with airex 2x5 nv 5kg MB chair with airex 2x5          STS 2x4 10# KB chair with airex  nv 2x4 10# KB chair with airex  Red chair, 10# DB, 3x7 Red chair, 10# DB, 3x7 10# DB, 3x7    3x5 No UE   Bike for endurance          7'    HEP and lifting technique education [facility] on breaks from sitting at desk  AFB  SW, walking program     5'    Gait Training                                       Modalities                                                                    "

## 2024-10-02 ENCOUNTER — OFFICE VISIT (OUTPATIENT)
Dept: PHYSICAL THERAPY | Facility: REHABILITATION | Age: 74
End: 2024-10-02
Payer: MEDICARE

## 2024-10-02 DIAGNOSIS — M54.50 CHRONIC MIDLINE LOW BACK PAIN WITHOUT SCIATICA: Primary | ICD-10-CM

## 2024-10-02 DIAGNOSIS — G89.29 CHRONIC MIDLINE LOW BACK PAIN WITHOUT SCIATICA: Primary | ICD-10-CM

## 2024-10-02 PROCEDURE — 97110 THERAPEUTIC EXERCISES: CPT | Performed by: PHYSICAL THERAPIST

## 2024-10-02 PROCEDURE — 97530 THERAPEUTIC ACTIVITIES: CPT | Performed by: PHYSICAL THERAPIST

## 2024-10-02 PROCEDURE — 97112 NEUROMUSCULAR REEDUCATION: CPT | Performed by: PHYSICAL THERAPIST

## 2024-10-02 NOTE — PROGRESS NOTES
Daily Note     Today's date: 10/2/2024  Patient name: Miguel Angel Ramos  : 1950  MRN: [id number]  Referring provider: Sundeep Stone MD  Dx:   Encounter Diagnosis     ICD-10-CM    1. Chronic midline low back pain without sciatica  M54.50     G89.29             Start Time: 1003  Stop Time: 1045  Total time in clinic (min): 42 minutes    Subjective: States the back was painful over the weekend with unknown reason why. Reports he feels better when he moves compared to when he sits for a while. Has some back pain while he coughs.     Objective: See treatment diary below      Assessment: Tolerated treatment well. No noted increase in back pain during the session. Supervision during STS due to standing up on airex for safety. Encouraged to stand every hour if possible during the days where he sits for a longer period of time. Patient exhibited good technique with therapeutic exercises and would benefit from continued PT      Plan: Continue per plan of care.     I have directly supervised and participated in the care of this patient with the therapy student, Saundra Santos, SPT. I agree with the details as outlined above as well as during today's session. Court Sousa, PT, DPT          Precautions: fall risk      FOTO   = 56/61   = 53/61    POC exp =  10/30/24    Access Code: [email or web address]  URL: [email or web address]  Date: 2024  Prepared by: Court Sousa    Exercises  - Supine Transversus Abdominis Bracing - Hands on Stomach  - 5 x daily - 7 x weekly - 3 sets - 10 reps  - Hooklying Clamshell with Resistance  - 1 x daily - 7 x weekly - 3 sets - 10 reps - 5 hold  - Seated Transversus Abdominis Bracing with PLB  - 5 x daily - 7 x weekly - 3 sets - 10 reps    Manuals 9/11 9/13 9/18 9/20 9/25 9/27 10/2 8/30 9/4 9/6     Lumbar paraspinal STM AFB np       RE - SW                                          Neuro Re-Ed             TA iso             TA with hooklying clamshell  YHB x30 YHB x30   " YHB 2x10  YHB x30 YHB x30 YHB x30   S/l clamshells             S/l hip abd w/ TA draw             Pallof press   DBL GTB   OJB 2x10 OJB 2x10      Standing TA with pball press down  5\" 3x10 5\" 3x10  5\" 3x10   5\" 2x15 5\" 2x15    Bridges  nv 2x8 2x8 2x10 2x10  YHB 2x12 YHB 2x12 2x12   Rectangle  BAPS X25 each way nv X25 each way X25 each way   x25 each way X15 Df and PF X25 4 ways X25 4 ways   Supine pelvic tilt 2x5 4x5x3\" 4x5x3\" 3x5 3\" holds                      SandDune march Step up and hip flex BLE  X12 each side nv  Step up and hip flex BLE  X12 each side Step up and hip flex BLE  X12 each side Step up and hip flex BLE  X15 each side Step up and hip flex BLE  X15 each side Step up and hip flex BLE  X12 each side Step up and hip flex BLE  X12 each side Step up and hip flex BLE  X12 each side   Ther Ex             Vg for endur and strength L7 10' L7 10' L7 10' L7 10' L7 10' L7 10' L7 10' L7 10' L7 10' L7 10'   Resisted side step             LTR  5\"x10 ea np          Standing hip ext. On airex  2x5 nv nv          Pball roll out   5\"x10 5\" x10         Standing ext at table    2x5, hold due to P!          Pball DKTC     Supine x5, P! Hold  Supine 2x10       Leg ext.       20# 2x8       Prone/HAKEEM     P!, hold        Ther Activity             [place] carry             Suitcase carry    15# KB with YJB 250ft 15# KB with YJB [id number] 15# KB with YJB [id number] 15# KB with YJB [id number]  8# DB 450ft/2 laps    Box lift              Box carry        floor <> windowsill x1 with 3# AW     Seated deadlift 5kg MB chair with airex 2x5 nv 5kg MB chair with airex 2x5          STS 2x4 10# KB chair with airex  nv 2x4 10# KB chair with airex  Red chair, 10# DB, 3x7 Red chair, 10# DB, 3x7 10# DB, 3x7 3x6, standing on airex   3x5 No UE   Lunges       Sanddune +airex 3x5 each      Bike for endurance          7'    HEP and lifting technique education SW Edu on breaks from sitting at desk  AFB  SW, walking program     5'    Gait Training         "                               Modalities

## 2024-10-03 NOTE — PROGRESS NOTES
Daily Note     Today's date: 10/4/2024  Patient name: Miguel Angel Ramos  : 1950  MRN: [id number]  Referring provider: Sundeep Stone MD  Dx:   Encounter Diagnosis     ICD-10-CM    1. Chronic midline low back pain without sciatica  M54.50     G89.29           Start Time: 818  Stop Time: 0859  Total time in clinic (min): 41 minutes    Subjective: Patient reports his back is bothering him more today and is unsure why. He arrives with 5/10 pain level across low back.       Objective: See treatment diary below      Assessment: Patient tolerated treatment session well. No notable increase in pain during session however, displayed more difficulty with balance interventions performed on uneven surfaces. One uncontrolled descent while performing first set of STS however, technique and control improved with reps. Demonstrated a good performance with lumbo-pelvic strengthening exercises and reported an improvement in LBP by conclusion of visit. Patient demonstrated fatigue post-tx and would benefit from continued PT to improve level of function.     Plan: Continue per POC. Increase reps/resistance as tolerated.      Precautions: fall risk      FOTO   = 56/61   = 53/61    POC exp =  10/30/24    Access Code: [email or web address]  URL: [email or web address]  Date: 2024  Prepared by: Court Sousa    Exercises  - Supine Transversus Abdominis Bracing - Hands on Stomach  - 5 x daily - 7 x weekly - 3 sets - 10 reps  - Hooklying Clamshell with Resistance  - 1 x daily - 7 x weekly - 3 sets - 10 reps - 5 hold  - Seated Transversus Abdominis Bracing with PLB  - 5 x daily - 7 x weekly - 3 sets - 10 reps    Manuals 9/11 9/13 9/18 9/20 9/25 9/27 10/2 10/4 8/30 9/4 9/6     Lumbar paraspinal STM AFB np        RE - SW                                             Neuro Re-Ed              TA iso              TA with hooklying clamshell  YHB x30 YHB x30   YHB 2x10  YHB 2x10 YHB x30 YHB x30 YHB x30   S/l charissels        "       S/l hip abd w/ TA draw              Pallof press   DBL GTB   OJB 2x10 OJB 2x10 OJB 2x10      Standing TA with pball press down  5\" 3x10 5\" 3x10  5\" 3x10    5\" 2x15 5\" 2x15    Bridges  nv 2x8 2x8 2x10 2x10   YHB 2x12 YHB 2x12 2x12   Rectangle  BAPS X25 each way nv X25 each way X25 each way   x25 each way x25 each way X15 Df and PF X25 4 ways X25 4 ways   Supine pelvic tilt 2x5 4x5x3\" 4x5x3\" 3x5 3\" holds                        SandDune march Step up and hip flex BLE  X12 each side nv  Step up and hip flex BLE  X12 each side Step up and hip flex BLE  X12 each side Step up and hip flex BLE  X15 each side Step up and hip flex BLE  X15 each side Step up and hip flex BLE  X15 each side Step up and hip flex BLE  X12 each side Step up and hip flex BLE  X12 each side Step up and hip flex BLE  X12 each side   Ther Ex              Vg for endur and strength L7 10' L7 10' L7 10' L7 10' L7 10' L7 10' L7 10' L7 10' L7 10' L7 10' L7 10'   Resisted side step              LTR  5\"x10 ea np           Standing hip ext. On airex  2x5 nv nv           Pball roll out   5\"x10 5\" x10          Standing ext at table    2x5, hold due to P!           Pball DKTC     Supine x5, P! Hold  Supine 2x10        Leg ext.       20# 2x8        Prone/HAKEEM     P!, hold         Ther Activity              [place] carry              Suitcase carry    15# KB with YJB 250ft 15# KB with YJB [id number] 15# KB with YJB [id number] 15# KB with YJB [id number] 15# KB with YJB [id number]  8# DB 450ft/2 laps    Box lift               Box carry         floor <> windowsill x1 with 3# AW     Seated deadlift 5kg MB chair with airex 2x5 nv 5kg MB chair with airex 2x5           STS 2x4 10# KB chair with airex  nv 2x4 10# KB chair with airex  Red chair, 10# DB, 3x7 Red chair, 10# DB, 3x7 10# DB, 3x7 3x6, standing on airex 3x6, standing on airex   3x5 No UE   Lunges       Sanddune +airex 3x5 each Sanddune +airex 3x5 each      Bike for endurance           7'    HEP and lifting technique " education SW Edu on breaks from sitting at desk  AFB  SW, walking program      5'    Gait Training                                          Modalities

## 2024-10-04 ENCOUNTER — OFFICE VISIT (OUTPATIENT)
Dept: PHYSICAL THERAPY | Facility: REHABILITATION | Age: 74
End: 2024-10-04
Payer: MEDICARE

## 2024-10-04 DIAGNOSIS — G89.29 CHRONIC MIDLINE LOW BACK PAIN WITHOUT SCIATICA: Primary | ICD-10-CM

## 2024-10-04 DIAGNOSIS — M54.50 CHRONIC MIDLINE LOW BACK PAIN WITHOUT SCIATICA: Primary | ICD-10-CM

## 2024-10-04 PROCEDURE — 97110 THERAPEUTIC EXERCISES: CPT

## 2024-10-04 PROCEDURE — 97112 NEUROMUSCULAR REEDUCATION: CPT

## 2024-10-04 PROCEDURE — 97530 THERAPEUTIC ACTIVITIES: CPT

## 2024-10-09 ENCOUNTER — OFFICE VISIT (OUTPATIENT)
Dept: PHYSICAL THERAPY | Facility: REHABILITATION | Age: 74
End: 2024-10-09
Payer: MEDICARE

## 2024-10-09 DIAGNOSIS — G89.29 CHRONIC MIDLINE LOW BACK PAIN WITHOUT SCIATICA: Primary | ICD-10-CM

## 2024-10-09 DIAGNOSIS — M54.50 CHRONIC MIDLINE LOW BACK PAIN WITHOUT SCIATICA: Primary | ICD-10-CM

## 2024-10-09 PROCEDURE — 97110 THERAPEUTIC EXERCISES: CPT | Performed by: PHYSICAL THERAPIST

## 2024-10-09 PROCEDURE — 97112 NEUROMUSCULAR REEDUCATION: CPT | Performed by: PHYSICAL THERAPIST

## 2024-10-09 PROCEDURE — 97530 THERAPEUTIC ACTIVITIES: CPT | Performed by: PHYSICAL THERAPIST

## 2024-10-09 NOTE — PROGRESS NOTES
Daily Note     Today's date: 10/9/2024  Patient name: Miguel Angel Ramos  : 1950  MRN: [id number]  Referring provider: Sundeep Stone MD  Dx:   Encounter Diagnosis     ICD-10-CM    1. Chronic midline low back pain without sciatica  M54.50     G89.29           Start Time: 0846  Stop Time: 09  Total time in clinic (min): 44 minutes    Subjective: Patient reports that today is a good day. Had some flare ups over the last week but is better this morning.       Objective: See treatment diary below      Assessment: Tolerated treatment well. Cues to decrease speed when descending into a lunge to increase eccentric muscle control. Responded well to progression made during leg extension. Patient demonstrated fatigue post treatment, exhibited good technique with therapeutic exercises, and would benefit from continued PT      Plan: Continue per plan of care.     I have directly supervised and participated in the care of this patient with the therapy student, Saundra Santos, SPT. I agree with the details as outlined above as well as during today's session. Court Sousa, PT, DPT        Precautions: fall risk      FOTO   = 56/61   = 53/61  10= 53/61    POC exp =  10/30/24    Access Code: [email or web address]  URL: [email or web address]  Date: 2024  Prepared by: Court Sousa    Exercises  - Supine Transversus Abdominis Bracing - Hands on Stomach  - 5 x daily - 7 x weekly - 3 sets - 10 reps  - Hooklying Clamshell with Resistance  - 1 x daily - 7 x weekly - 3 sets - 10 reps - 5 hold  - Seated Transversus Abdominis Bracing with PLB  - 5 x daily - 7 x weekly - 3 sets - 10 reps    Manuals 9/11 9/13 9/18 9/20 9/25 9/27 10/2 10/4 10/9 9/6     Lumbar paraspinal STM AFB np       RE - SW                                          Neuro Re-Ed             TA iso             TA with hooklying clamshell  YHB x30 YHB x30   YHB 2x10  YHB 2x10  YHB x30   S/l clamshells             S/l hip abd w/ TA draw            "  Pallof press   DBL GTB   OJB 2x10 OJB 2x10 OJB 2x10 OJB 2x10    Standing TA with pball press down  5\" 3x10 5\" 3x10  5\" 3x10        Bridges  nv 2x8 2x8 2x10 2x10    2x12   Rectangle  BAPS X25 each way nv X25 each way X25 each way   x25 each way x25 each way x25 each way X25 4 ways   Supine pelvic tilt 2x5 4x5x3\" 4x5x3\" 3x5 3\" holds                      SandDune march Step up and hip flex BLE  X12 each side nv  Step up and hip flex BLE  X12 each side Step up and hip flex BLE  X12 each side Step up and hip flex BLE  X15 each side Step up and hip flex BLE  X15 each side Step up and hip flex BLE  X15 each side  Step up and hip flex BLE  X12 each side   Ther Ex             Vg for endur and strength L7 10' L7 10' L7 10' L7 10' L7 10' L7 10' L7 10' L7 10' L7 10' L7 10'   Resisted side step             LTR  5\"x10 ea np          Standing hip ext. On airex  2x5 nv nv          Pball roll out   5\"x10 5\" x10         Standing ext at table    2x5, hold due to P!          Pball DKTC     Supine x5, P! Hold  Supine 2x10       Leg ext.       20# 2x8   30# 2x8    Prone/HAKEEM     P!, hold        Ther Activity             [place] carry             Suitcase carry    15# KB with YJB 250ft 15# KB with YJB [id number] 15# KB with YJB [id number] 15# KB with YJB [id number] 15# KB with YJB [id number] 15# KB with YJB [id number]    Box lift              Box carry             Seated deadlift 5kg MB chair with airex 2x5 nv 5kg MB chair with airex 2x5          STS 2x4 10# KB chair with airex  nv 2x4 10# KB chair with airex  Red chair, 10# DB, 3x7 Red chair, 10# DB, 3x7 10# DB, 3x7 3x6, standing on airex 3x6, standing on airex 3x6, standing on airex 3x5 No UE   Lunges       Sanddune +airex 3x5 each Sanddune +airex 3x5 each Sanddune +airex 3x5 each    Bike for endurance              HEP and lifting technique education SW Edu on breaks from sitting at desk  AFB  SW, walking program         Gait Training                                       Modalities                   "

## 2024-10-11 ENCOUNTER — EVALUATION (OUTPATIENT)
Dept: PHYSICAL THERAPY | Facility: REHABILITATION | Age: 74
End: 2024-10-11
Payer: MEDICARE

## 2024-10-11 DIAGNOSIS — G89.29 CHRONIC MIDLINE LOW BACK PAIN WITHOUT SCIATICA: Primary | ICD-10-CM

## 2024-10-11 DIAGNOSIS — M54.50 CHRONIC MIDLINE LOW BACK PAIN WITHOUT SCIATICA: Primary | ICD-10-CM

## 2024-10-11 PROCEDURE — 97530 THERAPEUTIC ACTIVITIES: CPT | Performed by: PHYSICAL THERAPIST

## 2024-10-11 PROCEDURE — 97110 THERAPEUTIC EXERCISES: CPT | Performed by: PHYSICAL THERAPIST

## 2024-10-11 NOTE — PROGRESS NOTES
Daily Note/ReEval     Today's date: 10/11/2024  Patient name: Miguel Angel Ramos  : 1950  MRN: [id number]  Referring provider: Sundeep Stone MD  Dx:   Encounter Diagnosis     ICD-10-CM    1. Chronic midline low back pain without sciatica  M54.50     G89.29           Start Time: 08  Stop Time: 918  Total time in clinic (min): 47 minutes    Subjective: Patient reports feeling 55% better since starting therapy. States he feels like his strength has increased and he does not rely on his surroundings as much when he walks. Pain does not wake him up as much during the night. Able to sit for 1.5 hours before the low back pain begins. Continues to have random days where the low back pain is terrible.Yesterday the pain in the LB was bad.       Objective: See treatment diary below    Pain:  Current Pain: 4/10   Best Pain: 2/10 when he is busy and distracted  Worst pain: 9/10 random       Lumbar  AROM  Flexion:  WFL, pain throughout with more at end range  Extension:   50% painful 7/10  Left lateral flexion: lateral knee  Right lateral flexion:  lateral knee, pain R>L  Left rotation:  WFL no pain  Right rotation:  WFL no pain      Strength/Myotome Testing   Left Hip   Planes of Motion   Extension: 4, pain  Abduction: 4+     Right Hip   Planes of Motion   Extension: 4, pain R > L  Abduction: 4+    5x STS: No UE 10.48 seconds     Assessment: Tolerated treatment and re-eval well. Patient's 5xSTS time and BLE strength has improved indicating a decrease in fall risk. Pain continues to make sporadic appearances with an unknown reason or cause. However the pt reports overall improvement in pain and fucntion. Educated on continuing with HEP.  Continues to feel challenged with current exercise program. Patient demonstrated fatigue post treatment, exhibited good technique with therapeutic exercises, and would benefit from continued PT to improve BLE strength, decrease fall risk, decrease pain, and work towards functional  goals.     Short term goals: to be met in 3-4 weeks  Pt independent with initial HEP, rationale, technique and frequency, for ROM and pain control. MET  Pt will report at least a 25% reduction in subjective pain complaints/symptoms to better manage ADLs and household chores. MET  Improve max pain level by 2 points on the numeric pain rating scale indicating a clinically significant reduction in pain level. NOT MET  Pt will achieve an improvement in FOTO score indicating an improvement in pain and function. NOT MET        Long term goals: to be met in 6-8 weeks  Pt will have full and pain free lumbar ROM to better manage ADLs and household chores. NOT MET  Pt will report a 75% or > reduction in subjective pain complaints/symptoms to better manage ADLs and household chores. NOT MET  Improve B hip abduction MMT to a 4/5 or greater for improved lumbopelvic stability. MET  Pt will improve FOTO score to better then expected outcome indicating an overall improvement in pain and function NOT MET  Pt will be able to sleep through the night (6-8 hours) without waking secondary to pain. IN PROGRESS  Pt will be able to perform ADLs, iADLS, and household duties without pain or restriction indicating return to PLOF. NOT MET  Pt independent with rationale, technique and plan for performance of advanced HEP to ensure independent self-management of symptoms upon discharge. NOT MET  Achieve pts therapy goal: to be able to walk longer distances and sit for longer periods to write PARTIALLY MET    Plan: Continue per plan of care.  1-2x a week for 8-12 weeks    I have directly supervised and participated in the care of this patient with the therapy student, Saundra Santos, SPT. I agree with the details as outlined above as well as during today's session. Court Sousa, PT, DPT          Precautions: fall risk      FOTO  8/7 = 56/61  8/30 = 53/61  10/9= 53/61    POC exp =  1/3/25    Access Code: [email or web address]  URL:  [email or web address]  Date: 08/07/2024  Prepared by: Court Sousa    Exercises  - Supine Transversus Abdominis Bracing - Hands on Stomach  - 5 x daily - 7 x weekly - 3 sets - 10 reps  - Hooklying Clamshell with Resistance  - 1 x daily - 7 x weekly - 3 sets - 10 reps - 5 hold  - Seated Transversus Abdominis Bracing with PLB  - 5 x daily - 7 x weekly - 3 sets - 10 reps    Manuals 9/11 9/13 9/18 9/20 9/25 9/27 10/2 10/4 10/9 10/11     Lumbar paraspinal STM AFB np       Re SW                                          Neuro Re-Ed             TA iso             TA with hooklying clamshell  YHB x30 YHB x30   YHB 2x10  YHB 2x10     S/l clamshells             S/l hip abd w/ TA draw             Pallof press   DBL GTB   OJB 2x10 OJB 2x10 OJB 2x10 OJB 2x10 OJB 2x10   Standing TA with pball press down  5\" 3x10 5\" 3x10  5\" 3x10        Bridges  nv 2x8 2x8 2x10 2x10       Rectangle  BAPS X25 each way nv X25 each way X25 each way   x25 each way x25 each way x25 each way    Supine pelvic tilt 2x5 4x5x3\" 4x5x3\" 3x5 3\" holds                      SandDune march Step up and hip flex BLE  X12 each side nv  Step up and hip flex BLE  X12 each side Step up and hip flex BLE  X12 each side Step up and hip flex BLE  X15 each side Step up and hip flex BLE  X15 each side Step up and hip flex BLE  X15 each side     Ther Ex             Vg for endur and strength L7 10' L7 10' L7 10' L7 10' L7 10' L7 10' L7 10' L7 10' L7 10' L7 10'   Resisted side step             LTR  5\"x10 ea np          Standing hip ext. On airex  2x5 nv nv          Pball roll out   5\"x10 5\" x10         Standing ext at table    2x5, hold due to P!          Pball DKTC     Supine x5, P! Hold  Supine 2x10       Leg ext.       20# 2x8   30# 2x8 30#   3x8   Prone/HAKEEM     P!, hold        Ther Activity             [place] carry             Suitcase carry    15# KB with YJB 250ft 15# KB with YJB [id number] 15# KB with YJB [id number] 15# KB with YJB [id number] 15# KB with YJB [id number] " 15# KB with YJB [id number]    Box lift              Box carry             Seated deadlift 5kg MB chair with airex 2x5 nv 5kg MB chair with airex 2x5          STS 2x4 10# KB chair with airex  nv 2x4 10# KB chair with airex  Red chair, 10# DB, 3x7 Red chair, 10# DB, 3x7 10# DB, 3x7 3x6, standing on airex 3x6, standing on airex 3x6, standing on airex 3x6, standing on airex, 5x STS   Lunges       Sanddune +airex 3x5 each Sanddune +airex 3x5 each Sanddune +airex 3x5 each Sanddune +airex 3x5 each   Bike for endurance              HEP and lifting technique education SW Edu on breaks from sitting at desk  AFB  SW, walking program      POC/HEP SW   Gait Training                                       Modalities

## 2024-10-16 ENCOUNTER — OFFICE VISIT (OUTPATIENT)
Dept: PHYSICAL THERAPY | Facility: REHABILITATION | Age: 74
End: 2024-10-16
Payer: MEDICARE

## 2024-10-16 DIAGNOSIS — M54.50 CHRONIC MIDLINE LOW BACK PAIN WITHOUT SCIATICA: Primary | ICD-10-CM

## 2024-10-16 DIAGNOSIS — G89.29 CHRONIC MIDLINE LOW BACK PAIN WITHOUT SCIATICA: Primary | ICD-10-CM

## 2024-10-16 PROCEDURE — 97112 NEUROMUSCULAR REEDUCATION: CPT | Performed by: PHYSICAL THERAPIST

## 2024-10-16 PROCEDURE — 97110 THERAPEUTIC EXERCISES: CPT | Performed by: PHYSICAL THERAPIST

## 2024-10-16 PROCEDURE — 97530 THERAPEUTIC ACTIVITIES: CPT | Performed by: PHYSICAL THERAPIST

## 2024-10-16 NOTE — PROGRESS NOTES
"Daily Note     Today's date: 10/16/2024  Patient name: Miguel Angel Ramos  : 1950  MRN: [id number]  Referring provider: Sundeep Stone MD  Dx:   Encounter Diagnosis     ICD-10-CM    1. Chronic midline low back pain without sciatica  M54.50     G89.29                      Subjective: Patient stated minimal pain prior to treatment session.       Objective: See treatment diary below      Assessment: Patient demonstrated moderate challenge with STS activity; no c/o at completion of treatment session.       Plan: Continue per plan of care.      Precautions: fall risk      FOTO   = 56/61   = 53/61  10= 53/61    POC exp =  1/3/25    Access Code: [email or web address]  URL: [email or web address]  Date: 2024  Prepared by: Court Sousa    Exercises  - Supine Transversus Abdominis Bracing - Hands on Stomach  - 5 x daily - 7 x weekly - 3 sets - 10 reps  - Hooklying Clamshell with Resistance  - 1 x daily - 7 x weekly - 3 sets - 10 reps - 5 hold  - Seated Transversus Abdominis Bracing with PLB  - 5 x daily - 7 x weekly - 3 sets - 10 reps    Manuals 10/16 9/13 9/18 9/20 9/25 9/27 10/2 10/4 10/9 10/11     Lumbar paraspinal STM AFB np       Re SW                                          Neuro Re-Ed             TA iso             TA with hooklying clamshell YHB 2x10 YHB x30 YHB x30   YHB 2x10  YHB 2x10     S/l clamshells             S/l hip abd w/ TA draw             Pallof press OJB 2x10  DBL GTB   OJB 2x10 OJB 2x10 OJB 2x10 OJB 2x10 OJB 2x10   Standing TA with pball press down  5\" 3x10 5\" 3x10  5\" 3x10        Bridges  nv 2x8 2x8 2x10 2x10       Rectangle  BAPS x25 each way nv X25 each way X25 each way   x25 each way x25 each way x25 each way    Supine pelvic tilt  4x5x3\" 4x5x3\" 3x5 3\" holds                        nv  Step up and hip flex BLE  X12 each side Step up and hip flex BLE  X12 each side Step up and hip flex BLE  X15 each side Step up and hip flex BLE  X15 each side Step up and hip " "flex BLE  X15 each side     Ther Ex             Vg for endur and strength L7 10' L7 10' L7 10' L7 10' L7 10' L7 10' L7 10' L7 10' L7 10' L7 10'   Resisted side step             LTR  5\"x10 ea np          Standing hip ext. On airex   nv nv          Pball roll out   5\"x10 5\" x10         Standing ext at table    2x5, hold due to P!          Pball DKTC     Supine x5, P! Hold  Supine 2x10       Leg ext.  35#   3x8     20# 2x8   30# 2x8 30#   3x8   Prone/HAKEEM     P!, hold        Ther Activity             [place] carry             Suitcase carry 15# KB with YJB [id number]   15# KB with YJB 250ft 15# KB with YJB [id number] 15# KB with YJB [id number] 15# KB with YJB [id number] 15# KB with YJB [id number] 15# KB with YJB [id number]    Box lift              Box carry             Seated deadlift  nv 5kg MB chair with airex 2x5          STS 3x6, standing on airex nv 2x4 10# KB chair with airex  Red chair, 10# DB, 3x7 Red chair, 10# DB, 3x7 10# DB, 3x7 3x6, standing on airex 3x6, standing on airex 3x6, standing on airex 3x6, standing on airex, 5x STS   Lunges Sanddune +airex 3x5 each      Sanddune +airex 3x5 each Sanddune +airex 3x5 each Sanddune +airex 3x5 each Sanddune +airex 3x5 each   Bike for endurance              HEP and lifting technique education  Edu on breaks from sitting at desk  AFB  SW, walking program      POC/HEP SW   Gait Training                                       Modalities                                                                          "

## 2024-10-18 ENCOUNTER — OFFICE VISIT (OUTPATIENT)
Dept: PHYSICAL THERAPY | Facility: REHABILITATION | Age: 74
End: 2024-10-18
Payer: MEDICARE

## 2024-10-18 DIAGNOSIS — M54.50 CHRONIC MIDLINE LOW BACK PAIN WITHOUT SCIATICA: Primary | ICD-10-CM

## 2024-10-18 DIAGNOSIS — G89.29 CHRONIC MIDLINE LOW BACK PAIN WITHOUT SCIATICA: Primary | ICD-10-CM

## 2024-10-18 PROCEDURE — 97530 THERAPEUTIC ACTIVITIES: CPT | Performed by: PHYSICAL THERAPIST

## 2024-10-18 PROCEDURE — 97110 THERAPEUTIC EXERCISES: CPT | Performed by: PHYSICAL THERAPIST

## 2024-10-18 PROCEDURE — 97112 NEUROMUSCULAR REEDUCATION: CPT | Performed by: PHYSICAL THERAPIST

## 2024-10-18 NOTE — PROGRESS NOTES
"Daily Note     Today's date: 10/18/2024  Patient name: Miguel Angel Ramos  : 1950  MRN: [id number]  Referring provider: Sundeep Stone MD  Dx:   Encounter Diagnosis     ICD-10-CM    1. Chronic midline low back pain without sciatica  M54.50     G89.29                      Subjective: Patient reports that he is feeling better today than he was yesterday.      Objective: See treatment diary below      Assessment: Patient tolerated treatment well. Continued with program as outlined below. No reports of increased symptoms noted throughout the session. He would benefit from continued PT.       Plan: Continue per plan of care.      Precautions: fall risk      FOTO   = 56/61   = 53/61  10= 53/61    POC exp =  1/3/25    Access Code: [email or web address]  URL: [email or web address]  Date: 2024  Prepared by: Court Sousa    Exercises  - Supine Transversus Abdominis Bracing - Hands on Stomach  - 5 x daily - 7 x weekly - 3 sets - 10 reps  - Hooklying Clamshell with Resistance  - 1 x daily - 7 x weekly - 3 sets - 10 reps - 5 hold  - Seated Transversus Abdominis Bracing with PLB  - 5 x daily - 7 x weekly - 3 sets - 10 reps    Manuals 10/16 10/18   9/25 9/27 10/2 10/4 10/9 10/11             Re SW                                          Neuro Re-Ed             TA iso             TA with hooklying clamshell YHB 2x10 YHB 2x10    YHB 2x10  YHB 2x10     S/l clamshells             S/l hip abd w/ TA draw             Pallof press OJB 2x10 OJB 2x10    OJB 2x10 OJB 2x10 OJB 2x10 OJB 2x10 OJB 2x10   Standing TA with pball press down     5\" 3x10        Bridges     2x10 2x10       Rectangle  BAPS x25 each way x25 each way     x25 each way x25 each way x25 each way    Supine pelvic tilt                          SandDune march     Step up and hip flex BLE  X12 each side Step up and hip flex BLE  X15 each side Step up and hip flex BLE  X15 each side Step up and hip flex BLE  X15 each side     Ther Ex             Vg " for endur and strength L7 10' L7 10'   L7 10' L7 10' L7 10' L7 10' L7 10' L7 10'   Resisted side step             LTR             Standing hip ext. On airex              Pball roll out             Standing ext at table             Pball DKTC     Supine x5, P! Hold  Supine 2x10       Leg ext.  35#   3x8 35#  3x8    20# 2x8   30# 2x8 30#   3x8   Prone/HAKEEM     P!, hold        Ther Activity             [place] carry             Suitcase carry 15# KB with YJB [id number] 15# with YTB [id number]   15# KB with YJB [id number] 15# KB with YJB [id number] 15# KB with YJB [id number] 15# KB with YJB [id number] 15# KB with YJB [id number]    Box lift              Box carry             Seated deadlift             STS 3x6, standing on airex 3x6, standing on airex   Red chair, 10# DB, 3x7 10# DB, 3x7 3x6, standing on airex 3x6, standing on airex 3x6, standing on airex 3x6, standing on airex, 5x STS   Lunges Sanddune +airex 3x5 each Sanddune +airex 3x5 each     Sanddune +airex 3x5 each Sanddune +airex 3x5 each Sanddune +airex 3x5 each Sanddune +airex 3x5 each   Bike for endurance              HEP and lifting technique education          POC/HEP SW   Gait Training                                       Modalities

## 2024-10-21 ENCOUNTER — OFFICE VISIT (OUTPATIENT)
Dept: PAIN MEDICINE | Facility: CLINIC | Age: 74
End: 2024-10-21
Payer: MEDICARE

## 2024-10-21 ENCOUNTER — APPOINTMENT (OUTPATIENT)
Dept: PHYSICAL THERAPY | Facility: REHABILITATION | Age: 74
End: 2024-10-21
Payer: MEDICARE

## 2024-10-21 VITALS
RESPIRATION RATE: 16 BRPM | HEART RATE: 64 BPM | SYSTOLIC BLOOD PRESSURE: 108 MMHG | BODY MASS INDEX: 34.07 KG/M2 | HEIGHT: 70 IN | WEIGHT: 238 LBS | DIASTOLIC BLOOD PRESSURE: 66 MMHG

## 2024-10-21 DIAGNOSIS — G89.29 CHRONIC MIDLINE LOW BACK PAIN WITHOUT SCIATICA: ICD-10-CM

## 2024-10-21 DIAGNOSIS — M54.50 CHRONIC MIDLINE LOW BACK PAIN WITHOUT SCIATICA: ICD-10-CM

## 2024-10-21 DIAGNOSIS — M51.16 INTERVERTEBRAL DISC DISORDER WITH RADICULOPATHY OF LUMBAR REGION: Primary | ICD-10-CM

## 2024-10-21 PROCEDURE — 99214 OFFICE O/P EST MOD 30 MIN: CPT | Performed by: ANESTHESIOLOGY

## 2024-10-21 RX ORDER — CENEGERMIN-BKBJ 20 UG/ML
SOLUTION/ DROPS OPHTHALMIC
COMMUNITY
Start: 2024-10-03

## 2024-10-21 NOTE — PROGRESS NOTES
Assessment:  1. Intervertebral disc disorder with radiculopathy of lumbar region    2. Chronic midline low back pain without sciatica        Plan:  The patient's symptoms, history/physical are consistent with pain as a result of underlying lumbar spondylosis and degenerative disc disease.  At this time, I discussed proceeding with a lumbar epidural injection at the L4 level to help reduce swelling/inflammation which is leading to pain symptoms.  He would like to proceed and will be scheduled under fluoroscopic guidance.    For now, he will continue with physical therapy as it has been helpful.    My impressions and treatment recommendations were discussed in detail with the patient who verbalized understanding and had no further questions.  Discharge instructions were provided. I personally saw and examined the patient and I agree with the above discussed plan of care.    Orders Placed This Encounter   Procedures    FL spine and pain procedure     Standing Status:   Future     Standing Expiration Date:   10/21/2028     Order Specific Question:   Reason for Exam:     Answer:   L4 LESI     Order Specific Question:   Anticoagulant hold needed?     Answer:   No     New Medications Ordered This Visit   Medications    Oxervate 0.002 % SOLN       History of Present Illness:  Miguel Angel Ramos is a 74 y.o. male who presents for a follow up office visit in regards to Back Pain.   The patient reports that he has been experiencing lower back pain for more than 6 months.  Symptoms are worse at nighttime described to be dull/aching and throbbing with pain across the lower back that radiates into his right side and occasionally to the hip area.  Symptoms can be moderate to severe rated 8/10 on numeric rating scale.  He has been going to physical therapy which has been helping.  He saw Dr. Stone who had prescribed that and has referred him here for further treatment.    I have personally reviewed and/or updated the patient's  past medical history, past surgical history, family history, social history, current medications, allergies, and vital signs today.     Review of Systems   Respiratory:  Negative for shortness of breath.    Cardiovascular:  Negative for chest pain.   Gastrointestinal:  Negative for constipation, diarrhea, nausea and vomiting.   Musculoskeletal:  Positive for back pain, gait problem and joint swelling. Negative for arthralgias and myalgias.   Skin:  Negative for rash.   Neurological:  Negative for dizziness, seizures and weakness.   All other systems reviewed and are negative.      Patient Active Problem List   Diagnosis    Trigeminal neuralgia    Facial spasm    Anemia    Bilateral patellofemoral syndrome    Diverticulosis of colon    Esophageal reflux    Intracranial meningioma (HCC)    Vertigo    Vitamin D deficiency    Need for pneumococcal vaccination    Insomnia    Primary osteoarthritis of right knee    DNS (deviated nasal septum)    Chronic pain syndrome    Uncomplicated opioid dependence (HCC)    Long-term current use of opiate analgesic    Mood disorder (HCC)    Skin lesions    Neuralgic facial pain    Preoperative evaluation of a medical condition to rule out surgical contraindications (TAR required)    Pruritus    Rib contusion, right, initial encounter    Closed fracture of one rib of right side    Hearing loss of left ear    Chronic midline low back pain without sciatica       Past Medical History:   Diagnosis Date    Arthritis     Right knee    Chronic pain disorder     Difficulty walking Unknown    Unsteady on feet; pain in knees.    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       Past Surgical History:   Procedure Laterality Date    APPENDECTOMY  1962    BRAIN SURGERY  2005    Gamma Knife for Trigeminal Neuralgia    CATARACT EXTRACTION      CRANIOTOMY FOR TUMOR   2022    ESOPHAGOGASTRODUODENOSCOPY      HERNIA REPAIR      OTHER SURGICAL HISTORY      gamma knife RT in  and     AL ARTHRP KNE CONDYLE&PLATU MEDIAL&LAT COMPARTMENTS Right 10/22/2018    Procedure: ARTHROPLASTY KNEE TOTAL;  Surgeon: Rhina Pratt MD;  Location: [facility];  Service: Orthopedics    AL CREATE LESION STRTCTC PRQ NEUROLYTIC GASSERIAN Right 2018    Procedure: RHIZOTOMY TRIGEMINAL NERVES (V2 & V3);  Surgeon: Jam Winter MD;  Location: [facility];  Service: Neurosurgery    REPLACEMENT TOTAL KNEE Right 10/2018    RHIZOTOMY W/ RADIOFREQUENCY ABLATION Right 2014    Stereotactic Ablation of Gasserian Ganglion Right sided trigeminal V2 radiofrequency rhizotomy x2       Family History   Problem Relation Age of Onset    Emphysema Mother     Colon cancer Father     Skin cancer Maternal Grandmother        Social History     Occupational History    Occupation: Retired     Comment: 20 years [employer] &    Tobacco Use    Smoking status: Never    Smokeless tobacco: Never    Tobacco comments:     Both my parents smoked -- dad (2 packs a day); mom (4 packs a day),  in  at 67,  emphysema.   Vaping Use    Vaping status: Never Used   Substance and Sexual Activity    Alcohol use: Yes     Alcohol/week: 5.0 standard drinks of alcohol     Types: 2 Cans of beer, 3 Standard drinks or equivalent per week     Comment: Rum and vanilla Coke    Drug use: No    Sexual activity: Yes     Partners: Female     Birth control/protection: Post-menopausal       Current Outpatient Medications on File Prior to Visit   Medication Sig    Acetaminophen (TYLENOL PO) Take 2 tablets by mouth as needed     ascorbic acid (VITAMIN C) 500 MG tablet Take 1 tablet (500 mg total) by mouth daily    Calcium 250 MG CAPS Take 250 mg by mouth daily     cyanocobalamin (VITAMIN B-12) 1,000 mcg tablet Take 1 tablet by mouth daily     MELATONIN PO Take 10 mg by mouth daily at bedtime    meloxicam (Mobic) 7.5 mg  "tablet Take 1 tablet (7.5 mg total) by mouth daily    Oxervate 0.002 % SOLN     pregabalin (LYRICA) 50 mg capsule Take 1 capsule (50 mg total) by mouth 3 (three) times a day    Probiotic Product (PROBIOTIC PO) Take 1 tablet by mouth in the morning    ciprofloxacin (Ciloxan) 0.3 % ophthalmic ointment  (Patient not taking: Reported on 8/11/2023)     No current facility-administered medications on file prior to visit.       Allergies   Allergen Reactions    Gabapentin Delirium    Oxcarbazepine Dizziness    Baclofen Other (See Comments)     \"unable to think and form sentences\"    Carbamazepine Other (See Comments)     \"unable to think\"    Lamictal [Lamotrigine] Confusion     Difficulty to think clearly     Tramadol Other (See Comments)     \"forgetful\"       Physical Exam:    /66   Pulse 64   Resp 16   Ht 5' 10\" (1.778 m)   Wt 108 kg (238 lb)   BMI 34.15 kg/m²     Constitutional:normal, well developed, well nourished, alert, in no distress and non-toxic and no overt pain behavior.  Eyes:anicteric  HEENT:grossly intact  Neck:supple, symmetric, trachea midline and no masses   Pulmonary:even and unlabored  Cardiovascular:No edema or pitting edema present  Skin:Normal without rashes or lesions and well hydrated  Psychiatric:Mood and affect appropriate  Neurologic:Cranial Nerves II-XII grossly intact  Musculoskeletal:normal    Lumbar Spine Exam  Appearance:  Normal lordosis  Palpation/Tenderness:  left lumbar paraspinal tenderness  right lumbar paraspinal tenderness  Range of Motion:  Flexion:  Minimally limited  with pain  Extension:  Moderately limited  with pain  Motor Strength:  Left hip flexion:  5/5  Left hip extension:  5/5  Right hip flexion:  5/5  Right hip extension:  5/5  Left knee flexion:  5/5  Left knee extension:  5/5  Right knee flexion:  5/5  Right knee extension:  5/5  Left foot dorsiflexion:  5/5  Left foot plantar flexion:  5/5  Right foot dorsiflexion:  5/5  Right foot plantar flexion:  " 5/5    Imaging    MRI LUMBAR SPINE WITHOUT CONTRAST (8/29/2024)     INDICATION: M54.50: Low back pain, unspecified  G89.29: Other chronic pain.     COMPARISON: Lumbar spine radiographs 7/31/2024.     TECHNIQUE:  Multiplanar, multisequence imaging of the lumbar spine was performed. .        IMAGE QUALITY: Motion-degraded     FINDINGS:     Transitional lumbosacral anatomy, with 6 nonrib-bearing lumbar vertebral bodies. Recommend correlation with thoracic spine imaging to count the ribs prior to any future intervention to ensure accuracy/consistency of numbering.     VERTEBRAL BODIES: Levoscoliosis of the lumbar spine, with apex at L3.     The vertebral body heights are relatively preserved. There is no acute compression fracture.     SACRUM: No acute abnormality.     DISTAL CORD AND CONUS:  Normal size and signal within the distal cord and conus.     PARASPINAL SOFT TISSUES: Fatty atrophy of the paraspinal musculature.     LOWER THORACIC DISC SPACES: Spondylotic changes without acute critical central canal stenosis.     LUMBAR DISC SPACES: Multilevel mild to moderate degenerative disc disease.     L1-L2: No significant neuroforaminal narrowing or central spinal canal stenosis.     L2-L3: Minimal disc bulge. Mild bilateral neuroforaminal narrowing. No significant spinal canal stenosis.     L3-L4: Disc bulge. Mild-moderate facet arthropathy. Mild right without significant left neuroforaminal narrowing. No significant spinal canal stenosis.     L4-L5: Disc bulge. Mild facet arthropathy. No significant neuroforaminal narrowing. Mild spinal canal stenosis.     L5-S1: Disc bulge. No significant neuroforaminal narrowing or spinal canal stenosis.     There is narrowing of the left subarticular recess with mild mass effect on the traversing left S1 nerve root.     OTHER FINDINGS: Large left renal T2 hyperintensity is partially evaluated, statistically a cyst.

## 2024-10-25 ENCOUNTER — OFFICE VISIT (OUTPATIENT)
Dept: PHYSICAL THERAPY | Facility: REHABILITATION | Age: 74
End: 2024-10-25
Payer: MEDICARE

## 2024-10-25 DIAGNOSIS — M54.50 CHRONIC MIDLINE LOW BACK PAIN WITHOUT SCIATICA: Primary | ICD-10-CM

## 2024-10-25 DIAGNOSIS — G89.29 CHRONIC MIDLINE LOW BACK PAIN WITHOUT SCIATICA: Primary | ICD-10-CM

## 2024-10-25 PROCEDURE — 97112 NEUROMUSCULAR REEDUCATION: CPT | Performed by: PHYSICAL THERAPIST

## 2024-10-25 PROCEDURE — 97110 THERAPEUTIC EXERCISES: CPT | Performed by: PHYSICAL THERAPIST

## 2024-10-25 PROCEDURE — 97530 THERAPEUTIC ACTIVITIES: CPT | Performed by: PHYSICAL THERAPIST

## 2024-10-25 NOTE — PROGRESS NOTES
Daily Note     Today's date: 10/25/2024  Patient name: Miguel Angel Ramos  : 1950  MRN: [id number]  Referring provider: Sundeep Stone MD  Dx:   Encounter Diagnosis     ICD-10-CM    1. Chronic midline low back pain without sciatica  M54.50     G89.29           Start Time: 0730  Stop Time: 0815  Total time in clinic (min): 45 minutes    Subjective: Patient reports feeling great today. States he is getting injections in his back next Wednesday to decrease the pain.         Objective: See treatment diary below      Assessment: Tolerated treatment well. Responded well to progressions with no increase in symptoms. One UE used prn during balance board taps. Encouraged to keep being active at home. Patient demonstrated fatigue post treatment and would benefit from continued PT      Plan: Continue per plan of care.  Progress treatment as tolerated.      I have directly supervised and participated in the care of this patient with the therapy student, Saundra Santos, SPT. I agree with the details as outlined above as well as during today's session. Court Sousa, PT, DPT        Precautions: fall risk      FOTO   = 56/61  8 = 53/61  10/= 53/61    POC exp =  1/3/25    Access Code: [email or web address]  URL: [email or web address]  Date: 2024  Prepared by: Court Sousa    Exercises  - Supine Transversus Abdominis Bracing - Hands on Stomach  - 5 x daily - 7 x weekly - 3 sets - 10 reps  - Hooklying Clamshell with Resistance  - 1 x daily - 7 x weekly - 3 sets - 10 reps - 5 hold  - Seated Transversus Abdominis Bracing with PLB  - 5 x daily - 7 x weekly - 3 sets - 10 reps    Manuals 10/16 10/18 10/25  9/25 9/27 10/2 10/4 10/9 10/11             Re SW                                          Neuro Re-Ed             TA iso             TA with hooklying clamshell YHB 2x10 YHB 2x10 YHB 2x10   YHB 2x10  YHB 2x10     S/l clamshells             S/l hip abd w/ TA draw             Pallof press OJB 2x10 OJB 2x10 OJB  "2x8   OJB 2x10 OJB 2x10 OJB 2x10 OJB 2x10 OJB 2x10   Standing TA with pball press down     5\" 3x10        Bridges     2x10 2x10       Rectangle  BAPS x25 each way x25 each way x25 each way    x25 each way x25 each way x25 each way    Supine pelvic tilt                          SandDune march     Step up and hip flex BLE  X12 each side Step up and hip flex BLE  X15 each side Step up and hip flex BLE  X15 each side Step up and hip flex BLE  X15 each side     Ther Ex             Vg for endur and strength L7 10' L7 10' L7 10'  L7 10' L7 10' L7 10' L7 10' L7 10' L7 10'   Resisted side step             LTR             Standing hip ext. On airex              Pball roll out             Standing ext at table             Pball DKTC     Supine x5, P! Hold  Supine 2x10       Leg ext.  35#   3x8 35#  3x8 40# 3x8   20# 2x8   30# 2x8 30#   3x8   Prone/HAKEEM     P!, hold        Ther Activity             [place] carry             Suitcase carry 15# KB with YJB [id number] 15# with YTB [id number] 15# with YTB [id number]  15# KB with YJB [id number] 15# KB with YJB [id number] 15# KB with YJB [id number] 15# KB with YJB [id number] 15# KB with YJB [id number]    Box lift              Box carry             Seated deadlift             STS 3x6, standing on airex 3x6, standing on airex 5# KB 3x6, standing on airex  Red chair, 10# DB, 3x7 10# DB, 3x7 3x6, standing on airex 3x6, standing on airex 3x6, standing on airex 3x6, standing on airex, 5x STS   Lunges Sanddune +airex 3x5 each Sanddune +airex 3x5 each Sanddune +airex 3x5 each    Sanddune +airex 3x5 each Sanddune +airex 3x5 each Sanddune +airex 3x5 each Sanddune +airex 3x5 each   Bike for endurance              HEP and lifting technique education          POC/HEP SW   Gait Training                                       Modalities                                                                            "

## 2024-10-29 ENCOUNTER — OFFICE VISIT (OUTPATIENT)
Dept: PHYSICAL THERAPY | Facility: REHABILITATION | Age: 74
End: 2024-10-29
Payer: MEDICARE

## 2024-10-29 DIAGNOSIS — G89.29 CHRONIC MIDLINE LOW BACK PAIN WITHOUT SCIATICA: Primary | ICD-10-CM

## 2024-10-29 DIAGNOSIS — M54.50 CHRONIC MIDLINE LOW BACK PAIN WITHOUT SCIATICA: Primary | ICD-10-CM

## 2024-10-29 PROCEDURE — 97530 THERAPEUTIC ACTIVITIES: CPT | Performed by: PHYSICAL THERAPIST

## 2024-10-29 PROCEDURE — 97110 THERAPEUTIC EXERCISES: CPT | Performed by: PHYSICAL THERAPIST

## 2024-10-29 PROCEDURE — 97112 NEUROMUSCULAR REEDUCATION: CPT | Performed by: PHYSICAL THERAPIST

## 2024-10-29 NOTE — PROGRESS NOTES
Daily Note     Today's date: 10/29/2024  Patient name: Miguel Angel Ramos  : 1950  MRN: [id number]  Referring provider: Sundeep Stone MD  Dx:   Encounter Diagnosis     ICD-10-CM    1. Chronic midline low back pain without sciatica  M54.50     G89.29           Start Time: 733  Stop Time: 0815  Total time in clinic (min): 42 minutes    Subjective: Patient reports that the back is feeling okay today. He is looking forward to the injection tomorrow.     Objective: See treatment diary below      Assessment: Tolerated treatment well. CGA during BAPS board with no UE. Added standing hamstring curls and patient responded well with no increase in back pain. Patient exhibited good technique with therapeutic exercises and would benefit from continued PT      Plan: Continue per plan of care.  Progress treatment as tolerated.      I have directly supervised and participated in the care of this patient with the therapy student, Saundra Santos, SPT. I agree with the details as outlined above as well as during today's session. Coutr Sousa, PT, DPT        Precautions: fall risk      FOTO   = 56/61   = 53/61  10/= 53/61    POC exp =  1/3/25    Access Code: [email or web address]  URL: [email or web address]  Date: 2024  Prepared by: Court Sousa    Exercises  - Supine Transversus Abdominis Bracing - Hands on Stomach  - 5 x daily - 7 x weekly - 3 sets - 10 reps  - Hooklying Clamshell with Resistance  - 1 x daily - 7 x weekly - 3 sets - 10 reps - 5 hold  - Seated Transversus Abdominis Bracing with PLB  - 5 x daily - 7 x weekly - 3 sets - 10 reps    Manuals 10/16 10/18 10/25 10/29 9/25 9/27 10/2 10/4 10/9 10/11             Re SW                                          Neuro Re-Ed             TA iso             TA with hooklying clamshell YHB 2x10 YHB 2x10 YHB 2x10 NP  YHB 2x10  YHB 2x10     S/l clamshells             S/l hip abd w/ TA draw             Pallof press OJB 2x10 OJB 2x10 OJB 2x8 OJB 2x8  OJB  [facility] EEG/VIDEO MONITORING REPORT  Selma Lux  [id number]  1937    DATE OF SERVICE:  12/19/2021  DATE OF ADMISSION: 12/13/2021 10:38 AM    ADMITTING/REQUESTING PROVIDER: Selwyn Egan MD    REASON FOR CONSULT:  84-year-old woman with a large right subdural hematoma and episodes of left-sided twitching.  Evaluate for evidence of epileptiform activity.    METHODOLOGY   Electroencephalographic (EEG) recording is with electrodes placed according to the International 10-20 placement system.  Thirty two (32) channels of digital signal (sampling rate of 512/sec) including T1 and T2 was simultaneously recorded from the scalp and may include  EKG, EMG, and/or eye monitors.  Recording band pass was 0.1 to 512 hz.  Digital video recording of the patient is simultaneously recorded with the EEG.  The patient is instructed report clinical symptoms which may occur during the recording session.  EEG and video recording is stored and archived in digital format.  Activation procedures which include photic stimulation, hyperventilation and instructing patients to perform simple task are done in selected patients.   The EEG is displayed on a monitor screen and can be reviewed using different montages.  Computer assisted analysis is employed to detect spike and electrographic seizure activity.   The entire record is submitted for computer analysis.  The entire recording is visually reviewed and the times identified by computer analysis as being spikes or seizures are reviewed again.  Compresses spectral analysis (CSA) is also performed on the activity recorded from each individual channel.  This is displayed as a power display of frequencies from 0 to 30 Hz over time.   The CSA is reviewed looking for asymmetries in power between homologous areas of the scalp and then compared with the original EEG recording.     [employer] software is also utilized in the review of this study.  This software suite analyzes the EEG recording in  "2x10 OJB 2x10 OJB 2x10 OJB 2x10 OJB 2x10   Standing TA with pball press down     5\" 3x10        Bridges     2x10 2x10       Rectangle  BAPS x25 each way x25 each way x25 each way x25 each way   x25 each way x25 each way x25 each way    Supine pelvic tilt                          SandDune march     Step up and hip flex BLE  X12 each side Step up and hip flex BLE  X15 each side Step up and hip flex BLE  X15 each side Step up and hip flex BLE  X15 each side     Ther Ex             Vg for endur and strength L7 10' L7 10' L7 10' L7 10' L7 10' L7 10' L7 10' L7 10' L7 10' L7 10'   Resisted side step             LTR             Standing hip ext. On airex              Pball roll out             Standing ext at table             Pball DKTC     Supine x5, P! Hold  Supine 2x10       Leg ext.  35#   3x8 35#  3x8 40# 3x8 40# 3x10  20# 2x8   30# 2x8 30#   3x8   Standing ham curls    4# AW 3x10         Ther Activity             Purple Sage carry             Suitcase carry 15# KB with YJB [id number] 15# with YTB [id number] 15# with YTB [id number] 15# with YTB 3x795in 15# KB with YJB [id number] 15# KB with YJB [id number] 15# KB with YJB [id number] 15# KB with YJB [id number] 15# KB with YJB [id number]    Box lift              Box carry             Seated deadlift             STS 3x6, standing on airex 3x6, standing on airex 5# KB 3x6, standing on airex 5# KB 3x6, standing on airex Red chair, 10# DB, 3x7 10# DB, 3x7 3x6, standing on airex 3x6, standing on airex 3x6, standing on airex 3x6, standing on airex, 5x STS   Lunges Sanddune +airex 3x5 each Sanddune +airex 3x5 each Sanddune +airex 3x5 each Sanddune +airex 3x5 each   Sanddune +airex 3x5 each Sanddune +airex 3x5 each Sanddune +airex 3x5 each Sanddune +airex 3x5 each   Bike for endurance              HEP and lifting technique education          POC/HEP SW   Gait Training                                       Modalities                                                                              " multiple domains.  Coherence and rhythmicity is computed to identify EEG sections which may contain organized seizures.  Each channel undergoes analysis to detect presence of spike and sharp waves which have special and morphological characteristic of epileptic activity.  The routine EEG recording is converted from spacial into frequency domain.  This is then displayed comparing homologous areas to identify areas of significant asymmetry.  Algorithm to identify non-cortically generated artifact is used to separate eye movement, EMG and other artifact from the EEG.      RECORDING TIMES  Start on 12/19/2021 at 21:46 p.m.  Stop on 12/20/2021 at 08:04 a.m.  A total of 10 hours and 16 minutes of EEG recording is obtained.    EEG FINDINGS  Background activity:   Within the limitation of a significant amount of movement/lead artifact on an electrode cap EEG, the background is continuous and asymmetric theta/delta activity with lower voltages and slightly lower frequency seen over the right hemisphere.  There is plenty of intermittent generalized and multifocal slowing seen bilaterally.    There are no pushbutton activations.    Sleep:  There is poorly formed sleep architecture    Activation procedures:   Hyperventilation is not performed  Photic stimulation is not performed    Cardiac Monitor:   Electrode caps do not have EKG capabilities    Impression:   Within the limitation of a significant amount of lead/movement artifact, this is an abnormal electrode cap EEG monitoring study because of generalized background slowing consistent with a moderate encephalopathy with more prominent focal slowing seen over the right hemisphere.  There are no pushbutton activations.  There are no apparent epileptiform discharges or electrographic seizures.  Depending on the clinical scenario, consider additional monitoring with standard scalp electrodes.    Brianda Goss MD PhD  Neurology-Epilepsy  [facility]  [place].

## 2024-10-30 ENCOUNTER — APPOINTMENT (OUTPATIENT)
Dept: PHYSICAL THERAPY | Facility: REHABILITATION | Age: 74
End: 2024-10-30
Payer: MEDICARE

## 2024-10-30 ENCOUNTER — HOSPITAL ENCOUNTER (OUTPATIENT)
Dept: RADIOLOGY | Facility: CLINIC | Age: 74
Discharge: HOME | End: 2024-10-30
Payer: MEDICARE

## 2024-10-30 VITALS
HEART RATE: 61 BPM | OXYGEN SATURATION: 95 % | DIASTOLIC BLOOD PRESSURE: 70 MMHG | RESPIRATION RATE: 18 BRPM | SYSTOLIC BLOOD PRESSURE: 109 MMHG | TEMPERATURE: 98 F

## 2024-10-30 DIAGNOSIS — M51.16 INTERVERTEBRAL DISC DISORDER WITH RADICULOPATHY OF LUMBAR REGION: ICD-10-CM

## 2024-10-30 PROCEDURE — 62323 NJX INTERLAMINAR LMBR/SAC: CPT | Performed by: ANESTHESIOLOGY

## 2024-10-30 RX ORDER — BUPIVACAINE HCL/PF 2.5 MG/ML
2 VIAL (ML) INJECTION ONCE
Status: COMPLETED | OUTPATIENT
Start: 2024-10-30 | End: 2024-10-30

## 2024-10-30 RX ORDER — METHYLPREDNISOLONE ACETATE 80 MG/ML
80 INJECTION, SUSPENSION INTRA-ARTICULAR; INTRALESIONAL; INTRAMUSCULAR; PARENTERAL; SOFT TISSUE ONCE
Status: COMPLETED | OUTPATIENT
Start: 2024-10-30 | End: 2024-10-30

## 2024-10-30 RX ADMIN — METHYLPREDNISOLONE ACETATE 80 MG: 80 INJECTION, SUSPENSION INTRA-ARTICULAR; INTRALESIONAL; INTRAMUSCULAR; SOFT TISSUE at 13:24

## 2024-10-30 RX ADMIN — BUPIVACAINE HYDROCHLORIDE 2 ML: 2.5 INJECTION, SOLUTION EPIDURAL; INFILTRATION; INTRACAUDAL at 13:24

## 2024-10-30 RX ADMIN — IOHEXOL 1 ML: 300 INJECTION, SOLUTION INTRAVENOUS at 13:23

## 2024-10-30 NOTE — H&P
History of Present Illness: The patient is a 74 y.o. male who presents with complaints of lower back pain and is here today for a lumbar epidural steroid injection    Past Medical History:   Diagnosis Date    Arthritis     Right knee    Chronic pain disorder     Difficulty walking Unknown    Unsteady on feet; pain in knees.    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       Past Surgical History:   Procedure Laterality Date    APPENDECTOMY  1962    BRAIN SURGERY  2005    Gamma Knife for Trigeminal Neuralgia    CATARACT EXTRACTION      CRANIOTOMY FOR TUMOR  07/20/2022    ESOPHAGOGASTRODUODENOSCOPY      HERNIA REPAIR  1952    OTHER SURGICAL HISTORY      gamma knife RT in 2007 and 2010    AK ARTHRP KNE CONDYLE&PLATU MEDIAL&LAT COMPARTMENTS Right 10/22/2018    Procedure: ARTHROPLASTY KNEE TOTAL;  Surgeon: Rhina Pratt MD;  Location: [facility];  Service: Orthopedics    AK CREATE LESION STRTCTC PRQ NEUROLYTIC GASSERIAN Right 03/06/2018    Procedure: RHIZOTOMY TRIGEMINAL NERVES (V2 & V3);  Surgeon: Jam Winter MD;  Location: [facility];  Service: Neurosurgery    REPLACEMENT TOTAL KNEE Right 10/2018    RHIZOTOMY W/ RADIOFREQUENCY ABLATION Right 08/08/2014    Stereotactic Ablation of Gasserian Ganglion Right sided trigeminal V2 radiofrequency rhizotomy x2         Current Outpatient Medications:     Acetaminophen (TYLENOL PO), Take 2 tablets by mouth as needed , Disp: , Rfl:     ascorbic acid (VITAMIN C) 500 MG tablet, Take 1 tablet (500 mg total) by mouth daily, Disp: 60 tablet, Rfl: 0    Calcium 250 MG CAPS, Take 250 mg by mouth daily , Disp: , Rfl:     ciprofloxacin (Ciloxan) 0.3 % ophthalmic ointment, , Disp: , Rfl:     cyanocobalamin (VITAMIN B-12) 1,000 mcg tablet, Take 1 tablet by mouth daily , Disp: , Rfl:     MELATONIN PO, Take 10 mg by mouth daily at bedtime, Disp:  ", Rfl:     meloxicam (Mobic) 7.5 mg tablet, Take 1 tablet (7.5 mg total) by mouth daily, Disp: 14 tablet, Rfl: 0    Oxervate 0.002 % SOLN, , Disp: , Rfl:     pregabalin (LYRICA) 50 mg capsule, Take 1 capsule (50 mg total) by mouth 3 (three) times a day, Disp: 270 capsule, Rfl: 1    Probiotic Product (PROBIOTIC PO), Take 1 tablet by mouth in the morning, Disp: , Rfl:     Current Facility-Administered Medications:     bupivacaine (PF) (MARCAINE) 0.25 % injection 2 mL, 2 mL, Epidural, Once, Gil Felton MD    iohexol (OMNIPAQUE) 300 mg/mL injection 1 mL, 1 mL, Epidural, Once, Gil Felton MD    methylPREDNISolone acetate (DEPO-MEDROL) injection 80 mg, 80 mg, Epidural, Once, Gil Felton MD    Allergies   Allergen Reactions    Gabapentin Delirium    Oxcarbazepine Dizziness    Baclofen Other (See Comments)     \"unable to think and form sentences\"    Carbamazepine Other (See Comments)     \"unable to think\"    Lamictal [Lamotrigine] Confusion     Difficulty to think clearly     Tramadol Other (See Comments)     \"forgetful\"       Physical Exam:   Vitals:    10/30/24 1304   BP: 102/67   Pulse: 66   Resp: 18   Temp: 98 °F (36.7 °C)   SpO2: 97%     General: Awake, Alert, Oriented x 3, Mood and affect appropriate  Respiratory: Respirations even and unlabored  Cardiovascular: Peripheral pulses intact; no edema  Musculoskeletal Exam: Lower back pain    ASA Score: 3    Patient/Chart Verification  Patient ID Verified: Verbal  ID Band Applied: No  Consents Confirmed: Procedural, To be obtained in the Pre-Procedure area  H&P( within 30 days) Verified: To be obtained in the Procedural area  Allergies Reviewed: Yes  Anticoag/NSAID held?: No  Currently on antibiotics?: No    Assessment:   1. Intervertebral disc disorder with radiculopathy of lumbar region        Plan: L4 LESI    "

## 2024-10-30 NOTE — DISCHARGE INSTR - LAB
Epidural Steroid Injection   WHAT YOU NEED TO KNOW:   An epidural steroid injection (DANIE) is a procedure to inject steroid medicine into the epidural space. The epidural space is between your spinal cord and vertebrae. Steroids reduce inflammation and fluid buildup in your spine that may be causing pain. You may be given pain medicine along with the steroids.          ACTIVITY  Do not drive or operate machinery today.  No strenuous activity today - bending, lifting, etc.  You may resume normal activites starting tomorrow - start slowly and as tolerated.  You may shower today, but no tub baths or hot tubs.  You may have numbness for several hours from the local anesthetic. Please use caution and common sense, especially with weight-bearing activities.    CARE OF THE INJECTION SITE  If you have soreness or pain, apply ice to the area today (20 minutes on/20 minutes off).  Starting tomorrow, you may use warm, moist heat or ice if needed.  You may have an increase or change in your discomfort for 36-48 hours after your treatment.  Apply ice and continue with any pain medication you have been prescribed.  Notify the [facility] if you have any of the following: redness, drainage, swelling, headache, stiff neck or fever above 100°F.    SPECIAL INSTRUCTIONS  Our office will contact you in approximately 14 days for a progress report.    MEDICATIONS  Continue to take all routine medications.  Our office may have instructed you to hold some medications.    As no general anesthesia was used in today's procedure, you should not experience any side effects related to anesthesia.     If you are diabetic, the steroids used in today's injection may temporarily increase your blood sugar levels after the first few days after your injection. Please keep a close eye on your sugars and alert the doctor who manages your diabetes if your sugars are significantly high from your baseline or you are symptomatic.     If you have a  problem specifically related to your procedure, please call our office at [phone].  Problems not related to your procedure should be directed to your primary care physician.

## 2024-11-01 ENCOUNTER — OFFICE VISIT (OUTPATIENT)
Dept: PHYSICAL THERAPY | Facility: REHABILITATION | Age: 74
End: 2024-11-01
Payer: MEDICARE

## 2024-11-01 DIAGNOSIS — M54.50 CHRONIC MIDLINE LOW BACK PAIN WITHOUT SCIATICA: Primary | ICD-10-CM

## 2024-11-01 DIAGNOSIS — G89.29 CHRONIC MIDLINE LOW BACK PAIN WITHOUT SCIATICA: Primary | ICD-10-CM

## 2024-11-01 PROCEDURE — 97530 THERAPEUTIC ACTIVITIES: CPT | Performed by: PHYSICAL THERAPIST

## 2024-11-01 PROCEDURE — 97112 NEUROMUSCULAR REEDUCATION: CPT | Performed by: PHYSICAL THERAPIST

## 2024-11-01 PROCEDURE — 97110 THERAPEUTIC EXERCISES: CPT | Performed by: PHYSICAL THERAPIST

## 2024-11-06 ENCOUNTER — OFFICE VISIT (OUTPATIENT)
Dept: PHYSICAL THERAPY | Facility: REHABILITATION | Age: 74
End: 2024-11-06
Payer: MEDICARE

## 2024-11-06 DIAGNOSIS — G89.29 CHRONIC MIDLINE LOW BACK PAIN WITHOUT SCIATICA: Primary | ICD-10-CM

## 2024-11-06 DIAGNOSIS — M54.50 CHRONIC MIDLINE LOW BACK PAIN WITHOUT SCIATICA: Primary | ICD-10-CM

## 2024-11-06 PROCEDURE — 97530 THERAPEUTIC ACTIVITIES: CPT

## 2024-11-06 PROCEDURE — 97112 NEUROMUSCULAR REEDUCATION: CPT

## 2024-11-06 PROCEDURE — 97110 THERAPEUTIC EXERCISES: CPT

## 2024-11-06 NOTE — PROGRESS NOTES
"Daily Note     Today's date: 2024  Patient name: Miguel Angel Ramos  : 1950  MRN: [id number]  Referring provider: Sundeep Stone MD  Dx:   Encounter Diagnosis     ICD-10-CM    1. Chronic midline low back pain without sciatica  M54.50     G89.29                      Subjective: Patient reports he is continuing to feel good after injection last week.       Objective: See treatment diary below      Assessment: Tolerated treatment well. No noted discomfort since the injection. Continues to feel challenged with current exercise program. Patient demonstrated fatigue post treatment, exhibited good technique with therapeutic exercises, and would benefit from continued PT       Plan: Continue per plan of care.  Progress treatment as tolerated.           Precautions: fall risk      FOTO   = 56/61   = 53/61  10= 53/61    POC exp =  1/3/25    Access Code: [email or web address]  URL: [email or web address]  Date: 2024  Prepared by: Court Sosua    Exercises  - Supine Transversus Abdominis Bracing - Hands on Stomach  - 5 x daily - 7 x weekly - 3 sets - 10 reps  - Hooklying Clamshell with Resistance  - 1 x daily - 7 x weekly - 3 sets - 10 reps - 5 hold  - Seated Transversus Abdominis Bracing with PLB  - 5 x daily - 7 x weekly - 3 sets - 10 reps    Manuals 10/16 10/18 10/25 10/29 11/1 11/6  10/4 10/9 10/11             Re SW                                          Neuro Re-Ed             TA iso             TA with hooklying clamshell YHB 2x10 YHB 2x10 YHB 2x10 NP    YHB 2x10     S/l clamshells             S/l hip abd w/ TA draw             Pallof press OJB 2x10 OJB 2x10 OJB 2x8 OJB 2x8 OJB 2x10 OJB 2x12  OJB 2x10 OJB 2x10 OJB 2x10   Standing TA with pball press down     5\" x15 NV       Bridges             Rectangle  BAPS x25 each way x25 each way x25 each way x25 each way X15 each way  X40 each way  x25 each way x25 each way    Supine pelvic tilt                          SandDune march        Step up " and hip flex BLE  X15 each side     Ther Ex             Vg for endur and strength L7 10' L7 10' L7 10' L7 10' L7 15' L7 10'  L7 10' L7 10' L7 10'   Resisted side step             LTR             Standing hip ext. On airex              Pball roll out             Standing ext at table             Pball DKTC              Leg ext.  35#   3x8 35#  3x8 40# 3x8 40# 3x10 40# 3x10 40# 3x10   30# 2x8 30#   3x8   Standing ham curls    4# AW 3x10 4# AW 3x10 4# AW 3x10       Ther Activity             [place]rington carry             Suitcase carry 15# KB with YJB [id number] 15# with YTB [id number] 15# with YTB [id number] 15# with YTB [id number] 15# KB with YJB [id number] 15# KB with YJB [id number]  15# KB with YJB [id number] 15# KB with YJB [id number]    Box lift              Box carry             Seated deadlift             STS 3x6, standing on airex 3x6, standing on airex 5# KB 3x6, standing on airex 5# KB 3x6, standing on airex 5# KB 3x6, standing on airex 5# KB 3x6, standing on airex  3x6, standing on airex 3x6, standing on airex 3x6, standing on airex, 5x STS   Lunges Sanddune +airex 3x5 each Sanddune +airex 3x5 each Sanddune +airex 3x5 each Sanddune +airex 3x5 each nv Sanddune +airex 3x5 each  Sanddune +airex 3x5 each Sanddune +airex 3x5 each Sanddune +airex 3x5 each   Bike for endurance              HEP and lifting technique education          POC/HEP SW   Gait Training                                       Modalities

## 2024-11-08 ENCOUNTER — OFFICE VISIT (OUTPATIENT)
Dept: PHYSICAL THERAPY | Facility: REHABILITATION | Age: 74
End: 2024-11-08
Payer: MEDICARE

## 2024-11-08 DIAGNOSIS — G89.29 CHRONIC MIDLINE LOW BACK PAIN WITHOUT SCIATICA: Primary | ICD-10-CM

## 2024-11-08 DIAGNOSIS — M54.50 CHRONIC MIDLINE LOW BACK PAIN WITHOUT SCIATICA: Primary | ICD-10-CM

## 2024-11-08 PROCEDURE — 97530 THERAPEUTIC ACTIVITIES: CPT | Performed by: PHYSICAL THERAPIST

## 2024-11-08 PROCEDURE — 97110 THERAPEUTIC EXERCISES: CPT | Performed by: PHYSICAL THERAPIST

## 2024-11-08 NOTE — PROGRESS NOTES
"Daily Note     Today's date: 2024  Patient name: Miguel Angel Ramos  : 1950  MRN: [id number]  Referring provider: Sundeep Stone MD  Dx:   Encounter Diagnosis     ICD-10-CM    1. Chronic midline low back pain without sciatica  M54.50     G89.29           Start Time: 0815  Stop Time: 09  Total time in clinic (min): 50 minutes    Subjective: Patient reports he is continuing to feel good after injection.        Objective: See treatment diary below      Assessment: Tolerated treatment well. Overall he is making progress as supported by subjective report and improvement in FOTO score.         Plan: Continue per plan of care.  Progress treatment as tolerated.           Precautions: fall risk      FOTO   = 56/61   = 53/61  10= 53/61   = 59/61    POC exp =  1/3/25    Access Code: [email or web address]  URL: [email or web address]  Date: 2024  Prepared by: Court Sousa    Exercises  - Supine Transversus Abdominis Bracing - Hands on Stomach  - 5 x daily - 7 x weekly - 3 sets - 10 reps  - Hooklying Clamshell with Resistance  - 1 x daily - 7 x weekly - 3 sets - 10 reps - 5 hold  - Seated Transversus Abdominis Bracing with PLB  - 5 x daily - 7 x weekly - 3 sets - 10 reps    Manuals 10/16 10/18 10/25 10/29 11/1 11/6 11/8   10/11             Re SW                                          Neuro Re-Ed             TA iso             TA with hooklying clamshell YHB 2x10 YHB 2x10 YHB 2x10 NP         S/l clamshells             S/l hip abd w/ TA draw             Pallof press OJB 2x10 OJB 2x10 OJB 2x8 OJB 2x8 OJB 2x10 OJB 2x12 OJB 2x12   OJB 2x10   Standing TA with pball press down     5\" x15 NV       Bridges             Rectangle  BAPS x25 each way x25 each way x25 each way x25 each way X15 each way  X40 each way nv      SandDune march                                       Ther Ex             Vg for endur and strength L7 10' L7 10' L7 10' L7 10' L7 15' L7 10' L7 10'   L7 10'   Resisted side step      "        LTR             Standing hip ext. On airex              Pball roll out             Standing ext at table             Pball DKTC              Leg ext.  35#   3x8 35#  3x8 40# 3x8 40# 3x10 40# 3x10 40# 3x10 40# 3x10  30# 2x8 30#   3x8   Standing ham curls    4# AW 3x10 4# AW 3x10 4# AW 3x10 4# AW 3x10      Ther Activity             [place] carry             Suitcase carry 15# KB with YJB [id number] 15# with YTB [id number] 15# with YTB [id number] 15# with YTB [id number] 15# KB with YJB [id number] 15# KB with YJB [id number] 15# KB with YJB 2x 250ft      Box lift              Box carry             Seated deadlift             STS 3x6, standing on airex 3x6, standing on airex 5# KB 3x6, standing on airex 5# KB 3x6, standing on airex 5# KB 3x6, standing on airex 5# KB 3x6, standing on airex 6# KB 3x10, missed airex under feet - nv   3x6, standing on airex, 5x STS   Lunges Sanddune +airex 3x5 each Sanddune +airex 3x5 each Sanddune +airex 3x5 each Sanddune +airex 3x5 each nv Sanddune +airex 3x5 each Sanddune +airex 3x5 each   Sanddune +airex 3x5 each   Bike for endurance              HEP and lifting technique education          POC/HEP SW   Gait Training                                       Modalities

## 2024-11-13 ENCOUNTER — TELEPHONE (OUTPATIENT)
Dept: PAIN MEDICINE | Facility: CLINIC | Age: 74
End: 2024-11-13

## 2024-11-13 ENCOUNTER — OFFICE VISIT (OUTPATIENT)
Dept: PHYSICAL THERAPY | Facility: REHABILITATION | Age: 74
End: 2024-11-13
Payer: MEDICARE

## 2024-11-13 DIAGNOSIS — G89.29 CHRONIC MIDLINE LOW BACK PAIN WITHOUT SCIATICA: Primary | ICD-10-CM

## 2024-11-13 DIAGNOSIS — M54.50 CHRONIC MIDLINE LOW BACK PAIN WITHOUT SCIATICA: Primary | ICD-10-CM

## 2024-11-13 PROCEDURE — 97530 THERAPEUTIC ACTIVITIES: CPT

## 2024-11-13 PROCEDURE — 97110 THERAPEUTIC EXERCISES: CPT

## 2024-11-13 PROCEDURE — 97112 NEUROMUSCULAR REEDUCATION: CPT

## 2024-11-13 NOTE — PROGRESS NOTES
"Daily Note     Today's date: 2024  Patient name: Miguel Angel Ramos  : 1950  MRN: [id number]  Referring provider: Sundeep Stone MD  Dx:   Encounter Diagnosis     ICD-10-CM    1. Chronic midline low back pain without sciatica  M54.50     G89.29           Start Time: 0800  Stop Time: 0845  Total time in clinic (min): 45 minutes    Subjective: Ryan says he is feeling good today. He thinks the steroid injections have really been helping him.       Objective: See treatment diary below      Assessment: Tolerated treatment well. Resumed postural control interventions with appropriate response. Mild central low back soreness reported during sit to stand interventions, with no residual symptoms reported after. Patient demonstrated fatigue post treatment, exhibited good technique with therapeutic exercises, and would benefit from continued PT      Plan: Continue per plan of care.      Precautions: fall risk      FOTO   = 56/61   = 53/61  10= 53/61   = 59/61    POC exp =  1/3/25    Access Code: [email or web address]  URL: [email or web address]  Date: 2024  Prepared by: Court Sousa    Exercises  - Supine Transversus Abdominis Bracing - Hands on Stomach  - 5 x daily - 7 x weekly - 3 sets - 10 reps  - Hooklying Clamshell with Resistance  - 1 x daily - 7 x weekly - 3 sets - 10 reps - 5 hold  - Seated Transversus Abdominis Bracing with PLB  - 5 x daily - 7 x weekly - 3 sets - 10 reps    Manuals 10/16 10/18 10/25 10/29 11/1 11/6 11/8 11/13  10/11             Re SW                                          Neuro Re-Ed             TA iso             TA with hooklying clamshell YHB 2x10 YHB 2x10 YHB 2x10 NP         S/l clamshells             S/l hip abd w/ TA draw             Pallof press OJB 2x10 OJB 2x10 OJB 2x8 OJB 2x8 OJB 2x10 OJB 2x12 OJB 2x12 OMB 2x12  OJB 2x10   Standing TA with pball press down     5\" x15 NV       Bridges             Rectangle  BAPS x25 each way x25 each way x25 each way " x25 each way X15 each way  X40 each way nv X40 ea     SandDune march                                       Ther Ex             Vg for endur and strength L7 10' L7 10' L7 10' L7 10' L7 15' L7 10' L7 10' L7 10'  L7 10'   Resisted side step             LTR             Standing hip ext. On airex              Pball roll out             Standing ext at table             Pball DKTC              Leg ext.  35#   3x8 35#  3x8 40# 3x8 40# 3x10 40# 3x10 40# 3x10 40# 3x10 40# 3x10 30# 2x8 30#   3x8   Standing ham curls    4# AW 3x10 4# AW 3x10 4# AW 3x10 4# AW 3x10 4#   3x10     Ther Activity             [place] carry             Suitcase carry 15# KB with YJB [id number] 15# with YTB [id number] 15# with YTB [id number] 15# with YTB [id number] 15# KB with YJB [id number] 15# KB with YJB [id number] 15# KB with YJB 2x 250ft 15# KB with YMB 2x 250'      Box lift              Box carry             Seated deadlift             STS 3x6, standing on airex 3x6, standing on airex 5# KB 3x6, standing on airex 5# KB 3x6, standing on airex 5# KB 3x6, standing on airex 5# KB 3x6, standing on airex 6# KB 3x10, missed airex under feet - nv 6# KB 3x10 airex under feet.   3x6, standing on airex, 5x STS   Lunges Sanddune +airex 3x5 each Sanddune +airex 3x5 each Sanddune +airex 3x5 each Sanddune +airex 3x5 each nv Sanddune +airex 3x5 each Sanddune +airex 3x5 each Sand dune + Airex 3x5 ea.   Sanddune +airex 3x5 each   Bike for endurance              HEP and lifting technique education          POC/HEP SW   Gait Training                                       Modalities

## 2024-11-15 ENCOUNTER — OFFICE VISIT (OUTPATIENT)
Dept: PHYSICAL THERAPY | Facility: REHABILITATION | Age: 74
End: 2024-11-15
Payer: MEDICARE

## 2024-11-15 DIAGNOSIS — M54.50 CHRONIC MIDLINE LOW BACK PAIN WITHOUT SCIATICA: Primary | ICD-10-CM

## 2024-11-15 DIAGNOSIS — G89.29 CHRONIC MIDLINE LOW BACK PAIN WITHOUT SCIATICA: Primary | ICD-10-CM

## 2024-11-15 PROCEDURE — 97530 THERAPEUTIC ACTIVITIES: CPT | Performed by: PHYSICAL THERAPIST

## 2024-11-15 PROCEDURE — 97112 NEUROMUSCULAR REEDUCATION: CPT | Performed by: PHYSICAL THERAPIST

## 2024-11-15 PROCEDURE — 97110 THERAPEUTIC EXERCISES: CPT | Performed by: PHYSICAL THERAPIST

## 2024-11-15 NOTE — PROGRESS NOTES
"Daily Note     Today's date: 11/15/2024  Patient name: Miguel Angel Ramos  : 1950  MRN: [id number]  Referring provider: Sundeep Stone MD  Dx:   Encounter Diagnosis     ICD-10-CM    1. Chronic midline low back pain without sciatica  M54.50     G89.29           Start Time: 08  Stop Time: 0857  Total time in clinic (min): 40 minutes    Subjective: Pt reports continued improvement since the injection.      Objective: See treatment diary below      Assessment: Pt noted some R low back pain during lunges that reduced with repositioning of the R foot. He is progressing well with improving pain and function.  Pt will benefit from continued skilled outpatient PT to improve strength, to address therapy goals, to reduce pain, and improve function.          Plan: Continue per plan of care.      Precautions: fall risk      FOTO   = 56/61   = 53/61  10= 53/61   = 59/61    POC exp =  1/3/25    Access Code: [email or web address]  URL: [email or web address]  Date: 2024  Prepared by: Court Sousa    Exercises  - Supine Transversus Abdominis Bracing - Hands on Stomach  - 5 x daily - 7 x weekly - 3 sets - 10 reps  - Hooklying Clamshell with Resistance  - 1 x daily - 7 x weekly - 3 sets - 10 reps - 5 hold  - Seated Transversus Abdominis Bracing with PLB  - 5 x daily - 7 x weekly - 3 sets - 10 reps    Manuals 10/16 10/18 10/25 10/29 11/1 11/6 11/8 11/13 11/15 10/11             Re SW                                          Neuro Re-Ed             TA iso             TA with hooklying clamshell YHB 2x10 YHB 2x10 YHB 2x10 NP         S/l clamshells             S/l hip abd w/ TA draw             Pallof press OJB 2x10 OJB 2x10 OJB 2x8 OJB 2x8 OJB 2x10 OJB 2x12 OJB 2x12 OMB 2x12 OJB 2x12 OJB 2x10   Standing TA with pball press down     5\" x15 NV       Bridges             Rectangle  BAPS x25 each way x25 each way x25 each way x25 each way X15 each way  X40 each way nv X40 ea X40 ea               "                             Ther Ex             Vg for endur and strength L7 10' L7 10' L7 10' L7 10' L7 15' L7 10' L7 10' L7 10' L7 10' L7 10'   Resisted side step             LTR             Standing hip ext. On airex              Pball roll out             Standing ext at table             Pball DKTC              Leg ext.  35#   3x8 35#  3x8 40# 3x8 40# 3x10 40# 3x10 40# 3x10 40# 3x10 40# 3x10 40# 3x10 30#   3x8   Standing ham curls    4# AW 3x10 4# AW 3x10 4# AW 3x10 4# AW 3x10 4#   3x10 4#   3x10    Ther Activity             [place] carry             Suitcase carry 15# KB with YJB [id number] 15# with YTB 2g676op 15# with YTB 1h115bg 15# with YTB [id number] 15# KB with YJB [id number] 15# KB with YJB [id number] 15# KB with YJB 2x 250ft 15# KB with YMB 2x 250'  15# KB with YJB 2x 250ft    Box lift              Box carry             Seated deadlift             STS 3x6, standing on airex 3x6, standing on airex 5# KB 3x6, standing on airex 5# KB 3x6, standing on airex 5# KB 3x6, standing on airex 5# KB 3x6, standing on airex 6# KB 3x10, missed airex under feet - nv 6# KB 3x10 airex under feet.  6# KB 3x6, standing on airex 3x6, standing on airex, 5x STS   Lunges Sanddune +airex 3x5 each Sanddune +airex 3x5 each Sanddune +airex 3x5 each Sanddune +airex 3x5 each nv Sanddune +airex 3x5 each Sanddune +airex 3x5 each Sand dune + Airex 3x5 ea.  Sand dune + Airex 3x5 ea.  Sanddune +airex 3x5 each   Bike for endurance              HEP and lifting technique education          POC/HEP SW   Gait Training                                       Modalities

## 2024-11-20 ENCOUNTER — EVALUATION (OUTPATIENT)
Dept: PHYSICAL THERAPY | Facility: REHABILITATION | Age: 74
End: 2024-11-20
Payer: MEDICARE

## 2024-11-20 DIAGNOSIS — G89.29 CHRONIC MIDLINE LOW BACK PAIN WITHOUT SCIATICA: Primary | ICD-10-CM

## 2024-11-20 DIAGNOSIS — M54.50 CHRONIC MIDLINE LOW BACK PAIN WITHOUT SCIATICA: Primary | ICD-10-CM

## 2024-11-20 PROCEDURE — 97110 THERAPEUTIC EXERCISES: CPT | Performed by: PHYSICAL THERAPIST

## 2024-11-20 PROCEDURE — 97112 NEUROMUSCULAR REEDUCATION: CPT | Performed by: PHYSICAL THERAPIST

## 2024-11-20 PROCEDURE — 97530 THERAPEUTIC ACTIVITIES: CPT | Performed by: PHYSICAL THERAPIST

## 2024-11-20 NOTE — PROGRESS NOTES
Daily Note     Today's date: 2024  Patient name: Miguel Angel Ramos  : 1950  MRN: [id number]  Referring provider: Sundeep Stone MD  Dx:   Encounter Diagnosis     ICD-10-CM    1. Chronic midline low back pain without sciatica  M54.50     G89.29             Start Time: 181          Subjective: Patient reports feeling 72% better since starting therapy. States yesterday was a painful day.        Objective: See treatment diary below     Pain:  Best Pain: 2/10 at last RE, 1/10  Worst pain: 9/10 at last RE,  7/10        Lumbar  AROM  Flexion:  WFL, no pain  Extension:   50% painful  but improved  Left rotation:  WFL no pain  Right rotation:  WFL no pain      Strength/Myotome Testing   Left Hip   Planes of Motion   Extension: 4+  Abduction: 4+     Right Hip   Planes of Motion   Extension: 4+  Abduction: 4+     5x STS: No UE 10.46 seconds      Assessment: Pt presents for reassessment of low back pain. Since last RE has has made further progress with improvements in strength, pain and function. His lumbar AROM is better and less painful, hip strength has improved and he subjectively reports feeling 72% better.  Pt will benefit from continued skilled outpatient PT to improve strength, to address therapy goals, to reduce pain, and improve function.       Short term goals: to be met in 3-4 weeks  Pt independent with initial HEP, rationale, technique and frequency, for ROM and pain control. MET  Pt will report at least a 25% reduction in subjective pain complaints/symptoms to better manage ADLs and household chores. MET  Improve max pain level by 2 points on the numeric pain rating scale indicating a clinically significant reduction in pain level. NOT MET  Pt will achieve an improvement in FOTO score indicating an improvement in pain and function. NOT MET        Long term goals: to be met in 6-8 weeks  Pt will have full and pain free lumbar ROM to better manage ADLs and household chores. IN PROGRESS  Pt will  "report a 75% or > reduction in subjective pain complaints/symptoms to better manage ADLs and household chores. IN PROGRESS  Improve B hip abduction MMT to a 4/5 or greater for improved lumbopelvic stability. MET  Pt will improve FOTO score to better then expected outcome indicating an overall improvement in pain and function NOT MET  Pt will be able to sleep through the night (6-8 hours) without waking secondary to pain. UNMET, sleeping through the night some nights  Pt will be able to perform ADLs, iADLS, and household duties without pain or restriction indicating return to PLOF. NOT MET  Pt independent with rationale, technique and plan for performance of advanced HEP to ensure independent self-management of symptoms upon discharge. IN PROGRESS  Achieve pts therapy goal: to be able to walk longer distances and sit for longer periods to write PARTIALLY MET           Plan: Continue per plan of care.      Precautions: fall risk      FOTO  8/7 = 56/61  8/30 = 53/61  10/9= 53/61  11/8 = 59/61    POC exp =  1/3/25    Access Code: [email or web address]  URL: [email or web address]  Date: 08/07/2024  Prepared by: Court Sousa    Exercises  - Supine Transversus Abdominis Bracing - Hands on Stomach  - 5 x daily - 7 x weekly - 3 sets - 10 reps  - Hooklying Clamshell with Resistance  - 1 x daily - 7 x weekly - 3 sets - 10 reps - 5 hold  - Seated Transversus Abdominis Bracing with PLB  - 5 x daily - 7 x weekly - 3 sets - 10 reps    Manuals 10/16 10/18 10/25 10/29 11/1 11/6 11/8 11/13 11/15 11/20             Re DS                                          Neuro Re-Ed             TA iso             TA with hooklying clamshell YHB 2x10 YHB 2x10 YHB 2x10 NP         S/l clamshells             S/l hip abd w/ TA draw             Pallof press OJB 2x10 OJB 2x10 OJB 2x8 OJB 2x8 OJB 2x10 OJB 2x12 OJB 2x12 OMB 2x12 OJB 2x12 OJB 2x10   Standing TA with pball press down     5\" x15 NV       Bridges             Rectangle  BAPS x25 each way " x25 each way x25 each way x25 each way X15 each way  X40 each way nv X40 ea X40 ea X40 ea   SandDune march                                       Ther Ex             Vg for endur and strength L7 10' L7 10' L7 10' L7 10' L7 15' L7 10' L7 10' L7 10' L7 10' L7 17'   Resisted side step             LTR             Standing hip ext. On airex              Pball roll out             Standing ext at table             Pball DKTC              Leg ext.  35#   3x8 35#  3x8 40# 3x8 40# 3x10 40# 3x10 40# 3x10 40# 3x10 40# 3x10 40# 3x10 40# 3x10   Standing ham curls    4# AW 3x10 4# AW 3x10 4# AW 3x10 4# AW 3x10 4#   3x10 4#   3x10 4#   3x10   Ther Activity             [place] carry             Suitcase carry 15# KB with YJB 2v683oz 15# with YTB [id number] 15# with YTB 3p843oa 15# with YTB 2e526mo 15# KB with YJB 6y229ch 15# KB with YJB [id number] 15# KB with YJB 2x 250ft 15# KB with YMB 2x 250'  15# KB with YJB 2x 250ft nv   Box lift              Box carry             Seated deadlift             STS 3x6, standing on airex 3x6, standing on airex 5# KB 3x6, standing on airex 5# KB 3x6, standing on airex 5# KB 3x6, standing on airex 5# KB 3x6, standing on airex 6# KB 3x10, missed airex under feet - nv 6# KB 3x10 airex under feet.  6# KB 3x6, standing on airex 5x STS test   Lunges Sanddune +airex 3x5 each Sanddune +airex 3x5 each Sanddune +airex 3x5 each Sanddune +airex 3x5 each nv Sanddune +airex 3x5 each Sanddune +airex 3x5 each Sand dune + Airex 3x5 ea.  Sand dune + Airex 3x5 ea.  Sand dune + Airex 3x5 ea.    Bike for endurance              HEP and lifting technique education             Gait Training                                       Modalities

## 2024-11-22 ENCOUNTER — OFFICE VISIT (OUTPATIENT)
Dept: PHYSICAL THERAPY | Facility: REHABILITATION | Age: 74
End: 2024-11-22
Payer: MEDICARE

## 2024-11-22 DIAGNOSIS — G89.29 CHRONIC MIDLINE LOW BACK PAIN WITHOUT SCIATICA: Primary | ICD-10-CM

## 2024-11-22 DIAGNOSIS — M54.50 CHRONIC MIDLINE LOW BACK PAIN WITHOUT SCIATICA: Primary | ICD-10-CM

## 2024-11-22 PROCEDURE — 97110 THERAPEUTIC EXERCISES: CPT | Performed by: PHYSICAL THERAPIST

## 2024-11-22 PROCEDURE — 97112 NEUROMUSCULAR REEDUCATION: CPT | Performed by: PHYSICAL THERAPIST

## 2024-11-22 PROCEDURE — 97530 THERAPEUTIC ACTIVITIES: CPT | Performed by: PHYSICAL THERAPIST

## 2024-11-22 NOTE — PROGRESS NOTES
"Daily Note     Today's date: 2024  Patient name: Miguel Angel Ramos  : 1950  MRN: [id number]  Referring provider: Sundeep Stone MD  Dx:   Encounter Diagnosis     ICD-10-CM    1. Chronic midline low back pain without sciatica  M54.50     G89.29           Start Time: 0835  Stop Time: 0916  Total time in clinic (min): 41 minutes    Subjective: Pt reports \"I feel pretty good today\"      Objective: See treatment diary below      Assessment: Pt completed all exercises without complaints and with good form.  Pt will benefit from continued skilled outpatient PT to improve strength, to address therapy goals, to reduce pain, and improve function.        Plan: Continue per plan of care.  Progress treatment as tolerated.       Precautions: fall risk      FOTO   = 56/61   = 53/61  10= 53/61   = 59/61    POC exp =  1/3/25    Access Code: [email or web address]  URL: [email or web address]  Date: 2024  Prepared by: Court Sousa    Exercises  - Supine Transversus Abdominis Bracing - Hands on Stomach  - 5 x daily - 7 x weekly - 3 sets - 10 reps  - Hooklying Clamshell with Resistance  - 1 x daily - 7 x weekly - 3 sets - 10 reps - 5 hold  - Seated Transversus Abdominis Bracing with PLB  - 5 x daily - 7 x weekly - 3 sets - 10 reps    Manuals 11/22 10/18 10/25 10/29 11/1 11/6 11/8 11/13 11/15 11/20             Re DS                                          Neuro Re-Ed             TA iso             TA with hooklying clamshell  YHB 2x10 YHB 2x10 NP         S/l clamshells             S/l hip abd w/ TA draw             Pallof press OJB 2x10 OJB 2x10 OJB 2x8 OJB 2x8 OJB 2x10 OJB 2x12 OJB 2x12 OMB 2x12 OJB 2x12 OJB 2x10   Standing TA with pball press down     5\" x15 NV       Bridges             Rectangle  BAPS X40 ea x25 each way x25 each way x25 each way X15 each way  X40 each way nv X40 ea X40 ea X40 ea   LiviaDune march                                       Ther Ex             Vg for endur and strength " L7 10' L7 10' L7 10' L7 10' L7 15' L7 10' L7 10' L7 10' L7 10' L7 17'   Leg ext.  40# 3x10 35#  3x8 40# 3x8 40# 3x10 40# 3x10 40# 3x10 40# 3x10 40# 3x10 40# 3x10 40# 3x10   Standing ham curls 4#   3x10   4# AW 3x10 4# AW 3x10 4# AW 3x10 4# AW 3x10 4#   3x10 4#   3x10 4#   3x10                                          Ther Activity             Suitcase carry 15# KB with YJB 2x 250ft 15# with YTB 2a683wj 15# with YTB 1w165xi 15# with YTB 7p964hs 15# KB with YJB 5b236ft 15# KB with YJB 5o078lq 15# KB with YJB 2x 250ft 15# KB with YMB 2x 250'  15# KB with YJB 2x 250ft nv   STS 6# KB 3x9 airex under feet. 3x6, standing on airex 5# KB 3x6, standing on airex 5# KB 3x6, standing on airex 5# KB 3x6, standing on airex 5# KB 3x6, standing on airex 6# KB 3x10, missed airex under feet - nv 6# KB 3x10 airex under feet.  6# KB 3x6, standing on airex 5x STS test                Lunges Sand dune + Airex 3x5 ea. Sanddune +airex 3x5 each Sanddune +airex 3x5 each Sanddune +airex 3x5 each nv Sanddune +airex 3x5 each Sanddune +airex 3x5 each Sand dune + Airex 3x5 ea.  Sand dune + Airex 3x5 ea.  Sand dune + Airex 3x5 ea.                              Gait Training                                       Modalities

## 2024-11-27 ENCOUNTER — OFFICE VISIT (OUTPATIENT)
Dept: PHYSICAL THERAPY | Facility: REHABILITATION | Age: 74
End: 2024-11-27
Payer: MEDICARE

## 2024-11-27 DIAGNOSIS — M54.50 CHRONIC MIDLINE LOW BACK PAIN WITHOUT SCIATICA: Primary | ICD-10-CM

## 2024-11-27 DIAGNOSIS — G89.29 CHRONIC MIDLINE LOW BACK PAIN WITHOUT SCIATICA: Primary | ICD-10-CM

## 2024-11-27 PROCEDURE — 97112 NEUROMUSCULAR REEDUCATION: CPT | Performed by: PHYSICAL THERAPIST

## 2024-11-27 PROCEDURE — 97110 THERAPEUTIC EXERCISES: CPT | Performed by: PHYSICAL THERAPIST

## 2024-11-27 PROCEDURE — 97530 THERAPEUTIC ACTIVITIES: CPT | Performed by: PHYSICAL THERAPIST

## 2024-11-27 NOTE — PROGRESS NOTES
"Daily Note     Today's date: 2024  Patient name: Miguel Angel Ramos  : 1950  MRN: [id number]  Referring provider: Sundeep Stone MD  Dx:   Encounter Diagnosis     ICD-10-CM    1. Chronic midline low back pain without sciatica  M54.50     G89.29             Start Time: 1530  Stop Time: 1610  Total time in clinic (min): 40 minutes    Subjective: Pt reports today is better but he had low back pain yesterday.        Objective: See treatment diary below      Assessment: Pt was challenged by progression in KB wt during sit to stands. He completed all of the remaining exercises without complaints. Pt will benefit from continued skilled outpatient PT to improve strength, to address therapy goals, to reduce pain, and improve function.        Plan: Continue per plan of care.  Progress treatment as tolerated.       Precautions: fall risk      FOTO   = 56/61   = 53/61  10= 53/61   = 59/61    POC exp =  1/3/25    Access Code: [email or web address]  URL: [email or web address]  Date: 2024  Prepared by: Court Sousa    Exercises  - Supine Transversus Abdominis Bracing - Hands on Stomach  - 5 x daily - 7 x weekly - 3 sets - 10 reps  - Hooklying Clamshell with Resistance  - 1 x daily - 7 x weekly - 3 sets - 10 reps - 5 hold  - Seated Transversus Abdominis Bracing with PLB  - 5 x daily - 7 x weekly - 3 sets - 10 reps    Manuals 11/22 11/27 10/25 10/29 11/1 11/6 11/8 11/13 11/15 11/20             Re DS                                          Neuro Re-Ed             TA iso             TA with hooklying clamshell   YHB 2x10 NP         S/l clamshells             S/l hip abd w/ TA draw             [place] press OJB 2x10 OJB 2x10 OJB 2x8 OJB 2x8 OJB 2x10 OJB 2x12 OJB 2x12 OMB 2x12 OJB 2x12 OJB 2x10   Standing TA with pball press down     5\" x15 NV       Bridges             Rectangle  BAPS X40 ea X40 ea x25 each way x25 each way X15 each way  X40 each way nv X40 ea X40 ea X40 ea   [place] march          "                              Ther Ex             Vg for endur and strength L7 10' L7 10' L7 10' L7 10' L7 15' L7 10' L7 10' L7 10' L7 10' L7 17'   Leg ext.  40# 3x10 40#  3x10 40# 3x8 40# 3x10 40# 3x10 40# 3x10 40# 3x10 40# 3x10 40# 3x10 40# 3x10   Standing ham curls 4#   3x10 4# AW 3x10  4# AW 3x10 4# AW 3x10 4# AW 3x10 4# AW 3x10 4#   3x10 4#   3x10 4#   3x10                                          Ther Activity             Suitcase carry 15# KB with YJB 2x 250ft 15# with YTB 3d370bx 15# with YTB 5i376kn 15# with YTB 2g608km 15# KB with YJB 1y906vm 15# KB with YJB 2i205tj 15# KB with YJB 2x 250ft 15# KB with YMB 2x 250'  15# KB with YJB 2x 250ft nv   STS 6# KB 3x9 airex under feet. 10# KB 3x5 airex under feet. 5# KB 3x6, standing on airex 5# KB 3x6, standing on airex 5# KB 3x6, standing on airex 5# KB 3x6, standing on airex 6# KB 3x10, missed airex under feet - nv 6# KB 3x10 airex under feet.  6# KB 3x6, standing on airex 5x STS test                Lunges Sand dune + Airex 3x5 ea. Sanddune +airex 3x5 each Sanddune +airex 3x5 each Sanddune +airex 3x5 each nv Sanddune +airex 3x5 each Sanddune +airex 3x5 each Sand dune + Airex 3x5 ea.  Sand dune + Airex 3x5 ea.  Sand dune + Airex 3x5 ea.                              Gait Training                                       Modalities

## 2024-11-29 ENCOUNTER — OFFICE VISIT (OUTPATIENT)
Dept: PHYSICAL THERAPY | Facility: REHABILITATION | Age: 74
End: 2024-11-29
Payer: MEDICARE

## 2024-11-29 DIAGNOSIS — M54.50 CHRONIC MIDLINE LOW BACK PAIN WITHOUT SCIATICA: Primary | ICD-10-CM

## 2024-11-29 DIAGNOSIS — G89.29 CHRONIC MIDLINE LOW BACK PAIN WITHOUT SCIATICA: Primary | ICD-10-CM

## 2024-11-29 PROCEDURE — 97112 NEUROMUSCULAR REEDUCATION: CPT | Performed by: PHYSICAL THERAPIST

## 2024-11-29 PROCEDURE — 97110 THERAPEUTIC EXERCISES: CPT | Performed by: PHYSICAL THERAPIST

## 2024-11-29 PROCEDURE — 97530 THERAPEUTIC ACTIVITIES: CPT | Performed by: PHYSICAL THERAPIST

## 2024-11-29 NOTE — PROGRESS NOTES
"Daily Note     Today's date: 2024  Patient name: Miguel Angel Ramos  : 1950  MRN: [id number]  Referring provider: Sundeep Stone MD  Dx:   Encounter Diagnosis     ICD-10-CM    1. Chronic midline low back pain without sciatica  M54.50     G89.29             Start Time: 0700  Stop Time: 0745  Total time in clinic (min): 45 minutes    Subjective: Pt reports his back isnt too bad today.        Objective: See treatment diary below      Assessment: Pt responded well to progression in suitcase carry. He noted lumbar muscle fatigue but no pain. Pt will benefit from continued skilled outpatient PT to improve strength, to address therapy goals, to reduce pain, and improve function.        Plan: Continue per plan of care.  Progress treatment as tolerated.       Precautions: fall risk      FOTO   = 56/61   = 53/61  10= 53/61   = 59/61    POC exp =  1/3/25    Access Code: [email or web address]  URL: [email or web address]  Date: 2024  Prepared by: Court Sousa    Exercises  - Supine Transversus Abdominis Bracing - Hands on Stomach  - 5 x daily - 7 x weekly - 3 sets - 10 reps  - Hooklying Clamshell with Resistance  - 1 x daily - 7 x weekly - 3 sets - 10 reps - 5 hold  - Seated Transversus Abdominis Bracing with PLB  - 5 x daily - 7 x weekly - 3 sets - 10 reps    Manuals 11/22 11/27 11/29 10/29 11/1 11/6 11/8 11/13 11/15 11/20             Re DS                                          Neuro Re-Ed             TA iso             TA with hooklying clamshell    NP         S/l clamshells             S/l hip abd w/ TA draw             [place] press OJB 2x10 OJB 2x10 OJB 2x10 OJB 2x8 OJB 2x10 OJB 2x12 OJB 2x12 OMB 2x12 OJB 2x12 OJB 2x10   Standing TA with pball press down     5\" x15 NV       Bridges             Rectangle  BAPS X40 ea X40 ea x40ea x25 each way X15 each way  X40 each way nv X40 ea X40 ea X40 ea   SandDune march                                       Ther Ex             Vg for endur and " strength L7 10' L7 10' L7 10' L7 10' L7 15' L7 10' L7 10' L7 10' L7 10' L7 17'   Leg ext.  40# 3x10 40#  3x10 40# 3x10 40# 3x10 40# 3x10 40# 3x10 40# 3x10 40# 3x10 40# 3x10 40# 3x10   Standing ham curls 4#   3x10 4# AW 3x10 4# AW 3x10 4# AW 3x10 4# AW 3x10 4# AW 3x10 4# AW 3x10 4#   3x10 4#   3x10 4#   3x10                                          Ther Activity             Suitcase carry 15# KB with YJB 2x 250ft 25# with YTB 2w243bl 15# with YJB 1v340fi 15# with YTB 3y174yt 15# KB with YJB 9w584re 15# KB with YJB 8q152wt 15# KB with YJB 2x 250ft 15# KB with YMB 2x 250'  15# KB with YJB 2x 250ft nv   STS 6# KB 3x9 airex under feet. 10# KB 3x5 airex under feet. 10# KB 3x5 airex under feet. 5# KB 3x6, standing on airex 5# KB 3x6, standing on airex 5# KB 3x6, standing on airex 6# KB 3x10, missed airex under feet - nv 6# KB 3x10 airex under feet.  6# KB 3x6, standing on airex 5x STS test                Lunges Sand dune + Airex 3x5 ea. Sanddune +airex 3x5 each Sanddune +airex 3x5 each Sanddune +airex 3x5 each nv Sanddune +airex 3x5 each Sanddune +airex 3x5 each Sand dune + Airex 3x5 ea.  Sand dune + Airex 3x5 ea.  Sand dune + Airex 3x5 ea.                              Gait Training                                       Modalities

## 2024-12-04 ENCOUNTER — OFFICE VISIT (OUTPATIENT)
Dept: PHYSICAL THERAPY | Facility: REHABILITATION | Age: 74
End: 2024-12-04
Payer: MEDICARE

## 2024-12-04 DIAGNOSIS — M54.50 CHRONIC MIDLINE LOW BACK PAIN WITHOUT SCIATICA: Primary | ICD-10-CM

## 2024-12-04 DIAGNOSIS — G89.29 CHRONIC MIDLINE LOW BACK PAIN WITHOUT SCIATICA: Primary | ICD-10-CM

## 2024-12-04 PROCEDURE — 97112 NEUROMUSCULAR REEDUCATION: CPT | Performed by: PHYSICAL THERAPIST

## 2024-12-04 PROCEDURE — 97530 THERAPEUTIC ACTIVITIES: CPT | Performed by: PHYSICAL THERAPIST

## 2024-12-04 PROCEDURE — 97110 THERAPEUTIC EXERCISES: CPT | Performed by: PHYSICAL THERAPIST

## 2024-12-04 NOTE — PROGRESS NOTES
"Daily Note     Today's date: 2024  Patient name: Miguel Angel Ramos  : 1950  MRN: [id number]  Referring provider: Sundeep Stone MD  Dx:   Encounter Diagnosis     ICD-10-CM    1. Chronic midline low back pain without sciatica  M54.50     G89.29             Start Time: 0810  Stop Time: 0855  Total time in clinic (min): 45 minutes    Subjective: Pt reports he feels great, just has a little pain in the R low back.        Objective: See treatment diary below      Assessment: Pt was appropriately challenged by progression in hamstring curls. He also fatigued with suitcase carry, but tolerated well.  Pt will benefit from continued skilled outpatient PT to improve strength, to address therapy goals, to reduce pain, and improve function.        Plan: Continue per plan of care.  Progress treatment as tolerated.       Precautions: fall risk      FOTO   = 56/61   = 53/61  10= 53/61   = 59/61    POC exp =  1/3/25    Access Code: [email or web address]  URL: [email or web address]  Date: 2024  Prepared by: Court Sousa    Exercises  - Supine Transversus Abdominis Bracing - Hands on Stomach  - 5 x daily - 7 x weekly - 3 sets - 10 reps  - Hooklying Clamshell with Resistance  - 1 x daily - 7 x weekly - 3 sets - 10 reps - 5 hold  - Seated Transversus Abdominis Bracing with PLB  - 5 x daily - 7 x weekly - 3 sets - 10 reps    Manuals 11/22 11/27 11/29 12/4 11/1 11/6 11/8 11/13 11/15 11/20             Re DS                                          Neuro Re-Ed             TA iso             TA with hooklying clamshell             S/l clamshells             S/l hip abd w/ TA draw             [place] press OJB 2x10 OJB 2x10 OJB 2x10 OJB 2x10 OJB 2x10 OJB 2x12 OJB 2x12 OMB 2x12 OJB 2x12 OJB 2x10   Standing TA with pball press down     5\" x15 NV       Bridges             Rectangle  BAPS X40 ea X40 ea x40ea X40 ea X15 each way  X40 each way nv X40 ea X40 ea X40 ea   Shama march                             "           Ther Ex             Vg for endur and strength L7 10' L7 10' L7 10' L7 10' L7 15' L7 10' L7 10' L7 10' L7 10' L7 17'   Leg ext.  40# 3x10 40#  3x10 40# 3x10 40# 3x10 40# 3x10 40# 3x10 40# 3x10 40# 3x10 40# 3x10 40# 3x10   Standing ham curls 4#   3x10 4# AW 3x10 4# AW 3x10 5# AW 3x10 4# AW 3x10 4# AW 3x10 4# AW 3x10 4#   3x10 4#   3x10 4#   3x10                                          Ther Activity             Suitcase carry 15# KB with YJB 2x 250ft 25# with YTB 8c700af 15# with YJB 9x241zo 25# with YTB 8q601rz 15# KB with YJB 3y924tg 15# KB with YJB [id number] 15# KB with YJB 2x 250ft 15# KB with YMB 2x 250'  15# KB with YJB 2x 250ft nv   STS 6# KB 3x9 airex under feet. 10# KB 3x5 airex under feet. 10# KB 3x5 airex under feet. 10# KB 3x5 standing on airex 5# KB 3x6, standing on airex 5# KB 3x6, standing on airex 6# KB 3x10, missed airex under feet - nv 6# KB 3x10 airex under feet.  6# KB 3x6, standing on airex 5x STS test                Lunges Sand dune + Airex 3x5 ea. Sanddune +airex 3x5 each Sanddune +airex 3x5 each Sanddune +airex 3x5 each nv Sanddune +airex 3x5 each Sanddune +airex 3x5 each Sand dune + Airex 3x5 ea.  Sand dune + Airex 3x5 ea.  Sand dune + Airex 3x5 ea.                              Gait Training                                       Modalities

## 2024-12-06 ENCOUNTER — OFFICE VISIT (OUTPATIENT)
Dept: PHYSICAL THERAPY | Facility: REHABILITATION | Age: 74
End: 2024-12-06
Payer: MEDICARE

## 2024-12-06 DIAGNOSIS — M54.50 CHRONIC MIDLINE LOW BACK PAIN WITHOUT SCIATICA: Primary | ICD-10-CM

## 2024-12-06 DIAGNOSIS — G89.29 CHRONIC MIDLINE LOW BACK PAIN WITHOUT SCIATICA: Primary | ICD-10-CM

## 2024-12-06 PROCEDURE — 97110 THERAPEUTIC EXERCISES: CPT | Performed by: PHYSICAL THERAPIST

## 2024-12-06 PROCEDURE — 97112 NEUROMUSCULAR REEDUCATION: CPT | Performed by: PHYSICAL THERAPIST

## 2024-12-06 PROCEDURE — 97530 THERAPEUTIC ACTIVITIES: CPT | Performed by: PHYSICAL THERAPIST

## 2024-12-06 NOTE — PROGRESS NOTES
Daily Note     Today's date: 2024  Patient name: Miguel Angel Ramos  : 1950  MRN: [id number]  Referring provider: Sundeep Stone MD  Dx:   Encounter Diagnosis     ICD-10-CM    1. Chronic midline low back pain without sciatica  M54.50     G89.29             Start Time: 831  Stop Time: 919  Total time in clinic (min): 48 minutes    Subjective: Pt reports he is feeling alright today, feeling pretty good.        Objective: See treatment diary below      Assessment: Pt responded well to the exercises and completed all exercises with only fatigue no pain.    Pt will benefit from continued skilled outpatient PT to improve strength, to address therapy goals, to reduce pain, and improve function.        Plan: Continue per plan of care.  Progress treatment as tolerated.       Precautions: fall risk      FOTO   = 56/61   = 53/61  10= 53/61   = 59/61    POC exp =  1/3/25    Access Code: [email or web address]  URL: [email or web address]  Date: 2024  Prepared by: Court Sousa    Exercises  - Supine Transversus Abdominis Bracing - Hands on Stomach  - 5 x daily - 7 x weekly - 3 sets - 10 reps  - Hooklying Clamshell with Resistance  - 1 x daily - 7 x weekly - 3 sets - 10 reps - 5 hold  - Seated Transversus Abdominis Bracing with PLB  - 5 x daily - 7 x weekly - 3 sets - 10 reps    Manuals 11/22 11/27 11/29 12/4 12/6 11/6 11/8 11/13 11/15 11/20             Re DS                                          Neuro Re-Ed             TA iso             TA with hooklying clamshell             S/l clamshells             S/l hip abd w/ TA draw             Pallof press OJB 2x10 OJB 2x10 OJB 2x10 OJB 2x10 OJB 2x10 OJB 2x12 OJB 2x12 OMB 2x12 OJB 2x12 OJB 2x10   Standing TA with pball press down      NV       Bridges             Rectangle  BAPS X40 ea X40 ea x40ea X40 ea X40 ea X40 each way nv X40 ea X40 ea X40 ea   SandDune march                                       Ther Ex             Vg for endur and  strength L7 10' L7 10' L7 10' L7 10' L7 10' L7 10' L7 10' L7 10' L7 10' L7 17'   Leg ext.  40# 3x10 40#  3x10 40# 3x10 40# 3x10 40# 3x10 40# 3x10 40# 3x10 40# 3x10 40# 3x10 40# 3x10   Standing ham curls 4#   3x10 4# AW 3x10 4# AW 3x10 5# AW 3x10 5# AW 3x10 4# AW 3x10 4# AW 3x10 4#   3x10 4#   3x10 4#   3x10                                          Ther Activity             Suitcase carry 15# KB with YJB 2x 250ft 25# with YTB [id number] 15# with YJB [id number] 25# with YTB 2w560lh 25# KB with YJB [id number] 15# KB with YJB [id number] 15# KB with YJB 2x 250ft 15# KB with YMB 2x 250'  15# KB with YJB 2x 250ft nv   STS 6# KB 3x9 airex under feet. 10# KB 3x5 airex under feet. 10# KB 3x5 airex under feet. 10# KB 3x5 standing on airex 10# KB 3x5 airex under feet. 5# KB 3x6, standing on airex 6# KB 3x10, missed airex under feet - nv 6# KB 3x10 airex under feet.  6# KB 3x6, standing on airex 5x STS test                Lunges Sand dune + Airex 3x5 ea. Sanddune +airex 3x5 each Sanddune +airex 3x5 each Sanddune +airex 3x5 each Sanddune + airex 3x10 each Sanddune +airex 3x5 each Sanddune +airex 3x5 each Sand dune + Airex 3x5 ea.  Sand dune + Airex 3x5 ea.  Sand dune + Airex 3x5 ea.                              Gait Training                                       Modalities

## 2024-12-11 ENCOUNTER — OFFICE VISIT (OUTPATIENT)
Dept: PHYSICAL THERAPY | Facility: REHABILITATION | Age: 74
End: 2024-12-11
Payer: MEDICARE

## 2024-12-11 DIAGNOSIS — M54.50 CHRONIC MIDLINE LOW BACK PAIN WITHOUT SCIATICA: Primary | ICD-10-CM

## 2024-12-11 DIAGNOSIS — G89.29 CHRONIC MIDLINE LOW BACK PAIN WITHOUT SCIATICA: Primary | ICD-10-CM

## 2024-12-11 PROCEDURE — 97112 NEUROMUSCULAR REEDUCATION: CPT | Performed by: PHYSICAL THERAPIST

## 2024-12-11 PROCEDURE — 97530 THERAPEUTIC ACTIVITIES: CPT | Performed by: PHYSICAL THERAPIST

## 2024-12-11 PROCEDURE — 97110 THERAPEUTIC EXERCISES: CPT | Performed by: PHYSICAL THERAPIST

## 2024-12-11 NOTE — PROGRESS NOTES
"Daily Note     Today's date: 2024  Patient name: Miguel Angel Ramos  : 1950  MRN: [id number]  Referring provider: Sundeep Stone MD  Dx:   Encounter Diagnosis     ICD-10-CM    1. Chronic midline low back pain without sciatica  M54.50     G89.29             Start Time: 0807  Stop Time: 0852  Total time in clinic (min): 45 minutes    Subjective: Pt reports he gets flare ups of low back pain. States he had a flare up yesterday but feels better today.        Objective: See treatment diary below  Tx started late due to pt arriving late.    Assessment: Pt completed exercises with good form and without an increase in pain. He required occasional cues for form and correct sets and reps. Noted his low back \"felt pretty good\" during and at the end of tx today. Pt will benefit from continued skilled outpatient PT to improve strength, to address therapy goals, to reduce pain, and improve function.        Plan: Continue per plan of care.  Progress treatment as tolerated.       Precautions: fall risk      POC exp =  1/3/25    Access Code: [email or web address]  URL: [email or web address]  Date: 2024  Prepared by: Court Sousa    Exercises  - Supine Transversus Abdominis Bracing - Hands on Stomach  - 5 x daily - 7 x weekly - 3 sets - 10 reps  - Hooklying Clamshell with Resistance  - 1 x daily - 7 x weekly - 3 sets - 10 reps - 5 hold  - Seated Transversus Abdominis Bracing with PLB  - 5 x daily - 7 x weekly - 3 sets - 10 reps    Manuals 11/22 11/27 11/29 12/4 12/6 12/11 11/8 11/13 11/15 11/20             Re DS                                          Neuro Re-Ed             Pallof press OJB 2x10 OJB 2x10 OJB 2x10 OJB 2x10 OJB 2x10 OJB 2x10 OJB 2x12 OMB 2x12 OJB 2x12 OJB 2x10   Rectangle  BAPS X40 ea X40 ea x40ea X40 ea X40 ea X40 each way nv X40 ea X40 ea X40 ea   SandDune march                                                                 Ther Ex             Vg for endur and strength L7 10' L7 10' L7 10' " L7 10' L7 10' L7 10' L7 10' L7 10' L7 10' L7 17'   Leg ext.  40# 3x10 40#  3x10 40# 3x10 40# 3x10 40# 3x10 40# 3x10 40# 3x10 40# 3x10 40# 3x10 40# 3x10   Standing ham curls 4#   3x10 4# AW 3x10 4# AW 3x10 5# AW 3x10 5# AW 3x10 5# AW 3x10 4# AW 3x10 4#   3x10 4#   3x10 4#   3x10                                          Ther Activity             Suitcase carry 15# KB with YJB 2x 250ft 25# with YTB [id number] 15# with YJB 1s571em 25# with YTB [id number] 25# KB with YJB 6l925ig 25# KB with YJB [id number] 15# KB with YJB 2x 250ft 15# KB with YMB 2x 250'  15# KB with YJB 2x 250ft nv   STS 6# KB 3x9 airex under feet. 10# KB 3x5 airex under feet. 10# KB 3x5 airex under feet. 10# KB 3x5 standing on airex 10# KB 3x5 airex under feet. 10# KB 3x5, standing on airex 6# KB 3x10, missed airex under feet - nv 6# KB 3x10 airex under feet.  6# KB 3x6, standing on airex 5x STS test                Lunges Sand dune + Airex 3x5 ea. Sanddune +airex 3x5 each Sanddune +airex 3x5 each Sanddune +airex 3x5 each Sanddune + airex 3x10 each Sanddune +airex 3x5 each Sanddune +airex 3x5 each Sand dune + Airex 3x5 ea.  Sand dune + Airex 3x5 ea.  Sand dune + Airex 3x5 ea.                              Gait Training                                       Modalities

## 2024-12-13 ENCOUNTER — OFFICE VISIT (OUTPATIENT)
Dept: PHYSICAL THERAPY | Facility: REHABILITATION | Age: 74
End: 2024-12-13
Payer: MEDICARE

## 2024-12-13 DIAGNOSIS — M54.50 CHRONIC MIDLINE LOW BACK PAIN WITHOUT SCIATICA: Primary | ICD-10-CM

## 2024-12-13 DIAGNOSIS — G89.29 CHRONIC MIDLINE LOW BACK PAIN WITHOUT SCIATICA: Primary | ICD-10-CM

## 2024-12-13 PROCEDURE — 97110 THERAPEUTIC EXERCISES: CPT | Performed by: PHYSICAL THERAPIST

## 2024-12-13 PROCEDURE — 97112 NEUROMUSCULAR REEDUCATION: CPT | Performed by: PHYSICAL THERAPIST

## 2024-12-13 PROCEDURE — 97530 THERAPEUTIC ACTIVITIES: CPT | Performed by: PHYSICAL THERAPIST

## 2024-12-13 NOTE — PROGRESS NOTES
Daily Note     Today's date: 2024  Patient name: Miguel Angel Ramos  : 1950  MRN: [id number]  Referring provider: Sundeep Stone MD  Dx:   Encounter Diagnosis     ICD-10-CM    1. Chronic midline low back pain without sciatica  M54.50     G89.29             Start Time: 703  Stop Time: 745  Total time in clinic (min): 42 minutes    Subjective: Pt reports he is doing pretty good today        Objective: See treatment diary below      Assessment: Pt responded well to tx and completed all exercises without complaints. Pt will benefit from continued skilled outpatient PT to improve strength, to address therapy goals, to reduce pain, and improve function.        Plan: Continue per plan of care.  Progress treatment as tolerated.       Precautions: fall risk      POC exp =  1/3/25    Access Code: [email or web address]  URL: [email or web address]  Date: 2024  Prepared by: Court Sousa    Exercises  - Supine Transversus Abdominis Bracing - Hands on Stomach  - 5 x daily - 7 x weekly - 3 sets - 10 reps  - Hooklying Clamshell with Resistance  - 1 x daily - 7 x weekly - 3 sets - 10 reps - 5 hold  - Seated Transversus Abdominis Bracing with PLB  - 5 x daily - 7 x weekly - 3 sets - 10 reps    Manuals 11/22 11/27 11/29 12/4 12/6 12/11 12/13 11/13 11/15 11/20             Re DS                                          Neuro Re-Ed             Pallof press OJB 2x10 OJB 2x10 OJB 2x10 OJB 2x10 OJB 2x10 OJB 2x10 OJB 2x12 OMB 2x12 OJB 2x12 OJB 2x10   Rectangle  BAPS X40 ea X40 ea x40ea X40 ea X40 ea X40 ea X40 ea X40 ea X40 ea X40 ea   Shama march                                                                 Ther Ex             Vg for endur and strength L7 10' L7 10' L7 10' L7 10' L7 10' L7 10' L7 10' L7 10' L7 10' L7 17'   Leg ext.  40# 3x10 40#  3x10 40# 3x10 40# 3x10 40# 3x10 40# 3x10 40# 3x10 40# 3x10 40# 3x10 40# 3x10   Standing ham curls 4#   3x10 4# AW 3x10 4# AW 3x10 5# AW 3x10 5# AW 3x10 5# AW 3x10 4#  AW 3x10 4#   3x10 4#   3x10 4#   3x10                                          Ther Activity             Suitcase carry 15# KB with YJB 2x 250ft 25# with YTB [id number] 15# with YJB 0u954dp 25# with YTB 3l541rw 25# KB with YJB [id number] 25# KB with YJB 4a898nl 15# KB with YJB 2x 250ft 15# KB with YMB 2x 250'  15# KB with YJB 2x 250ft nv   STS 6# KB 3x9 airex under feet. 10# KB 3x5 airex under feet. 10# KB 3x5 airex under feet. 10# KB 3x5 standing on airex 10# KB 3x5 airex under feet. 10# KB 3x5, standing on airex 6# KB 3x10, missed airex under feet - nv 6# KB 3x10 airex under feet.  6# KB 3x6, standing on airex 5x STS test                Lunges Sand dune + Airex 3x5 ea. Sanddune +airex 3x5 each Sanddune +airex 3x5 each Sanddune +airex 3x5 each Sanddune + airex 3x10 each Sanddune +airex 3x5 each Sanddune +airex 3x5 each Sand dune + Airex 3x5 ea.  Sand dune + Airex 3x5 ea.  Sand dune + Airex 3x5 ea.                              Gait Training                                       Modalities

## 2024-12-18 ENCOUNTER — OFFICE VISIT (OUTPATIENT)
Dept: PHYSICAL THERAPY | Facility: REHABILITATION | Age: 74
End: 2024-12-18
Payer: MEDICARE

## 2024-12-18 DIAGNOSIS — M54.50 CHRONIC MIDLINE LOW BACK PAIN WITHOUT SCIATICA: Primary | ICD-10-CM

## 2024-12-18 DIAGNOSIS — G89.29 CHRONIC MIDLINE LOW BACK PAIN WITHOUT SCIATICA: Primary | ICD-10-CM

## 2024-12-18 PROCEDURE — 97110 THERAPEUTIC EXERCISES: CPT | Performed by: PHYSICAL THERAPIST

## 2024-12-18 PROCEDURE — 97530 THERAPEUTIC ACTIVITIES: CPT | Performed by: PHYSICAL THERAPIST

## 2024-12-18 PROCEDURE — 97112 NEUROMUSCULAR REEDUCATION: CPT | Performed by: PHYSICAL THERAPIST

## 2024-12-18 NOTE — PROGRESS NOTES
Daily Note     Today's date: 2024  Patient name: Miguel Angel Ramos  : 1950  MRN: [id number]  Referring provider: Sundeep Stone MD  Dx:   Encounter Diagnosis     ICD-10-CM    1. Chronic midline low back pain without sciatica  M54.50     G89.29             Start Time: 0803  Stop Time: 0847  Total time in clinic (min): 44 minutes    Subjective: Pt reports he feels pretty good today.        Objective: See treatment diary below      Assessment: Pt responded well to tx including progression of hamstring curls. Pt will benefit from continued skilled outpatient PT to improve strength, to address therapy goals, to reduce pain, and improve function.        Plan: Continue per plan of care.  Progress treatment as tolerated.       Precautions: fall risk      POC exp =  1/3/25    Access Code: [email or web address]  URL: [email or web address]  Date: 2024  Prepared by: Court Sousa    Exercises  - Supine Transversus Abdominis Bracing - Hands on Stomach  - 5 x daily - 7 x weekly - 3 sets - 10 reps  - Hooklying Clamshell with Resistance  - 1 x daily - 7 x weekly - 3 sets - 10 reps - 5 hold  - Seated Transversus Abdominis Bracing with PLB  - 5 x daily - 7 x weekly - 3 sets - 10 reps    Manuals 11/22 11/27 11/29 12/4 12/6 12/11 12/13 12/18 11/15 11/20             Re DS                                          Neuro Re-Ed             Pallof press OJB 2x10 OJB 2x10 OJB 2x10 OJB 2x10 OJB 2x10 OJB 2x10 OJB 2x12 OJB 2x12 OJB 2x12 OJB 2x10   Rectangle  BAPS X40 ea X40 ea x40ea X40 ea X40 ea X40 ea X40 ea X40 ea X40 ea X40 ea   Shama march                                                                 Ther Ex             Vg for endur and strength L7 10' L7 10' L7 10' L7 10' L7 10' L7 10' L7 10' L7 10' L7 10' L7 17'   Leg ext.  40# 3x10 40#  3x10 40# 3x10 40# 3x10 40# 3x10 40# 3x10 40# 3x10 40# 3x10 40# 3x10 40# 3x10   Standing ham curls 4#   3x10 4# AW 3x10 4# AW 3x10 5# AW 3x10 5# AW 3x10 5# AW 3x10 4# AW 3x10  5#   AW 3x10 4#   3x10 4#   3x10                                          Ther Activity             Suitcase carry 15# KB with YJB 2x 250ft 25# with YTB [id number] 15# with YJB 2g934tp 25# with YTB [id number] 25# KB with YJB 0d065hg 25# KB with YJB [id number] 15# KB with YJB 2x 250ft 25# KB with YMB 2x 250'  15# KB with YJB 2x 250ft nv   STS 6# KB 3x9 airex under feet. 10# KB 3x5 airex under feet. 10# KB 3x5 airex under feet. 10# KB 3x5 standing on airex 10# KB 3x5 airex under feet. 10# KB 3x5, standing on airex 10# KB 3x10, missed airex under feet - nv 10# KB 3x5, standing on airex  6# KB 3x6, standing on airex 5x STS test                Lunges Sand dune + Airex 3x5 ea. Sanddune +airex 3x5 each Sanddune +airex 3x5 each Sanddune +airex 3x5 each Sanddune + airex 3x10 each Sanddune +airex 3x5 each Sanddune +airex 3x5 each Sand dune + Airex 3x5 ea.  Sand dune + Airex 3x5 ea.  Sand dune + Airex 3x5 ea.                              Gait Training                                       Modalities

## 2024-12-20 ENCOUNTER — OFFICE VISIT (OUTPATIENT)
Dept: PHYSICAL THERAPY | Facility: REHABILITATION | Age: 74
End: 2024-12-20
Payer: MEDICARE

## 2024-12-20 DIAGNOSIS — M54.50 CHRONIC MIDLINE LOW BACK PAIN WITHOUT SCIATICA: Primary | ICD-10-CM

## 2024-12-20 DIAGNOSIS — G89.29 CHRONIC MIDLINE LOW BACK PAIN WITHOUT SCIATICA: Primary | ICD-10-CM

## 2024-12-20 PROCEDURE — 97110 THERAPEUTIC EXERCISES: CPT | Performed by: PHYSICAL THERAPIST

## 2024-12-20 PROCEDURE — 97112 NEUROMUSCULAR REEDUCATION: CPT | Performed by: PHYSICAL THERAPIST

## 2024-12-20 NOTE — PROGRESS NOTES
Daily Note     Today's date: 2024  Patient name: Miguel Angel Ramos  : 1950  MRN: [id number]  Referring provider: Sundeep Stone MD  Dx:   Encounter Diagnosis     ICD-10-CM    1. Chronic midline low back pain without sciatica  M54.50     G89.29             Start Time: 08  Stop Time: 922  Total time in clinic (min): 47 minutes    Subjective: Pt reports his low back is bothersome today.        Objective: See treatment diary below      Assessment: Pt responded well to tx but is having some returning low back pain. Pt will benefit from continued skilled outpatient PT to improve strength, to address therapy goals, to reduce pain, and improve function.        Plan: Continue per plan of care.  Progress treatment as tolerated.       Precautions: fall risk      POC exp =  1/3/25    Access Code: [email or web address]  URL: [email or web address]  Date: 2024  Prepared by: Court Sousa    Exercises  - Supine Transversus Abdominis Bracing - Hands on Stomach  - 5 x daily - 7 x weekly - 3 sets - 10 reps  - Hooklying Clamshell with Resistance  - 1 x daily - 7 x weekly - 3 sets - 10 reps - 5 hold  - Seated Transversus Abdominis Bracing with PLB  - 5 x daily - 7 x weekly - 3 sets - 10 reps    Manuals              Re DS                                          Neuro Re-Ed             Pallof press OJB 2x10 OJB 2x10 OJB 2x10 OJB 2x10 OJB 2x10 OJB 2x10 OJB 2x12 OJB 2x12 OJB 2x12 OJB 2x10   Rectangle  BAPS X40 ea X40 ea x40ea X40 ea X40 ea X40 ea X40 ea X40 ea X40 ea X40 ea   Shama march                                                                 Ther Ex             Vg for endur and strength L7 10' L7 10' L7 10' L7 10' L7 10' L7 10' L7 10' L7 10' L7 10' L7 17'   Leg ext.  40# 3x10 40#  3x10 40# 3x10 40# 3x10 40# 3x10 40# 3x10 40# 3x10 40# 3x10 40# 3x10 40# 3x10   Standing ham curls 4#   3x10 4# AW 3x10 4# AW 3x10 5# AW 3x10 5# AW 3x10 5# AW 3x10 4#  AW 3x10 5#   AW 3x10 5#   3x10 4#   3x10                                          Ther Activity             Suitcase carry 15# KB with YJB 2x 250ft 25# with YTB 2n622uo 15# with YJB 2h091es 25# with YTB 2k525bp 25# KB with YJB 0r891gt 25# KB with YJB [id number] 15# KB with YJB 2x 250ft 25# KB with YMB 2x 250'  nv nv   STS 6# KB 3x9 airex under feet. 10# KB 3x5 airex under feet. 10# KB 3x5 airex under feet. 10# KB 3x5 standing on airex 10# KB 3x5 airex under feet. 10# KB 3x5, standing on airex 10# KB 3x10, missed airex under feet - nv 10# KB 3x5, standing on airex  10# KB 3x8, standing on airex 5x STS test                Lunges Sand dune + Airex 3x5 ea. Sanddune +airex 3x5 each Sanddune +airex 3x5 each Sanddune +airex 3x5 each Sanddune + airex 3x10 each Sanddune +airex 3x5 each Sanddune +airex 3x5 each Sand dune + Airex 3x5 ea.  Sand dune + Airex 3x5 ea.  Sand dune + Airex 3x5 ea.                              Gait Training                                       Modalities

## 2024-12-23 ENCOUNTER — EVALUATION (OUTPATIENT)
Dept: PHYSICAL THERAPY | Facility: REHABILITATION | Age: 74
End: 2024-12-23
Payer: MEDICARE

## 2024-12-23 DIAGNOSIS — L29.9 PRURITUS: ICD-10-CM

## 2024-12-23 DIAGNOSIS — M54.50 CHRONIC MIDLINE LOW BACK PAIN WITHOUT SCIATICA: Primary | ICD-10-CM

## 2024-12-23 DIAGNOSIS — G89.29 CHRONIC MIDLINE LOW BACK PAIN WITHOUT SCIATICA: Primary | ICD-10-CM

## 2024-12-23 PROCEDURE — 97112 NEUROMUSCULAR REEDUCATION: CPT | Performed by: PHYSICAL THERAPIST

## 2024-12-23 PROCEDURE — 97110 THERAPEUTIC EXERCISES: CPT | Performed by: PHYSICAL THERAPIST

## 2024-12-23 NOTE — PROGRESS NOTES
Daily Note/re-eval     Today's date: 2024  Patient name: Miguel Angel Ramos  : 1950  MRN: [id number]  Referring provider: Sundeep Stone MD  Dx:   Encounter Diagnosis     ICD-10-CM    1. Chronic midline low back pain without sciatica  M54.50     G89.29           Pt was 8 min late but was accommodated    Start Time: 0808  Stop Time: 0850  Total time in clinic (min): 42 minutes    Subjective: Patient reports feeling 65% better since starting therapy.         Objective: See treatment diary below     Pain:  Best Pain: 1/10 at last RE, 2/10  Worst pain: 7/10 at last RE,  8/10        Lumbar  AROM  Flexion:  WFL, pain at end range center of low back and to the R  Extension:   50% painful R side low back  Left rotation:  WFL end range pain R low back  Right rotation:  WFL end range pain R low back     Strength/Myotome Testing   Left Hip   Planes of Motion   Extension: 4+  Abduction: 4+     Right Hip   Planes of Motion   Extension: 4+  Abduction: 4+     5x STS: No UE 7.84 seconds (10.4 previously)     Assessment: Pt presents for reassessment of low back pain. Since last RE pt has reached a plateau. His lumbar AROM is slightly more painful and he reports slightly less improvement overall compared to last RE. He reports 66% improvement compared to 72% improvement last RE. Will refer pt back to physician at this time although the pt would like to continue with PT as he feels it is still helping.  Recommend continued skilled outpatient PT to improve strength, to address therapy goals, to reduce pain, and improve function.        Short term goals: to be met in 3-4 weeks  Pt independent with initial HEP, rationale, technique and frequency, for ROM and pain control. MET  Pt will report at least a 25% reduction in subjective pain complaints/symptoms to better manage ADLs and household chores. MET  Improve max pain level by 2 points on the numeric pain rating scale indicating a clinically significant reduction in  pain level. MET  Pt will achieve an improvement in FOTO score indicating an improvement in pain and function. MET        Long term goals: to be met in 6-8 weeks  Pt will have full and pain free lumbar ROM to better manage ADLs and household chores. IN PROGRESS  Pt will report a 75% or > reduction in subjective pain complaints/symptoms to better manage ADLs and household chores. UNMET  Improve B hip abduction MMT to a 4/5 or greater for improved lumbopelvic stability. MET  Pt will improve FOTO score to better then expected outcome indicating an overall improvement in pain and function MET  Pt will be able to sleep through the night (6-8 hours) without waking secondary to pain. MET  Pt will be able to perform ADLs, iADLS, and household duties without pain or restriction indicating return to PLOF. NOT MET  Pt independent with rationale, technique and plan for performance of advanced HEP to ensure independent self-management of symptoms upon discharge. IN PROGRESS  Achieve pts therapy goal: to be able to walk longer distances and sit for longer periods to write PARTIALLY MET           Plan: Continue per plan of care.  Progress treatment as tolerated.   2x/week for 6 weeks     Precautions: fall risk      POC exp =  2/3/25    Access Code: [email or web address]  URL: [email or web address]  Date: 08/07/2024  Prepared by: Court Sousa    Exercises  - Supine Transversus Abdominis Bracing - Hands on Stomach  - 5 x daily - 7 x weekly - 3 sets - 10 reps  - Hooklying Clamshell with Resistance  - 1 x daily - 7 x weekly - 3 sets - 10 reps - 5 hold  - Seated Transversus Abdominis Bracing with PLB  - 5 x daily - 7 x weekly - 3 sets - 10 reps    Manuals 11/22 11/27 11/29 12/4 12/6 12/11 12/13 12/18 12/20 12/23             RE DS                                          Neuro Re-Ed             Pallof press OJB 2x10 OJB 2x10 OJB 2x10 OJB 2x10 OJB 2x10 OJB 2x10 OJB 2x12 OJB 2x12 OJB 2x12 OJB 2x12   Rectangle  BAPS X40 ea X40 ea x40ea  X40 ea X40 ea X40 ea X40 ea X40 ea X40 ea X40 ea   SandDune march                                                                 Ther Ex             Vg for endur and strength L7 10' L7 10' L7 10' L7 10' L7 10' L7 10' L7 10' L7 10' L7 10' L7 10'   Leg ext.  40# 3x10 40#  3x10 40# 3x10 40# 3x10 40# 3x10 40# 3x10 40# 3x10 40# 3x10 40# 3x10 40# 3x10   Standing ham curls 4#   3x10 4# AW 3x10 4# AW 3x10 5# AW 3x10 5# AW 3x10 5# AW 3x10 4# AW 3x10 5#   AW 3x10 5#   3x10 5#   3x10                                          Ther Activity             Suitcase carry 15# KB with YJB 2x 250ft 25# with YTB 8g990kw 15# with YJB 2i327mh 25# with YTB 0s873yi 25# KB with YJB 6p902ez 25# KB with YJB 7f864kn 15# KB with YJB 2x 250ft 25# KB with YMB 2x 250'  nv nv   STS 6# KB 3x9 airex under feet. 10# KB 3x5 airex under feet. 10# KB 3x5 airex under feet. 10# KB 3x5 standing on airex 10# KB 3x5 airex under feet. 10# KB 3x5, standing on airex 10# KB 3x10, missed airex under feet - nv 10# KB 3x5, standing on airex  10# KB 3x8, standing on airex 5x STS test                Lunges Sand dune + Airex 3x5 ea. Sanddune +airex 3x5 each Sanddune +airex 3x5 each Sanddune +airex 3x5 each Sanddune + airex 3x10 each Sanddune +airex 3x5 each Sanddune +airex 3x5 each Sand dune + Airex 3x5 ea.  Sand dune + Airex 3x5 ea.  Sand dune + Airex 3x5 ea.                              Gait Training                                       Modalities

## 2024-12-24 RX ORDER — PREGABALIN 50 MG/1
50 CAPSULE ORAL 3 TIMES DAILY
Qty: 270 CAPSULE | Refills: 0 | Status: SHIPPED | OUTPATIENT
Start: 2024-12-24

## 2024-12-27 ENCOUNTER — OFFICE VISIT (OUTPATIENT)
Dept: PHYSICAL THERAPY | Facility: REHABILITATION | Age: 74
End: 2024-12-27
Payer: MEDICARE

## 2024-12-27 DIAGNOSIS — G89.29 CHRONIC MIDLINE LOW BACK PAIN WITHOUT SCIATICA: Primary | ICD-10-CM

## 2024-12-27 DIAGNOSIS — M54.50 CHRONIC MIDLINE LOW BACK PAIN WITHOUT SCIATICA: Primary | ICD-10-CM

## 2024-12-27 PROCEDURE — 97110 THERAPEUTIC EXERCISES: CPT

## 2024-12-27 PROCEDURE — 97112 NEUROMUSCULAR REEDUCATION: CPT

## 2024-12-27 NOTE — PROGRESS NOTES
Daily Note     Today's date: 2024  Patient name: Miguel Angel Ramos  : 1950  MRN: [id number]  Referring provider: Sundeep Stone MD  Dx:   Encounter Diagnosis     ICD-10-CM    1. Chronic midline low back pain without sciatica  M54.50     G89.29           Start Time: 0930  Stop Time: 1020  Total time in clinic (min): 50 minutes    Subjective: the back is always bothersome more on the R side today, an annoying nag      Objective: See treatment diary below      Assessment: Tolerated treatment well. Pt continues to be challenged by STS, VC given to avoid pushing of his legs. Able to progress suitcase carry by increasing laps.   Patient demonstrated fatigue post treatment, exhibited good technique with therapeutic exercises, and would benefit from continued PT      Plan: Continue per plan of care.  Progress treatment as tolerated.       Precautions: fall risk      POC exp =  2/3/25    Access Code: [email or web address]  URL: [email or web address]  Date: 2024  Prepared by: Court Sousa    Exercises  - Supine Transversus Abdominis Bracing - Hands on Stomach  - 5 x daily - 7 x weekly - 3 sets - 10 reps  - Hooklying Clamshell with Resistance  - 1 x daily - 7 x weekly - 3 sets - 10 reps - 5 hold  - Seated Transversus Abdominis Bracing with PLB  - 5 x daily - 7 x weekly - 3 sets - 10 reps    Manuals              RE DS                                          Neuro Re-Ed             Pallof press OJB  2x12 OJB 2x10 OJB 2x10 OJB 2x10 OJB 2x10 OJB 2x10 OJB 2x12 OJB 2x12 OJB 2x12 OJB 2x12   Rectangle  BAPS x40ea X40 ea x40ea X40 ea X40 ea X40 ea X40 ea X40 ea X40 ea X40 ea   SandDune march                                                                 Ther Ex             Vg for endur and strength L7 10' L7 10' L7 10' L7 10' L7 10' L7 10' L7 10' L7 10' L7 10' L7 10'   Leg ext.  40# 3x10 40#  3x10 40# 3x10 40# 3x10 40# 3x10 40# 3x10 40# 3x10 40# 3x10  40# 3x10 40# 3x10   Standing ham curls 5#   3x10 4# AW 3x10 4# AW 3x10 5# AW 3x10 5# AW 3x10 5# AW 3x10 4# AW 3x10 5#   AW 3x10 5#   3x10 5#   3x10                                          Ther Activity             Suitcase carry 25# KB with YMB 4x 250'  25# with YTB [id number] 15# with YJB [id number] 25# with YTB 9y768iz 25# KB with YJB 2h284wu 25# KB with YJB 0i083gx 15# KB with YJB 2x 250ft 25# KB with YMB 2x 250'  nv nv   STS 10# KB 3x8, standing on airex 10# KB 3x5 airex under feet. 10# KB 3x5 airex under feet. 10# KB 3x5 standing on airex 10# KB 3x5 airex under feet. 10# KB 3x5, standing on airex 10# KB 3x10, missed airex under feet - nv 10# KB 3x5, standing on airex  10# KB 3x8, standing on airex 5x STS test                Lunges Sand dune + Airex 3x5 ea. Sanddune +airex 3x5 each Sanddune +airex 3x5 each Sanddune +airex 3x5 each Sanddune + airex 3x10 each Sanddune +airex 3x5 each Sanddune +airex 3x5 each Sand dune + Airex 3x5 ea.  Sand dune + Airex 3x5 ea.  Sand dune + Airex 3x5 ea.                              Gait Training                                       Modalities

## 2025-01-03 ENCOUNTER — OFFICE VISIT (OUTPATIENT)
Dept: PHYSICAL THERAPY | Facility: REHABILITATION | Age: 75
End: 2025-01-03
Payer: MEDICARE

## 2025-01-03 DIAGNOSIS — G89.29 CHRONIC MIDLINE LOW BACK PAIN WITHOUT SCIATICA: Primary | ICD-10-CM

## 2025-01-03 DIAGNOSIS — M54.50 CHRONIC MIDLINE LOW BACK PAIN WITHOUT SCIATICA: Primary | ICD-10-CM

## 2025-01-03 PROCEDURE — 97112 NEUROMUSCULAR REEDUCATION: CPT | Performed by: PHYSICAL THERAPIST

## 2025-01-03 PROCEDURE — 97110 THERAPEUTIC EXERCISES: CPT | Performed by: PHYSICAL THERAPIST

## 2025-01-03 PROCEDURE — 97530 THERAPEUTIC ACTIVITIES: CPT | Performed by: PHYSICAL THERAPIST

## 2025-01-03 NOTE — PROGRESS NOTES
Daily Note     Today's date: 1/3/2025  Patient name: Miguel Angel Ramos  : 1950  MRN: [id number]  Referring provider: Sundeep Stone MD  Dx:   Encounter Diagnosis     ICD-10-CM    1. Chronic midline low back pain without sciatica  M54.50     G89.29             Start Time: 0950  Stop Time: 1030  Total time in clinic (min): 40 minutes    Subjective: Pt reports his back is feeling a little rough today. States he had trouble sleeping last night. States this is more rare lately, but last night was bad.      Objective: See treatment diary below      Assessment: Tolerated treatment well. Pt noted less pain with tx.  Patient demonstrated fatigue post treatment, exhibited good technique with therapeutic exercises, and would benefit from continued PT      Plan: Continue per plan of care.  Progress treatment as tolerated.       Precautions: fall risk      POC exp =  2/3/25    Access Code: [email or web address]  URL: [email or web address]  Date: 2024  Prepared by: Court Sousa    Exercises  - Supine Transversus Abdominis Bracing - Hands on Stomach  - 5 x daily - 7 x weekly - 3 sets - 10 reps  - Hooklying Clamshell with Resistance  - 1 x daily - 7 x weekly - 3 sets - 10 reps - 5 hold  - Seated Transversus Abdominis Bracing with PLB  - 5 x daily - 7 x weekly - 3 sets - 10 reps    Manuals 12/27 1/3 11/29 12/4 12/6 12/11 12/13 12/18 12/20 12/23             RE DS                                          Neuro Re-Ed             Pallof press OJB  2x12 OJB 2x12 OJB 2x10 OJB 2x10 OJB 2x10 OJB 2x10 OJB 2x12 OJB 2x12 OJB 2x12 OJB 2x12   Rectangle  BAPS x40ea X40 ea x40ea X40 ea X40 ea X40 ea X40 ea X40 ea X40 ea X40 ea   SandDu                                                                 Ther Ex             Vg for endur and strength L7 10' L7 10' L7 10' L7 10' L7 10' L7 10' L7 10' L7 10' L7 10' L7 10'   Leg ext.  40# 3x10 40#  3x10 40# 3x10 40# 3x10 40# 3x10 40# 3x10 40# 3x10 40# 3x10 40# 3x10 40# 3x10    Standing ham curls 5#   3x10 4# AW 3x10 4# AW 3x10 5# AW 3x10 5# AW 3x10 5# AW 3x10 4# AW 3x10 5#   AW 3x10 5#   3x10 5#   3x10                                          Ther Activity             Suitcase carry 25# KB with YMB 4x 250'  25# KB with YMB 2x 250'  15# with YJB [id number] 25# with YTB [id number] 25# KB with YJB [id number] 25# KB with YJB [id number] 15# KB with YJB 2x 250ft 25# KB with YMB 2x 250'  nv nv   STS 10# KB 3x8, standing on airex 15# KB 3x5 airex under feet. 10# KB 3x5 airex under feet. 10# KB 3x5 standing on airex 10# KB 3x5 airex under feet. 10# KB 3x5, standing on airex 10# KB 3x10, missed airex under feet - nv 10# KB 3x5, standing on airex  10# KB 3x8, standing on airex 5x STS test                Lunges Sand dune + Airex 3x5 ea. Sand dune +airex 3x5 each Sanddune +airex 3x5 each Sanddune +airex 3x5 each Sanddune + airex 3x10 each Sanddune +airex 3x5 each Sanddune +airex 3x5 each Sand dune + Airex 3x5 ea.  Sand dune + Airex 3x5 ea.  Sand dune + Airex 3x5 ea.                              Gait Training                                       Modalities

## 2025-01-08 ENCOUNTER — OFFICE VISIT (OUTPATIENT)
Dept: PHYSICAL THERAPY | Facility: REHABILITATION | Age: 75
End: 2025-01-08
Payer: MEDICARE

## 2025-01-08 DIAGNOSIS — M54.50 CHRONIC MIDLINE LOW BACK PAIN WITHOUT SCIATICA: Primary | ICD-10-CM

## 2025-01-08 DIAGNOSIS — G89.29 CHRONIC MIDLINE LOW BACK PAIN WITHOUT SCIATICA: Primary | ICD-10-CM

## 2025-01-08 PROCEDURE — 97110 THERAPEUTIC EXERCISES: CPT | Performed by: PHYSICAL THERAPIST

## 2025-01-08 PROCEDURE — 97112 NEUROMUSCULAR REEDUCATION: CPT | Performed by: PHYSICAL THERAPIST

## 2025-01-08 NOTE — PROGRESS NOTES
"Daily Note     Today's date: 2025  Patient name: Miguel Angel Ramos  : 1950  MRN: [id number]  Referring provider: Sundeep Stone MD  Dx:   Encounter Diagnosis     ICD-10-CM    1. Chronic midline low back pain without sciatica  M54.50     G89.29           Start Time: 1620  Stop Time: 1700  Total time in clinic (min): 40 minutes    Subjective: Pt reports his back has been bothersome more lately      Objective: See treatment diary below      Assessment: Modified tx due to increased low back pain with focus on core. Advised pt to call and follow up with referring MD due to increase in LBP. Pt did report an improvement in pain with tx. Patient demonstrated fatigue post treatment, exhibited good technique with therapeutic exercises, and would benefit from continued PT      Plan: Continue per plan of care.  Progress treatment as tolerated.       Precautions: fall risk      POC exp =  2/3/25    Access Code: [email or web address]  URL: [email or web address]  Date: 2024  Prepared by: Court Sousa    Exercises  - Supine Transversus Abdominis Bracing - Hands on Stomach  - 5 x daily - 7 x weekly - 3 sets - 10 reps  - Hooklying Clamshell with Resistance  - 1 x daily - 7 x weekly - 3 sets - 10 reps - 5 hold  - Seated Transversus Abdominis Bracing with PLB  - 5 x daily - 7 x weekly - 3 sets - 10 reps    Manuals 12/27 1/3 1/8 12/4 12/6 12/11 12/13 12/18 12/20 12/23             RE DS                                          Neuro Re-Ed             Pallof press OJB  2x12 OJB 2x12 OJB 2x10 OJB 2x10 OJB 2x10 OJB 2x10 OJB 2x12 OJB 2x12 OJB 2x12 OJB 2x12   Rectangle  BAPS x40ea X40 ea x40ea X40 ea X40 ea X40 ea X40 ea X40 ea X40 ea X40 ea                Pball press down with TA iso   5\"x30                                                 Ther Ex             Vg for endur and strength L7 10' L7 10' L7 10' L7 10' L7 10' L7 10' L7 10' L7 10' L7 10' L7 10'   Leg ext.  40# 3x10 40#  3x10 np 40# 3x10 40# 3x10 " 40# 3x10 40# 3x10 40# 3x10 40# 3x10 40# 3x10   Standing ham curls 5#   3x10 4# AW 3x10 np 5# AW 3x10 5# AW 3x10 5# AW 3x10 4# AW 3x10 5#   AW 3x10 5#   3x10 5#   3x10   Resisted side step   RHB 2min                                    Ther Activity             Suitcase carry 25# KB with YMB 4x 250'  25# KB with YMB 2x 250'  10# KB with YMB 6x 250'  25# with YTB [id number] 25# KB with YJB [id number] 25# KB with YJB [id number] 15# KB with YJB 2x 250ft 25# KB with YMB 2x 250'  nv nv   STS 10# KB 3x8, standing on airex 15# KB 3x5 airex under feet. hold. 10# KB 3x5 standing on airex 10# KB 3x5 airex under feet. 10# KB 3x5, standing on airex 10# KB 3x10, missed airex under feet - nv 10# KB 3x5, standing on airex  10# KB 3x8, standing on airex 5x STS test                Lunges Sand dune + Airex 3x5 ea. Sand dune +airex 3x5 each hold Sanddune +airex 3x5 each Sanddune + airex 3x10 each Sanddune +airex 3x5 each Sanddune +airex 3x5 each Sand dune + Airex 3x5 ea.  Sand dune + Airex 3x5 ea.  Sand dune + Airex 3x5 ea.                              Gait Training                                       Modalities

## 2025-01-10 ENCOUNTER — TELEPHONE (OUTPATIENT)
Age: 75
End: 2025-01-10

## 2025-01-10 ENCOUNTER — OFFICE VISIT (OUTPATIENT)
Dept: PHYSICAL THERAPY | Facility: REHABILITATION | Age: 75
End: 2025-01-10
Payer: MEDICARE

## 2025-01-10 DIAGNOSIS — G89.29 CHRONIC MIDLINE LOW BACK PAIN WITHOUT SCIATICA: Primary | ICD-10-CM

## 2025-01-10 DIAGNOSIS — M54.50 CHRONIC MIDLINE LOW BACK PAIN WITHOUT SCIATICA: Primary | ICD-10-CM

## 2025-01-10 PROCEDURE — 97530 THERAPEUTIC ACTIVITIES: CPT

## 2025-01-10 PROCEDURE — 97112 NEUROMUSCULAR REEDUCATION: CPT

## 2025-01-10 PROCEDURE — 97110 THERAPEUTIC EXERCISES: CPT

## 2025-01-10 NOTE — TELEPHONE ENCOUNTER
S/w Pt. Pt requested repeat injection.  Injection type: L4 interlaminar epidural steroid injection   Last injection date: 10/30/24  Last office visit:  10/21/24    Where is pain located: B/L LB, radiating into mid back, R > L   Is the pain the same area as before: Yes  Current pain level: 4-9/10  How much % of relief did the last injection provide: 60%  Duration of relief from last injection: 2 months   Is the pain effecting your ADLs: yes    Blood thinners/NSAIDS? No  Diabetes? No  Please advise if able to repeat after 01/30/25.

## 2025-01-10 NOTE — TELEPHONE ENCOUNTER
Caller: Patient    Doctor: COLTON    Reason for call: Would like to schedule a procedure for back pain    Call back#:

## 2025-01-10 NOTE — PROGRESS NOTES
"Daily Note     Today's date: 1/10/2025  Patient name: Miguel Angel Ramos  : 1950  MRN: [id number]  Referring provider: Sundeep Stone MD  Dx:   Encounter Diagnosis     ICD-10-CM    1. Chronic midline low back pain without sciatica  M54.50     G89.29                      Subjective: Ryan reports feeling better than Wed, rested yesterday.       Objective: See treatment diary below      Assessment: Tolerated treatment well. LE strengthening, and core stability tasks without adverse effects. Mild irritation of low back symptoms with suitcase carry, but tolerable to complete tasks. Continued PT would be beneficial to improve function.          Plan: Continue per plan of care.       Precautions: fall risk      POC exp =  2/3/25    Access Code: [email or web address]  URL: [email or web address]  Date: 2024  Prepared by: Court Sousa    Exercises  - Supine Transversus Abdominis Bracing - Hands on Stomach  - 5 x daily - 7 x weekly - 3 sets - 10 reps  - Hooklying Clamshell with Resistance  - 1 x daily - 7 x weekly - 3 sets - 10 reps - 5 hold  - Seated Transversus Abdominis Bracing with PLB  - 5 x daily - 7 x weekly - 3 sets - 10 reps    Manuals 12/27 1/3 1/8 1/10  12/11 12/13 12/18 12/20 12/23             RE DS                                          Neuro Re-Ed             Pallof press OJB  2x12 OJB 2x12 OJB 2x10 OJB 3x10  OJB 2x10 OJB 2x12 OJB 2x12 OJB 2x12 OJB 2x12   Rectangle  BAPS x40ea X40 ea x40ea x40ea  X40 ea X40 ea X40 ea X40 ea X40 ea                Pball press down with TA iso   5\"x30                                                 Ther Ex             Vg for endur and strength L7 10' L7 10' L7 10' L7 10'  L7 10' L7 10' L7 10' L7 10' L7 10'   Leg ext.  40# 3x10 40#  3x10 np 40#  3x10  40# 3x10 40# 3x10 40# 3x10 40# 3x10 40# 3x10   Standing ham curls 5#   3x10 4# AW 3x10 np 4# AW 3x10  5# AW 3x10 4# AW 3x10 5#   AW 3x10 5#   3x10 5#   3x10   Resisted side step   RHB 2min RHB 2min     "                               Ther Activity             Suitcase carry 25# KB with YMB 4x 250'  25# KB with YMB 2x 250'  10# KB with YMB 6x 250'   10# KB with YMB 6x 250'   25# KB with YJB [id number] 15# KB with YJB 2x 250ft 25# KB with YMB 2x 250'  nv nv   STS- HOLD 10# KB 3x8, standing on airex 15# KB 3x5 airex under feet. hold.   10# KB 3x5, standing on airex 10# KB 3x10, missed airex under feet - nv 10# KB 3x5, standing on airex  10# KB 3x8, standing on airex 5x STS test                Lunges- HOLD Sand dune + Airex 3x5 ea. Sand dune +airex 3x5 each hold   Sanddune +airex 3x5 each Sanddune +airex 3x5 each Sand dune + Airex 3x5 ea.  Sand dune + Airex 3x5 ea.  Sand dune + Airex 3x5 ea.                              Gait Training                                       Modalities

## 2025-01-13 DIAGNOSIS — L29.9 PRURITUS: ICD-10-CM

## 2025-01-13 RX ORDER — PREGABALIN 50 MG/1
50 CAPSULE ORAL 3 TIMES DAILY
Qty: 270 CAPSULE | Refills: 0 | Status: SHIPPED | OUTPATIENT
Start: 2025-01-13

## 2025-01-13 NOTE — TELEPHONE ENCOUNTER
Reason for call: NOT A [facility] did not receive they told patient to contact us to resend script      [x] Refill   [] Prior Auth  [] Other:     Office:   [] PCP/Provider -   [x] Specialty/Provider -     Medication: pregabalin (LYRICA) 50 mg capsule     Dose/Frequency: TAKE 1 CAPSULE THREE TIMES A DAY,     Quantity: 270    Pharmacy: [facility] - [address] [phone]    Does the patient have enough for 3 days?   [] Yes   [x] No - Send as HP to POD

## 2025-01-14 NOTE — TELEPHONE ENCOUNTER
Patient has been scheduled for their procedure, please see [employer] message for additional details.

## 2025-01-15 ENCOUNTER — OFFICE VISIT (OUTPATIENT)
Dept: PHYSICAL THERAPY | Facility: REHABILITATION | Age: 75
End: 2025-01-15
Payer: MEDICARE

## 2025-01-15 DIAGNOSIS — M54.50 CHRONIC MIDLINE LOW BACK PAIN WITHOUT SCIATICA: Primary | ICD-10-CM

## 2025-01-15 DIAGNOSIS — G89.29 CHRONIC MIDLINE LOW BACK PAIN WITHOUT SCIATICA: Primary | ICD-10-CM

## 2025-01-15 PROCEDURE — 97110 THERAPEUTIC EXERCISES: CPT | Performed by: PHYSICAL THERAPIST

## 2025-01-15 PROCEDURE — 97112 NEUROMUSCULAR REEDUCATION: CPT | Performed by: PHYSICAL THERAPIST

## 2025-01-15 NOTE — PROGRESS NOTES
"Daily Note     Today's date: 1/15/2025  Patient name: Miguel Angel Ramos  : 1950  MRN: [id number]  Referring provider: Sundeep Stone MD  Dx:   Encounter Diagnosis     ICD-10-CM    1. Chronic midline low back pain without sciatica  M54.50     G89.29           Start Time: 1147  Stop Time: 1225  Total time in clinic (min): 38 minutes    Subjective: Pt reports he is going back to see the doc . States his back has been pretty bad again.      Objective: See treatment diary below      Assessment: Pt tolerated tx well.  Pt will benefit from continued skilled outpatient PT to improve strength, to address therapy goals, to reduce pain, and improve function.      Plan: Continue per plan of care.       Precautions: fall risk      POC exp =  2/3/25    Access Code: [email or web address]  URL: [email or web address]  Date: 2024  Prepared by: Court Sousa    Exercises  - Supine Transversus Abdominis Bracing - Hands on Stomach  - 5 x daily - 7 x weekly - 3 sets - 10 reps  - Hooklying Clamshell with Resistance  - 1 x daily - 7 x weekly - 3 sets - 10 reps - 5 hold  - Seated Transversus Abdominis Bracing with PLB  - 5 x daily - 7 x weekly - 3 sets - 10 reps    Manuals 12/27 1/3 1/8 1/10 1/15    12/20 12/23             RE DS                                          Neuro Re-Ed             Pallof press OJB  2x12 OJB 2x12 OJB 2x10 OJB 3x10 OJB 3x10    OJB 2x12 OJB 2x12   Rectangle  BAPS x40ea X40 ea x40ea x40ea X40 ea    X40 ea X40 ea   SandDune march             Pball press down with TA iso   5\"x30  5\"x30                                               Ther Ex             Vg for endur and strength L7 10' L7 10' L7 10' L7 10' L7 10'    L7 10' L7 10'   Leg ext.  40# 3x10 40#  3x10 np 40#  3x10 40#  3x10    40# 3x10 40# 3x10   Standing ham curls 5#   3x10 4# AW 3x10 np 4# AW 3x10 4# AW 3x10    5#   3x10 5#   3x10   Resisted side step   RHB 2min RHB 2min RHB 2min                                  Ther Activity           "   Suitcase carry 25# KB with YMB 4x 250'  25# KB with YMB 2x 250'  10# KB with YMB 6x 250'   10# KB with YMB 6x 250'  nv    nv nv   STS- HOLD 10# KB 3x8, standing on airex 15# KB 3x5 airex under feet. hold.      10# KB 3x8, standing on airex 5x STS test                Lunges- HOLD Sand dune + Airex 3x5 ea. Sand dune +airex 3x5 each hold      Sand dune + Airex 3x5 ea.  Sand dune + Airex 3x5 ea.                              Gait Training                                       Modalities

## 2025-01-16 ENCOUNTER — OFFICE VISIT (OUTPATIENT)
Dept: FAMILY MEDICINE CLINIC | Facility: CLINIC | Age: 75
End: 2025-01-16
Payer: MEDICARE

## 2025-01-16 VITALS
OXYGEN SATURATION: 96 % | DIASTOLIC BLOOD PRESSURE: 60 MMHG | WEIGHT: 236.25 LBS | HEIGHT: 70 IN | RESPIRATION RATE: 18 BRPM | SYSTOLIC BLOOD PRESSURE: 118 MMHG | BODY MASS INDEX: 33.82 KG/M2 | HEART RATE: 68 BPM | TEMPERATURE: 97.6 F

## 2025-01-16 DIAGNOSIS — G89.29 CHRONIC MIDLINE LOW BACK PAIN WITHOUT SCIATICA: ICD-10-CM

## 2025-01-16 DIAGNOSIS — M54.50 CHRONIC MIDLINE LOW BACK PAIN WITHOUT SCIATICA: ICD-10-CM

## 2025-01-16 DIAGNOSIS — G50.0 TRIGEMINAL NEURALGIA: Primary | ICD-10-CM

## 2025-01-16 DIAGNOSIS — G89.4 CHRONIC PAIN SYNDROME: ICD-10-CM

## 2025-01-16 PROCEDURE — 99213 OFFICE O/P EST LOW 20 MIN: CPT | Performed by: FAMILY MEDICINE

## 2025-01-16 NOTE — PROGRESS NOTES
FAMILY PRACTICE OFFICE VISIT       NAME: Miguel Angel Ramos  AGE: 74 y.o. SEX: male       : 1950        MRN: [id number]    DATE: 2025  TIME: 9:34 AM    Assessment and Plan     Problem List Items Addressed This Visit       Trigeminal neuralgia - Primary    Trigeminal neuralgia.  Patient states his previous neuralgia pain has been very well-controlled since his surgery however he was left with right facial numbness which is chronic         Relevant Orders    CBC    Comprehensive metabolic panel    TSH, 3rd generation    Chronic pain syndrome    Relevant Orders    [facility]    Comprehensive metabolic panel    TSH, 3rd generation    Chronic midline low back pain without sciatica    Back pain.  Patient had been previously given a refill of his Lyrica to continue use 50 mg 3 times daily.  He will proceed with lumbar epidural injection.  If ineffective patient will discuss with his orthopedist regarding restarting meloxicam medication that he took last year.         Relevant Orders    [facility]    Comprehensive metabolic panel    TSH, 3rd generation           Chief Complaint     Chief Complaint   Patient presents with    Back Pain       History of Present Illness     Patient in the office to review chronic medical condition.  He denies any recent illness.  His biggest complaint is of lower back and neck pain.  He is scheduled to have an epidural injection to his lumbar spine at the end of this month and follow-up with his orthopedic back physician early February.  Patient is awaiting mail-order delivery of his Lyrica which he ran out of.  Since being out of medication patient patient has noticed increased discomfort and symptoms of neuropathy of his feet.    Neurologic Problem  The patient's primary symptoms include clumsiness, a loss of balance, memory loss and a visual change. This is a chronic problem. The current episode started more than 1 year ago. The neurological problem developed insidiously. The problem has  been waxing and waning since onset. There was lower extremity focality noted. Associated symptoms include an auditory change, back pain and neck pain. Pertinent negatives include no abdominal pain, aura, fever or headaches. Past treatments include acetaminophen and medication. The treatment provided mild relief.       Review of Systems   Review of Systems   Constitutional:  Negative for fever.   Eyes:  Positive for visual disturbance.        Patient recently completed course of eyedrops for 8 weeks to try and improve vision of his right eye.   Respiratory: Negative.     Cardiovascular: Negative.    Gastrointestinal: Negative.  Negative for abdominal pain.   Musculoskeletal:  Positive for back pain and neck pain.   Neurological:  Positive for loss of balance. Negative for headaches.        As per HPI   Psychiatric/Behavioral:  Positive for memory loss.        Active Problem List     Patient Active Problem List   Diagnosis    Trigeminal neuralgia    Facial spasm    Anemia    Bilateral patellofemoral syndrome    Diverticulosis of colon    Esophageal reflux    Intracranial meningioma (HCC)    Vertigo    Vitamin D deficiency    Need for pneumococcal vaccination    Insomnia    Primary osteoarthritis of right knee    DNS (deviated nasal septum)    Chronic pain syndrome    Uncomplicated opioid dependence (HCC)    Long-term current use of opiate analgesic    Mood disorder (HCC)    Skin lesions    Neuralgic facial pain    Preoperative evaluation of a medical condition to rule out surgical contraindications (TAR required)    Pruritus    Rib contusion, right, initial encounter    Closed fracture of one rib of right side    Hearing loss of left ear    Chronic midline low back pain without sciatica    Intervertebral disc disorder with radiculopathy of lumbar region       Past Medical History:  Past Medical History:   Diagnosis Date    Arthritis     Right knee    Chronic pain disorder     Difficulty walking Unknown    Unsteady on  feet; pain in knees.    Ear problems     HL (hearing loss)     Tinnitus     Trigeminal neuralgia pain     Ulcer of bile duct     Vision loss 22    Following brain surgery on 22. Scratched cornea.       Past Surgical History:  Past Surgical History:   Procedure Laterality Date    APPENDECTOMY      BRAIN SURGERY      Gamma Knife for Trigeminal Neuralgia    CATARACT EXTRACTION      CRANIOTOMY FOR TUMOR  2022    ESOPHAGOGASTRODUODENOSCOPY      HERNIA REPAIR      OTHER SURGICAL HISTORY      gamma knife RT in  and     MD ARTHRP KNE CONDYLE&PLATU MEDIAL&LAT COMPARTMENTS Right 10/22/2018    Procedure: ARTHROPLASTY KNEE TOTAL;  Surgeon: Rhina Pratt MD;  Location: [facility];  Service: Orthopedics    MD CREATE LESION STRTCTC PRQ NEUROLYTIC GASSERIAN Right 2018    Procedure: RHIZOTOMY TRIGEMINAL NERVES (V2 & V3);  Surgeon: Jam Winter MD;  Location: [facility];  Service: Neurosurgery    REPLACEMENT TOTAL KNEE Right 10/2018    RHIZOTOMY W/ RADIOFREQUENCY ABLATION Right 2014    Stereotactic Ablation of Gasserian Ganglion Right sided trigeminal V2 radiofrequency rhizotomy x2       Family History:  Family History   Problem Relation Age of Onset    Emphysema Mother     Colon cancer Father     Skin cancer Maternal Grandmother        Social History:  Social History     Socioeconomic History    Marital status: /Civil Union     Spouse name: Gina    Number of children: 2    Years of education: BS Degree plus addl classes    Highest education level: Not on file   Occupational History    Occupation: Retired     Comment: 20 years [employer] &    Tobacco Use    Smoking status: Never    Smokeless tobacco: Never    Tobacco comments:     Both my parents smoked -- dad (2 packs a day); mom (4 packs a day),  in  at 67,  emphysema.   Vaping Use    Vaping status: Never Used   Substance and Sexual Activity    Alcohol use: Yes     Alcohol/week: 5.0 standard drinks of  alcohol     Types: 2 Cans of beer, 3 Standard drinks or equivalent per week     Comment: Rum and vanilla Coke    Drug use: No    Sexual activity: Yes     Partners: Female     Birth control/protection: Post-menopausal   Other Topics Concern    Not on file   Social History Narrative    Caffeine use: Daily caffeinated cola, drinks coffee    Lives at home with wife Gina     Social Drivers of Health     Financial Resource Strain: Low Risk  (1/4/2023)    Overall Financial Resource Strain (CARDIA)     Difficulty of Paying Living Expenses: Not very hard   Food Insecurity: No Food Insecurity (7/8/2024)    Nursing - Inadequate Food Risk Classification     Worried About Running Out of Food in the Last Year: Never true     Ran Out of Food in the Last Year: Never true     Ran Out of Food in the Last Year: Not on file   Transportation Needs: No Transportation Needs (7/8/2024)    PRAPARE - Transportation     Lack of Transportation (Medical): No     Lack of Transportation (Non-Medical): No   Physical Activity: Not on file   Stress: Not on file   Social Connections: Not on file   Intimate Partner Violence: Not on file   Housing Stability: Low Risk  (7/8/2024)    Housing Stability Vital Sign     Unable to Pay for Housing in the Last Year: No     Number of Times Moved in the Last Year: 0     Homeless in the Last Year: No       Objective     Vitals:    01/16/25 0836   BP: 118/60   Pulse: 68   Resp: 18   Temp: 97.6 °F (36.4 °C)   SpO2: 96%     Wt Readings from Last 3 Encounters:   01/16/25 107 kg (236 lb 4 oz)   10/21/24 108 kg (238 lb)   09/03/24 109 kg (240 lb 4.8 oz)       Physical Exam  Constitutional:       General: He is not in acute distress.     Appearance: Normal appearance. He is not ill-appearing.   HENT:      Head: Normocephalic and atraumatic.   Eyes:      General:         Right eye: No discharge.         Left eye: No discharge.      Extraocular Movements: Extraocular movements intact.      Conjunctiva/sclera: Conjunctivae  "normal.      Pupils: Pupils are equal, round, and reactive to light.   Neck:      Vascular: No carotid bruit.   Cardiovascular:      Rate and Rhythm: Normal rate and regular rhythm.      Heart sounds: Normal heart sounds. No murmur heard.  Pulmonary:      Effort: Pulmonary effort is normal.      Breath sounds: Normal breath sounds. No wheezing, rhonchi or rales.   Abdominal:      General: Abdomen is flat. Bowel sounds are normal. There is no distension.      Palpations: Abdomen is soft.      Tenderness: There is no abdominal tenderness. There is no guarding or rebound.   Musculoskeletal:      Right lower leg: No edema.      Left lower leg: No edema.   Lymphadenopathy:      Cervical: No cervical adenopathy.   Skin:     Findings: No rash.   Neurological:      General: No focal deficit present.      Mental Status: He is alert and oriented to person, place, and time.      Cranial Nerves: No cranial nerve deficit.   Psychiatric:         Mood and Affect: Mood normal.         Behavior: Behavior normal.         Thought Content: Thought content normal.         Judgment: Judgment normal.         Pertinent Laboratory/Diagnostic Studies:  Lab Results   Component Value Date    GLUCOSE 78 12/19/2015    BUN 11 07/09/2024    CREATININE 0.95 07/09/2024    CALCIUM 10.4 (H) 07/09/2024     12/19/2015    K 4.9 07/09/2024    CO2 29 07/09/2024     07/09/2024     Lab Results   Component Value Date    ALT 16 07/09/2024    AST 16 07/09/2024    ALKPHOS 62 07/09/2024    BILITOT 0.45 06/24/2015       Lab Results   Component Value Date    WBC 7.88 06/12/2021    HGB 14.5 06/12/2021    HCT 45.6 06/12/2021    MCV 95 06/12/2021     06/12/2021       No results found for: \"TSH\"    Lab Results   Component Value Date    CHOL 185 06/24/2015     Lab Results   Component Value Date    TRIG 62 07/09/2024     Lab Results   Component Value Date    HDL 54 07/09/2024     Lab Results   Component Value Date    LDLCALC 95 07/09/2024     Lab " "Results   Component Value Date    HGBA1C 5.1 09/26/2018       Results for orders placed or performed in visit on 07/09/24   Comprehensive metabolic panel    Collection Time: 07/09/24  9:52 AM   Result Value Ref Range    Sodium 142 135 - 147 mmol/L    Potassium 4.9 3.5 - 5.3 mmol/L    Chloride 106 96 - 108 mmol/L    CO2 29 21 - 32 mmol/L    ANION GAP 7 4 - 13 mmol/L    BUN 11 5 - 25 mg/dL    Creatinine 0.95 0.60 - 1.30 mg/dL    Glucose, Fasting 94 65 - 99 mg/dL    Calcium 10.4 (H) 8.4 - 10.2 mg/dL    AST 16 13 - 39 U/L    ALT 16 7 - 52 U/L    Alkaline Phosphatase 62 34 - 104 U/L    Total Protein 6.2 (L) 6.4 - 8.4 g/dL    Albumin 3.8 3.5 - 5.0 g/dL    Total Bilirubin 0.57 0.20 - 1.00 mg/dL    eGFR 78 ml/min/1.73sq m   Lipid panel    Collection Time: 07/09/24  9:52 AM   Result Value Ref Range    Cholesterol 161 See Comment mg/dL    Triglycerides 62 See Comment mg/dL    HDL, Direct 54 >=40 mg/dL    LDL Calculated 95 0 - 100 mg/dL    Non-HDL-Chol (CHOL-HDL) 107 mg/dl   TSH, 3rd generation    Collection Time: 07/09/24  9:52 AM   Result Value Ref Range    TSH 3RD GENERATON 2.149 0.450 - 4.500 uIU/mL   PSA, Total Screen    Collection Time: 07/09/24  9:52 AM   Result Value Ref Range    PSA 1.916 0.000 - 4.000 ng/mL       Orders Placed This Encounter   Procedures    CBC    Comprehensive metabolic panel    TSH, 3rd generation       ALLERGIES:  Allergies   Allergen Reactions    Gabapentin Delirium    Oxcarbazepine Dizziness    Baclofen Other (See Comments)     \"unable to think and form sentences\"    Carbamazepine Other (See Comments)     \"unable to think\"    Lamictal [Lamotrigine] Confusion     Difficulty to think clearly     Tramadol Other (See Comments)     \"forgetful\"       Current Medications     Current Outpatient Medications   Medication Sig Dispense Refill    Acetaminophen (TYLENOL PO) Take 2 tablets by mouth as needed       ascorbic acid (VITAMIN C) 500 MG tablet Take 1 tablet (500 mg total) by mouth daily 60 tablet 0 "    Calcium 250 MG CAPS Take 250 mg by mouth daily       cyanocobalamin (VITAMIN B-12) 1,000 mcg tablet Take 1 tablet by mouth daily       MELATONIN PO Take 10 mg by mouth daily at bedtime      meloxicam (Mobic) 7.5 mg tablet Take 1 tablet (7.5 mg total) by mouth daily 14 tablet 0    pregabalin (LYRICA) 50 mg capsule Take 1 capsule (50 mg total) by mouth 3 (three) times a day 270 capsule 0    Probiotic Product (PROBIOTIC PO) Take 1 tablet by mouth in the morning      ciprofloxacin (Ciloxan) 0.3 % ophthalmic ointment  (Patient not taking: Reported on 8/11/2023)       No current facility-administered medications for this visit.         Health Maintenance     Health Maintenance   Topic Date Due    Hepatitis C Screening  Never done    SLP PLAN OF CARE  Never done    Falls: Plan of Care  Never done    Zoster Vaccine (2 of 2) 11/30/2023    PT PLAN OF CARE  01/22/2025    RSV Vaccine Age 60+ Years (1 - 1-dose 75+ series) 06/18/2025    Medicare Annual Wellness Visit (AWV)  07/09/2025    Fall Risk  08/07/2025    Depression Screening  01/16/2026    Colorectal Cancer Screening  08/15/2027    Pneumococcal Vaccine: 65+ Years  Completed    Influenza Vaccine  Completed    COVID-19 Vaccine  Completed    Meningococcal B Vaccine  Aged Out    RSV Vaccine age 0-20 Months  Aged Out    HIB Vaccine  Aged Out    IPV Vaccine  Aged Out    Hepatitis A Vaccine  Aged Out    Meningococcal ACWY Vaccine  Aged Out    HPV Vaccine  Aged Out     Immunization History   Administered Date(s) Administered    COVID-19 Moderna mRNA Vaccine 12 Yr+ 50 mcg/0.5 mL (Spikevax) 10/03/2024    COVID-19 PFIZER VACCINE 0.3 ML IM 03/03/2021, 03/24/2021, 10/06/2021, 04/20/2022    COVID-19 Pfizer Vac BIVALENT Fox-sucrose 12 Yr+ IM 10/27/2022    COVID-19 Pfizer mRNA vacc PF fox-sucrose 12 yr and older (Comirnaty) 10/05/2023    COVID-19 Pfizer vac (Fox-sucrose, gray cap) 12 yr+ IM 04/20/2022    INFLUENZA 11/07/2018, 09/18/2020, 10/06/2021, 10/19/2022, 10/05/2023     Influenza Split High Dose Preservative Free IM 11/07/2018, 09/27/2019, 10/03/2024    Pneumococcal Conjugate 13-Valent 02/22/2018, 02/04/2021    Pneumococcal Polysaccharide PPV23 02/10/2017    Zoster Vaccine Recombinant 10/05/2023       Joey Orourke MD

## 2025-01-16 NOTE — ASSESSMENT & PLAN NOTE
Trigeminal neuralgia.  Patient states his previous neuralgia pain has been very well-controlled since his surgery however he was left with right facial numbness which is chronic

## 2025-01-16 NOTE — ASSESSMENT & PLAN NOTE
Back pain.  Patient had been previously given a refill of his Lyrica to continue use 50 mg 3 times daily.  He will proceed with lumbar epidural injection.  If ineffective patient will discuss with his orthopedist regarding restarting meloxicam medication that he took last year.

## 2025-01-17 ENCOUNTER — OFFICE VISIT (OUTPATIENT)
Dept: PHYSICAL THERAPY | Facility: REHABILITATION | Age: 75
End: 2025-01-17
Payer: MEDICARE

## 2025-01-17 DIAGNOSIS — G89.29 CHRONIC MIDLINE LOW BACK PAIN WITHOUT SCIATICA: Primary | ICD-10-CM

## 2025-01-17 DIAGNOSIS — M54.50 CHRONIC MIDLINE LOW BACK PAIN WITHOUT SCIATICA: Primary | ICD-10-CM

## 2025-01-17 PROCEDURE — 97110 THERAPEUTIC EXERCISES: CPT

## 2025-01-17 PROCEDURE — 97112 NEUROMUSCULAR REEDUCATION: CPT

## 2025-01-17 NOTE — PROGRESS NOTES
"Daily Note     Today's date: 2025  Patient name: Miguel Angel Ramos  : 1950  MRN: [id number]  Referring provider: Snudeep Stone MD  Dx:   Encounter Diagnosis     ICD-10-CM    1. Chronic midline low back pain without sciatica  M54.50     G89.29           Start Time: 0830  Stop Time: 0910  Total time in clinic (min): 40 minutes    Subjective: Patient reports his back is still bothersome but a little less today compared to last session.       Objective: See treatment diary below      Assessment: Continued with core stabilization and proximal hip strengthening as outlined below. Able to complete all listed exercises without a flare up in symptoms. Verbal cues to maintain proper alignment of hips and feet with resisted side stepping. Patient demonstrated fatigue post-tx and would benefit from continued PT to improve level of function.       Plan: Continue per POC. Increase reps/resistance as tolerated.      Precautions: fall risk      POC exp =  2/3/25    Access Code: [email or web address]  URL: [email or web address]  Date: 2024  Prepared by: Court Sousa    Exercises  - Supine Transversus Abdominis Bracing - Hands on Stomach  - 5 x daily - 7 x weekly - 3 sets - 10 reps  - Hooklying Clamshell with Resistance  - 1 x daily - 7 x weekly - 3 sets - 10 reps - 5 hold  - Seated Transversus Abdominis Bracing with PLB  - 5 x daily - 7 x weekly - 3 sets - 10 reps    Manuals 12/27 1/3 1/8 1/10 1/15 1/17   12/20 12/23             RE DS                                          Neuro Re-Ed             Pallof press OJB  2x12 OJB 2x12 OJB 2x10 OJB 3x10 OJB 3x10 OJB 3x10   OJB 2x12 OJB 2x12   Rectangle  BAPS x40ea X40 ea x40ea x40ea X40 ea X40 ea   X40 ea X40 ea   SandDu             Pball press down with TA iso   5\"x30  5\"x30 5\"x30                                              Ther Ex             Vg for endur and strength L7 10' L7 10' L7 10' L7 10' L7 10' L7 10'   L7 10' L7 10'   Leg ext.  40# 3x10 " 40#  3x10 np 40#  3x10 40#  3x10 40#  3x10   40# 3x10 40# 3x10   Standing ham curls 5#   3x10 4# AW 3x10 np 4# AW 3x10 4# AW 3x10 4# AW 3x10   5#   3x10 5#   3x10   Resisted side step   RHB 2min RHB 2min RHB 2min RHB 2min                                 Ther Activity             Suitcase carry 25# KB with YMB 4x 250'  25# KB with YMB 2x 250'  10# KB with YMB 6x 250'   10# KB with YMB 6x 250'  nv  10# KB with YMB 6x 250'    nv nv   STS- HOLD 10# KB 3x8, standing on airex 15# KB 3x5 airex under feet. hold.      10# KB 3x8, standing on airex 5x STS test                Lunges- HOLD Sand dune + Airex 3x5 ea. Sand dune +airex 3x5 each hold      Sand dune + Airex 3x5 ea.  Sand dune + Airex 3x5 ea.                              Gait Training                                       Modalities

## 2025-01-22 ENCOUNTER — OFFICE VISIT (OUTPATIENT)
Dept: PHYSICAL THERAPY | Facility: REHABILITATION | Age: 75
End: 2025-01-22
Payer: MEDICARE

## 2025-01-22 DIAGNOSIS — M54.50 CHRONIC MIDLINE LOW BACK PAIN WITHOUT SCIATICA: Primary | ICD-10-CM

## 2025-01-22 DIAGNOSIS — G89.29 CHRONIC MIDLINE LOW BACK PAIN WITHOUT SCIATICA: Primary | ICD-10-CM

## 2025-01-22 PROCEDURE — 97110 THERAPEUTIC EXERCISES: CPT

## 2025-01-22 PROCEDURE — 97112 NEUROMUSCULAR REEDUCATION: CPT

## 2025-01-22 NOTE — PROGRESS NOTES
"Daily Note     Today's date: 2025  Patient name: Miguel Angel Ramos  : 1950  MRN: [id number]  Referring provider: Sundeep Stone MD  Dx:   Encounter Diagnosis     ICD-10-CM    1. Chronic midline low back pain without sciatica  M54.50     G89.29                      Subjective: Ryan stated that his low back is feeling really good today, he has been a little more active in the last few days- less time watching TV/prolonged sitting.       Objective: See treatment diary below      Assessment: Tolerated treatment well. Continued work on core and global hip/LE strengthening, with good tolerance. He was able to tolerate increased weight for leg extension with generalized muscle fatigue. Less lateral veering with suitcase carry.  Continued PT would be beneficial to improve function.          Plan: Continue per plan of care.       Precautions: fall risk      POC exp =  2/3/25    Access Code: [email or web address]  URL: [email or web address]  Date: 2024  Prepared by: Court Sousa    Exercises  - Supine Transversus Abdominis Bracing - Hands on Stomach  - 5 x daily - 7 x weekly - 3 sets - 10 reps  - Hooklying Clamshell with Resistance  - 1 x daily - 7 x weekly - 3 sets - 10 reps - 5 hold  - Seated Transversus Abdominis Bracing with PLB  - 5 x daily - 7 x weekly - 3 sets - 10 reps    Manuals 12/27 1/3 1/8 1/10 1/15 1/17 1/22  12/20 12/23             RE DS                                          Neuro Re-Ed             Pallof press OJB  2x12 OJB 2x12 OJB 2x10 OJB 3x10 OJB 3x10 OJB 3x10 OJB 3x10  OJB 2x12 OJB 2x12   Rectangle  BAPS x40ea X40 ea x40ea x40ea X40 ea X40 ea X40 ea  X40 ea X40 ea   Du             Pball press down with TA iso   5\"x30  5\"x30 5\"x30 5\"x30                                             Ther Ex             Vg for endur and strength L7 10' L7 10' L7 10' L7 10' L7 10' L7 10' L7 10'  L7 10' L7 10'   Leg ext.  40# 3x10 40#  3x10 np 40#  3x10 40#  3x10 40#  3x10 45#  3x10  40# " 3x10 40# 3x10   Standing ham curls 5#   3x10 4# AW 3x10 np 4# AW 3x10 4# AW 3x10 4# AW 3x10 4# AW 3x10  5#   3x10 5#   3x10   Resisted side step   RHB 2min RHB 2min RHB 2min RHB 2min RHB 2min                                Ther Activity             Suitcase carry 25# KB with YMB 4x 250'  25# KB with YMB 2x 250'  10# KB with YMB 6x 250'   10# KB with YMB 6x 250'  nv  10# KB with YMB 6x 250'   10# KB with YMB 6x 250'   nv nv   STS- HOLD 10# KB 3x8, standing on airex 15# KB 3x5 airex under feet. hold.      10# KB 3x8, standing on airex 5x STS test                Lunges- HOLD Sand dune + Airex 3x5 ea. Sand dune +airex 3x5 each hold      Sand dune + Airex 3x5 ea.  Sand dune + Airex 3x5 ea.                              Gait Training                                       Modalities

## 2025-01-24 ENCOUNTER — OFFICE VISIT (OUTPATIENT)
Dept: PHYSICAL THERAPY | Facility: REHABILITATION | Age: 75
End: 2025-01-24
Payer: MEDICARE

## 2025-01-24 DIAGNOSIS — G89.29 CHRONIC MIDLINE LOW BACK PAIN WITHOUT SCIATICA: Primary | ICD-10-CM

## 2025-01-24 DIAGNOSIS — M54.50 CHRONIC MIDLINE LOW BACK PAIN WITHOUT SCIATICA: Primary | ICD-10-CM

## 2025-01-24 PROCEDURE — 97110 THERAPEUTIC EXERCISES: CPT | Performed by: PHYSICAL THERAPIST

## 2025-01-24 PROCEDURE — 97112 NEUROMUSCULAR REEDUCATION: CPT | Performed by: PHYSICAL THERAPIST

## 2025-01-24 NOTE — PROGRESS NOTES
"Daily Note     Today's date: 2025  Patient name: Miguel Angel Ramos  : 1950  MRN: [id number]  Referring provider: Sundeep Stone MD  Dx:   Encounter Diagnosis     ICD-10-CM    1. Chronic midline low back pain without sciatica  M54.50     G89.29             Start Time: 08  Stop Time: 917  Total time in clinic (min): 42 minutes    Subjective: Pt reports he is feeling pretty good today.      Objective: See treatment diary below      Assessment: Pt responded well to tx and completed all exercises without issue.  Pt will benefit from continued skilled outpatient PT to improve strength, to address therapy goals, to reduce pain, and improve function.          Plan: Continue per plan of care.       Precautions: fall risk      POC exp =  2/3/25    Access Code: [email or web address]  URL: [email or web address]  Date: 2024  Prepared by: Court Sousa    Exercises  - Supine Transversus Abdominis Bracing - Hands on Stomach  - 5 x daily - 7 x weekly - 3 sets - 10 reps  - Hooklying Clamshell with Resistance  - 1 x daily - 7 x weekly - 3 sets - 10 reps - 5 hold  - Seated Transversus Abdominis Bracing with PLB  - 5 x daily - 7 x weekly - 3 sets - 10 reps    Manuals 12/27 1/3 1/8 1/10 1/15 1/17 1/22 1/24  12/23             RE DS                                          Neuro Re-Ed             Pallof press OJB  2x12 OJB 2x12 OJB 2x10 OJB 3x10 OJB 3x10 OJB 3x10 OJB 3x10 OJB 3x10  OJB 2x12   Rectangle  BAPS x40ea X40 ea x40ea x40ea X40 ea X40 ea X40 ea X40 ea  X40 ea   Dune march             Pball press down with TA iso   5\"x30  5\"x30 5\"x30 5\"x30 5\"x30                                            Ther Ex             Vg for endur and strength L7 10' L7 10' L7 10' L7 10' L7 10' L7 10' L7 10' L7 12'  L7 10'   Leg ext.  40# 3x10 40#  3x10 np 40#  3x10 40#  3x10 40#  3x10 45#  3x10 45#  3x10  40# 3x10   Standing ham curls 5#   3x10 4# AW 3x10 np 4# AW 3x10 4# AW 3x10 4# AW 3x10 4# AW 3x10 4# AW 3x10  5#   3x10 "   Resisted side step   RHB 2min RHB 2min RHB 2min RHB 2min RHB 2min RHB 2min                               Ther Activity             Suitcase carry 25# KB with YMB 4x 250'  25# KB with YMB 2x 250'  10# KB with YMB 6x 250'   10# KB with YMB 6x 250'  nv  10# KB with YMB 6x 250'   10# KB with YMB 6x 250'  nv  nv   STS- HOLD 10# KB 3x8, standing on airex 15# KB 3x5 airex under feet. hold.       5x STS test                Lunges- HOLD Sand dune + Airex 3x5 ea. Sand dune +airex 3x5 each hold       Sand dune + Airex 3x5 ea.                              Gait Training                                       Modalities

## 2025-01-29 ENCOUNTER — OFFICE VISIT (OUTPATIENT)
Dept: PHYSICAL THERAPY | Facility: REHABILITATION | Age: 75
End: 2025-01-29
Payer: MEDICARE

## 2025-01-29 DIAGNOSIS — M54.50 CHRONIC MIDLINE LOW BACK PAIN WITHOUT SCIATICA: Primary | ICD-10-CM

## 2025-01-29 DIAGNOSIS — G89.29 CHRONIC MIDLINE LOW BACK PAIN WITHOUT SCIATICA: Primary | ICD-10-CM

## 2025-01-29 PROCEDURE — 97110 THERAPEUTIC EXERCISES: CPT | Performed by: PHYSICAL THERAPIST

## 2025-01-29 PROCEDURE — 97112 NEUROMUSCULAR REEDUCATION: CPT | Performed by: PHYSICAL THERAPIST

## 2025-01-29 NOTE — PROGRESS NOTES
"Daily Note     Today's date: 2025  Patient name: Miguel Angel Ramos  : 1950  MRN: [id number]  Referring provider: Sundeep Stone MD  Dx:   Encounter Diagnosis     ICD-10-CM    1. Chronic midline low back pain without sciatica  M54.50     G89.29             Start Time: 1147  Stop Time: 1230  Total time in clinic (min): 43 minutes    Subjective: Pt reports he is feeling pretty really good today.      Objective: See treatment diary below      Assessment: Pt responded well to tx and completed all exercises without issue. Cues given for reps and sets. Pt will benefit from continued skilled outpatient PT to improve strength, to address therapy goals, to reduce pain, and improve function.      Plan: Continue per plan of care.       Precautions: fall risk      POC exp =  2/3/25    Access Code: [email or web address]  URL: [email or web address]  Date: 2024  Prepared by: Court Sousa    Exercises  - Supine Transversus Abdominis Bracing - Hands on Stomach  - 5 x daily - 7 x weekly - 3 sets - 10 reps  - Hooklying Clamshell with Resistance  - 1 x daily - 7 x weekly - 3 sets - 10 reps - 5 hold  - Seated Transversus Abdominis Bracing with PLB  - 5 x daily - 7 x weekly - 3 sets - 10 reps    Manuals 12/27 1/3 1/8 1/10 1/15 1/17 1/22 1/24 1/29 12/23             RE DS                                          Neuro Re-Ed             Pallof press OJB  2x12 OJB 2x12 OJB 2x10 OJB 3x10 OJB 3x10 OJB 3x10 OJB 3x10 OJB 3x10 OJB 3x10 OJB 2x12   Rectangle  BAPS x40ea X40 ea x40ea x40ea X40 ea X40 ea X40 ea X40 ea X40 ea X40 ea   Pball press down with TA iso   5\"x30  5\"x30 5\"x30 5\"x30 5\"x30 5\"x30                                           Ther Ex             Vg for endur and strength L7 10' L7 10' L7 10' L7 10' L7 10' L7 10' L7 10' L7 12' L7 10' L7 10'   Leg ext.  40# 3x10 40#  3x10 np 40#  3x10 40#  3x10 40#  3x10 45#  3x10 45#  3x10 55#  3x10 40# 3x10   Standing ham curls 5#   3x10 4# AW 3x10 np 4# AW 3x10 4# AW 3x10 4# " AW 3x10 4# AW 3x10 4# AW 3x10 4# AW 3x10 5#   3x10   Resisted side step   RHB 2min RHB 2min RHB 2min RHB 2min RHB 2min RHB 2min RHB 2min                              Ther Activity             Suitcase carry 25# KB with YMB 4x 250'  25# KB with YMB 2x 250'  10# KB with YMB 6x 250'   10# KB with YMB 6x 250'  nv  10# KB with YMB 6x 250'   10# KB with YMB 6x 250'  nv  nv   STS- HOLD 10# KB 3x8, standing on airex 15# KB 3x5 airex under feet. hold.       5x STS test                Lunges- HOLD Sand dune + Airex 3x5 ea. Sand dune +airex 3x5 each hold       Sand dune + Airex 3x5 ea.                              Gait Training                                       Modalities

## 2025-01-31 ENCOUNTER — EVALUATION (OUTPATIENT)
Dept: PHYSICAL THERAPY | Facility: REHABILITATION | Age: 75
End: 2025-01-31
Payer: MEDICARE

## 2025-01-31 ENCOUNTER — HOSPITAL ENCOUNTER (OUTPATIENT)
Dept: RADIOLOGY | Facility: CLINIC | Age: 75
End: 2025-01-31
Payer: MEDICARE

## 2025-01-31 VITALS
HEART RATE: 74 BPM | DIASTOLIC BLOOD PRESSURE: 69 MMHG | TEMPERATURE: 98.3 F | SYSTOLIC BLOOD PRESSURE: 109 MMHG | RESPIRATION RATE: 20 BRPM | OXYGEN SATURATION: 98 %

## 2025-01-31 DIAGNOSIS — M54.50 CHRONIC MIDLINE LOW BACK PAIN WITHOUT SCIATICA: Primary | ICD-10-CM

## 2025-01-31 DIAGNOSIS — M54.16 LUMBAR RADICULOPATHY: ICD-10-CM

## 2025-01-31 DIAGNOSIS — G89.29 CHRONIC MIDLINE LOW BACK PAIN WITHOUT SCIATICA: Primary | ICD-10-CM

## 2025-01-31 PROCEDURE — 97110 THERAPEUTIC EXERCISES: CPT | Performed by: PHYSICAL THERAPIST

## 2025-01-31 PROCEDURE — 62323 NJX INTERLAMINAR LMBR/SAC: CPT | Performed by: ANESTHESIOLOGY

## 2025-01-31 PROCEDURE — 97112 NEUROMUSCULAR REEDUCATION: CPT | Performed by: PHYSICAL THERAPIST

## 2025-01-31 RX ORDER — METHYLPREDNISOLONE ACETATE 80 MG/ML
80 INJECTION, SUSPENSION INTRA-ARTICULAR; INTRALESIONAL; INTRAMUSCULAR; PARENTERAL; SOFT TISSUE ONCE
Status: COMPLETED | OUTPATIENT
Start: 2025-01-31 | End: 2025-01-31

## 2025-01-31 RX ORDER — BUPIVACAINE HCL/PF 2.5 MG/ML
2 VIAL (ML) INJECTION ONCE
Status: COMPLETED | OUTPATIENT
Start: 2025-01-31 | End: 2025-01-31

## 2025-01-31 RX ADMIN — BUPIVACAINE HYDROCHLORIDE 2 ML: 2.5 INJECTION, SOLUTION EPIDURAL; INFILTRATION; INTRACAUDAL at 13:42

## 2025-01-31 RX ADMIN — METHYLPREDNISOLONE ACETATE 80 MG: 80 INJECTION, SUSPENSION INTRA-ARTICULAR; INTRALESIONAL; INTRAMUSCULAR; SOFT TISSUE at 13:42

## 2025-01-31 RX ADMIN — IOHEXOL 1 ML: 300 INJECTION, SOLUTION INTRAVENOUS at 13:42

## 2025-01-31 NOTE — PROGRESS NOTES
Daily Note     Today's date: 2025  Patient name: Miguel Angel Ramos  : 1950  MRN: [id number]  Referring provider: Sundeep Stone MD  Dx:   Encounter Diagnosis     ICD-10-CM    1. Chronic midline low back pain without sciatica  M54.50     G89.29               Start Time: 08  Stop Time: 922  Total time in clinic (min): 44 minutes    Subjective: Patient reports feeling 45% better since starting therapy. He goes back to see Dr. ISLAS this afternoon for an injection.        Objective: See treatment diary below     Pain:  Best Pain: 2/10 at last RE, 3/10  Worst pain: 8/10 at last RE,  8/10        Lumbar  AROM  Flexion:  WFL, no pain  Extension:   50% painful   Left rotation:  WFL  Right rotation:  WFL end range pain R low back     Strength/Myotome Testing   Left Hip   Planes of Motion   Extension: 4+  Abduction: 4+     Right Hip   Planes of Motion   Extension: 4+  Abduction: 4+     5x STS: No UE 7.13 seconds (7.84 previously)     Assessment: Pt presents for reassessment of low back pain. Since last RE pts sx's have worsened as he feels his injection has worn off. His lumbar AROM is slightly more painful and he reports slightly less improvement overall compared to last RE. He reports 45% improvement compared to 65% improvement last RE. He is going back to pain mgmt this afternoon for another injection. However he would like to continue PT as he does feel therapy is helping to manage his pain and is improving his strength.  Recommend continued skilled outpatient PT to improve strength, to address therapy goals, to reduce pain, and improve function.        Short term goals: to be met in 3-4 weeks  Pt independent with initial HEP, rationale, technique and frequency, for ROM and pain control. MET  Pt will report at least a 25% reduction in subjective pain complaints/symptoms to better manage ADLs and household chores. MET  Improve max pain level by 2 points on the numeric pain rating scale indicating a  clinically significant reduction in pain level. MET  Pt will achieve an improvement in FOTO score indicating an improvement in pain and function. MET        Long term goals: to be met in 6-8 weeks  Pt will have full and pain free lumbar ROM to better manage ADLs and household chores. IN PROGRESS  Pt will report a 75% or > reduction in subjective pain complaints/symptoms to better manage ADLs and household chores. UNMET  Improve B hip abduction MMT to a 4/5 or greater for improved lumbopelvic stability. MET  Pt will improve FOTO score to better then expected outcome indicating an overall improvement in pain and function MET  Pt will be able to sleep through the night (6-8 hours) without waking secondary to pain. MET  Pt will be able to perform ADLs, iADLS, and household duties without pain or restriction indicating return to PLOF. NOT MET  Pt independent with rationale, technique and plan for performance of advanced HEP to ensure independent self-management of symptoms upon discharge. IN PROGRESS  Achieve pts therapy goal: to be able to walk longer distances and sit for longer periods to write PARTIALLY MET               Plan: Continue per plan of care. 2x/week for 8-12 weeks       Precautions: fall risk      POC exp =  4/25/25    Access Code: [email or web address]  URL: [email or web address]  Date: 08/07/2024  Prepared by: Court Sousa    Exercises  - Supine Transversus Abdominis Bracing - Hands on Stomach  - 5 x daily - 7 x weekly - 3 sets - 10 reps  - Hooklying Clamshell with Resistance  - 1 x daily - 7 x weekly - 3 sets - 10 reps - 5 hold  - Seated Transversus Abdominis Bracing with PLB  - 5 x daily - 7 x weekly - 3 sets - 10 reps    Manuals 12/27 1/3 1/8 1/10 1/15 1/17 1/22 1/24 1/29 1/31             RE DS                                          Neuro Re-Ed             Pallof press OJB  2x12 OJB 2x12 OJB 2x10 OJB 3x10 OJB 3x10 OJB 3x10 OJB 3x10 OJB 3x10 OJB 3x10 Grey TB 2x12 ea   Rectangle  BAPS x40ea X40 ea  "x40ea x40ea X40 ea X40 ea X40 ea X40 ea X40 ea X40 ea   Pball press down with TA iso   5\"x30  5\"x30 5\"x30 5\"x30 5\"x30 5\"x30 5\"x30                                          Ther Ex             Vg for endur and strength L7 10' L7 10' L7 10' L7 10' L7 10' L7 10' L7 10' L7 12' L7 10' L7 10'   Leg ext.  40# 3x10 40#  3x10 np 40#  3x10 40#  3x10 40#  3x10 45#  3x10 45#  3x10 55#  3x10 40# 3x10   Standing ham curls 5#   3x10 4# AW 3x10 np 4# AW 3x10 4# AW 3x10 4# AW 3x10 4# AW 3x10 4# AW 3x10 4# AW 3x10 5#   3x10   Resisted side step   RHB 2min RHB 2min RHB 2min RHB 2min RHB 2min RHB 2min RHB 2min RHB 2'                             Ther Activity             Suitcase carry 25# KB with YMB 4x 250'  25# KB with YMB 2x 250'  10# KB with YMB 6x 250'   10# KB with YMB 6x 250'  nv  10# KB with YMB 6x 250'   10# KB with YMB 6x 250'  nv     STS- HOLD 10# KB 3x8, standing on airex 15# KB 3x5 airex under feet. hold.       5x STS test                Lunges- HOLD Sand dune + Airex 3x5 ea. Sand dune +airex 3x5 each hold                                    Gait Training                                       Modalities                                                                    "

## 2025-01-31 NOTE — DISCHARGE INSTR - LAB
Epidural Steroid Injection   WHAT YOU NEED TO KNOW:   An epidural steroid injection (DANIE) is a procedure to inject steroid medicine into the epidural space. The epidural space is between your spinal cord and vertebrae. Steroids reduce inflammation and fluid buildup in your spine that may be causing pain. You may be given pain medicine along with the steroids.          ACTIVITY  Do not drive or operate machinery today.  No strenuous activity today - bending, lifting, etc.  You may resume normal activites starting tomorrow - start slowly and as tolerated.  You may shower today, but no tub baths or hot tubs.  You may have numbness for several hours from the local anesthetic. Please use caution and common sense, especially with weight-bearing activities.    CARE OF THE INJECTION SITE  If you have soreness or pain, apply ice to the area today (20 minutes on/20 minutes off).  Starting tomorrow, you may use warm, moist heat or ice if needed.  You may have an increase or change in your discomfort for 36-48 hours after your treatment.  Apply ice and continue with any pain medication you have been prescribed.  Notify the [facility] if you have any of the following: redness, drainage, swelling, headache, stiff neck or fever above 100°F.    SPECIAL INSTRUCTIONS  Our office will contact you in approximately 14 days for a progress report.    MEDICATIONS  Continue to take all routine medications.  Our office may have instructed you to hold some medications.    As no general anesthesia was used in today's procedure, you should not experience any side effects related to anesthesia.     If you are diabetic, the steroids used in today's injection may temporarily increase your blood sugar levels after the first few days after your injection. Please keep a close eye on your sugars and alert the doctor who manages your diabetes if your sugars are significantly high from your baseline or you are symptomatic.     If you have a  problem specifically related to your procedure, please call our office at [phone].  Problems not related to your procedure should be directed to your primary care physician.

## 2025-01-31 NOTE — H&P
History of Present Illness: The patient is a 74 y.o. male who presents with complaints of lower back pain and is here today for a lumbar epidural steroid injection    Past Medical History:   Diagnosis Date    Arthritis     Right knee    Chronic pain disorder     Difficulty walking Unknown    Unsteady on feet; pain in knees.    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       Past Surgical History:   Procedure Laterality Date    APPENDECTOMY  1962    BRAIN SURGERY  2005    Gamma Knife for Trigeminal Neuralgia    CATARACT EXTRACTION      CRANIOTOMY FOR TUMOR  07/20/2022    ESOPHAGOGASTRODUODENOSCOPY      HERNIA REPAIR  1952    OTHER SURGICAL HISTORY      gamma knife RT in 2007 and 2010    HI ARTHRP KNE CONDYLE&PLATU MEDIAL&LAT COMPARTMENTS Right 10/22/2018    Procedure: ARTHROPLASTY KNEE TOTAL;  Surgeon: Rhina Pratt MD;  Location: [facility];  Service: Orthopedics    HI CREATE LESION STRTCTC PRQ NEUROLYTIC GASSERIAN Right 03/06/2018    Procedure: RHIZOTOMY TRIGEMINAL NERVES (V2 & V3);  Surgeon: Jam Winter MD;  Location: [facility];  Service: Neurosurgery    REPLACEMENT TOTAL KNEE Right 10/2018    RHIZOTOMY W/ RADIOFREQUENCY ABLATION Right 08/08/2014    Stereotactic Ablation of Gasserian Ganglion Right sided trigeminal V2 radiofrequency rhizotomy x2         Current Outpatient Medications:     Acetaminophen (TYLENOL PO), Take 2 tablets by mouth as needed , Disp: , Rfl:     ascorbic acid (VITAMIN C) 500 MG tablet, Take 1 tablet (500 mg total) by mouth daily, Disp: 60 tablet, Rfl: 0    Calcium 250 MG CAPS, Take 250 mg by mouth daily , Disp: , Rfl:     ciprofloxacin (Ciloxan) 0.3 % ophthalmic ointment, , Disp: , Rfl:     cyanocobalamin (VITAMIN B-12) 1,000 mcg tablet, Take 1 tablet by mouth daily , Disp: , Rfl:     MELATONIN PO, Take 10 mg by mouth daily at bedtime, Disp:  ", Rfl:     meloxicam (Mobic) 7.5 mg tablet, Take 1 tablet (7.5 mg total) by mouth daily, Disp: 14 tablet, Rfl: 0    pregabalin (LYRICA) 50 mg capsule, Take 1 capsule (50 mg total) by mouth 3 (three) times a day, Disp: 270 capsule, Rfl: 0    Probiotic Product (PROBIOTIC PO), Take 1 tablet by mouth in the morning, Disp: , Rfl:     Current Facility-Administered Medications:     bupivacaine (PF) (MARCAINE) 0.25 % injection 2 mL, 2 mL, Epidural, Once, Gil Felton MD    iohexol (OMNIPAQUE) 300 mg/mL injection 1 mL, 1 mL, Epidural, Once, Gil Felton MD    methylPREDNISolone acetate (DEPO-MEDROL) injection 80 mg, 80 mg, Epidural, Once, Gil Felton MD    Allergies   Allergen Reactions    Gabapentin Delirium    Oxcarbazepine Dizziness    Baclofen Other (See Comments)     \"unable to think and form sentences\"    Carbamazepine Other (See Comments)     \"unable to think\"    Lamictal [Lamotrigine] Confusion     Difficulty to think clearly     Tramadol Other (See Comments)     \"forgetful\"       Physical Exam:   Vitals:    01/31/25 1329   BP: 112/77   Pulse: 75   Resp: 20   Temp: 98.3 °F (36.8 °C)   SpO2: 96%     General: Awake, Alert, Oriented x 3, Mood and affect appropriate  Respiratory: Respirations even and unlabored  Cardiovascular: Peripheral pulses intact; no edema  Musculoskeletal Exam: Lower back pain    ASA Score: 3    Patient/Chart Verification  Patient ID Verified: Verbal  Consents Confirmed: To be obtained in the Procedural area  Interval H&P(within 24 hr) Complete (required for Outpatients and Surgery Admit only): To be obtained in the Procedural area  Allergies Reviewed: Yes  Anticoag/NSAID held?: NA  Currently on antibiotics?: No    Assessment:   1. Lumbar radiculopathy        Plan: L4 LESI    "

## 2025-02-03 ENCOUNTER — OFFICE VISIT (OUTPATIENT)
Dept: PHYSICAL THERAPY | Facility: REHABILITATION | Age: 75
End: 2025-02-03
Payer: MEDICARE

## 2025-02-03 ENCOUNTER — OFFICE VISIT (OUTPATIENT)
Dept: OBGYN CLINIC | Facility: HOSPITAL | Age: 75
End: 2025-02-03
Payer: MEDICARE

## 2025-02-03 VITALS — BODY MASS INDEX: 33.77 KG/M2 | WEIGHT: 235.89 LBS | HEIGHT: 70 IN

## 2025-02-03 DIAGNOSIS — M54.50 CHRONIC MIDLINE LOW BACK PAIN WITHOUT SCIATICA: ICD-10-CM

## 2025-02-03 DIAGNOSIS — G89.29 CHRONIC MIDLINE LOW BACK PAIN WITHOUT SCIATICA: Primary | ICD-10-CM

## 2025-02-03 DIAGNOSIS — M54.50 CHRONIC MIDLINE LOW BACK PAIN WITHOUT SCIATICA: Primary | ICD-10-CM

## 2025-02-03 DIAGNOSIS — G89.29 CHRONIC MIDLINE LOW BACK PAIN WITHOUT SCIATICA: ICD-10-CM

## 2025-02-03 PROCEDURE — 97110 THERAPEUTIC EXERCISES: CPT

## 2025-02-03 PROCEDURE — 99214 OFFICE O/P EST MOD 30 MIN: CPT | Performed by: ORTHOPAEDIC SURGERY

## 2025-02-03 PROCEDURE — 97112 NEUROMUSCULAR REEDUCATION: CPT

## 2025-02-03 RX ORDER — MELOXICAM 7.5 MG/1
7.5 TABLET ORAL DAILY
Qty: 20 TABLET | Refills: 0 | Status: SHIPPED | OUTPATIENT
Start: 2025-02-03

## 2025-02-03 NOTE — PROGRESS NOTES
Assessment & Plan/Medical Decision Makin y.o. male with Back Pain and imaging findings most notable for lumbar spondylosis         The clinical, physical and imaging findings were reviewed with the patient.  Miguel Angel  has a constellation of findings consistent with Lumbar Myofascial Pain in the setting of lumbar degenerative disease.  We reviewed his lumbar MRI in detail which does not show any significant neural compression and no instability.  He has had improvement with physical therapy and lumbar DANIE.    Ryan remains neurologically intact and functional.  We discussed the treatment options including physical therapy, at home exercises, activity modifications, chiropractic medicine, oral medications, interventional spine procedures.  At this time recommend continued conservative treatments.  No spine surgical intervention recommended at this time.    Continue with  physical therapy to work on core strengthening, lumbar ROM, strengthening, and stretching exercises. Maintain at home exercises and stretches.  Can continue to follow up with spine & pain management as needed for repeat injections if providing benefit.  Will send refill meloxicam to take as needed. Can transition to over the counter medications as needed.    Patient instructed to return to office/ER sooner if symptoms are not improving, getting worse, or new worrisome/neurologic symptoms arise.  Patient will follow up as needed.      Subjective:      Chief Complaint: Back Pain    HPI:  Miguel Angel Ramos is a 74 y.o. male presenting for initial visit with chief complaint of back pain.  Pain began several months ago.  Notes constant pain across his back.  Wakes him up from sleep.  Difficulty ambulating due to the pain.  Has some subjective weakness.  Denies any raquel trauma. Denies fever or chills, no night sweats. Denies any bladder or bowel changes.      Conservative therapy includes the following:   Medications: Lyrica (used for neuropathy),  Tylenol  Injections: No        Physical Therapy: No  Chiropractic Medicine: has not attempted  Accupunture/Massage Therapy: has not attempted   These therapeutic modalities were ineffective at providing sustained pain relief/functional improvement.     Nicotine dependent: No  Occupation: Retired    Living situation: Lives with family   ADLs: patient is able to perform     9/3/2024 update  Patient is here for follow-up.  He did obtain lumbar MRI in the interim.  He continues with back pain.    Update HPI 2/3/2025:  Patient is here for follow up. He has been going to physical therapy with steady some improvement. Had initial L4 ILESI on 10/30/2024 and recent repeat L4 ILESI on 1/31/2025. Notes injections help 80% with back pain, wears off in about 4-5 months. Some continued right > left low back pain at times. Taking lyrica with benefit. Denies lower extremity symptoms. Denies raquel lower extremity weakness. Does report some ongoing right toe pain for years that throws his gait off at times.    Objective:     Family History   Problem Relation Age of Onset    Emphysema Mother     Colon cancer Father     Skin cancer Maternal Grandmother        Past Medical History:   Diagnosis Date    Arthritis     Right knee    Chronic pain disorder     Difficulty walking Unknown    Unsteady on feet; pain in knees.    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       Current Outpatient Medications   Medication Sig Dispense Refill    meloxicam (Mobic) 7.5 mg tablet Take 1 tablet (7.5 mg total) by mouth daily 20 tablet 0    Acetaminophen (TYLENOL PO) Take 2 tablets by mouth as needed       ascorbic acid (VITAMIN C) 500 MG tablet Take 1 tablet (500 mg total) by mouth daily 60 tablet 0    Calcium 250 MG CAPS Take 250 mg by mouth daily       ciprofloxacin (Ciloxan) 0.3 % ophthalmic ointment   (Patient not taking: Reported on 2023)      cyanocobalamin (VITAMIN B-12) 1,000 mcg tablet Take 1 tablet by mouth daily       MELATONIN PO Take 10 mg by mouth daily at bedtime      pregabalin (LYRICA) 50 mg capsule Take 1 capsule (50 mg total) by mouth 3 (three) times a day 270 capsule 0    Probiotic Product (PROBIOTIC PO) Take 1 tablet by mouth in the morning       No current facility-administered medications for this visit.       Past Surgical History:   Procedure Laterality Date    APPENDECTOMY      BRAIN SURGERY      Gamma Knife for Trigeminal Neuralgia    CATARACT EXTRACTION      CRANIOTOMY FOR TUMOR  2022    ESOPHAGOGASTRODUODENOSCOPY      HERNIA REPAIR      OTHER SURGICAL HISTORY      gamma knife RT in  and     ME ARTHRP KNE CONDYLE&PLATU MEDIAL&LAT COMPARTMENTS Right 10/22/2018    Procedure: ARTHROPLASTY KNEE TOTAL;  Surgeon: Rhina Pratt MD;  Location: [facility];  Service: Orthopedics    ME CREATE LESION STRTCTC PRQ NEUROLYTIC GASSERIAN Right 2018    Procedure: RHIZOTOMY TRIGEMINAL NERVES (V2 & V3);  Surgeon: Jam Winter MD;  Location: [facility];  Service: Neurosurgery    REPLACEMENT TOTAL KNEE Right 10/2018    RHIZOTOMY W/ RADIOFREQUENCY ABLATION Right 2014    Stereotactic Ablation of Gasserian Ganglion Right sided trigeminal V2 radiofrequency rhizotomy x2       Social History     Socioeconomic History    Marital status: /Civil Union     Spouse name: Gina    Number of children: 2    Years of education: BS Degree plus addl classes    Highest education level: Not on file   Occupational History    Occupation: Retired     Comment: 20 years [employer] &    Tobacco Use    Smoking status: Never    Smokeless tobacco: Never    Tobacco comments:     Both my parents smoked -- dad (2 packs a day); mom (4 packs a day),  in  at 67,  emphysema.   Vaping Use    Vaping status: Never Used   Substance and Sexual Activity    Alcohol use: Yes      "Alcohol/week: 5.0 standard drinks of alcohol     Types: 2 Cans of beer, 3 Standard drinks or equivalent per week     Comment: Rum and vanilla Coke    Drug use: No    Sexual activity: Yes     Partners: Female     Birth control/protection: Post-menopausal   Other Topics Concern    Not on file   Social History Narrative    Caffeine use: Daily caffeinated cola, drinks coffee    Lives at home with wife Gina     Social Drivers of Health     Financial Resource Strain: Low Risk  (1/4/2023)    Overall Financial Resource Strain (CARDIA)     Difficulty of Paying Living Expenses: Not very hard   Food Insecurity: No Food Insecurity (7/8/2024)    Nursing - Inadequate Food Risk Classification     Worried About Running Out of Food in the Last Year: Never true     Ran Out of Food in the Last Year: Never true     Ran Out of Food in the Last Year: Not on file   Transportation Needs: No Transportation Needs (7/8/2024)    PRAPARE - Transportation     Lack of Transportation (Medical): No     Lack of Transportation (Non-Medical): No   Physical Activity: Not on file   Stress: Not on file   Social Connections: Not on file   Intimate Partner Violence: Not on file   Housing Stability: Low Risk  (7/8/2024)    Housing Stability Vital Sign     Unable to Pay for Housing in the Last Year: No     Number of Times Moved in the Last Year: 0     Homeless in the Last Year: No       Allergies   Allergen Reactions    Gabapentin Delirium    Oxcarbazepine Dizziness    Baclofen Other (See Comments)     \"unable to think and form sentences\"    Carbamazepine Other (See Comments)     \"unable to think\"    Lamictal [Lamotrigine] Confusion     Difficulty to think clearly     Tramadol Other (See Comments)     \"forgetful\"       Review of Systems  General- denies fever/chills  HEENT- denies hearing loss or sore throat  Eyes- denies eye pain or visual disturbances, denies red eyes  Respiratory- denies cough or SOB  Cardio- denies chest pain or palpitations  GI- denies " "abdominal pain  Endocrine- denies urinary frequency  Urinary- denies pain with urination  Musculoskeletal- Negative except noted above  Skin- denies rashes or wounds  Neurological- denies dizziness or headache  Psychiatric- denies anxiety or difficulty concentrating    Physical Exam  Ht 5' 10\" (1.778 m)   Wt 107 kg (235 lb 14.3 oz)   BMI 33.85 kg/m²     General/Constitutional: No apparent distress: well-nourished and well developed.  Lymphatic: No appreciable lymphadenopathy  Respiratory: Non-labored breathing  Vascular: No edema, swelling or tenderness, except as noted in detailed exam.  Integumentary: No impressive skin lesions present, except as noted in detailed exam.  Psych: Normal mood and affect, oriented to person, place and time.  MSK: normal other than stated in HPI and exam  Gait & balance: no evidence of myelopathic gait, ambulates Independently     Lumbar spine range of motion:  -Forward flexion to 90  -Extension to neutral  -Lateral bend 25 right, 25 left  -Rotation 25 right, 25 left  There tenderness with palpation along lumbar paraspinal musculature, no midline tenderness     Neurologic:    Lower Extremity Motor Function    Right  Left    Iliopsoas  5/5  5/5    Quadriceps 5/5 5/5   Tibialis anterior  5/5  5/5    EHL  5/5  5/5    Gastroc. muscle  5/5  5/5    Heel rise  5/5  5/5    Toe rise  5/5  5/5      Sensory: light touch is intact to bilateral upper and lower extremities     Reflexes:    Right Left   Patellar 1+ 1+   Achilles 1+ 1+     Other tests:  Straight Leg Raise: negative  Carla SI: negative  ROBERT SI: negative  Greater troch: no tenderness   Internal/external hip ROM: intact, no pain   Flexion/extension knee ROM: intact, no pain   Vascular: WWP extremities, 2+DP bilateral      Diagnostic Tests   IMAGING: I have personally reviewed the images and these are my findings:  Lumbar Spine X-rays from 7/16/24 and 7/31/24: multi level lumbar spondylosis with loss of disc height, osteophyte " "formation and facet hypertrophy, no apparent spondylolisthesis, no appreciated lytic/blastic lesions, no obvious instability    Lumbar MRI from 8/19/2024: Multilevel lumbar disc degeneration, no significant central or foraminal stenosis, there is a left-sided L5-S1 disc protrusion with contact of the left S1 nerve root    Electronic Medical Records were reviewed including office notes, physical therapy notes, pain management notes, imaging studies.    Procedures, if performed today     None performed       Portions of the record may have been created with voice recognition software.  Occasional wrong word or \"sound a like\" substitutions may have occurred due to the inherent limitations of voice recognition software.  Read the chart carefully and recognize, using context, where substitutions have occurred.    "

## 2025-02-03 NOTE — PROGRESS NOTES
"Daily Note     Today's date: 2/3/2025  Patient name: Miguel Angel Ramos  : 1950  MRN: [id number]  Referring provider: Sundeep Stone MD  Dx:   Encounter Diagnosis     ICD-10-CM    1. Chronic midline low back pain without sciatica  M54.50     G89.29           Start Time: 1110  Stop Time: 1155  Total time in clinic (min): 45 minutes    Subjective: Pt reports he received a cortisone injection this past Friday, which has been helping his LB feel better.        Objective: See treatment diary below      Assessment: Tolerated treatment well. Continued with program as outlined below with focus on core stability.  Able to resume suitcase carries with no complaints or worsening of symptoms today.  Patient would benefit from continued PT to further reduce stress to lumbar spine and maximize function with ADL's.        Plan: Continue per plan of care.      Precautions: fall risk      POC exp =  25    Access Code: [email or web address]  URL: [email or web address]  Date: 2024  Prepared by: Court Sousa    Exercises  - Supine Transversus Abdominis Bracing - Hands on Stomach  - 5 x daily - 7 x weekly - 3 sets - 10 reps  - Hooklying Clamshell with Resistance  - 1 x daily - 7 x weekly - 3 sets - 10 reps - 5 hold  - Seated Transversus Abdominis Bracing with PLB  - 5 x daily - 7 x weekly - 3 sets - 10 reps    Manuals /3   1/10 1/15 1/17 1/22 1/24 1/29 1/31             RE DS                                          Neuro Re-Ed             Pallof press Grey TB 2x12 ea   OJB 3x10 OJB 3x10 OJB 3x10 OJB 3x10 OJB 3x10 OJB 3x10 Grey TB 2x12 ea   Rectangle  BAPS X40 ea   x40ea X40 ea X40 ea X40 ea X40 ea X40 ea X40 ea   Pball press down with TA iso 5\"x30    5\"x30 5\"x30 5\"x30 5\"x30 5\"x30 5\"x30                                          Ther Ex             Vg for endur and strength L7 10'   L7 10' L7 10' L7 10' L7 10' L7 12' L7 10' L7 10'   Leg ext.     40#  3x10 40#  3x10 40#  3x10 45#  3x10 45#  3x10 55#  3x10 40# 3x10 "   Standing ham curls 5#   3x10   4# AW 3x10 4# AW 3x10 4# AW 3x10 4# AW 3x10 4# AW 3x10 4# AW 3x10 5#   3x10   Resisted side step RHB 2min   RHB 2min RHB 2min RHB 2min RHB 2min RHB 2min RHB 2min RHB 2'                             Ther Activity             Suitcase carry 10#  KB  Silver TB  50'x4 ea hand    10# KB with YMB 6x 250'  nv  10# KB with YMB 6x 250'   10# KB with YMB 6x 250'  nv     STS- HOLD          5x STS test                Lunges- HOLD                                       Gait Training                                       Modalities

## 2025-02-06 ENCOUNTER — OFFICE VISIT (OUTPATIENT)
Dept: PHYSICAL THERAPY | Facility: REHABILITATION | Age: 75
End: 2025-02-06
Payer: MEDICARE

## 2025-02-06 DIAGNOSIS — M54.50 CHRONIC MIDLINE LOW BACK PAIN WITHOUT SCIATICA: Primary | ICD-10-CM

## 2025-02-06 DIAGNOSIS — G89.29 CHRONIC MIDLINE LOW BACK PAIN WITHOUT SCIATICA: Primary | ICD-10-CM

## 2025-02-06 PROCEDURE — 97110 THERAPEUTIC EXERCISES: CPT

## 2025-02-06 PROCEDURE — 97112 NEUROMUSCULAR REEDUCATION: CPT

## 2025-02-06 NOTE — PROGRESS NOTES
"Daily Note     Today's date: 2025  Patient name: Miguel Angel Ramos  : 1950  MRN: [id number]  Referring provider: Sundeep Stone MD  Dx:   Encounter Diagnosis     ICD-10-CM    1. Chronic midline low back pain without sciatica  M54.50     G89.29             Start Time: 1107  Stop Time: 1150  Total time in clinic (min): 43 minutes    Subjective:  Pt reports that his back has been feeling okay still since he got his steroid injection.      Objective: See treatment diary below      Assessment: Tolerated treatment well. Continued to focus on improving the pt's core stability today. He demonstrated a good tolerance for and proper form with all of his exercises. Patient would benefit from continued PT to further reduce stress to lumbar spine and maximize function with ADL's.        Plan: Continue per plan of care.      Precautions: fall risk      POC exp =  25    Access Code: [email or web address]  URL: [email or web address]  Date: 2024  Prepared by: Court Sousa    Exercises  - Supine Transversus Abdominis Bracing - Hands on Stomach  - 5 x daily - 7 x weekly - 3 sets - 10 reps  - Hooklying Clamshell with Resistance  - 1 x daily - 7 x weekly - 3 sets - 10 reps - 5 hold  - Seated Transversus Abdominis Bracing with PLB  - 5 x daily - 7 x weekly - 3 sets - 10 reps    Manuals 2/3 2/6  1/10 1/15 1/17 1/22 1/24 1/29 1/31             RE DS                                          Neuro Re-Ed             Pallof press Grey TB 2x12 ea Grey TB 2x12 ea  OJB 3x10 OJB 3x10 OJB 3x10 OJB 3x10 OJB 3x10 OJB 3x10 Grey TB 2x12 ea   Rectangle  BAPS X40 ea X40 ea  x40ea X40 ea X40 ea X40 ea X40 ea X40 ea X40 ea   Pball press down with TA iso 5\"x30 5\"x30   5\"x30 5\"x30 5\"x30 5\"x30 5\"x30 5\"x30                                          Ther Ex             Vg for endur and strength L7 10' L7 10'  L7 10' L7 10' L7 10' L7 10' L7 12' L7 10' L7 10'   Leg ext.     40#  3x10 40#  3x10 40#  3x10 45#  3x10 45#  3x10 55#  3x10 40# " 3x10   Standing ham curls 5#   3x10 5#   3x10  4# AW 3x10 4# AW 3x10 4# AW 3x10 4# AW 3x10 4# AW 3x10 4# AW 3x10 5#   3x10   Resisted side step RHB 2min RHB 2min  RHB 2min RHB 2min RHB 2min RHB 2min RHB 2min RHB 2min RHB 2'                             Ther Activity             Suitcase carry 10#  KB  Silver TB  50'x4 ea hand 10#  KB  Silver TB  50'x4 ea hand   10# KB with YMB 6x 250'  nv  10# KB with YMB 6x 250'   10# KB with YMB 6x 250'  nv     STS- HOLD          5x STS test                Lunges- HOLD                                       Gait Training                                       Modalities

## 2025-02-10 ENCOUNTER — OFFICE VISIT (OUTPATIENT)
Dept: PHYSICAL THERAPY | Facility: REHABILITATION | Age: 75
End: 2025-02-10
Payer: MEDICARE

## 2025-02-10 DIAGNOSIS — G89.29 CHRONIC MIDLINE LOW BACK PAIN WITHOUT SCIATICA: Primary | ICD-10-CM

## 2025-02-10 DIAGNOSIS — M54.50 CHRONIC MIDLINE LOW BACK PAIN WITHOUT SCIATICA: Primary | ICD-10-CM

## 2025-02-10 PROCEDURE — 97110 THERAPEUTIC EXERCISES: CPT

## 2025-02-10 PROCEDURE — 97530 THERAPEUTIC ACTIVITIES: CPT

## 2025-02-10 PROCEDURE — 97112 NEUROMUSCULAR REEDUCATION: CPT

## 2025-02-10 NOTE — PROGRESS NOTES
"Daily Note     Today's date: 2/10/2025  Patient name: Miguel Angel Ramos  : 1950  MRN: [id number]  Referring provider: Sundeep Stone MD  Dx:   Encounter Diagnosis     ICD-10-CM    1. Chronic midline low back pain without sciatica  M54.50     G89.29           Start Time: 1107  Stop Time: 1150  Total time in clinic (min): 43 minutes    Subjective: Pt reports his LB has still been feeling good since receiving his injection.        Objective: See treatment diary below      Assessment: Tolerated treatment well. Continued with program as outlined below with focus on core stability and strength of BLE.  Was able to perform all exercise with no aggravation of symptoms.  Is making good overall progress.  Patient would benefit from continued PT      Plan: Continue per plan of care.      Precautions: fall risk      POC exp =  25    Access Code: [email or web address]  URL: [email or web address]  Date: 2024  Prepared by: Court Sousa    Exercises  - Supine Transversus Abdominis Bracing - Hands on Stomach  - 5 x daily - 7 x weekly - 3 sets - 10 reps  - Hooklying Clamshell with Resistance  - 1 x daily - 7 x weekly - 3 sets - 10 reps - 5 hold  - Seated Transversus Abdominis Bracing with PLB  - 5 x daily - 7 x weekly - 3 sets - 10 reps    Manuals /3 2/6 2/10  1/15 1/17 1/22 1/24 1/29 1/31             RE DS                                          Neuro Re-Ed             Pallof press Grey TB 2x12 ea Grey TB 2x12 ea Grey TB 2x12 ea  OJB 3x10 OJB 3x10 OJB 3x10 OJB 3x10 OJB 3x10 Grey TB 2x12 ea   Rectangle  BAPS X40 ea X40 ea X40 ea  X40 ea X40 ea X40 ea X40 ea X40 ea X40 ea   Pball press down with TA iso 5\"x30 5\"x30 5\"x30  5\"x30 5\"x30 5\"x30 5\"x30 5\"x30 5\"x30                                          Ther Ex             Vg for endur and strength L7 10' L7 10' L7 10'  L7 10' L7 10' L7 10' L7 12' L7 10' L7 10'   Leg ext.    50# 3x10  40#  3x10 40#  3x10 45#  3x10 45#  3x10 55#  3x10 40# 3x10   Standing ham curls " 5#   3x10 5#   3x10 5#   3x10  4# AW 3x10 4# AW 3x10 4# AW 3x10 4# AW 3x10 4# AW 3x10 5#   3x10   Resisted side step RHB 2min RHB 2min RHB 2min  RHB 2min RHB 2min RHB 2min RHB 2min RHB 2min RHB 2'                             Ther Activity             Suitcase carry 10#  KB  Silver TB  50'x4 ea hand 10#  KB  Silver TB  50'x4 ea hand 10#  KB  Silver TB  50'x4 ea hand  nv  10# KB with YMB 6x 250'   10# KB with YMB 6x 250'  nv     STS- HOLD          5x STS test                Lunges- HOLD                                       Gait Training                                       Modalities

## 2025-02-12 ENCOUNTER — APPOINTMENT (OUTPATIENT)
Dept: PHYSICAL THERAPY | Facility: REHABILITATION | Age: 75
End: 2025-02-12
Payer: MEDICARE

## 2025-02-12 ENCOUNTER — OFFICE VISIT (OUTPATIENT)
Dept: PHYSICAL THERAPY | Facility: REHABILITATION | Age: 75
End: 2025-02-12
Payer: MEDICARE

## 2025-02-12 DIAGNOSIS — G89.29 CHRONIC MIDLINE LOW BACK PAIN WITHOUT SCIATICA: Primary | ICD-10-CM

## 2025-02-12 DIAGNOSIS — M54.50 CHRONIC MIDLINE LOW BACK PAIN WITHOUT SCIATICA: Primary | ICD-10-CM

## 2025-02-12 PROCEDURE — 97112 NEUROMUSCULAR REEDUCATION: CPT

## 2025-02-12 PROCEDURE — 97110 THERAPEUTIC EXERCISES: CPT

## 2025-02-12 PROCEDURE — 97530 THERAPEUTIC ACTIVITIES: CPT

## 2025-02-12 NOTE — PROGRESS NOTES
"Daily Note     Today's date: 2025  Patient name: Miguel Angel Ramos  : 1950  MRN: [id number]  Referring provider: Sundeep Stone MD  Dx:   Encounter Diagnosis     ICD-10-CM    1. Chronic midline low back pain without sciatica  M54.50     G89.29             Start Time: 0845  Stop Time: 09  Total time in clinic (min): 45 minutes    Subjective: Pt reports he is doing well today, a little twinge to back probably due to shoveling. Late to therapy due to shoveling today.       Objective: See treatment diary below      Assessment: Tolerated treatment well. Session initiated on VG for endurance and strength. Continued to focus on balance, B LE strength and TrA activation during TE's for core strength/stability with good tolerance to activity and no adverse reactions. Patient would benefit from continued PT      Plan: Continue per plan of care.      Precautions: fall risk      POC exp =  25    Access Code: [email or web address]  URL: [email or web address]  Date: 2024  Prepared by: Court Sousa    Exercises  - Supine Transversus Abdominis Bracing - Hands on Stomach  - 5 x daily - 7 x weekly - 3 sets - 10 reps  - Hooklying Clamshell with Resistance  - 1 x daily - 7 x weekly - 3 sets - 10 reps - 5 hold  - Seated Transversus Abdominis Bracing with PLB  - 5 x daily - 7 x weekly - 3 sets - 10 reps    Manuals /3 2/6 2/10 2/12  1/17 1/22 1/24 1/29 1/31             RE DS                                          Neuro Re-Ed             Pallof press Grey TB 2x12 ea Grey TB 2x12 ea Grey TB 2x12 ea wade TB 2x12 ea  OJB 3x10 OJB 3x10 OJB 3x10 OJB 3x10 Grey TB 2x12 ea   Rectangle  BAPS X40 ea X40 ea X40 ea X40 ea  X40 ea X40 ea X40 ea X40 ea X40 ea   Pball press down with TA iso 5\"x30 5\"x30 5\"x30 5\"x30  5\"x30 5\"x30 5\"x30 5\"x30 5\"x30                                          Ther Ex             Vg for endur and strength L7 10' L7 10' L7 10' L7 10'  L7 10' L7 10' L7 12' L7 10' L7 10'   Leg ext.    50# 3x10 np  " 40#  3x10 45#  3x10 45#  3x10 55#  3x10 40# 3x10   Standing ham curls 5#   3x10 5#   3x10 5#   3x10 5#   3x10  4# AW 3x10 4# AW 3x10 4# AW 3x10 4# AW 3x10 5#   3x10   Resisted side step RHB 2min RHB 2min RHB 2min RHB 2min  RHB 2min RHB 2min RHB 2min RHB 2min RHB 2'                             Ther Activity             Suitcase carry 10#  KB  Silver TB  50'x4 ea hand 10#  KB  Silver TB  50'x4 ea hand 10#  KB  Silver TB  50'x4 ea hand 10#  KB  Silver TB  50'x4 ea hand   10# KB with YMB 6x 250'   10# KB with YMB 6x 250'  nv     STS- HOLD          5x STS test                Lunges- HOLD                                       Gait Training                                       Modalities

## 2025-02-13 ENCOUNTER — TELEPHONE (OUTPATIENT)
Dept: RADIOLOGY | Facility: MEDICAL CENTER | Age: 75
End: 2025-02-13

## 2025-02-13 ENCOUNTER — TELEPHONE (OUTPATIENT)
Age: 75
End: 2025-02-13

## 2025-02-13 NOTE — TELEPHONE ENCOUNTER
Caller: Patient   Doctor/office: Dr Felton  #: [phone]     % of improvement: 80%  (Patient feels really good)   Pain Scale (1-10): 6 or 7 (but this did not last long)     Does have twinges and sometimes a pressure type of pain but has been feeling much better since procedure.       Patient is still attending PT and that has been helping as well       If the injection does wear off again patient would like to know how often it can be done.

## 2025-02-16 DIAGNOSIS — G89.29 CHRONIC MIDLINE LOW BACK PAIN WITHOUT SCIATICA: ICD-10-CM

## 2025-02-16 DIAGNOSIS — M54.50 CHRONIC MIDLINE LOW BACK PAIN WITHOUT SCIATICA: ICD-10-CM

## 2025-02-18 RX ORDER — MELOXICAM 7.5 MG/1
7.5 TABLET ORAL DAILY
Qty: 20 TABLET | Refills: 0 | OUTPATIENT
Start: 2025-02-18

## 2025-02-19 ENCOUNTER — OFFICE VISIT (OUTPATIENT)
Dept: PHYSICAL THERAPY | Facility: REHABILITATION | Age: 75
End: 2025-02-19
Payer: MEDICARE

## 2025-02-19 DIAGNOSIS — M54.50 CHRONIC MIDLINE LOW BACK PAIN WITHOUT SCIATICA: Primary | ICD-10-CM

## 2025-02-19 DIAGNOSIS — G89.29 CHRONIC MIDLINE LOW BACK PAIN WITHOUT SCIATICA: Primary | ICD-10-CM

## 2025-02-19 PROCEDURE — 97530 THERAPEUTIC ACTIVITIES: CPT

## 2025-02-19 PROCEDURE — 97110 THERAPEUTIC EXERCISES: CPT

## 2025-02-19 PROCEDURE — 97112 NEUROMUSCULAR REEDUCATION: CPT

## 2025-02-19 NOTE — PROGRESS NOTES
"Daily Note     Today's date: 2025  Patient name: Miguel Angel Ramos  : 1950  MRN: [id number]  Referring provider: Sundeep Stone MD  Dx:   Encounter Diagnosis     ICD-10-CM    1. Chronic midline low back pain without sciatica  M54.50     G89.29                      Subjective: no new changes since LV.       Objective: See treatment diary below      Assessment: Tolerated treatment well. Ryan is unsteady with fatigue, lateral veering noted with suite case carry to end the session. Min UE support required for rocker board to execute safely. Mild generalized muscle fatigue noted with leg extension and resisted lateral walking. Continued PT to promote core stability and LE strength, and overall function, would be beneficial.       Plan: Continue per plan of care.       Precautions: fall risk      POC exp =  25    Access Code: [email or web address]  URL: [email or web address]  Date: 2024  Prepared by: Court Sousa    Exercises  - Supine Transversus Abdominis Bracing - Hands on Stomach  - 5 x daily - 7 x weekly - 3 sets - 10 reps  - Hooklying Clamshell with Resistance  - 1 x daily - 7 x weekly - 3 sets - 10 reps - 5 hold  - Seated Transversus Abdominis Bracing with PLB  - 5 x daily - 7 x weekly - 3 sets - 10 reps    Manuals /3 2/6 2/10 2/12 2/19  1/22 1/24 1/29 1/31             RE DS                                          Neuro Re-Ed             Pallof press Grey TB 2x12 ea Grey TB 2x12 ea Grey TB 2x12 ea wade TB 2x12 ea grey TB 2x12 ea  OJB 3x10 OJB 3x10 OJB 3x10 Grey TB 2x12 ea   Rectangle  BAPS X40 ea X40 ea X40 ea X40 ea X40 ea  X40 ea X40 ea X40 ea X40 ea   Pball press down with TA iso 5\"x30 5\"x30 5\"x30 5\"x30 5\"x30  5\"x30 5\"x30 5\"x30 5\"x30                                          Ther Ex             Vg for endur and strength L7 10' L7 10' L7 10' L7 10' L7 10'  L7 10' L7 12' L7 10' L7 10'   Leg ext.    50# 3x10 np 50# 3x10  45#  3x10 45#  3x10 55#  3x10 40# 3x10   Standing ham curls 5# "   3x10 5#   3x10 5#   3x10 5#   3x10 5#   3x10  4# AW 3x10 4# AW 3x10 4# AW 3x10 5#   3x10   Resisted side step RHB 2min RHB 2min RHB 2min RHB 2min RHB 2min  RHB 2min RHB 2min RHB 2min RHB 2'                             Ther Activity             Suitcase carry 10#  KB  Silver TB  50'x4 ea hand 10#  KB  Silver TB  50'x4 ea hand 10#  KB  Silver TB  50'x4 ea hand 10#  KB  Silver TB  50'x4 ea hand 10#  KB  Silver TB  50'x4 ea hand   10# KB with YMB 6x 250'  nv     STS- HOLD          5x STS test                Lunges- HOLD                                       Gait Training                                       Modalities

## 2025-02-21 ENCOUNTER — OFFICE VISIT (OUTPATIENT)
Dept: PHYSICAL THERAPY | Facility: REHABILITATION | Age: 75
End: 2025-02-21
Payer: MEDICARE

## 2025-02-21 DIAGNOSIS — G89.29 CHRONIC MIDLINE LOW BACK PAIN WITHOUT SCIATICA: Primary | ICD-10-CM

## 2025-02-21 DIAGNOSIS — M54.50 CHRONIC MIDLINE LOW BACK PAIN WITHOUT SCIATICA: Primary | ICD-10-CM

## 2025-02-21 PROCEDURE — 97110 THERAPEUTIC EXERCISES: CPT

## 2025-02-21 PROCEDURE — 97112 NEUROMUSCULAR REEDUCATION: CPT

## 2025-02-21 NOTE — PROGRESS NOTES
"Daily Note     Today's date: 2025  Patient name: Miguel Angel Ramos  : 1950  MRN: [id number]  Referring provider: Sundeep Stone MD  Dx:   Encounter Diagnosis     ICD-10-CM    1. Chronic midline low back pain without sciatica  M54.50     G89.29                      Subjective:  Patient states he is feeling well today.       Objective: See treatment diary below      Assessment: Tolerated treatment well. Patient demonstrated fatigue post treatment, exhibited good technique with therapeutic exercises, and would benefit from continued PT      Plan: Continue per plan of care.      Precautions: fall risk      POC exp =  25    Access Code: [email or web address]  URL: [email or web address]  Date: 2024  Prepared by: Court Sousa    Exercises  - Supine Transversus Abdominis Bracing - Hands on Stomach  - 5 x daily - 7 x weekly - 3 sets - 10 reps  - Hooklying Clamshell with Resistance  - 1 x daily - 7 x weekly - 3 sets - 10 reps - 5 hold  - Seated Transversus Abdominis Bracing with PLB  - 5 x daily - 7 x weekly - 3 sets - 10 reps    Manuals /3 2/6 2/10 2/12 2/19 2/21   1/29 1/31             RE DS                                          Neuro Re-Ed             Pallof press Grey TB 2x12 ea Grey TB 2x12 ea Grey TB 2x12 ea wade TB 2x12 ea grey TB 2x12 ea Grey TB 2x12 ea    OJB 3x10 Grey TB 2x12 ea   Rectangle  BAPS X40 ea X40 ea X40 ea X40 ea X40 ea X40 ea    X40 ea X40 ea   Pball press down with TA iso 5\"x30 5\"x30 5\"x30 5\"x30 5\"x30 5\" x 30   5\"x30 5\"x30                                          Ther Ex             Vg for endur and strength L7 10' L7 10' L7 10' L7 10' L7 10' L7 10'   L7 10' L7 10'   Leg ext.    50# 3x10 np 50# 3x10 50# 3x10    55#  3x10 40# 3x10   Standing ham curls 5#   3x10 5#   3x10 5#   3x10 5#   3x10 5#   3x10 5#   3x10   4# AW 3x10 5#   3x10   Resisted side step RHB 2min RHB 2min RHB 2min RHB 2min RHB 2min RHB 2 min   RHB 2min RHB 2'                             Ther Activity       " DISCHARGE PLANNING - CARE MANAGEMENT     Discharge to post-acute care or home with appropriate resources Progressing        GASTROINTESTINAL - ADULT     Maintains or returns to baseline bowel function Progressing     Maintains adequate nutritional intake Progressing        METABOLIC, FLUID AND ELECTROLYTES - ADULT     Electrolytes maintained within normal limits Progressing     Fluid balance maintained Progressing        Nutrition/Hydration-ADULT     Nutrient/Hydration intake appropriate for improving, restoring or maintaining nutritional needs Progressing       Suitcase carry 10#  KB  Silver TB  50'x4 ea hand 10#  KB  Silver TB  50'x4 ea hand 10#  KB  Silver TB  50'x4 ea hand 10#  KB  Silver TB  50'x4 ea hand 10#  KB  Silver TB  50'x4 ea hand 10#  KB  Silver TB  50'x4 ea hand       STS- HOLD          5x STS test                Lunges- HOLD                                       Gait Training                                       Modalities

## 2025-02-24 ENCOUNTER — OFFICE VISIT (OUTPATIENT)
Dept: PHYSICAL THERAPY | Facility: REHABILITATION | Age: 75
End: 2025-02-24
Payer: MEDICARE

## 2025-02-24 DIAGNOSIS — G89.29 CHRONIC MIDLINE LOW BACK PAIN WITHOUT SCIATICA: Primary | ICD-10-CM

## 2025-02-24 DIAGNOSIS — M54.50 CHRONIC MIDLINE LOW BACK PAIN WITHOUT SCIATICA: Primary | ICD-10-CM

## 2025-02-24 PROCEDURE — 97112 NEUROMUSCULAR REEDUCATION: CPT

## 2025-02-24 PROCEDURE — 97110 THERAPEUTIC EXERCISES: CPT

## 2025-02-24 NOTE — PROGRESS NOTES
"Daily Note     Today's date: 2025  Patient name: Miguel Angel Ramos  : 1950  MRN: [id number]  Referring provider: Sundeep Stone MD  Dx:   Encounter Diagnosis     ICD-10-CM    1. Chronic midline low back pain without sciatica  M54.50     G89.29           Start Time: 1530  Stop Time: 1615  Total time in clinic (min): 45 minutes    Subjective: Pt reports he gets a little twinge every now and again at his LB, but overall his injections seems to still be helping.        Objective: See treatment diary below      Assessment: Tolerated treatment well. Continued with program as outlined below.  Able to progress with increased resistance on leg ext today, demonstrating improvements in strength.  Patient would benefit from continued PT to further improve strength, decrease pain, and maximize overall function.        Plan: Continue per plan of care.        Precautions: fall risk      POC exp =  25    Access Code: [email or web address]  URL: [email or web address]  Date: 2024  Prepared by: Court Sousa    Exercises  - Supine Transversus Abdominis Bracing - Hands on Stomach  - 5 x daily - 7 x weekly - 3 sets - 10 reps  - Hooklying Clamshell with Resistance  - 1 x daily - 7 x weekly - 3 sets - 10 reps - 5 hold  - Seated Transversus Abdominis Bracing with PLB  - 5 x daily - 7 x weekly - 3 sets - 10 reps    Manuals 2/3 2/6 2/10 2/12 2/19 2/21 2/24  1/29 1/31             RE DS                                          Neuro Re-Ed             Pallof press Grey TB 2x12 ea Grey TB 2x12 ea Grey TB 2x12 ea wade TB 2x12 ea grey TB 2x12 ea Grey TB 2x12 ea  Grey TB 2x12 ea   OJB 3x10 Grey TB 2x12 ea   Rectangle  BAPS X40 ea X40 ea X40 ea X40 ea X40 ea X40 ea  X40 ea   X40 ea X40 ea   Pball press down with TA iso 5\"x30 5\"x30 5\"x30 5\"x30 5\"x30 5\" x 30 5\" x 30  5\"x30 5\"x30                                          Ther Ex             Vg for endur and strength L7 10' L7 10' L7 10' L7 10' L7 10' L7 10' L7 10'  L7 10' L7 " 10'   Leg ext.    50# 3x10 np 50# 3x10 50# 3x10  55# 3x10  55#  3x10 40# 3x10   Standing ham curls 5#   3x10 5#   3x10 5#   3x10 5#   3x10 5#   3x10 5#   3x10 5#   3x10  4# AW 3x10 5#   3x10   Resisted side step RHB 2min RHB 2min RHB 2min RHB 2min RHB 2min RHB 2 min RHB 2 min  RHB 2min RHB 2'                             Ther Activity             Suitcase carry 10#  KB  Silver TB  50'x4 ea hand 10#  KB  Silver TB  50'x4 ea hand 10#  KB  Silver TB  50'x4 ea hand 10#  KB  Silver TB  50'x4 ea hand 10#  KB  Silver TB  50'x4 ea hand 10#  KB  Silver TB  50'x4 ea hand 10#  KB  Silver TB  50'x4 ea hand      STS- HOLD          5x STS test                Lunges- HOLD                                       Gait Training                                       Modalities

## 2025-02-28 ENCOUNTER — OFFICE VISIT (OUTPATIENT)
Dept: PHYSICAL THERAPY | Facility: REHABILITATION | Age: 75
End: 2025-02-28
Payer: MEDICARE

## 2025-02-28 DIAGNOSIS — G89.29 CHRONIC MIDLINE LOW BACK PAIN WITHOUT SCIATICA: Primary | ICD-10-CM

## 2025-02-28 DIAGNOSIS — M54.50 CHRONIC MIDLINE LOW BACK PAIN WITHOUT SCIATICA: Primary | ICD-10-CM

## 2025-02-28 PROCEDURE — 97110 THERAPEUTIC EXERCISES: CPT | Performed by: PHYSICAL THERAPIST

## 2025-02-28 PROCEDURE — 97112 NEUROMUSCULAR REEDUCATION: CPT | Performed by: PHYSICAL THERAPIST

## 2025-02-28 NOTE — PROGRESS NOTES
Daily Note     Today's date: 2025  Patient name: Miguel Angel Ramos  : 1950  MRN: [id number]  Referring provider: Sundeep Stone MD  Dx:   Encounter Diagnosis     ICD-10-CM    1. Chronic midline low back pain without sciatica  M54.50     G89.29             Start Time: 0835  Stop Time: 0915  Total time in clinic (min): 40 minutes    Subjective: Pt reports 75% improvement overall. Max pain in the past week 6/10 but only a couple times.      Objective: See treatment diary below  Lumbar  AROM  Flexion:  WFL, no pain  Extension:   50%   Left rotation:  WFL  Right rotation:  WFL   Strength/Myotome Testing   Left Hip   Planes of Motion   Extension: 4+  Abduction: 4+     Right Hip   Planes of Motion   Extension: 4+  Abduction: 4+     5x STS: No UE 7.13 seconds (7.84 previously)       Short term goals: to be met in 3-4 weeks  Pt independent with initial HEP, rationale, technique and frequency, for ROM and pain control. MET  Pt will report at least a 25% reduction in subjective pain complaints/symptoms to better manage ADLs and household chores. MET  Improve max pain level by 2 points on the numeric pain rating scale indicating a clinically significant reduction in pain level. MET  Pt will achieve an improvement in FOTO score indicating an improvement in pain and function. MET        Long term goals: to be met in 6-8 weeks  Pt will have full and pain free lumbar ROM to better manage ADLs and household chores. MET  Pt will report a 75% or > reduction in subjective pain complaints/symptoms to better manage ADLs and household chores. MET  Improve B hip abduction MMT to a 4/5 or greater for improved lumbopelvic stability. MET  Pt will improve FOTO score to better then expected outcome indicating an overall improvement in pain and function MET  Pt will be able to sleep through the night (6-8 hours) without waking secondary to pain. MET  Pt will be able to perform ADLs, iADLS, and household duties without pain or  "restriction indicating return to PLOF. PARTIALLY MET  Pt independent with rationale, technique and plan for performance of advanced HEP to ensure independent self-management of symptoms upon discharge. MET  Achieve pts therapy goal: to be able to walk longer distances and sit for longer periods to write MET       Assessment: Pt presents today for final therapy visit. At this time the pt has met all therapy goals and feels 75% improvement of sx's. Lumbar AROM is WFL, LE strength has improved, and pain has reduced to a max of 6/10. Pt is pleased with progress and independent in HEP. Pt should continue to improve on own with home program. Pt thanked therapist and feels ready for discharge from therapy.       Plan:  DC to HEP       Precautions: fall risk        Access Code: [email or web address]  URL: [email or web address]  Date: 08/07/2024  Prepared by: Court Sousa    Exercises  - Supine Transversus Abdominis Bracing - Hands on Stomach  - 5 x daily - 7 x weekly - 3 sets - 10 reps  - Hooklying Clamshell with Resistance  - 1 x daily - 7 x weekly - 3 sets - 10 reps - 5 hold  - Seated Transversus Abdominis Bracing with PLB  - 5 x daily - 7 x weekly - 3 sets - 10 reps    Manuals 2/3 2/6 2/10 2/12 2/19 2/21 2/24 2/28 1/29 1/31             RE DS                                          Neuro Re-Ed             Pallof press Grey TB 2x12 ea Grey TB 2x12 ea Grey TB 2x12 ea wade TB 2x12 ea grey TB 2x12 ea Grey TB 2x12 ea  Grey TB 2x12 ea  Grey TB 2x12 ea  OJB 3x10 Grey TB 2x12 ea   Rectangle  BAPS X40 ea X40 ea X40 ea X40 ea X40 ea X40 ea  X40 ea  X40 ea X40 ea X40 ea   Pball press down with TA iso 5\"x30 5\"x30 5\"x30 5\"x30 5\"x30 5\" x 30 5\" x 30 5\"x30 5\"x30 5\"x30                                          Ther Ex             Vg for endur and strength L7 10' L7 10' L7 10' L7 10' L7 10' L7 10' L7 10' L7 10' L7 10' L7 10'   Leg ext.    50# 3x10 np 50# 3x10 50# 3x10  55# 3x10 55# 3x10 55#  3x10 40# 3x10   Standing ham curls 5#   3x10 5# "   3x10 5#   3x10 5#   3x10 5#   3x10 5#   3x10 5#   3x10 np 4# AW 3x10 5#   3x10   Resisted side step RHB 2min RHB 2min RHB 2min RHB 2min RHB 2min RHB 2 min RHB 2 min RHB 2' RHB 2min RHB 2'           Bike 5'                  Ther Activity             Suitcase carry 10#  KB  Silver TB  50'x4 ea hand 10#  KB  Silver TB  50'x4 ea hand 10#  KB  Silver TB  50'x4 ea hand 10#  KB  Silver TB  50'x4 ea hand 10#  KB  Silver TB  50'x4 ea hand 10#  KB  Silver TB  50'x4 ea hand 10#  KB  Silver TB  50'x4 ea hand np     STS- HOLD          5x STS test                Lunges- HOLD                                       Gait Training                                       Modalities

## 2025-04-12 DIAGNOSIS — L29.9 PRURITUS: ICD-10-CM

## 2025-04-14 RX ORDER — PREGABALIN 50 MG/1
50 CAPSULE ORAL 3 TIMES DAILY
Qty: 270 CAPSULE | Refills: 0 | Status: SHIPPED | OUTPATIENT
Start: 2025-04-14

## 2025-07-02 ENCOUNTER — TELEPHONE (OUTPATIENT)
Age: 75
End: 2025-07-02

## 2025-07-02 NOTE — TELEPHONE ENCOUNTER
Caller: Ryan     Doctor/Office: Dr Felton     Call regarding :        Call was transferred to: Warm transfer to

## 2025-07-10 ENCOUNTER — RA CDI HCC (OUTPATIENT)
Dept: OTHER | Facility: HOSPITAL | Age: 75
End: 2025-07-10

## 2025-07-11 ENCOUNTER — APPOINTMENT (OUTPATIENT)
Dept: LAB | Facility: CLINIC | Age: 75
End: 2025-07-11
Attending: FAMILY MEDICINE
Payer: MEDICARE

## 2025-07-11 DIAGNOSIS — G89.29 CHRONIC MIDLINE LOW BACK PAIN WITHOUT SCIATICA: ICD-10-CM

## 2025-07-11 DIAGNOSIS — M54.50 CHRONIC MIDLINE LOW BACK PAIN WITHOUT SCIATICA: ICD-10-CM

## 2025-07-11 DIAGNOSIS — G89.4 CHRONIC PAIN SYNDROME: ICD-10-CM

## 2025-07-11 DIAGNOSIS — G50.0 TRIGEMINAL NEURALGIA: ICD-10-CM

## 2025-07-11 LAB
ALBUMIN SERPL BCG-MCNC: 3.9 G/DL (ref 3.5–5)
ALP SERPL-CCNC: 65 U/L (ref 34–104)
ALT SERPL W P-5'-P-CCNC: 14 U/L (ref 7–52)
ANION GAP SERPL CALCULATED.3IONS-SCNC: 4 MMOL/L (ref 4–13)
AST SERPL W P-5'-P-CCNC: 20 U/L (ref 13–39)
BILIRUB SERPL-MCNC: 0.51 MG/DL (ref 0.2–1)
BUN SERPL-MCNC: 9 MG/DL (ref 5–25)
CALCIUM SERPL-MCNC: 10 MG/DL (ref 8.4–10.2)
CHLORIDE SERPL-SCNC: 107 MMOL/L (ref 96–108)
CO2 SERPL-SCNC: 28 MMOL/L (ref 21–32)
CREAT SERPL-MCNC: 1.05 MG/DL (ref 0.6–1.3)
ERYTHROCYTE [DISTWIDTH] IN BLOOD BY AUTOMATED COUNT: 14 % (ref 11.6–15.1)
GFR SERPL CREATININE-BSD FRML MDRD: 69 ML/MIN/1.73SQ M
GLUCOSE P FAST SERPL-MCNC: 93 MG/DL (ref 65–99)
HCT VFR BLD AUTO: 43.2 % (ref 36.5–49.3)
HGB BLD-MCNC: 14.2 G/DL (ref 12–17)
MCH RBC QN AUTO: 30.3 PG (ref 26.8–34.3)
MCHC RBC AUTO-ENTMCNC: 32.9 G/DL (ref 31.4–37.4)
MCV RBC AUTO: 92 FL (ref 82–98)
PLATELET # BLD AUTO: 226 THOUSANDS/UL (ref 149–390)
PMV BLD AUTO: 9.7 FL (ref 8.9–12.7)
POTASSIUM SERPL-SCNC: 4.5 MMOL/L (ref 3.5–5.3)
PROT SERPL-MCNC: 6.2 G/DL (ref 6.4–8.4)
RBC # BLD AUTO: 4.69 MILLION/UL (ref 3.88–5.62)
SODIUM SERPL-SCNC: 139 MMOL/L (ref 135–147)
TSH SERPL DL<=0.05 MIU/L-ACNC: 1.99 UIU/ML (ref 0.45–4.5)
WBC # BLD AUTO: 5.67 THOUSAND/UL (ref 4.31–10.16)

## 2025-07-11 PROCEDURE — 84443 ASSAY THYROID STIM HORMONE: CPT

## 2025-07-11 PROCEDURE — 36415 COLL VENOUS BLD VENIPUNCTURE: CPT

## 2025-07-11 PROCEDURE — 80053 COMPREHEN METABOLIC PANEL: CPT

## 2025-07-11 PROCEDURE — 85027 COMPLETE CBC AUTOMATED: CPT

## 2025-07-17 ENCOUNTER — OFFICE VISIT (OUTPATIENT)
Dept: FAMILY MEDICINE CLINIC | Facility: CLINIC | Age: 75
End: 2025-07-17
Payer: MEDICARE

## 2025-07-17 VITALS
HEIGHT: 70 IN | DIASTOLIC BLOOD PRESSURE: 82 MMHG | HEART RATE: 59 BPM | OXYGEN SATURATION: 96 % | TEMPERATURE: 98.2 F | BODY MASS INDEX: 34.56 KG/M2 | SYSTOLIC BLOOD PRESSURE: 142 MMHG | RESPIRATION RATE: 19 BRPM | WEIGHT: 241.4 LBS

## 2025-07-17 DIAGNOSIS — M54.50 CHRONIC MIDLINE LOW BACK PAIN WITHOUT SCIATICA: ICD-10-CM

## 2025-07-17 DIAGNOSIS — G89.4 CHRONIC PAIN SYNDROME: Primary | ICD-10-CM

## 2025-07-17 DIAGNOSIS — G50.0 TRIGEMINAL NEURALGIA: ICD-10-CM

## 2025-07-17 DIAGNOSIS — G89.29 CHRONIC MIDLINE LOW BACK PAIN WITHOUT SCIATICA: ICD-10-CM

## 2025-07-17 DIAGNOSIS — R26.89 BALANCE DISORDER: ICD-10-CM

## 2025-07-17 PROBLEM — Z01.818 PREOPERATIVE EVALUATION OF A MEDICAL CONDITION TO RULE OUT SURGICAL CONTRAINDICATIONS (TAR REQUIRED): Status: RESOLVED | Noted: 2022-05-26 | Resolved: 2025-07-17

## 2025-07-17 PROBLEM — G51.8 NEURALGIC FACIAL PAIN: Status: RESOLVED | Noted: 2021-08-30 | Resolved: 2025-07-17

## 2025-07-17 PROBLEM — Z23 NEED FOR PNEUMOCOCCAL VACCINATION: Status: RESOLVED | Noted: 2018-02-22 | Resolved: 2025-07-17

## 2025-07-17 PROCEDURE — 99213 OFFICE O/P EST LOW 20 MIN: CPT | Performed by: FAMILY MEDICINE

## 2025-07-17 PROCEDURE — G2211 COMPLEX E/M VISIT ADD ON: HCPCS | Performed by: FAMILY MEDICINE

## 2025-07-17 PROCEDURE — G0439 PPPS, SUBSEQ VISIT: HCPCS | Performed by: FAMILY MEDICINE

## 2025-07-17 RX ORDER — METHOCARBAMOL 500 MG/1
500 TABLET, FILM COATED ORAL
Qty: 30 TABLET | Refills: 1 | Status: SHIPPED | OUTPATIENT
Start: 2025-07-17

## 2025-07-17 NOTE — ASSESSMENT & PLAN NOTE
Balance disorder.  Patient given prescription to initiate balance therapy at local physical therapy department.  Orders:    Ambulatory Referral to Physical Therapy; Future

## 2025-07-17 NOTE — PROGRESS NOTES
Name: Miguel Angel Ramos      : 1950      MRN: [id number]  Encounter Provider: Joey Orourke MD  Encounter Date: 2025   Encounter department: [facility]  :  Assessment & Plan  Chronic pain syndrome    Orders:    methocarbamol (ROBAXIN) 500 mg tablet; Take 1 tablet (500 mg total) by mouth daily at bedtime as needed for muscle spasms    Balance disorder  Balance disorder.  Patient given prescription to initiate balance therapy at local physical therapy department.  Orders:    Ambulatory Referral to Physical Therapy; Future    Trigeminal neuralgia  Trigeminal neuralgia.  Patient feels symptoms are fairly well-controlled after having surgical option for chronic condition       Chronic midline low back pain without sciatica  Back pain.  Patient given prescription for methocarbamol to try 500 mg once at bedtime to see if it would help alleviate his back pain throughout the night.          Preventive health issues were discussed with patient, and age appropriate screening tests were ordered as noted in patient's After Visit Summary. Personalized health advice and appropriate referrals for health education or preventive services given if needed, as noted in patient's After Visit Summary.    History of Present Illness     Patient the office to review chronic medical condition.  He continues to struggle with chronic intermittent low back pain for which she has received therapeutic injections by pain management.  Patient has difficulties with falling asleep due to his symptoms.  He does take Lyrica 3 times daily.  He also takes Tylenol as needed throughout the day.        He expressed frustration in dealing with his wife's significant depression and anxiety symptoms patient has bilateral hearing loss for which [facility].  Patient feels they have been helpful and helping him hear better       Patient Care Team:  Joey Orourke MD as PCP - General (Family  Medicine)  MD Rhina Keys MD Ariel Alicea, MD Bushra I Malik, MD Alan Listhaus, MD (Ophthalmology)  Mayra Barajas DDS    Review of Systems   Constitutional: Negative.    HENT:  Positive for hearing loss.    Eyes: Negative.    Respiratory: Negative.     Cardiovascular: Negative.    Gastrointestinal: Negative.    Genitourinary: Negative.    Musculoskeletal:  Positive for back pain and gait problem.   Skin: Negative.    Allergic/Immunologic: Negative.    Neurological:  Positive for dizziness and light-headedness.   Hematological: Negative.    Psychiatric/Behavioral:  Positive for dysphoric mood. The patient is nervous/anxious.      Medical History Reviewed by provider this encounter:  Meds       Annual Wellness Visit Questionnaire       Health Risk Assessment:   Patient rates overall health as fair. Patient feels that their physical health rating is slightly worse. Patient is dissatisfied with their life. Eyesight was rated as slightly worse. Hearing was rated as much worse. Patient feels that their emotional and mental health rating is slightly worse. Patients states they are never, rarely angry. Patient states they are sometimes unusually tired/fatigued. Pain experienced in the last 7 days has been a lot. Patient's pain rating has been 7/10. Patient states that he has experienced no weight loss or gain in last 6 months. Suffering from intense back pain    Depression Screening:   PHQ-2 Score: 2      Fall Risk Screening:   In the past year, patient has experienced: no history of falling in past year      Home Safety:  Patient does not have trouble with stairs inside or outside of their home. Patient has working smoke alarms and has working carbon monoxide detector. Home safety hazards include: none.     Nutrition:   Current diet is Regular.     Medications:   Patient is currently taking over-the-counter supplements. OTC medications include: see medication list. Patient is able to manage  medications.     Activities of Daily Living (ADLs)/Instrumental Activities of Daily Living (IADLs):   Walk and transfer into and out of bed and chair?: Yes  Dress and groom yourself?: Yes    Bathe or shower yourself?: Yes    Feed yourself? Yes  Do your laundry/housekeeping?: Yes  Manage your money, pay your bills and track your expenses?: Yes  Make your own meals?: Yes    Do your own shopping?: Yes    Previous Hospitalizations:   Any hospitalizations or ED visits within the last 12 months?: No      Advance Care Planning:   Living will: Yes    Durable POA for healthcare: Yes    Advanced directive: Yes      Preventive Screenings      Cardiovascular Screening:    General: Screening Current      Diabetes Screening:     General: Screening Current      Colorectal Cancer Screening:     General: Screening Current      Prostate Cancer Screening:    General: Screening Not Indicated      Abdominal Aortic Aneurysm (AAA) Screening:    Risk factors include: age between 65-76 yo        Lung Cancer Screening:     General: Screening Not Indicated    Immunizations:  - Immunizations due: Zoster (Shingrix)    Screening, Brief Intervention, and Referral to Treatment (SBIRT)     Screening  Typical number of drinks in a day: 0  Typical number of drinks in a week: 3  Interpretation: Low risk drinking behavior.    AUDIT-C Screenin) How often did you have a drink containing alcohol in the past year? 2 to 4 times a month  2) How many drinks did you have on a typical day when you were drinking in the past year? 3 to 4  3) How often did you have 6 or more drinks on one occasion in the past year? never    AUDIT-C Score: 2  Interpretation: Score 0-3 (male): Negative screen for alcohol misuse    Single Item Drug Screening:  How often have you used an illegal drug (including marijuana) or a prescription medication for non-medical reasons in the past year? never    Single Item Drug Screen Score: 0  Interpretation: Negative screen for possible  "drug use disorder    Social Drivers of Health     Financial Resource Strain: Low Risk  (1/4/2023)    Overall Financial Resource Strain (CARDIA)     Difficulty of Paying Living Expenses: Not very hard   Food Insecurity: No Food Insecurity (7/14/2025)    Nursing - Inadequate Food Risk Classification     Worried About Running Out of Food in the Last Year: Never true     Ran Out of Food in the Last Year: Never true   Transportation Needs: No Transportation Needs (7/14/2025)    PRAPARE - Transportation     Lack of Transportation (Medical): No     Lack of Transportation (Non-Medical): No   Housing Stability: Low Risk  (7/14/2025)    Housing Stability Vital Sign     Unable to Pay for Housing in the Last Year: No     Number of Times Moved in the Last Year: 0     Homeless in the Last Year: No   Utilities: Not At Risk (7/8/2024)    [facility] Utilities     Threatened with loss of utilities: No     No results found.    Objective   /82 (BP Location: Right arm, Patient Position: Sitting, Cuff Size: Adult)   Pulse 59   Temp 98.2 °F (36.8 °C) (Temporal)   Resp 19   Ht 5' 10\" (1.778 m)   Wt 109 kg (241 lb 6.4 oz)   SpO2 96%   BMI 34.64 kg/m²     Physical Exam  Vitals and nursing note reviewed.   Constitutional:       General: He is not in acute distress.     Appearance: Normal appearance. He is well-developed. He is not ill-appearing.   HENT:      Head: Normocephalic and atraumatic.     Eyes:      General:         Right eye: No discharge.         Left eye: No discharge.      Extraocular Movements: Extraocular movements intact.      Conjunctiva/sclera: Conjunctivae normal.      Pupils: Pupils are equal, round, and reactive to light.     Neck:      Vascular: No carotid bruit.     Cardiovascular:      Rate and Rhythm: Normal rate and regular rhythm.      Heart sounds: Normal heart sounds. No murmur heard.  Pulmonary:      Effort: Pulmonary effort is normal. No respiratory distress.      Breath sounds: Normal breath sounds. No " wheezing or rhonchi.   Abdominal:      General: Abdomen is flat. Bowel sounds are normal.      Palpations: Abdomen is soft.      Tenderness: There is no abdominal tenderness. There is no guarding or rebound.     Musculoskeletal:      Right lower leg: No edema.      Left lower leg: No edema.   Lymphadenopathy:      Cervical: No cervical adenopathy.     Skin:     General: Skin is warm and dry.      Capillary Refill: Capillary refill takes less than 2 seconds.     Neurological:      Mental Status: He is alert. Mental status is at baseline.     Psychiatric:         Mood and Affect: Mood normal.         Behavior: Behavior normal.         Thought Content: Thought content normal.         Judgment: Judgment normal.

## 2025-07-17 NOTE — ASSESSMENT & PLAN NOTE
Trigeminal neuralgia.  Patient feels symptoms are fairly well-controlled after having surgical option for chronic condition

## 2025-07-17 NOTE — ASSESSMENT & PLAN NOTE
Orders:    methocarbamol (ROBAXIN) 500 mg tablet; Take 1 tablet (500 mg total) by mouth daily at bedtime as needed for muscle spasms

## 2025-07-17 NOTE — ASSESSMENT & PLAN NOTE
Back pain.  Patient given prescription for methocarbamol to try 500 mg once at bedtime to see if it would help alleviate his back pain throughout the night.

## 2025-07-24 DIAGNOSIS — L29.9 PRURITUS: ICD-10-CM

## 2025-07-25 RX ORDER — PREGABALIN 50 MG/1
50 CAPSULE ORAL 3 TIMES DAILY
Qty: 270 CAPSULE | Refills: 0 | Status: SHIPPED | OUTPATIENT
Start: 2025-07-25

## 2025-07-29 ENCOUNTER — HOSPITAL ENCOUNTER (OUTPATIENT)
Dept: RADIOLOGY | Facility: CLINIC | Age: 75
Discharge: HOME | End: 2025-07-29
Attending: ANESTHESIOLOGY
Payer: MEDICARE

## 2025-07-29 VITALS
HEART RATE: 67 BPM | DIASTOLIC BLOOD PRESSURE: 58 MMHG | OXYGEN SATURATION: 95 % | TEMPERATURE: 98.4 F | SYSTOLIC BLOOD PRESSURE: 115 MMHG | RESPIRATION RATE: 20 BRPM

## 2025-07-29 DIAGNOSIS — M54.16 LUMBAR RADICULOPATHY: ICD-10-CM

## 2025-07-29 PROCEDURE — 62323 NJX INTERLAMINAR LMBR/SAC: CPT | Performed by: ANESTHESIOLOGY

## 2025-07-29 RX ORDER — BUPIVACAINE HCL/PF 2.5 MG/ML
2 VIAL (ML) INJECTION ONCE
Status: COMPLETED | OUTPATIENT
Start: 2025-07-29 | End: 2025-07-29

## 2025-07-29 RX ORDER — METHYLPREDNISOLONE ACETATE 80 MG/ML
80 INJECTION, SUSPENSION INTRA-ARTICULAR; INTRALESIONAL; INTRAMUSCULAR; PARENTERAL; SOFT TISSUE ONCE
Status: COMPLETED | OUTPATIENT
Start: 2025-07-29 | End: 2025-07-29

## 2025-07-29 RX ADMIN — IOHEXOL 1 ML: 300 INJECTION, SOLUTION INTRAVENOUS at 11:19

## 2025-07-29 RX ADMIN — BUPIVACAINE HYDROCHLORIDE 2 ML: 2.5 INJECTION, SOLUTION EPIDURAL; INFILTRATION; INTRACAUDAL at 11:19

## 2025-07-29 RX ADMIN — METHYLPREDNISOLONE ACETATE 80 MG: 80 INJECTION, SUSPENSION INTRA-ARTICULAR; INTRALESIONAL; INTRAMUSCULAR; SOFT TISSUE at 11:19

## 2025-08-06 ENCOUNTER — EVALUATION (OUTPATIENT)
Dept: PHYSICAL THERAPY | Facility: REHABILITATION | Age: 75
End: 2025-08-06
Attending: FAMILY MEDICINE
Payer: MEDICARE

## 2025-08-06 DIAGNOSIS — R26.89 BALANCE DISORDER: ICD-10-CM

## 2025-08-06 PROCEDURE — 97162 PT EVAL MOD COMPLEX 30 MIN: CPT | Performed by: PHYSICAL THERAPIST

## 2025-08-12 ENCOUNTER — TELEPHONE (OUTPATIENT)
Dept: PAIN MEDICINE | Facility: CLINIC | Age: 75
End: 2025-08-12

## 2025-08-12 ENCOUNTER — OFFICE VISIT (OUTPATIENT)
Dept: PHYSICAL THERAPY | Facility: REHABILITATION | Age: 75
End: 2025-08-12
Attending: FAMILY MEDICINE
Payer: MEDICARE

## 2025-08-14 ENCOUNTER — OFFICE VISIT (OUTPATIENT)
Dept: PHYSICAL THERAPY | Facility: REHABILITATION | Age: 75
End: 2025-08-14
Attending: FAMILY MEDICINE
Payer: MEDICARE

## 2025-08-18 ENCOUNTER — OFFICE VISIT (OUTPATIENT)
Dept: PHYSICAL THERAPY | Facility: REHABILITATION | Age: 75
End: 2025-08-18
Attending: FAMILY MEDICINE
Payer: MEDICARE

## 2025-08-18 DIAGNOSIS — R26.89 BALANCE DISORDER: Primary | ICD-10-CM

## 2025-08-18 PROCEDURE — 97112 NEUROMUSCULAR REEDUCATION: CPT | Performed by: PHYSICAL THERAPIST

## 2025-08-20 ENCOUNTER — OFFICE VISIT (OUTPATIENT)
Dept: PHYSICAL THERAPY | Facility: REHABILITATION | Age: 75
End: 2025-08-20
Attending: FAMILY MEDICINE
Payer: MEDICARE

## 2025-08-20 DIAGNOSIS — R26.89 BALANCE DISORDER: Primary | ICD-10-CM

## 2025-08-20 PROCEDURE — 97112 NEUROMUSCULAR REEDUCATION: CPT | Performed by: PHYSICAL THERAPIST

## (undated) DEVICE — BUTTON SWITCH PENCIL HOLSTER: Brand: VALLEYLAB

## (undated) DEVICE — 3M™ IOBAN™ 2 ANTIMICROBIAL INCISE DRAPE 6650EZ: Brand: IOBAN™ 2

## (undated) DEVICE — PADDING CAST 4 IN  COTTON STRL

## (undated) DEVICE — INTENDED FOR TISSUE SEPARATION, AND OTHER PROCEDURES THAT REQUIRE A SHARP SURGICAL BLADE TO PUNCTURE OR CUT.: Brand: BARD-PARKER SAFETY BLADES SIZE 10, STERILE

## (undated) DEVICE — SCD SEQUENTIAL COMPRESSION COMFORT SLEEVE MEDIUM KNEE LENGTH: Brand: KENDALL SCD

## (undated) DEVICE — U-DRAPE: Brand: CONVERTORS

## (undated) DEVICE — DUAL CUT SAGITTAL BLADE

## (undated) DEVICE — THE SIMPULSE SOLO SYSTEM WITH ULTREX RETRACTABLE SPLASH SHIELD TIP: Brand: SIMPULSE SOLO

## (undated) DEVICE — IMPERVIOUS STOCKINETTE: Brand: DEROYAL

## (undated) DEVICE — SILVER-COATED ANTIMICROBIAL BARRIER DRESSING: Brand: ACTICOAT   4" X 8"

## (undated) DEVICE — CHLORAPREP HI-LITE 10.5ML ORANGE

## (undated) DEVICE — GLOVE INDICATOR PI UNDERGLOVE SZ 8.5 BLUE

## (undated) DEVICE — CUFF TOURNIQUET 34 X 4 IN QUICK CONNECT DISP 1BLA

## (undated) DEVICE — ABDOMINAL PAD: Brand: DERMACEA

## (undated) DEVICE — TRAY FOLEY 16FR URIMETER SURESTEP

## (undated) DEVICE — CHLORAPREP HI-LITE 26ML ORANGE

## (undated) DEVICE — RF GROUNDING PAD

## (undated) DEVICE — ACE WRAP 6 IN UNSTERILE

## (undated) DEVICE — PADDING,UNDERCAST,COTTON, 4"X4YD STERILE: Brand: MEDLINE

## (undated) DEVICE — BETHLEHEM UNIV TOTAL KNEE, KIT: Brand: CARDINAL HEALTH

## (undated) DEVICE — SPONGE PVP SCRUB WING STERILE

## (undated) DEVICE — SUT VICRYL PLUS 2-0 CTB-1 27 IN VCPB259H

## (undated) DEVICE — GLOVE SRG BIOGEL 8

## (undated) DEVICE — NEEDLE 18 G X 1 1/2 SAFETY

## (undated) DEVICE — GAUZE SPONGES,USP TYPE VII GAUZE, 12 PLY: Brand: CURITY

## (undated) DEVICE — HOOD: Brand: FLYTE, SURGICOOL

## (undated) DEVICE — CAPIT KNEE ATTUNE RP CEMENT - DEPUY

## (undated) DEVICE — COBAN 4 IN STERILE

## (undated) DEVICE — SPINNING CEMENT MIXING BOWL

## (undated) DEVICE — JP 3-SPRING RES W/10FR PVC DRAIN/TR: Brand: CARDINAL HEALTH

## (undated) DEVICE — SUT VICRYL PLUS 1 CTB-1 36 IN VCPB947H

## (undated) DEVICE — BANDAID SHEER SPOT

## (undated) DEVICE — RF CANNULA 22G

## (undated) DEVICE — RECIPROCATING BLADE, DOUBLE SIDED (70.0 X 0.96 X 12.5MM)